# Patient Record
Sex: MALE | Race: WHITE | NOT HISPANIC OR LATINO | Employment: OTHER | RURAL
[De-identification: names, ages, dates, MRNs, and addresses within clinical notes are randomized per-mention and may not be internally consistent; named-entity substitution may affect disease eponyms.]

---

## 2019-02-22 ENCOUNTER — HISTORICAL (OUTPATIENT)
Dept: ADMINISTRATIVE | Facility: HOSPITAL | Age: 79
End: 2019-02-22

## 2019-02-25 LAB
LAB AP CLINICAL INFORMATION: NORMAL
LAB AP DIAGNOSIS - HISTORICAL: NORMAL
LAB AP GROSS PATHOLOGY - HISTORICAL: NORMAL
LAB AP SPECIMEN SUBMITTED - HISTORICAL: NORMAL

## 2019-04-23 ENCOUNTER — HISTORICAL (OUTPATIENT)
Dept: ADMINISTRATIVE | Facility: HOSPITAL | Age: 79
End: 2019-04-23

## 2019-05-03 ENCOUNTER — HISTORICAL (OUTPATIENT)
Dept: ADMINISTRATIVE | Facility: HOSPITAL | Age: 79
End: 2019-05-03

## 2019-05-13 LAB — CRC RECOMMENDATION EXT: NORMAL

## 2020-06-11 ENCOUNTER — HISTORICAL (OUTPATIENT)
Dept: ADMINISTRATIVE | Facility: HOSPITAL | Age: 80
End: 2020-06-11

## 2021-04-13 ENCOUNTER — OFFICE VISIT (OUTPATIENT)
Dept: UROLOGY | Facility: CLINIC | Age: 81
End: 2021-04-13
Payer: MEDICARE

## 2021-04-13 VITALS
WEIGHT: 180 LBS | SYSTOLIC BLOOD PRESSURE: 149 MMHG | BODY MASS INDEX: 28.93 KG/M2 | DIASTOLIC BLOOD PRESSURE: 77 MMHG | TEMPERATURE: 98 F | HEART RATE: 71 BPM | HEIGHT: 66 IN

## 2021-04-13 DIAGNOSIS — N41.1 CHRONIC PROSTATITIS: ICD-10-CM

## 2021-04-13 DIAGNOSIS — N40.1 BENIGN PROSTATIC HYPERPLASIA WITH NOCTURIA: ICD-10-CM

## 2021-04-13 DIAGNOSIS — R35.1 BENIGN PROSTATIC HYPERPLASIA WITH NOCTURIA: ICD-10-CM

## 2021-04-13 DIAGNOSIS — N48.29 INFLAMMATION OF PENIS: Primary | ICD-10-CM

## 2021-04-13 PROCEDURE — 99214 OFFICE O/P EST MOD 30 MIN: CPT | Mod: PBBFAC | Performed by: UROLOGY

## 2021-04-13 PROCEDURE — 99999 PR PBB SHADOW E&M-EST. PATIENT-LVL IV: CPT | Mod: PBBFAC,,, | Performed by: UROLOGY

## 2021-04-13 PROCEDURE — 99213 OFFICE O/P EST LOW 20 MIN: CPT | Mod: S$PBB,,, | Performed by: UROLOGY

## 2021-04-13 PROCEDURE — 99213 PR OFFICE/OUTPT VISIT, EST, LEVL III, 20-29 MIN: ICD-10-PCS | Mod: S$PBB,,, | Performed by: UROLOGY

## 2021-04-13 PROCEDURE — 99999 PR PBB SHADOW E&M-EST. PATIENT-LVL IV: ICD-10-PCS | Mod: PBBFAC,,, | Performed by: UROLOGY

## 2021-04-13 RX ORDER — ASPIRIN 81 MG/1
81 TABLET ORAL DAILY
Status: ON HOLD | COMMUNITY
End: 2021-12-31 | Stop reason: HOSPADM

## 2021-04-13 RX ORDER — ALPRAZOLAM 0.5 MG/1
0.5 TABLET ORAL DAILY PRN
COMMUNITY
Start: 2021-02-24 | End: 2021-07-11

## 2021-04-13 RX ORDER — IMIPRAMINE HYDROCHLORIDE 25 MG/1
25 TABLET, FILM COATED ORAL DAILY
COMMUNITY
Start: 2021-01-27 | End: 2021-07-11

## 2021-04-13 RX ORDER — CARVEDILOL 3.12 MG/1
3.12 TABLET ORAL 2 TIMES DAILY
Status: ON HOLD | COMMUNITY
Start: 2021-01-27 | End: 2022-01-07 | Stop reason: HOSPADM

## 2021-04-13 RX ORDER — NITROGLYCERIN 0.4 MG/1
TABLET SUBLINGUAL
Status: ON HOLD | COMMUNITY
Start: 2021-02-09 | End: 2022-01-13 | Stop reason: HOSPADM

## 2021-04-13 RX ORDER — CLOPIDOGREL BISULFATE 75 MG/1
75 TABLET ORAL DAILY
Status: ON HOLD | COMMUNITY
Start: 2021-01-17 | End: 2021-12-31 | Stop reason: HOSPADM

## 2021-04-13 RX ORDER — AMLODIPINE BESYLATE 5 MG/1
2.5 TABLET ORAL DAILY
Status: ON HOLD | COMMUNITY
Start: 2021-01-27 | End: 2022-01-07 | Stop reason: HOSPADM

## 2021-04-13 RX ORDER — TERAZOSIN 5 MG/1
5 CAPSULE ORAL DAILY
Status: ON HOLD | COMMUNITY
Start: 2021-01-17 | End: 2022-01-13 | Stop reason: HOSPADM

## 2021-04-13 RX ORDER — CLOTRIMAZOLE AND BETAMETHASONE DIPROPIONATE 10; .64 MG/G; MG/G
CREAM TOPICAL
Qty: 45 G | Refills: 5 | OUTPATIENT
Start: 2021-04-13 | End: 2021-07-11

## 2021-04-13 RX ORDER — ROSUVASTATIN CALCIUM 20 MG/1
20 TABLET, COATED ORAL DAILY
COMMUNITY
Start: 2021-01-17 | End: 2022-10-19 | Stop reason: SDUPTHER

## 2021-04-13 RX ORDER — PANTOPRAZOLE SODIUM 40 MG/1
40 TABLET, DELAYED RELEASE ORAL DAILY
COMMUNITY
Start: 2021-03-06 | End: 2022-04-19 | Stop reason: SDUPTHER

## 2021-04-17 PROBLEM — N41.1 CHRONIC PROSTATITIS: Status: ACTIVE | Noted: 2021-04-17

## 2021-04-17 PROBLEM — N48.29: Status: ACTIVE | Noted: 2021-04-17

## 2021-04-17 PROBLEM — N40.1 BENIGN PROSTATIC HYPERPLASIA WITH NOCTURIA: Status: ACTIVE | Noted: 2021-04-17

## 2021-04-17 PROBLEM — R35.1 BENIGN PROSTATIC HYPERPLASIA WITH NOCTURIA: Status: ACTIVE | Noted: 2021-04-17

## 2021-05-17 ENCOUNTER — TELEPHONE (OUTPATIENT)
Dept: FAMILY MEDICINE | Facility: CLINIC | Age: 81
End: 2021-05-17

## 2021-05-17 ENCOUNTER — OFFICE VISIT (OUTPATIENT)
Dept: FAMILY MEDICINE | Facility: CLINIC | Age: 81
End: 2021-05-17
Payer: MEDICARE

## 2021-05-17 VITALS
SYSTOLIC BLOOD PRESSURE: 118 MMHG | WEIGHT: 179 LBS | BODY MASS INDEX: 28.77 KG/M2 | OXYGEN SATURATION: 98 % | RESPIRATION RATE: 16 BRPM | HEIGHT: 66 IN | HEART RATE: 65 BPM | DIASTOLIC BLOOD PRESSURE: 56 MMHG | TEMPERATURE: 98 F

## 2021-05-17 DIAGNOSIS — B86 SCABIES: Primary | ICD-10-CM

## 2021-05-17 DIAGNOSIS — L08.9 STAPHYLOCOCCAL INFECTION OF SKIN: ICD-10-CM

## 2021-05-17 DIAGNOSIS — B95.8 STAPHYLOCOCCAL INFECTION OF SKIN: ICD-10-CM

## 2021-05-17 DIAGNOSIS — T78.40XA ALLERGIC REACTION, INITIAL ENCOUNTER: ICD-10-CM

## 2021-05-17 PROCEDURE — 96372 PR INJECTION,THERAP/PROPH/DIAG2ST, IM OR SUBCUT: ICD-10-PCS | Mod: ,,, | Performed by: FAMILY MEDICINE

## 2021-05-17 PROCEDURE — 99214 PR OFFICE/OUTPT VISIT, EST, LEVL IV, 30-39 MIN: ICD-10-PCS | Mod: 25,,, | Performed by: FAMILY MEDICINE

## 2021-05-17 PROCEDURE — 99214 OFFICE O/P EST MOD 30 MIN: CPT | Mod: 25,,, | Performed by: FAMILY MEDICINE

## 2021-05-17 PROCEDURE — 96372 THER/PROPH/DIAG INJ SC/IM: CPT | Mod: ,,, | Performed by: FAMILY MEDICINE

## 2021-05-17 RX ORDER — DEXAMETHASONE SODIUM PHOSPHATE 4 MG/ML
4 INJECTION, SOLUTION INTRA-ARTICULAR; INTRALESIONAL; INTRAMUSCULAR; INTRAVENOUS; SOFT TISSUE
Status: COMPLETED | OUTPATIENT
Start: 2021-05-17 | End: 2021-05-17

## 2021-05-17 RX ORDER — METHYLPREDNISOLONE ACETATE 40 MG/ML
40 INJECTION, SUSPENSION INTRA-ARTICULAR; INTRALESIONAL; INTRAMUSCULAR; SOFT TISSUE
Status: COMPLETED | OUTPATIENT
Start: 2021-05-17 | End: 2021-05-17

## 2021-05-17 RX ORDER — HYDROXYZINE HYDROCHLORIDE 25 MG/1
25 TABLET, FILM COATED ORAL 3 TIMES DAILY PRN
Qty: 35 TABLET | Refills: 0 | Status: SHIPPED | OUTPATIENT
Start: 2021-05-17 | End: 2021-05-17

## 2021-05-17 RX ORDER — SULFAMETHOXAZOLE AND TRIMETHOPRIM 800; 160 MG/1; MG/1
1 TABLET ORAL 2 TIMES DAILY
Qty: 20 TABLET | Refills: 0 | Status: SHIPPED | OUTPATIENT
Start: 2021-05-17 | End: 2021-05-17

## 2021-05-17 RX ORDER — HYDROXYZINE HYDROCHLORIDE 25 MG/1
25 TABLET, FILM COATED ORAL 3 TIMES DAILY PRN
Qty: 35 TABLET | Refills: 0 | Status: SHIPPED | OUTPATIENT
Start: 2021-05-17 | End: 2021-06-14 | Stop reason: SDUPTHER

## 2021-05-17 RX ORDER — SULFAMETHOXAZOLE AND TRIMETHOPRIM 800; 160 MG/1; MG/1
1 TABLET ORAL 2 TIMES DAILY
Qty: 20 TABLET | Refills: 0 | Status: SHIPPED | OUTPATIENT
Start: 2021-05-17 | End: 2021-05-27

## 2021-05-17 RX ADMIN — METHYLPREDNISOLONE ACETATE 40 MG: 40 INJECTION, SUSPENSION INTRA-ARTICULAR; INTRALESIONAL; INTRAMUSCULAR; SOFT TISSUE at 02:05

## 2021-05-17 RX ADMIN — DEXAMETHASONE SODIUM PHOSPHATE 4 MG: 4 INJECTION, SOLUTION INTRA-ARTICULAR; INTRALESIONAL; INTRAMUSCULAR; INTRAVENOUS; SOFT TISSUE at 02:05

## 2021-06-02 PROBLEM — M17.11 ARTHRITIS OF RIGHT KNEE: Status: ACTIVE | Noted: 2021-06-02

## 2021-06-14 ENCOUNTER — OFFICE VISIT (OUTPATIENT)
Dept: FAMILY MEDICINE | Facility: CLINIC | Age: 81
End: 2021-06-14
Payer: MEDICARE

## 2021-06-14 ENCOUNTER — OFFICE VISIT (OUTPATIENT)
Dept: UROLOGY | Facility: CLINIC | Age: 81
End: 2021-06-14
Payer: MEDICARE

## 2021-06-14 ENCOUNTER — OFFICE VISIT (OUTPATIENT)
Dept: DERMATOLOGY | Facility: CLINIC | Age: 81
End: 2021-06-14
Payer: MEDICARE

## 2021-06-14 ENCOUNTER — HOSPITAL ENCOUNTER (OUTPATIENT)
Dept: RADIOLOGY | Facility: HOSPITAL | Age: 81
Discharge: HOME | End: 2021-06-14
Attending: UROLOGY
Payer: MEDICARE

## 2021-06-14 VITALS
WEIGHT: 174 LBS | DIASTOLIC BLOOD PRESSURE: 76 MMHG | HEIGHT: 66 IN | BODY MASS INDEX: 27.97 KG/M2 | SYSTOLIC BLOOD PRESSURE: 136 MMHG | HEART RATE: 77 BPM

## 2021-06-14 VITALS — RESPIRATION RATE: 16 BRPM | HEIGHT: 66 IN | WEIGHT: 175 LBS | BODY MASS INDEX: 28.12 KG/M2

## 2021-06-14 VITALS
HEART RATE: 92 BPM | WEIGHT: 175.19 LBS | BODY MASS INDEX: 28.15 KG/M2 | OXYGEN SATURATION: 98 % | TEMPERATURE: 99 F | DIASTOLIC BLOOD PRESSURE: 78 MMHG | HEIGHT: 66 IN | RESPIRATION RATE: 20 BRPM | SYSTOLIC BLOOD PRESSURE: 136 MMHG

## 2021-06-14 DIAGNOSIS — N47.6 BALANOPOSTHITIS: ICD-10-CM

## 2021-06-14 DIAGNOSIS — L73.9 FOLLICULITIS: Primary | ICD-10-CM

## 2021-06-14 DIAGNOSIS — N20.0 KIDNEY STONE: ICD-10-CM

## 2021-06-14 DIAGNOSIS — B86 SCABIES: Primary | ICD-10-CM

## 2021-06-14 DIAGNOSIS — N20.0 KIDNEY STONE: Primary | ICD-10-CM

## 2021-06-14 DIAGNOSIS — T78.40XA ALLERGIC REACTION, INITIAL ENCOUNTER: ICD-10-CM

## 2021-06-14 DIAGNOSIS — B86 SCABIES: ICD-10-CM

## 2021-06-14 PROCEDURE — 87077 CULTURE, WOUND: ICD-10-PCS | Mod: ,,, | Performed by: CLINICAL MEDICAL LABORATORY

## 2021-06-14 PROCEDURE — 99213 PR OFFICE/OUTPT VISIT, EST, LEVL III, 20-29 MIN: ICD-10-PCS | Mod: S$PBB,,, | Performed by: UROLOGY

## 2021-06-14 PROCEDURE — 99213 OFFICE O/P EST LOW 20 MIN: CPT | Mod: S$PBB,,, | Performed by: UROLOGY

## 2021-06-14 PROCEDURE — 99214 OFFICE O/P EST MOD 30 MIN: CPT | Mod: ,,, | Performed by: FAMILY MEDICINE

## 2021-06-14 PROCEDURE — 87077 CULTURE AEROBIC IDENTIFY: CPT | Mod: ,,, | Performed by: CLINICAL MEDICAL LABORATORY

## 2021-06-14 PROCEDURE — 87070 CULTURE, WOUND: ICD-10-PCS | Mod: ,,, | Performed by: CLINICAL MEDICAL LABORATORY

## 2021-06-14 PROCEDURE — 99203 OFFICE O/P NEW LOW 30 MIN: CPT | Mod: ,,, | Performed by: DERMATOLOGY

## 2021-06-14 PROCEDURE — 74018 RADEX ABDOMEN 1 VIEW: CPT | Mod: 26,,, | Performed by: RADIOLOGY

## 2021-06-14 PROCEDURE — 99203 PR OFFICE/OUTPT VISIT, NEW, LEVL III, 30-44 MIN: ICD-10-PCS | Mod: ,,, | Performed by: DERMATOLOGY

## 2021-06-14 PROCEDURE — 74018 XR KUB: ICD-10-PCS | Mod: 26,,, | Performed by: RADIOLOGY

## 2021-06-14 PROCEDURE — 87070 CULTURE OTHR SPECIMN AEROBIC: CPT | Mod: ,,, | Performed by: CLINICAL MEDICAL LABORATORY

## 2021-06-14 PROCEDURE — 87186 CULTURE, WOUND: ICD-10-PCS | Mod: ,,, | Performed by: CLINICAL MEDICAL LABORATORY

## 2021-06-14 PROCEDURE — 87186 SC STD MICRODIL/AGAR DIL: CPT | Mod: ,,, | Performed by: CLINICAL MEDICAL LABORATORY

## 2021-06-14 PROCEDURE — 99214 OFFICE O/P EST MOD 30 MIN: CPT | Mod: PBBFAC,25 | Performed by: UROLOGY

## 2021-06-14 PROCEDURE — 99214 PR OFFICE/OUTPT VISIT, EST, LEVL IV, 30-39 MIN: ICD-10-PCS | Mod: ,,, | Performed by: FAMILY MEDICINE

## 2021-06-14 PROCEDURE — 74018 RADEX ABDOMEN 1 VIEW: CPT | Mod: TC

## 2021-06-14 RX ORDER — GABAPENTIN 100 MG/1
CAPSULE ORAL
COMMUNITY
Start: 2021-06-09 | End: 2021-07-11

## 2021-06-14 RX ORDER — PERMETHRIN 50 MG/G
CREAM TOPICAL
Status: ON HOLD | COMMUNITY
Start: 2021-05-17 | End: 2022-01-13 | Stop reason: HOSPADM

## 2021-06-15 RX ORDER — DOXYCYCLINE 100 MG/1
CAPSULE ORAL
Qty: 60 CAPSULE | Refills: 0 | OUTPATIENT
Start: 2021-06-15 | End: 2021-07-11

## 2021-06-15 RX ORDER — CLINDAMYCIN PHOSPHATE 10 UG/ML
LOTION TOPICAL
Qty: 60 ML | Refills: 2 | OUTPATIENT
Start: 2021-06-15 | End: 2021-07-11

## 2021-06-15 RX ORDER — HYDROXYZINE HYDROCHLORIDE 25 MG/1
25 TABLET, FILM COATED ORAL NIGHTLY PRN
Qty: 30 TABLET | Refills: 0 | OUTPATIENT
Start: 2021-06-15 | End: 2021-07-11

## 2021-06-16 LAB — MICROORGANISM SPEC CULT: ABNORMAL

## 2021-06-25 ENCOUNTER — OFFICE VISIT (OUTPATIENT)
Dept: FAMILY MEDICINE | Facility: CLINIC | Age: 81
End: 2021-06-25
Payer: MEDICARE

## 2021-06-25 VITALS
HEART RATE: 68 BPM | BODY MASS INDEX: 28.26 KG/M2 | WEIGHT: 175.81 LBS | RESPIRATION RATE: 20 BRPM | HEIGHT: 66 IN | DIASTOLIC BLOOD PRESSURE: 68 MMHG | SYSTOLIC BLOOD PRESSURE: 120 MMHG | TEMPERATURE: 98 F | OXYGEN SATURATION: 99 %

## 2021-06-25 DIAGNOSIS — Z23 ENCOUNTER FOR IMMUNIZATION: ICD-10-CM

## 2021-06-25 DIAGNOSIS — Z12.5 ENCOUNTER FOR SCREENING FOR MALIGNANT NEOPLASM OF PROSTATE: ICD-10-CM

## 2021-06-25 DIAGNOSIS — Z00.00 ENCOUNTER FOR SUBSEQUENT ANNUAL WELLNESS VISIT (AWV) IN MEDICARE PATIENT: Primary | ICD-10-CM

## 2021-06-25 PROCEDURE — G0444 DEPRESSION SCREEN ANNUAL: HCPCS | Mod: ,,, | Performed by: NURSE PRACTITIONER

## 2021-06-25 PROCEDURE — 90471 PR IMMUNIZ ADMIN,1 SINGLE/COMB VAC/TOXOID: ICD-10-PCS | Mod: ,,, | Performed by: NURSE PRACTITIONER

## 2021-06-25 PROCEDURE — 1036F TOBACCO NON-USER: CPT | Mod: ,,, | Performed by: NURSE PRACTITIONER

## 2021-06-25 PROCEDURE — 1036F PR CURRENT TOBACCO NON-USER: ICD-10-PCS | Mod: ,,, | Performed by: NURSE PRACTITIONER

## 2021-06-25 PROCEDURE — 3048F LDL-C <100 MG/DL: CPT | Mod: ,,, | Performed by: NURSE PRACTITIONER

## 2021-06-25 PROCEDURE — 90715 TDAP VACCINE 7 YRS/> IM: CPT | Mod: ,,, | Performed by: NURSE PRACTITIONER

## 2021-06-25 PROCEDURE — G0439 PR MEDICARE ANNUAL WELLNESS SUBSEQUENT VISIT: ICD-10-PCS | Mod: ,,, | Performed by: NURSE PRACTITIONER

## 2021-06-25 PROCEDURE — 3048F PR MOST RECENT LDL-C < 100 MG/DL: ICD-10-PCS | Mod: ,,, | Performed by: NURSE PRACTITIONER

## 2021-06-25 PROCEDURE — 90715 PR TDAP VACCINE >7 YO, IM: ICD-10-PCS | Mod: ,,, | Performed by: NURSE PRACTITIONER

## 2021-06-25 PROCEDURE — G0439 PPPS, SUBSEQ VISIT: HCPCS | Mod: ,,, | Performed by: NURSE PRACTITIONER

## 2021-06-25 PROCEDURE — G0444 PR DEPRESSION SCREENING: ICD-10-PCS | Mod: ,,, | Performed by: NURSE PRACTITIONER

## 2021-06-25 PROCEDURE — 90471 IMMUNIZATION ADMIN: CPT | Mod: ,,, | Performed by: NURSE PRACTITIONER

## 2021-07-11 ENCOUNTER — HOSPITAL ENCOUNTER (EMERGENCY)
Facility: HOSPITAL | Age: 81
Discharge: HOME | End: 2021-07-11
Payer: MEDICARE

## 2021-07-11 VITALS
BODY MASS INDEX: 27.32 KG/M2 | WEIGHT: 170 LBS | RESPIRATION RATE: 20 BRPM | TEMPERATURE: 98 F | HEIGHT: 66 IN | OXYGEN SATURATION: 99 % | HEART RATE: 56 BPM | SYSTOLIC BLOOD PRESSURE: 109 MMHG | DIASTOLIC BLOOD PRESSURE: 73 MMHG

## 2021-07-11 DIAGNOSIS — M25.521 RIGHT ELBOW PAIN: Primary | ICD-10-CM

## 2021-07-11 PROCEDURE — 99283 EMERGENCY DEPT VISIT LOW MDM: CPT | Mod: GF | Performed by: NURSE PRACTITIONER

## 2021-07-11 PROCEDURE — 63600175 PHARM REV CODE 636 W HCPCS: Performed by: NURSE PRACTITIONER

## 2021-07-11 PROCEDURE — 96372 THER/PROPH/DIAG INJ SC/IM: CPT

## 2021-07-11 PROCEDURE — 99284 EMERGENCY DEPT VISIT MOD MDM: CPT

## 2021-07-11 RX ORDER — KETOROLAC TROMETHAMINE 30 MG/ML
30 INJECTION, SOLUTION INTRAMUSCULAR; INTRAVENOUS
Status: COMPLETED | OUTPATIENT
Start: 2021-07-11 | End: 2021-07-11

## 2021-07-11 RX ORDER — HYDROCODONE BITARTRATE AND ACETAMINOPHEN 5; 325 MG/1; MG/1
1 TABLET ORAL EVERY 6 HOURS PRN
Qty: 12 TABLET | Refills: 0 | Status: SHIPPED | OUTPATIENT
Start: 2021-07-11 | End: 2021-07-14

## 2021-07-11 RX ADMIN — KETOROLAC TROMETHAMINE 30 MG: 30 INJECTION, SOLUTION INTRAMUSCULAR at 01:07

## 2021-07-12 ENCOUNTER — TELEPHONE (OUTPATIENT)
Dept: EMERGENCY MEDICINE | Facility: HOSPITAL | Age: 81
End: 2021-07-12

## 2021-07-14 ENCOUNTER — OFFICE VISIT (OUTPATIENT)
Dept: DERMATOLOGY | Facility: CLINIC | Age: 81
End: 2021-07-14
Payer: MEDICARE

## 2021-07-14 VITALS — WEIGHT: 170 LBS | RESPIRATION RATE: 18 BRPM | HEIGHT: 66 IN | BODY MASS INDEX: 27.32 KG/M2

## 2021-07-14 DIAGNOSIS — L73.9 FOLLICULITIS: Primary | ICD-10-CM

## 2021-07-14 PROBLEM — S52.124A CLOSED NONDISPLACED FRACTURE OF HEAD OF RIGHT RADIUS: Status: ACTIVE | Noted: 2021-07-14

## 2021-07-14 PROCEDURE — 99213 OFFICE O/P EST LOW 20 MIN: CPT | Mod: ,,, | Performed by: DERMATOLOGY

## 2021-07-14 PROCEDURE — 99213 PR OFFICE/OUTPT VISIT, EST, LEVL III, 20-29 MIN: ICD-10-PCS | Mod: ,,, | Performed by: DERMATOLOGY

## 2021-07-23 DIAGNOSIS — L73.9 FOLLICULITIS: Primary | ICD-10-CM

## 2021-07-25 RX ORDER — DOXYCYCLINE 100 MG/1
CAPSULE ORAL
Qty: 60 CAPSULE | Refills: 0 | Status: ON HOLD | OUTPATIENT
Start: 2021-07-25 | End: 2021-12-31 | Stop reason: HOSPADM

## 2021-07-28 ENCOUNTER — HOSPITAL ENCOUNTER (OUTPATIENT)
Dept: RADIOLOGY | Facility: HOSPITAL | Age: 81
Discharge: HOME | End: 2021-07-28
Attending: ORTHOPAEDIC SURGERY
Payer: MEDICARE

## 2021-07-28 DIAGNOSIS — S52.124D CLOSED NONDISPLACED FRACTURE OF HEAD OF RIGHT RADIUS WITH ROUTINE HEALING, SUBSEQUENT ENCOUNTER: ICD-10-CM

## 2021-07-28 PROCEDURE — 73080 X-RAY EXAM OF ELBOW: CPT | Mod: TC,RT

## 2021-08-11 ENCOUNTER — OFFICE VISIT (OUTPATIENT)
Dept: FAMILY MEDICINE | Facility: CLINIC | Age: 81
End: 2021-08-11
Payer: MEDICARE

## 2021-08-11 VITALS
WEIGHT: 181.19 LBS | RESPIRATION RATE: 20 BRPM | TEMPERATURE: 98 F | DIASTOLIC BLOOD PRESSURE: 68 MMHG | HEART RATE: 69 BPM | HEIGHT: 64 IN | OXYGEN SATURATION: 97 % | SYSTOLIC BLOOD PRESSURE: 120 MMHG | BODY MASS INDEX: 30.93 KG/M2

## 2021-08-11 DIAGNOSIS — L25.9 CONTACT DERMATITIS, UNSPECIFIED CONTACT DERMATITIS TYPE, UNSPECIFIED TRIGGER: Primary | ICD-10-CM

## 2021-08-11 PROCEDURE — 96372 THER/PROPH/DIAG INJ SC/IM: CPT | Mod: ,,, | Performed by: NURSE PRACTITIONER

## 2021-08-11 PROCEDURE — 99214 PR OFFICE/OUTPT VISIT, EST, LEVL IV, 30-39 MIN: ICD-10-PCS | Mod: 25,,, | Performed by: NURSE PRACTITIONER

## 2021-08-11 PROCEDURE — 99214 OFFICE O/P EST MOD 30 MIN: CPT | Mod: 25,,, | Performed by: NURSE PRACTITIONER

## 2021-08-11 PROCEDURE — 96372 PR INJECTION,THERAP/PROPH/DIAG2ST, IM OR SUBCUT: ICD-10-PCS | Mod: ,,, | Performed by: NURSE PRACTITIONER

## 2021-08-11 RX ORDER — PREDNISONE 10 MG/1
10 TABLET ORAL DAILY
Qty: 10 TABLET | Refills: 0 | Status: SHIPPED | OUTPATIENT
Start: 2021-08-11 | End: 2021-08-11 | Stop reason: SDUPTHER

## 2021-08-11 RX ORDER — CETIRIZINE HYDROCHLORIDE 10 MG/1
10 TABLET ORAL DAILY
Qty: 30 TABLET | Refills: 5 | Status: ON HOLD | OUTPATIENT
Start: 2021-08-11 | End: 2022-01-13 | Stop reason: HOSPADM

## 2021-08-11 RX ORDER — PREDNISONE 10 MG/1
10 TABLET ORAL DAILY
Qty: 10 TABLET | Refills: 0 | Status: ON HOLD | OUTPATIENT
Start: 2021-08-11 | End: 2022-01-07 | Stop reason: HOSPADM

## 2021-08-11 RX ORDER — CETIRIZINE HYDROCHLORIDE 10 MG/1
10 TABLET ORAL DAILY
Qty: 30 TABLET | Refills: 5 | Status: SHIPPED | OUTPATIENT
Start: 2021-08-11 | End: 2021-08-11 | Stop reason: SDUPTHER

## 2021-08-11 RX ORDER — GABAPENTIN 100 MG/1
100 CAPSULE ORAL DAILY
Status: ON HOLD | COMMUNITY
Start: 2021-07-17 | End: 2022-01-13 | Stop reason: HOSPADM

## 2021-08-11 RX ORDER — TRIAMCINOLONE ACETONIDE 1 MG/G
OINTMENT TOPICAL 2 TIMES DAILY
Qty: 454 G | Refills: 0 | Status: ON HOLD | OUTPATIENT
Start: 2021-08-11 | End: 2022-01-13 | Stop reason: HOSPADM

## 2021-08-11 RX ORDER — TRIAMCINOLONE ACETONIDE 1 MG/G
OINTMENT TOPICAL 2 TIMES DAILY
Qty: 454 G | Refills: 0 | Status: SHIPPED | OUTPATIENT
Start: 2021-08-11 | End: 2021-08-11 | Stop reason: SDUPTHER

## 2021-08-11 RX ORDER — DEXAMETHASONE SODIUM PHOSPHATE 4 MG/ML
6 INJECTION, SOLUTION INTRA-ARTICULAR; INTRALESIONAL; INTRAMUSCULAR; INTRAVENOUS; SOFT TISSUE ONCE
Status: COMPLETED | OUTPATIENT
Start: 2021-08-11 | End: 2021-08-11

## 2021-08-11 RX ADMIN — DEXAMETHASONE SODIUM PHOSPHATE 6 MG: 4 INJECTION, SOLUTION INTRA-ARTICULAR; INTRALESIONAL; INTRAMUSCULAR; INTRAVENOUS; SOFT TISSUE at 10:08

## 2021-08-12 PROCEDURE — 36415 COLL VENOUS BLD VENIPUNCTURE: CPT | Mod: ,,, | Performed by: CLINICAL MEDICAL LABORATORY

## 2021-08-12 PROCEDURE — 86003 ALLG SPEC IGE CRUDE XTRC EA: CPT | Mod: 90,GZ,, | Performed by: CLINICAL MEDICAL LABORATORY

## 2021-08-12 PROCEDURE — 86003 RESP PROFILE, REG SOUTH CENTRAL: ICD-10-PCS | Mod: 90,GZ,, | Performed by: CLINICAL MEDICAL LABORATORY

## 2021-08-12 PROCEDURE — 82785 ASSAY OF IGE: CPT | Mod: 90,GZ,, | Performed by: CLINICAL MEDICAL LABORATORY

## 2021-08-12 PROCEDURE — 36415 PR COLLECTION VENOUS BLOOD,VENIPUNCTURE: ICD-10-PCS | Mod: ,,, | Performed by: CLINICAL MEDICAL LABORATORY

## 2021-08-12 PROCEDURE — 82785 RESP PROFILE, REG SOUTH CENTRAL: ICD-10-PCS | Mod: 90,GZ,, | Performed by: CLINICAL MEDICAL LABORATORY

## 2021-08-13 LAB
A ALTERNATA IGE QN: <0.35 KU/L
A FUMIGATUS IGE QN: <0.35 KU/L
BERMUDA GRASS IGE QN: <0.35 KU/L
BOXELDER IGE QN: <0.35 KU/L
CALIF WALNUT IGE QN: <0.35 KU/L
CAT DANDER IGE QN: <0.35 KU/L
CLADOSPORIUM IGE QN: <0.35 KU/L
COMMON RAGWEED IGE QN: <0.35 KU/L
D FARINAE IGE QN: <0.35 KU/L
D PTERONYSS IGE QN: <0.35 KU/L
DEPRECATED IGE QN: <0.35 KU/L
DOG DANDER IGE QN: <0.35 KU/L
IGE SERPL-ACNC: 122 KU/L
MARSH ELDER IGE QN: <0.35 KU/L
MT JUNIPER IGE QN: <0.35 KU/L
P NOTATUM IGE QN: <0.35 KU/L
PECAN/HICK NUT IGE QN: <0.35 KU/L
ROACH IGE QN: <0.35 KU/L
SILVER BIRCH IGE QN: <0.35 KU/L
TIMOTHY IGE QN: <0.35 KU/L
WHITE ELM IGE QN: <0.35 KU/L
WHITE MULBERRY IGE QN: <0.35 KU/L
WHITE OAK IGE QN: <0.35 KU/L

## 2021-09-08 ENCOUNTER — HOSPITAL ENCOUNTER (OUTPATIENT)
Dept: RADIOLOGY | Facility: HOSPITAL | Age: 81
Discharge: HOME | End: 2021-09-08
Attending: ORTHOPAEDIC SURGERY
Payer: MEDICARE

## 2021-09-08 DIAGNOSIS — M25.561 ACUTE PAIN OF RIGHT KNEE: ICD-10-CM

## 2021-09-08 DIAGNOSIS — S52.124D CLOSED NONDISPLACED FRACTURE OF HEAD OF RIGHT RADIUS WITH ROUTINE HEALING, SUBSEQUENT ENCOUNTER: ICD-10-CM

## 2021-09-08 PROCEDURE — 73564 X-RAY EXAM KNEE 4 OR MORE: CPT | Mod: TC,RT

## 2021-09-08 PROCEDURE — 73080 X-RAY EXAM OF ELBOW: CPT | Mod: TC,RT

## 2021-10-18 ENCOUNTER — OFFICE VISIT (OUTPATIENT)
Dept: FAMILY MEDICINE | Facility: CLINIC | Age: 81
End: 2021-10-18
Payer: MEDICARE

## 2021-10-18 VITALS
HEART RATE: 58 BPM | DIASTOLIC BLOOD PRESSURE: 78 MMHG | TEMPERATURE: 98 F | SYSTOLIC BLOOD PRESSURE: 124 MMHG | WEIGHT: 181 LBS | BODY MASS INDEX: 31.07 KG/M2 | RESPIRATION RATE: 18 BRPM | OXYGEN SATURATION: 98 %

## 2021-10-18 DIAGNOSIS — I10 PRIMARY HYPERTENSION: Primary | ICD-10-CM

## 2021-10-18 DIAGNOSIS — L25.9 CONTACT DERMATITIS, UNSPECIFIED CONTACT DERMATITIS TYPE, UNSPECIFIED TRIGGER: ICD-10-CM

## 2021-10-18 DIAGNOSIS — Z23 IMMUNIZATION DUE: ICD-10-CM

## 2021-10-18 DIAGNOSIS — R97.20 ELEVATED PSA: ICD-10-CM

## 2021-10-18 DIAGNOSIS — N41.1 CHRONIC PROSTATITIS: ICD-10-CM

## 2021-10-18 PROCEDURE — G0008 FLU VACCINE - QUADRIVALENT - HIGH DOSE (65+) PRESERVATIVE FREE IM: ICD-10-PCS | Mod: ,,, | Performed by: NURSE PRACTITIONER

## 2021-10-18 PROCEDURE — 99214 OFFICE O/P EST MOD 30 MIN: CPT | Mod: ,,, | Performed by: NURSE PRACTITIONER

## 2021-10-18 PROCEDURE — 99214 PR OFFICE/OUTPT VISIT, EST, LEVL IV, 30-39 MIN: ICD-10-PCS | Mod: ,,, | Performed by: NURSE PRACTITIONER

## 2021-10-18 PROCEDURE — 90662 FLU VACCINE - QUADRIVALENT - HIGH DOSE (65+) PRESERVATIVE FREE IM: ICD-10-PCS | Mod: ,,, | Performed by: NURSE PRACTITIONER

## 2021-10-18 PROCEDURE — 90662 IIV NO PRSV INCREASED AG IM: CPT | Mod: ,,, | Performed by: NURSE PRACTITIONER

## 2021-10-18 PROCEDURE — G0008 ADMIN INFLUENZA VIRUS VAC: HCPCS | Mod: ,,, | Performed by: NURSE PRACTITIONER

## 2021-10-18 RX ORDER — MELOXICAM 7.5 MG/1
7.5 TABLET ORAL DAILY PRN
Qty: 90 TABLET | Refills: 1 | Status: ON HOLD | OUTPATIENT
Start: 2021-10-18 | End: 2022-01-07 | Stop reason: HOSPADM

## 2021-10-18 RX ORDER — MELOXICAM 7.5 MG/1
7.5 TABLET ORAL DAILY PRN
COMMUNITY
End: 2021-10-18 | Stop reason: SDUPTHER

## 2021-12-10 DIAGNOSIS — L25.9 CONTACT DERMATITIS, UNSPECIFIED CONTACT DERMATITIS TYPE, UNSPECIFIED TRIGGER: Primary | ICD-10-CM

## 2021-12-17 ENCOUNTER — HOSPITAL ENCOUNTER (OUTPATIENT)
Dept: RADIOLOGY | Facility: HOSPITAL | Age: 81
Discharge: HOME | End: 2021-12-17
Attending: ORTHOPAEDIC SURGERY
Payer: MEDICARE

## 2021-12-17 DIAGNOSIS — Z01.811 PRE-OPERATIVE RESPIRATORY EXAMINATION: ICD-10-CM

## 2021-12-17 PROCEDURE — 71046 X-RAY EXAM CHEST 2 VIEWS: CPT | Mod: 26,,,

## 2021-12-17 PROCEDURE — 71046 XR CHEST PA AND LATERAL: ICD-10-PCS | Mod: 26,,,

## 2021-12-17 PROCEDURE — 71046 X-RAY EXAM CHEST 2 VIEWS: CPT | Mod: TC

## 2021-12-20 ENCOUNTER — OFFICE VISIT (OUTPATIENT)
Dept: SLEEP MEDICINE | Facility: CLINIC | Age: 81
End: 2021-12-20
Attending: FAMILY MEDICINE
Payer: MEDICARE

## 2021-12-20 VITALS
HEART RATE: 63 BPM | SYSTOLIC BLOOD PRESSURE: 145 MMHG | DIASTOLIC BLOOD PRESSURE: 69 MMHG | HEIGHT: 66 IN | BODY MASS INDEX: 29.25 KG/M2 | WEIGHT: 182 LBS | OXYGEN SATURATION: 99 %

## 2021-12-20 DIAGNOSIS — G47.33 OSA ON CPAP: Primary | ICD-10-CM

## 2021-12-20 PROCEDURE — 99999 PR STA SHADOW: ICD-10-PCS | Mod: PBBFAC,,, | Performed by: FAMILY MEDICINE

## 2021-12-20 PROCEDURE — 99214 OFFICE O/P EST MOD 30 MIN: CPT | Mod: PBBFAC | Performed by: FAMILY MEDICINE

## 2021-12-20 PROCEDURE — 99213 OFFICE O/P EST LOW 20 MIN: CPT | Mod: S$PBB | Performed by: FAMILY MEDICINE

## 2021-12-20 PROCEDURE — 99999 PR STA SHADOW: CPT | Mod: PBBFAC,,, | Performed by: FAMILY MEDICINE

## 2021-12-28 ENCOUNTER — ANESTHESIA EVENT (OUTPATIENT)
Dept: SURGERY | Facility: HOSPITAL | Age: 81
DRG: 377 | End: 2021-12-28
Payer: MEDICARE

## 2021-12-28 ENCOUNTER — ANESTHESIA (OUTPATIENT)
Dept: SURGERY | Facility: HOSPITAL | Age: 81
DRG: 377 | End: 2021-12-28
Payer: MEDICARE

## 2021-12-28 PROBLEM — E78.5 DYSLIPIDEMIA: Status: ACTIVE | Noted: 2021-12-28

## 2021-12-28 PROBLEM — Z95.820 S/P ANGIOPLASTY WITH STENT: Status: RESOLVED | Noted: 2021-12-28 | Resolved: 2021-12-28

## 2021-12-28 PROBLEM — K21.9 GERD (GASTROESOPHAGEAL REFLUX DISEASE): Status: ACTIVE | Noted: 2021-12-28

## 2021-12-28 PROBLEM — Z95.5 H/O HEART ARTERY STENT: Status: ACTIVE | Noted: 2021-12-28

## 2021-12-28 PROBLEM — Z95.820 S/P ANGIOPLASTY WITH STENT: Status: ACTIVE | Noted: 2021-12-28

## 2021-12-28 PROCEDURE — D9220A PRA ANESTHESIA: Mod: ANES,ICN,, | Performed by: ANESTHESIOLOGY

## 2021-12-28 PROCEDURE — 64447 PERIPHERAL BLOCK: ICD-10-PCS | Mod: XU,RT,, | Performed by: ANESTHESIOLOGY

## 2021-12-28 PROCEDURE — 27201960 HC SPINAL TRAY: Performed by: ANESTHESIOLOGY

## 2021-12-28 PROCEDURE — 27000260 *HC AIRWAY ORAL: Performed by: ANESTHESIOLOGY

## 2021-12-28 PROCEDURE — 27200750 HC INSULATED NEEDLE/ STIMUPLEX: Performed by: ANESTHESIOLOGY

## 2021-12-28 PROCEDURE — 63600175 PHARM REV CODE 636 W HCPCS: Performed by: ANESTHESIOLOGY

## 2021-12-28 PROCEDURE — 63600175 PHARM REV CODE 636 W HCPCS: Performed by: NURSE ANESTHETIST, CERTIFIED REGISTERED

## 2021-12-28 PROCEDURE — 25000003 PHARM REV CODE 250: Performed by: ORTHOPAEDIC SURGERY

## 2021-12-28 PROCEDURE — D9220A PRA ANESTHESIA: ICD-10-PCS | Mod: CRNA,,, | Performed by: NURSE ANESTHETIST, CERTIFIED REGISTERED

## 2021-12-28 PROCEDURE — D9220A PRA ANESTHESIA: ICD-10-PCS | Mod: ANES,ICN,, | Performed by: ANESTHESIOLOGY

## 2021-12-28 PROCEDURE — 63600175 PHARM REV CODE 636 W HCPCS: Performed by: ORTHOPAEDIC SURGERY

## 2021-12-28 PROCEDURE — 27000716 HC OXISENSOR PROBE, ANY SIZE: Performed by: ANESTHESIOLOGY

## 2021-12-28 PROCEDURE — 27000510 HC BLANKET BAIR HUGGER ANY SIZE: Performed by: ANESTHESIOLOGY

## 2021-12-28 PROCEDURE — 25000003 PHARM REV CODE 250: Performed by: NURSE ANESTHETIST, CERTIFIED REGISTERED

## 2021-12-28 PROCEDURE — 27000177 HC AIRWAY, LARYNGEAL MASK: Performed by: ANESTHESIOLOGY

## 2021-12-28 PROCEDURE — 27000655: Performed by: ANESTHESIOLOGY

## 2021-12-28 PROCEDURE — 27000284 HC CANNULA NASAL: Performed by: ANESTHESIOLOGY

## 2021-12-28 PROCEDURE — 64447 NJX AA&/STRD FEMORAL NRV IMG: CPT | Mod: XU,RT,, | Performed by: ANESTHESIOLOGY

## 2021-12-28 PROCEDURE — D9220A PRA ANESTHESIA: Mod: CRNA,,, | Performed by: NURSE ANESTHETIST, CERTIFIED REGISTERED

## 2021-12-28 RX ORDER — EPHEDRINE SULFATE 50 MG/ML
INJECTION, SOLUTION INTRAVENOUS
Status: DISCONTINUED | OUTPATIENT
Start: 2021-12-28 | End: 2021-12-28

## 2021-12-28 RX ORDER — LIDOCAINE HYDROCHLORIDE 20 MG/ML
INJECTION, SOLUTION EPIDURAL; INFILTRATION; INTRACAUDAL; PERINEURAL
Status: DISCONTINUED | OUTPATIENT
Start: 2021-12-28 | End: 2021-12-28

## 2021-12-28 RX ORDER — GLYCOPYRROLATE 0.2 MG/ML
INJECTION INTRAMUSCULAR; INTRAVENOUS
Status: DISCONTINUED | OUTPATIENT
Start: 2021-12-28 | End: 2021-12-28

## 2021-12-28 RX ORDER — ONDANSETRON 2 MG/ML
INJECTION INTRAMUSCULAR; INTRAVENOUS
Status: DISCONTINUED | OUTPATIENT
Start: 2021-12-28 | End: 2021-12-28

## 2021-12-28 RX ORDER — CEFAZOLIN SODIUM 1 G/3ML
INJECTION, POWDER, FOR SOLUTION INTRAMUSCULAR; INTRAVENOUS
Status: DISCONTINUED | OUTPATIENT
Start: 2021-12-28 | End: 2021-12-28

## 2021-12-28 RX ORDER — MIDAZOLAM HYDROCHLORIDE 5 MG/ML
INJECTION INTRAMUSCULAR; INTRAVENOUS
Status: DISCONTINUED | OUTPATIENT
Start: 2021-12-28 | End: 2021-12-28

## 2021-12-28 RX ORDER — PROPOFOL 10 MG/ML
VIAL (ML) INTRAVENOUS
Status: DISCONTINUED | OUTPATIENT
Start: 2021-12-28 | End: 2021-12-28

## 2021-12-28 RX ADMIN — EPHEDRINE SULFATE 10 MG: 50 INJECTION INTRAVENOUS at 01:12

## 2021-12-28 RX ADMIN — MIDAZOLAM HYDROCHLORIDE 2 MG: 5 INJECTION, SOLUTION INTRAMUSCULAR; INTRAVENOUS at 01:12

## 2021-12-28 RX ADMIN — CEFAZOLIN 2000 MG: 1 INJECTION, POWDER, FOR SOLUTION INTRAMUSCULAR; INTRAVENOUS; PARENTERAL at 01:12

## 2021-12-28 RX ADMIN — GLYCOPYRROLATE 0.2 MG: 0.2 INJECTION INTRAMUSCULAR; INTRAVENOUS at 02:12

## 2021-12-28 RX ADMIN — PROPOFOL 50 MG: 10 INJECTION, EMULSION INTRAVENOUS at 01:12

## 2021-12-28 RX ADMIN — ONDANSETRON 8 MG: 2 INJECTION INTRAMUSCULAR; INTRAVENOUS at 01:12

## 2021-12-28 RX ADMIN — LIDOCAINE HYDROCHLORIDE 60 MG: 20 INJECTION, SOLUTION EPIDURAL; INFILTRATION; INTRACAUDAL; PERINEURAL at 01:12

## 2021-12-28 RX ADMIN — ROPIVACAINE HYDROCHLORIDE 30 ML: 7.5 INJECTION, SOLUTION EPIDURAL; PERINEURAL at 01:12

## 2021-12-28 RX ADMIN — VANCOMYCIN HYDROCHLORIDE 1000 MG: 1 INJECTION, POWDER, LYOPHILIZED, FOR SOLUTION INTRAVENOUS at 01:12

## 2021-12-29 ENCOUNTER — HOSPITAL ENCOUNTER (INPATIENT)
Facility: HOSPITAL | Age: 81
LOS: 2 days | Discharge: ANOTHER HOSPITAL | DRG: 556 | End: 2021-12-31
Attending: FAMILY MEDICINE | Admitting: FAMILY MEDICINE
Payer: MEDICARE

## 2021-12-29 DIAGNOSIS — M62.81 MUSCLE WEAKNESS (GENERALIZED): ICD-10-CM

## 2021-12-29 DIAGNOSIS — K92.1 GASTROINTESTINAL HEMORRHAGE WITH MELENA: Primary | ICD-10-CM

## 2021-12-29 DIAGNOSIS — R53.1 ASTHENIA: ICD-10-CM

## 2021-12-29 LAB
BILIRUB UR QL STRIP: NEGATIVE
CLARITY UR: CLEAR
COLOR UR: YELLOW
GLUCOSE UR STRIP-MCNC: NEGATIVE MG/DL
KETONES UR STRIP-SCNC: NEGATIVE MG/DL
LEUKOCYTE ESTERASE UR QL STRIP: NEGATIVE
NITRITE UR QL STRIP: NEGATIVE
PH UR STRIP: 6 PH UNITS
PROT UR QL STRIP: NEGATIVE
RBC # UR STRIP: NEGATIVE /UL
SP GR UR STRIP: 1.01
UROBILINOGEN UR STRIP-ACNC: 0.2 MG/DL

## 2021-12-29 PROCEDURE — 11000004 HC SNF PRIVATE

## 2021-12-29 PROCEDURE — 25000003 PHARM REV CODE 250: Performed by: NURSE PRACTITIONER

## 2021-12-29 PROCEDURE — 81003 URINALYSIS AUTO W/O SCOPE: CPT

## 2021-12-29 RX ORDER — HYDROCODONE BITARTRATE AND ACETAMINOPHEN 10; 325 MG/1; MG/1
1 TABLET ORAL EVERY 4 HOURS PRN
Status: DISCONTINUED | OUTPATIENT
Start: 2021-12-29 | End: 2021-12-31 | Stop reason: HOSPADM

## 2021-12-29 RX ORDER — TALC
6 POWDER (GRAM) TOPICAL NIGHTLY PRN
Status: DISCONTINUED | OUTPATIENT
Start: 2021-12-29 | End: 2021-12-31 | Stop reason: HOSPADM

## 2021-12-29 RX ORDER — GABAPENTIN 300 MG/1
300 CAPSULE ORAL DAILY
Status: DISCONTINUED | OUTPATIENT
Start: 2021-12-30 | End: 2021-12-31 | Stop reason: HOSPADM

## 2021-12-29 RX ORDER — CARVEDILOL 3.12 MG/1
3.12 TABLET ORAL 2 TIMES DAILY
Status: DISCONTINUED | OUTPATIENT
Start: 2021-12-29 | End: 2021-12-31 | Stop reason: HOSPADM

## 2021-12-29 RX ORDER — AMLODIPINE BESYLATE 2.5 MG/1
2.5 TABLET ORAL DAILY
Status: DISCONTINUED | OUTPATIENT
Start: 2021-12-30 | End: 2021-12-31 | Stop reason: HOSPADM

## 2021-12-29 RX ORDER — ATORVASTATIN CALCIUM 10 MG/1
10 TABLET, FILM COATED ORAL NIGHTLY
Status: DISCONTINUED | OUTPATIENT
Start: 2021-12-29 | End: 2021-12-31 | Stop reason: HOSPADM

## 2021-12-29 RX ORDER — CLOPIDOGREL BISULFATE 75 MG/1
75 TABLET ORAL DAILY
Status: DISCONTINUED | OUTPATIENT
Start: 2021-12-30 | End: 2021-12-31

## 2021-12-29 RX ORDER — PANTOPRAZOLE SODIUM 40 MG/1
40 TABLET, DELAYED RELEASE ORAL DAILY
Status: DISCONTINUED | OUTPATIENT
Start: 2021-12-30 | End: 2021-12-31

## 2021-12-29 RX ORDER — TAMSULOSIN HYDROCHLORIDE 0.4 MG/1
0.4 CAPSULE ORAL DAILY
Status: DISCONTINUED | OUTPATIENT
Start: 2021-12-30 | End: 2021-12-31 | Stop reason: HOSPADM

## 2021-12-29 RX ORDER — ACETAMINOPHEN 325 MG/1
650 TABLET ORAL EVERY 6 HOURS PRN
Status: DISCONTINUED | OUTPATIENT
Start: 2021-12-29 | End: 2021-12-31 | Stop reason: HOSPADM

## 2021-12-29 RX ORDER — ASPIRIN 81 MG/1
81 TABLET ORAL DAILY
Status: DISCONTINUED | OUTPATIENT
Start: 2021-12-30 | End: 2021-12-31

## 2021-12-29 RX ORDER — ROPIVACAINE HYDROCHLORIDE 7.5 MG/ML
INJECTION, SOLUTION EPIDURAL; PERINEURAL
Status: DISCONTINUED | OUTPATIENT
Start: 2021-12-28 | End: 2021-12-29

## 2021-12-29 RX ADMIN — HYDROCODONE BITARTRATE AND ACETAMINOPHEN 1 TABLET: 10; 325 TABLET ORAL at 05:12

## 2021-12-29 RX ADMIN — APIXABAN 2.5 MG: 2.5 TABLET, FILM COATED ORAL at 08:12

## 2021-12-29 RX ADMIN — ATORVASTATIN CALCIUM 10 MG: 10 TABLET, FILM COATED ORAL at 08:12

## 2021-12-29 RX ADMIN — CARVEDILOL 3.12 MG: 3.12 TABLET, FILM COATED ORAL at 08:12

## 2021-12-29 RX ADMIN — HYDROCODONE BITARTRATE AND ACETAMINOPHEN 1 TABLET: 10; 325 TABLET ORAL at 09:12

## 2021-12-29 NOTE — PLAN OF CARE
Care plan reviewed  Problem: Adult Inpatient Plan of Care  Goal: Plan of Care Review  Outcome: Ongoing, Progressing     Problem: Adult Inpatient Plan of Care  Goal: Patient-Specific Goal (Individualized)  Outcome: Ongoing, Progressing     Problem: Adult Inpatient Plan of Care  Goal: Absence of Hospital-Acquired Illness or Injury  Outcome: Ongoing, Progressing     Problem: Adult Inpatient Plan of Care  Goal: Optimal Comfort and Wellbeing  Outcome: Ongoing, Progressing

## 2021-12-29 NOTE — NURSING
Patient received via wheelchair to room 203. Dressing noted to right knee. Dressing removed and pictures taken of surgical incisions. Sherwood noted intact with small amount of bloody drainage to old drain area. New dressing applied. Patient and family oriented to room. Instructed to call with needs. Verbalized understanding. Safety measures in place. Will continue to monitor.

## 2021-12-30 PROBLEM — M62.81 MUSCLE WEAKNESS (GENERALIZED): Status: ACTIVE | Noted: 2021-12-30

## 2021-12-30 PROBLEM — T14.8XXA SURGICAL WOUND PRESENT: Status: ACTIVE | Noted: 2021-12-30

## 2021-12-30 PROBLEM — Z91.89 AT RISK FOR VENOUS THROMBOEMBOLISM (VTE): Status: ACTIVE | Noted: 2021-12-30

## 2021-12-30 PROBLEM — M25.561 ACUTE PAIN OF RIGHT KNEE: Status: ACTIVE | Noted: 2021-12-30

## 2021-12-30 LAB
ANION GAP SERPL CALCULATED.3IONS-SCNC: 12 MMOL/L (ref 7–16)
BUN SERPL-MCNC: 16 MG/DL (ref 7–18)
BUN/CREAT SERPL: 17 (ref 6–20)
CALCIUM SERPL-MCNC: 7.7 MG/DL (ref 8.5–10.1)
CHLORIDE SERPL-SCNC: 99 MMOL/L (ref 98–107)
CO2 SERPL-SCNC: 26 MMOL/L (ref 21–32)
CREAT SERPL-MCNC: 0.95 MG/DL (ref 0.7–1.3)
DIFFERENTIAL METHOD BLD: ABNORMAL
EOSINOPHIL # BLD AUTO: 0.01 K/UL (ref 0–0.5)
EOSINOPHIL NFR BLD AUTO: 0 % (ref 1–4)
ERYTHROCYTE [DISTWIDTH] IN BLOOD BY AUTOMATED COUNT: 16 % (ref 11.5–14.5)
GLUCOSE SERPL-MCNC: 114 MG/DL (ref 74–106)
HCT VFR BLD AUTO: 23.2 % (ref 40–54)
HGB BLD-MCNC: 7.8 G/DL (ref 13.5–18)
LYMPHOCYTES # BLD AUTO: 2.33 K/UL (ref 1–4.8)
LYMPHOCYTES NFR BLD AUTO: 11.4 % (ref 27–41)
LYMPHOCYTES NFR BLD MANUAL: 10 % (ref 27–41)
MCH RBC QN AUTO: 29.1 PG (ref 27–31)
MCHC RBC AUTO-ENTMCNC: 33.6 G/DL (ref 32–36)
MCV RBC AUTO: 86.6 FL (ref 80–96)
MONOCYTES # BLD AUTO: 10.32 K/UL (ref 0–0.8)
MONOCYTES NFR BLD AUTO: 50.7 % (ref 2–6)
MONOCYTES NFR BLD MANUAL: 36 % (ref 2–6)
MPC BLD CALC-MCNC: 11.5 FL (ref 9.4–12.4)
NEUTS SEG NFR BLD MANUAL: 54 % (ref 50–62)
NRBC BLD MANUAL-RTO: ABNORMAL %
PLATELET # BLD AUTO: 82 K/UL (ref 150–400)
PLATELET MORPHOLOGY: ABNORMAL
POTASSIUM SERPL-SCNC: 3.9 MMOL/L (ref 3.5–5.1)
RBC # BLD AUTO: 2.68 M/UL (ref 4.6–6.2)
SODIUM SERPL-SCNC: 133 MMOL/L (ref 136–145)
WBC # BLD AUTO: 20.37 K/UL (ref 4.5–11)

## 2021-12-30 PROCEDURE — 80048 BASIC METABOLIC PNL TOTAL CA: CPT | Performed by: FAMILY MEDICINE

## 2021-12-30 PROCEDURE — 11000004 HC SNF PRIVATE

## 2021-12-30 PROCEDURE — 36415 COLL VENOUS BLD VENIPUNCTURE: CPT | Performed by: FAMILY MEDICINE

## 2021-12-30 PROCEDURE — 97162 PT EVAL MOD COMPLEX 30 MIN: CPT

## 2021-12-30 PROCEDURE — 25000003 PHARM REV CODE 250: Performed by: NURSE PRACTITIONER

## 2021-12-30 PROCEDURE — 85025 COMPLETE CBC W/AUTO DIFF WBC: CPT

## 2021-12-30 PROCEDURE — 36415 COLL VENOUS BLD VENIPUNCTURE: CPT

## 2021-12-30 PROCEDURE — 25000003 PHARM REV CODE 250: Performed by: FAMILY MEDICINE

## 2021-12-30 PROCEDURE — 97165 OT EVAL LOW COMPLEX 30 MIN: CPT

## 2021-12-30 PROCEDURE — 94761 N-INVAS EAR/PLS OXIMETRY MLT: CPT

## 2021-12-30 PROCEDURE — 87040 BLOOD CULTURE FOR BACTERIA: CPT | Performed by: FAMILY MEDICINE

## 2021-12-30 RX ORDER — SYRING-NEEDL,DISP,INSUL,0.3 ML 29 G X1/2"
296 SYRINGE, EMPTY DISPOSABLE MISCELLANEOUS ONCE
Status: COMPLETED | OUTPATIENT
Start: 2021-12-30 | End: 2021-12-30

## 2021-12-30 RX ORDER — ONDANSETRON 4 MG/1
4 TABLET, FILM COATED ORAL EVERY 6 HOURS PRN
Status: DISCONTINUED | OUTPATIENT
Start: 2021-12-30 | End: 2021-12-31 | Stop reason: HOSPADM

## 2021-12-30 RX ORDER — LACTULOSE 10 G/15ML
10 SOLUTION ORAL 2 TIMES DAILY
Status: DISCONTINUED | OUTPATIENT
Start: 2021-12-30 | End: 2021-12-31 | Stop reason: HOSPADM

## 2021-12-30 RX ADMIN — HYDROCODONE BITARTRATE AND ACETAMINOPHEN 1 TABLET: 10; 325 TABLET ORAL at 06:12

## 2021-12-30 RX ADMIN — HYDROCODONE BITARTRATE AND ACETAMINOPHEN 1 TABLET: 10; 325 TABLET ORAL at 01:12

## 2021-12-30 RX ADMIN — HYDROCODONE BITARTRATE AND ACETAMINOPHEN 1 TABLET: 10; 325 TABLET ORAL at 03:12

## 2021-12-30 RX ADMIN — HYDROCODONE BITARTRATE AND ACETAMINOPHEN 1 TABLET: 10; 325 TABLET ORAL at 10:12

## 2021-12-30 RX ADMIN — TAMSULOSIN HYDROCHLORIDE 0.4 MG: 0.4 CAPSULE ORAL at 08:12

## 2021-12-30 RX ADMIN — ONDANSETRON HYDROCHLORIDE 4 MG: 4 TABLET, FILM COATED ORAL at 02:12

## 2021-12-30 RX ADMIN — Medication 296 ML: at 07:12

## 2021-12-30 RX ADMIN — LACTULOSE 10 G: 10 SOLUTION ORAL at 08:12

## 2021-12-30 RX ADMIN — GABAPENTIN 300 MG: 300 CAPSULE ORAL at 08:12

## 2021-12-30 RX ADMIN — CARVEDILOL 3.12 MG: 3.12 TABLET, FILM COATED ORAL at 08:12

## 2021-12-30 RX ADMIN — ATORVASTATIN CALCIUM 10 MG: 10 TABLET, FILM COATED ORAL at 08:12

## 2021-12-30 RX ADMIN — CLOPIDOGREL 75 MG: 75 TABLET, FILM COATED ORAL at 08:12

## 2021-12-30 RX ADMIN — AMLODIPINE BESYLATE 2.5 MG: 2.5 TABLET ORAL at 08:12

## 2021-12-30 RX ADMIN — ASPIRIN 81 MG: 81 TABLET, COATED ORAL at 08:12

## 2021-12-30 RX ADMIN — PANTOPRAZOLE SODIUM 40 MG: 40 TABLET, DELAYED RELEASE ORAL at 08:12

## 2021-12-30 RX ADMIN — APIXABAN 2.5 MG: 2.5 TABLET, FILM COATED ORAL at 08:12

## 2021-12-30 NOTE — PLAN OF CARE
Plan of care reviewed  Problem: Pain Acute  Goal: Acceptable Pain Control and Functional Ability  Outcome: Ongoing, Progressing     Problem: Adjustment to Surgery (Knee Arthroplasty)  Goal: Optimal Coping  Outcome: Ongoing, Progressing     Problem: Fall Injury Risk  Goal: Absence of Fall and Fall-Related Injury  Outcome: Ongoing, Progressing

## 2021-12-30 NOTE — SUBJECTIVE & OBJECTIVE
Past Medical History:   Diagnosis Date    Hyperlipidemia     Hypertension     Kidney stone     MI (myocardial infarction)     Sleep apnea     Urinary tract infection        Past Surgical History:   Procedure Laterality Date    CORONARY ANGIOPLASTY WITH STENT PLACEMENT      HERNIA REPAIR      JOINT REPLACEMENT      difficult to wake after surgery        Review of patient's allergies indicates:  No Known Allergies    Current Facility-Administered Medications on File Prior to Encounter   Medication    [DISCONTINUED] 0.9%  NaCl infusion    [DISCONTINUED] amLODIPine tablet 2.5 mg    [DISCONTINUED] apixaban tablet 2.5 mg    [DISCONTINUED] aspirin EC tablet 81 mg    [DISCONTINUED] atorvastatin tablet 10 mg    [DISCONTINUED] bisacodyL suppository 10 mg    [DISCONTINUED] carvediloL tablet 3.125 mg    [DISCONTINUED] clopidogreL tablet 75 mg    [DISCONTINUED] docusate sodium capsule 100 mg    [DISCONTINUED] gabapentin capsule 300 mg    [DISCONTINUED] HYDROcodone-acetaminophen 5-325 mg per tablet 1 tablet    [DISCONTINUED] morphine injection 4 mg    [DISCONTINUED] nitroGLYCERIN SL tablet 0.4 mg    [DISCONTINUED] ondansetron injection 4 mg    [DISCONTINUED] pantoprazole EC tablet 40 mg    [COMPLETED] ROPIvacaine (PF) 7.5 mg/mL (0.75 %) injection    [DISCONTINUED] tamsulosin 24 hr capsule 0.4 mg     Current Outpatient Medications on File Prior to Encounter   Medication Sig    amLODIPine (NORVASC) 5 MG tablet Take 2.5 mg by mouth once daily.    apixaban (ELIQUIS) 2.5 mg Tab Take 1 tablet (2.5 mg total) by mouth 2 (two) times daily. for 12 days    aspirin (ECOTRIN) 81 MG EC tablet Take 81 mg by mouth once daily.    carvediloL (COREG) 3.125 MG tablet Take 3.125 mg by mouth 2 (two) times a day.    cetirizine (ZYRTEC) 10 MG tablet Take 1 tablet (10 mg total) by mouth once daily.    clopidogreL (PLAVIX) 75 mg tablet Take 75 mg by mouth once daily.    cyclobenzaprine (FLEXERIL) 10 MG tablet TAKE 1 TABLET  BY MOUTH 3 TIMES DAILY AS NEEDED FOR MUSCLE SPASMS ..MAY CAUSE DROWSINESS ..NO ALCOHOL WHILE TAKING    gabapentin (NEURONTIN) 100 MG capsule 100 mg once daily. Prescribe 3x per day takes once daily    HYDROcodone-acetaminophen (NORCO)  mg per tablet Take 1 tablet by mouth every 4 (four) hours as needed for Pain.    imipramine (TOFRANIL) 25 MG tablet     meloxicam (MOBIC) 7.5 MG tablet Take 1 tablet (7.5 mg total) by mouth daily as needed for Pain.    nitroGLYCERIN (NITROSTAT) 0.4 MG SL tablet     pantoprazole (PROTONIX) 40 MG tablet Take 40 mg by mouth once daily.    permethrin (ELIMITE) 5 % cream     predniSONE (DELTASONE) 10 MG tablet Take 1 tablet (10 mg total) by mouth once daily.    rosuvastatin (CRESTOR) 20 MG tablet Take 20 mg by mouth once daily.    terazosin (HYTRIN) 5 MG capsule Take 5 mg by mouth once daily.    triamcinolone acetonide 0.1% (KENALOG) 0.1 % ointment Apply topically 2 (two) times daily.    amoxicillin-clavulanate 500-125mg (AUGMENTIN) 500-125 mg Tab TAKE 1 TABLET BY MOUTH 3 TIMES DAILY WITH FOOD UNTIL GONE (DRINK PLENTY OF WATER)    doxycycline (VIBRA-TABS) 100 MG tablet     doxycycline (VIBRAMYCIN) 100 MG Cap Take one 100mg capsule PO BID. DO NOT TAKE WITH DAIRY PRODUCTS    [DISCONTINUED] ALPRAZolam (XANAX) 0.5 MG tablet Take 0.5 mg by mouth daily as needed.    [DISCONTINUED] clindamycin (CLEOCIN T) 1 % lotion Apply to back, chest, and neck twice a day (Patient not taking: Reported on 6/25/2021)     Family History     Problem Relation (Age of Onset)    Cancer Mother        Tobacco Use    Smoking status: Former Smoker    Smokeless tobacco: Never Used    Tobacco comment: QUIT 50 +YEARS AGO   Substance and Sexual Activity    Alcohol use: Not Currently     Comment: QUIT 50 +YEARS AGO     Drug use: Never    Sexual activity: Not Currently     Review of Systems   Constitutional: Negative for chills and fever.   HENT: Negative for congestion and sore throat.     Respiratory: Negative for cough, chest tightness, shortness of breath and wheezing.    Cardiovascular: Negative for chest pain and palpitations.   Gastrointestinal: Negative for abdominal pain, constipation, diarrhea, nausea and vomiting.   Genitourinary: Negative for dysuria.   Musculoskeletal: Positive for gait problem.   Skin: Positive for wound. Negative for rash.   Neurological: Positive for weakness (generalized). Negative for headaches.   Psychiatric/Behavioral: Negative for confusion.   All other systems reviewed and are negative.    Objective:     Vital Signs (Most Recent):  Temp: 98.9 °F (37.2 °C) (12/29/21 2000)  Pulse: 99 (12/29/21 2000)  Resp: 20 (12/30/21 0607)  BP: (!) 141/68 (12/29/21 2000)  SpO2: 96 % (12/29/21 2000) Vital Signs (24h Range):  Temp:  [97.8 °F (36.6 °C)-98.9 °F (37.2 °C)] 98.9 °F (37.2 °C)  Pulse:  [66-99] 99  Resp:  [16-28] 20  SpO2:  [96 %-97 %] 96 %  BP: (130-148)/(59-68) 141/68     Weight: 85.5 kg (188 lb 9.6 oz)  Body mass index is 30.44 kg/m².    Physical Exam  Vitals reviewed.   Constitutional:       General: He is not in acute distress.     Appearance: He is not ill-appearing, toxic-appearing or diaphoretic.   HENT:      Head: Normocephalic and atraumatic.      Right Ear: External ear normal.      Left Ear: External ear normal.      Nose: Nose normal.   Eyes:      General: No scleral icterus.        Right eye: No discharge.         Left eye: No discharge.      Extraocular Movements: Extraocular movements intact.      Conjunctiva/sclera: Conjunctivae normal.      Pupils:      Right eye: Pupil is reactive.      Left eye: Pupil is reactive.   Cardiovascular:      Rate and Rhythm: Normal rate and regular rhythm.      Pulses: Normal pulses.      Heart sounds: Normal heart sounds. No murmur heard.  No friction rub. No gallop.    Pulmonary:      Effort: Pulmonary effort is normal. No respiratory distress.      Breath sounds: No stridor. No wheezing, rhonchi or rales.   Chest:       Chest wall: No tenderness.   Abdominal:      Palpations: Abdomen is soft.      Tenderness: There is no abdominal tenderness. There is no guarding.   Musculoskeletal:      Right knee: No deformity. No tenderness.      Right lower leg: No edema.      Left lower leg: No edema.        Legs:    Skin:     General: Skin is warm and dry.   Neurological:      General: No focal deficit present.      Mental Status: He is alert and oriented to person, place, and time.      Sensory: No sensory deficit.      Motor: Weakness (generalized) present.   Psychiatric:         Mood and Affect: Mood normal.             Significant Labs:   All pertinent labs within the past 24 hours have been reviewed.  BMP:   Recent Labs   Lab 12/30/21  0603   *   *   K 3.9   CL 99   CO2 26   BUN 16   CREATININE 0.95   CALCIUM 7.7*     CBC:   Recent Labs   Lab 12/28/21  1637 12/28/21  1637 12/28/21  2249 12/29/21  0330 12/30/21  0445   WBC 3.08*  --   --  7.63 20.37*   HGB 11.8*   < > 10.6* 10.0* 7.8*   HCT 35.2*   < > 33.3* 29.5* 23.2*   PLT 89*  --   --  88* 82*    < > = values in this interval not displayed.     Urine Studies:   Recent Labs   Lab 12/29/21 2013   COLORU Yellow   APPEARANCEUA Clear   PHUR 6.0   SPECGRAV 1.010   PROTEINUA Negative   GLUCUA Negative   KETONESU Negative   BILIRUBINUA Negative   OCCULTUA Negative   NITRITE Negative   UROBILINOGEN 0.2   LEUKOCYTESUR Negative       Significant Imaging:

## 2021-12-30 NOTE — ASSESSMENT & PLAN NOTE
- Dressing to be changed on post op day #3  - Apply Aquacel AG dressing. Repeat dressing change in 7 days. Notify physician of any wound drainage. D/c staples in 2 weeks from surgery and steri strip incicision

## 2021-12-30 NOTE — HPI
Mr. Peters is an 81 yo WM s/p RTKR by Dr. Epps on 12/28/2021 who is admitted to [facility] on 12/29/21 for generalized muscle weakness. Patient's PMH includes HTN, HLD, cardiac stent, and sleep apnea. According to records he tolerated the procedure w/o complications. He was evaluated by PT/OT at [facility] and is to be weight bearing as tolerated to [facility]. Patient has hx of LTKR and suffered abdominal pain and constipation following procedure. He denies any abdominal pain at this time. Last BM was 12/27/21. H&H on admit to [facility] 7.8/23.2. Patient with H&H 10/29.5 on 12/29 prior to d/c from [facility]. He reports pain to E. States he took one Norco on the morning of 12/29 prior to transport to [facility] this evening. Pain was well controlled following procedure with current regimen. He denies any chest pain or shortness of breath. His wife is at bedside. They have met with [facility] and goal is to be discharged back home with wife and HH. He is to remain on Eliquis for DVT ppx.     Full Code  VTE - Eliquis/PATRIC/early ambulation

## 2021-12-30 NOTE — H&P
[facility] Surgical [facility] Medicine  History & Physical    Patient Name: Elliott Peters  MRN: [id number]  Patient Class: - Swing  Admission Date: 12/29/2021  Attending Physician: Juan Kirkpatrick DO   Primary Care Provider: Izzy Olivier NP         Patient information was obtained from patient, spouse/SO and past medical records.     Subjective:     Principal Problem:Muscle weakness (generalized)    Chief Complaint: No chief complaint on file.       HPI: Mr. Peters is an 83 yo WM s/p RTKR by Dr. Epps on 12/28/2021 who is admitted to [facility] Bed on 12/29/21 for generalized muscle weakness. Patient's PMH includes HTN, HLD, cardiac stent, and sleep apnea. According to records he tolerated the procedure w/o complications. He was evaluated by PT/OT at [facility] and is to be weight bearing as tolerated to [facility]. Patient has hx of LTKR and suffered abdominal pain and constipation following procedure. He denies any abdominal pain at this time. Last BM was 12/27/21. H&H on admit to [facility] 7.8/23.2. Patient with H&H 10/29.5 on 12/29 prior to d/c from [facility]. He reports pain to [facility]. States he took one Norco on the morning of 12/29 prior to transport to [facility] this evening. Pain was well controlled following procedure with current regimen. He denies any chest pain or shortness of breath. His wife is at bedside. They have met with [facility] and goal is to be discharged back home with wife and HH. He is to remain on Eliquis for DVT ppx.     Full Code  VTE - Eliquis/PATRIC/early ambulation      Past Medical History:   Diagnosis Date    Hyperlipidemia     Hypertension     Kidney stone     MI (myocardial infarction)     Sleep apnea     Urinary tract infection        Past Surgical History:   Procedure Laterality Date    CORONARY ANGIOPLASTY WITH STENT PLACEMENT      HERNIA REPAIR      JOINT REPLACEMENT      difficult to wake after surgery        Review of patient's allergies indicates:  No Known Allergies    Current  Facility-Administered Medications on File Prior to Encounter   Medication    [DISCONTINUED] 0.9%  NaCl infusion    [DISCONTINUED] amLODIPine tablet 2.5 mg    [DISCONTINUED] apixaban tablet 2.5 mg    [DISCONTINUED] aspirin EC tablet 81 mg    [DISCONTINUED] atorvastatin tablet 10 mg    [DISCONTINUED] bisacodyL suppository 10 mg    [DISCONTINUED] carvediloL tablet 3.125 mg    [DISCONTINUED] clopidogreL tablet 75 mg    [DISCONTINUED] docusate sodium capsule 100 mg    [DISCONTINUED] gabapentin capsule 300 mg    [DISCONTINUED] HYDROcodone-acetaminophen 5-325 mg per tablet 1 tablet    [DISCONTINUED] morphine injection 4 mg    [DISCONTINUED] nitroGLYCERIN SL tablet 0.4 mg    [DISCONTINUED] ondansetron injection 4 mg    [DISCONTINUED] pantoprazole EC tablet 40 mg    [COMPLETED] ROPIvacaine (PF) 7.5 mg/mL (0.75 %) injection    [DISCONTINUED] tamsulosin 24 hr capsule 0.4 mg     Current Outpatient Medications on File Prior to Encounter   Medication Sig    amLODIPine (NORVASC) 5 MG tablet Take 2.5 mg by mouth once daily.    apixaban (ELIQUIS) 2.5 mg Tab Take 1 tablet (2.5 mg total) by mouth 2 (two) times daily. for 12 days    aspirin (ECOTRIN) 81 MG EC tablet Take 81 mg by mouth once daily.    carvediloL (COREG) 3.125 MG tablet Take 3.125 mg by mouth 2 (two) times a day.    cetirizine (ZYRTEC) 10 MG tablet Take 1 tablet (10 mg total) by mouth once daily.    clopidogreL (PLAVIX) 75 mg tablet Take 75 mg by mouth once daily.    cyclobenzaprine (FLEXERIL) 10 MG tablet TAKE 1 TABLET BY MOUTH 3 TIMES DAILY AS NEEDED FOR MUSCLE SPASMS ..MAY CAUSE DROWSINESS ..NO ALCOHOL WHILE TAKING    gabapentin (NEURONTIN) 100 MG capsule 100 mg once daily. Prescribe 3x per day takes once daily    HYDROcodone-acetaminophen (NORCO)  mg per tablet Take 1 tablet by mouth every 4 (four) hours as needed for Pain.    imipramine (TOFRANIL) 25 MG tablet     meloxicam (MOBIC) 7.5 MG tablet Take 1 tablet (7.5 mg total) by mouth daily as needed for Pain.     nitroGLYCERIN (NITROSTAT) 0.4 MG SL tablet     pantoprazole (PROTONIX) 40 MG tablet Take 40 mg by mouth once daily.    permethrin (ELIMITE) 5 % cream     predniSONE (DELTASONE) 10 MG tablet Take 1 tablet (10 mg total) by mouth once daily.    rosuvastatin (CRESTOR) 20 MG tablet Take 20 mg by mouth once daily.    terazosin (HYTRIN) 5 MG capsule Take 5 mg by mouth once daily.    triamcinolone acetonide 0.1% (KENALOG) 0.1 % ointment Apply topically 2 (two) times daily.    amoxicillin-clavulanate 500-125mg (AUGMENTIN) 500-125 mg Tab TAKE 1 TABLET BY MOUTH 3 TIMES DAILY WITH FOOD UNTIL GONE (DRINK PLENTY OF WATER)    doxycycline (VIBRA-TABS) 100 MG tablet     doxycycline (VIBRAMYCIN) 100 MG Cap Take one 100mg capsule PO BID. DO NOT TAKE WITH DAIRY PRODUCTS    [DISCONTINUED] ALPRAZolam (XANAX) 0.5 MG tablet Take 0.5 mg by mouth daily as needed.    [DISCONTINUED] clindamycin (CLEOCIN T) 1 % lotion Apply to back, chest, and neck twice a day (Patient not taking: Reported on 6/25/2021)     Family History       Problem Relation (Age of Onset)    Cancer Mother          Tobacco Use    Smoking status: Former Smoker    Smokeless tobacco: Never Used    Tobacco comment: QUIT 50 +YEARS AGO   Substance and Sexual Activity    Alcohol use: Not Currently     Comment: QUIT 50 +YEARS AGO     Drug use: Never    Sexual activity: Not Currently     Review of Systems   Constitutional: Negative for chills and fever.   HENT: Negative for congestion and sore throat.    Respiratory: Negative for cough, chest tightness, shortness of breath and wheezing.    Cardiovascular: Negative for chest pain and palpitations.   Gastrointestinal: Negative for abdominal pain, constipation, diarrhea, nausea and vomiting.   Genitourinary: Negative for dysuria.   Musculoskeletal: Positive for gait problem.   Skin: Positive for wound. Negative for rash.   Neurological: Positive for weakness (generalized). Negative for headaches.   Psychiatric/Behavioral: Negative  for confusion.   All other systems reviewed and are negative.    Objective:     Vital Signs (Most Recent):  Temp: 98.9 °F (37.2 °C) (12/29/21 2000)  Pulse: 99 (12/29/21 2000)  Resp: 20 (12/30/21 0607)  BP: (!) 141/68 (12/29/21 2000)  SpO2: 96 % (12/29/21 2000) Vital Signs (24h Range):  Temp:  [97.8 °F (36.6 °C)-98.9 °F (37.2 °C)] 98.9 °F (37.2 °C)  Pulse:  [66-99] 99  Resp:  [16-28] 20  SpO2:  [96 %-97 %] 96 %  BP: (130-148)/(59-68) 141/68     Weight: 85.5 kg (188 lb 9.6 oz)  Body mass index is 30.44 kg/m².    Physical Exam  Vitals reviewed.   Constitutional:       General: He is not in acute distress.     Appearance: He is not ill-appearing, toxic-appearing or diaphoretic.   HENT:      Head: Normocephalic and atraumatic.      Right Ear: External ear normal.      Left Ear: External ear normal.      Nose: Nose normal.   Eyes:      General: No scleral icterus.        Right eye: No discharge.         Left eye: No discharge.      Extraocular Movements: Extraocular movements intact.      Conjunctiva/sclera: Conjunctivae normal.      Pupils:      Right eye: Pupil is reactive.      Left eye: Pupil is reactive.   Cardiovascular:      Rate and Rhythm: Normal rate and regular rhythm.      Pulses: Normal pulses.      Heart sounds: Normal heart sounds. No murmur heard.  No friction rub. No gallop.    Pulmonary:      Effort: Pulmonary effort is normal. No respiratory distress.      Breath sounds: No stridor. No wheezing, rhonchi or rales.   Chest:      Chest wall: No tenderness.   Abdominal:      Palpations: Abdomen is soft.      Tenderness: There is no abdominal tenderness. There is no guarding.   Musculoskeletal:      Right knee: No deformity. No tenderness.      Right lower leg: No edema.      Left lower leg: No edema.        Legs:    Skin:     General: Skin is warm and dry.   Neurological:      General: No focal deficit present.      Mental Status: He is alert and oriented to person, place, and time.      Sensory: No sensory  deficit.      Motor: Weakness (generalized) present.   Psychiatric:         Mood and Affect: Mood normal.             Significant Labs:   All pertinent labs within the past 24 hours have been reviewed.  BMP:   Recent Labs   Lab 12/30/21  0603   *   *   K 3.9   CL 99   CO2 26   BUN 16   CREATININE 0.95   CALCIUM 7.7*     CBC:   Recent Labs   Lab 12/28/21  1637 12/28/21  1637 12/28/21  2249 12/29/21  0330 12/30/21  0445   WBC 3.08*  --   --  7.63 20.37*   HGB 11.8*   < > 10.6* 10.0* 7.8*   HCT 35.2*   < > 33.3* 29.5* 23.2*   PLT 89*  --   --  88* 82*    < > = values in this interval not displayed.     Urine Studies:   Recent Labs   Lab 12/29/21 2013   COLORU Yellow   APPEARANCEUA Clear   PHUR 6.0   SPECGRAV 1.010   PROTEINUA Negative   GLUCUA Negative   KETONESU Negative   BILIRUBINUA Negative   OCCULTUA Negative   NITRITE Negative   UROBILINOGEN 0.2   LEUKOCYTESUR Negative       Significant Imaging:     Assessment/Plan:     * Muscle weakness (generalized)  - s/p RTKR  - PT/OT eval and treat  - weight bearing RLE as tolerated      Surgical wound present  - Dressing to be changed on post op day #3  - Apply Aquacel AG dressing. Repeat dressing change in 7 days. Notify physician of any wound drainage. D/c staples in 2 weeks from surgery and steri strip incicision      Hypertension  - Amlodipine 2.5 mg daily   - Carvedilol 3.125 mg BID  - 141/68      At risk for venous thromboembolism (VTE)  - Eliquis 2.5 mg BID  - PATRIC  - Early ambulation        Acute pain of right knee  - Norco  mg q4h PRN      Hyperlipidemia  - Atorvastatin 10 mg nightly      VTE Risk Mitigation (From admission, onward)           Ordered     apixaban tablet 2.5 mg  2 times daily         12/29/21 1710                  I discussed this patient with the nurse practitioner.  I have personally evaluated the patient and performed a physical exam.  I have reviewed the nurse practitioner's documentation.  I agree with nurse practitioner's  assessment and plan.        NICOLÁS Lei  Department of Hospital Medicine   [facility] - Medical Surgical Unit

## 2021-12-30 NOTE — PLAN OF CARE
Problem: Occupational Therapy Goal  Goal: Occupational Therapy Goal  Description: Goals to be met by: Discharge     Patient will increase functional independence with ADLs by performing:    LE Dressing with Stand-by Assistance.  Toileting from toilet with Stand-by Assistance for hygiene and clothing management.   Bathing from  edge of bed with Stand-by Assistance.  Toilet transfer to toilet with Stand-by Assistance.  Increased functional strength to 4/5 for increased safety and independence with self care and mobility tasks.    Outcome: Ongoing, Progressing

## 2021-12-30 NOTE — PT/OT/SLP EVAL
Physical Therapy Evaluation    Patient Name:  Elliott Peters   MRN:  [id number]    Recommendations:     Discharge Recommendations:  home with home health,home health PT   Discharge Equipment Recommendations: bedside commode,walker, rolling   Barriers to discharge: None    Assessment:     Elliott Peters is a 81 y.o. male admitted with a medical diagnosis of Muscle weakness (generalized).  He presents with the following impairments/functional limitations:  weakness,gait instability,impaired endurance,impaired self care skills,impaired functional mobilty,impaired balance,pain,decreased lower extremity function .    Rehab Prognosis: Good; patient would benefit from acute skilled PT services to address these deficits and reach maximum level of function.    Recent Surgery: * No surgery found *      Plan:     During this hospitalization, patient to be seen BID to address the identified rehab impairments via gait training,therapeutic activities,therapeutic exercises,neuromuscular re-education and progress toward the following goals:    · Plan of Care Expires:       Subjective     Chief Complaint: Patient without complaints upon therapist arrival and is agreeable to treatment today.   Patient/Family Comments/goals:   Pain/Comfort:  · Pain Rating 1: 4/10  · Location - Side 1: Right  · Location 1: knee  · Pain Addressed 1: Distraction  · Pain Rating Post-Intervention 1: 4/10    Patients cultural, spiritual, Yarsanism conflicts given the current situation: no    Living Environment:  Patient lives with his wife in a single story home. No steps required for entry  Prior to admission, patients level of function was independent.  Equipment used at home: bedside commode,cane, straight.  DME owned (not currently used): none.  Upon discharge, patient will have assistance from his wife.    Objective:     Communicated with nursing staff prior to session.  Patient found supine with SCD  upon PT entry to room.    General Precautions:  Standard, fall   Orthopedic Precautions:RLE weight bearing as tolerated   Braces: N/A  Respiratory Status: Room air    Exams:  · Cognitive Exam:  Patient is oriented to Person, Place, Time and Situation  · RLE ROM: Deficits: PROM knee flexion 4-98  · RLE Strength: Deficits: 3-/5 gross  · LLE ROM: WFL  · LLE Strength: Deficits: 4/5 gross    Functional Mobility:  · Bed Mobility:     · Rolling Left:  minimum assistance  · Rolling Right: minimum assistance  · Supine to Sit: minimum assistance  · Transfers:     · Sit to Stand:  contact guard assistance with rolling walker  · Bed to Chair: minimum assistance with  rolling walker  using  Step Transfer  · Gait: 95 feet with use of front wheeled walker        AM-PAC 6 CLICK MOBILITY  Total Score:17     Patient left supine with call button in reach.    GOALS:   Multidisciplinary Problems     Physical Therapy Goals        Problem: Physical Therapy Goal    Goal Priority Disciplines Outcome Goal Variances Interventions   Physical Therapy Goal     PT, PT/OT Ongoing, Progressing     Description: Goals to be met by: Discharge     Patient will increase functional independence with mobility by performin. Supine to sit with [place]  2. Sit to stand transfer with [place]  3. Bed to chair transfer with [place] using Rolling Walker  4. Gait  x 300 feet with Modified [place] using Rolling Walker.                      History:     Past Medical History:   Diagnosis Date    Hyperlipidemia     Hypertension     Kidney stone     MI (myocardial infarction)     Sleep apnea     Urinary tract infection        Past Surgical History:   Procedure Laterality Date    CORONARY ANGIOPLASTY WITH STENT PLACEMENT      HERNIA REPAIR      JOINT REPLACEMENT      difficult to wake after surgery        Time Tracking:     PT Received On: 21  PT Start Time: 1000     PT Stop Time: 1030  PT Total Time (min): 30 min     Billable Minutes: Evaluation 30      2021

## 2021-12-30 NOTE — PLAN OF CARE
[facility] - Medical Surgical Unit  Initial Discharge Assessment       Primary Care Provider: Izzy Olivier NP    Admission Diagnosis: Asthenia [R53.1]    Admission Date: 12/29/2021  Expected Discharge Date:     Discharge Barriers Identified: None    Payor: MEDICARE / Plan: MEDICARE PART A & B / Product Type: Government /     Extended Emergency Contact Information  Primary Emergency Contact: Teresa Peters  Mobile Phone: [phone]  Relation: Spouse    Discharge Plan A: Home Health  Discharge Plan B: Home,Home Health      [facility] - [place], [place] - [address] AT [facility]  [address]  [facility] [address]  Phone: [phone] Fax: [phone]    [facility] - [place], [place] - [address]  [address]  [place] [address]  Phone: [phone] Fax: [phone]    [facility] - [place], [place] - [address]  [address]  [place] [address]  Phone: [phone] Fax: [phone]    [facility] [place] [place] - [address] [place] [address]  [address] [place] [address] [place] [address]  Phone: [phone] Fax: [phone]      Initial Assessment (most recent)     Adult Discharge Assessment - 12/30/21 1006        Discharge Assessment    Assessment Type Discharge Planning Assessment     Source of Information patient     Lives With spouse     Do you expect to return to your current living situation? Yes     Do you have help at home or someone to help you manage your care at home? Yes     Who are your caregiver(s) and their phone number(s)? teresa peters spouse [phone]     Equipment Currently Used at Home CPAP;bedside commode;cane, straight     Do you currently have service(s) that help you manage your care at home? No     Are you on dialysis? No     Discharge Plan A Home Health     Discharge Plan B Home;Home Health     DME Needed Upon Discharge  none     Discharge Plan discussed with: Patient     Discharge Barriers Identified None                Admitted to [facility] on 12/29/21 for PT/OT. Lives at home with wife, melissa, who assist with care. Pt is not current with hh. [facility] obtained for [facility]. Dme includes cane, bsc, and cpap. Pt will need a rw , choice obtained for the medical store. PCP LELA Olivier and Pharmacy [facility]. SS explained [facility]. Plan dc home with hh. SS following for dc needs.          [facility] - Medical Surgical Unit  Discharge Final Note    Primary Care Provider: Izzy Olivier NP    Expected Discharge Date:     Final Discharge Note (most recent)     Final Note - 12/30/21 1006        Final Note    Assessment Type Discharge Planning Assessment                 Important Message from Medicare

## 2021-12-30 NOTE — PLAN OF CARE
Problem: Physical Therapy Goal  Goal: Physical Therapy Goal  Description: Goals to be met by: Discharge     Patient will increase functional independence with mobility by performin. Supine to sit with [place]  2. Sit to stand transfer with [place]  3. Bed to chair transfer with [place] using Rolling Walker  4. Gait  x 300 feet with Modified [place] using Rolling Walker.     Outcome: Ongoing, Progressing

## 2021-12-30 NOTE — CONSULTS
"  [facility] - Medical Surgical Unit  Adult Nutrition  Consult Note    SUMMARY     Recommendations    Recommendation/Intervention: 1. Ensure Clear po BID.  RDN honored prefs.  Goals: Improve po intake  Nutrition Goal Status: new    Assessment and Plan    No new Assessment & Plan notes have been filed under this hospital service since the last note was generated.  Service: Nutrition       Malnutrition Assessment  Malnutrition Type:  (This patient is not malnourished,  suspect Alb 3.1 is inaccurate)                                    Reason for Assessment    Reason For Assessment: consult (swing bed patient)  Diagnosis:  (s/p R TKR, constipation post op)  Relevant Medical History: ROSI, HTN, HLD, UTI, CAD with stents  Interdisciplinary Rounds: did not attend  General Information Comments: RDN vsited pt today andpt has a hx of ? ileus post op with another surgery.  He feels full and bloated and has a poor appetite. He is taking Lactulouse and Mg Citrate.  RDN took prefs and communicated thetm to dietary dept.    Nutrition Risk Screen    Nutrition Risk Screen: no indicators present    Nutrition/Diet History    Patient Reported Diet/Restrictions/Preferences: general  Typical Food/Fluid Intake: ~25% since admit  Food Preferences: Communicated to dietary dept  Spiritual, Cultural Beliefs, Church Practices, Values that Affect Care: no  Food Allergies: NKFA  Factors Affecting Nutritional Intake: altered gastrointestinal function,abdominal distension,constipation,decreased appetite    Anthropometrics    Temp: 99.8 °F (37.7 °C)  Height Method: Stated  Height: 5' 6" (167.6 cm)  Height (inches): 66 in  Weight Method: Standard Scale  Weight: 86 kg (189 lb 9.5 oz)  Weight (lb): 189.6 lb  Ideal Body Weight (IBW), Male: 142 lb  % Ideal Body Weight, Male (lb): 133.52 %  BMI (Calculated): 30.6  BMI Grade: 30 - 34.9- obesity - grade I       Lab/Procedures/Meds    Pertinent Labs Reviewed: reviewed  Pertinent Labs Comments: B-178, Hgb " 7.8, Hct 23.2. Alb 3.1, Na 133, elevated WBC  Pertinent Medications Reviewed: reviewed  Pertinent Medications Comments: Lipitor, Lactulose, Mg Citrate, Protonix    Physical Findings/Assessment         Estimated/Assessed Needs    Weight Used For Calorie Calculations: 86 kg (189 lb 9.5 oz)  Energy Calorie Requirements (kcal): 2150  Energy Need Method: Kcal/kg  Protein Requirements: 86-95  Weight Used For Protein Calculations: 86 kg (189 lb 9.5 oz)     Estimated Fluid Requirement Method: RDA Method  RDA Method (mL): 2150         Nutrition Prescription Ordered    Current Diet Order: [facility] soft with chopped meats and gravy    Evaluation of Received Nutrient/Fluid Intake    Energy Calories Required: not meeting needs  Protein Required: not meeting needs  Fluid Required: not meeting needs  Tolerance:  (Intake is poor)  % Intake of Estimated Energy Needs: 25 - 50 %  % Meal Intake: 25 - 50 %    Nutrition Risk            Monitor and Evaluation    Food and Nutrient Intake: energy intake  Anthropometric Measurements: weight  Biochemical Data, Medical Tests and Procedures: electrolyte and renal panel,glucose/endocrine profile  Nutrition-Focused Physical Findings: overall appearance       Nutrition Follow-Up  ~ one week or as needed.

## 2021-12-30 NOTE — PT/OT/SLP EVAL
Occupational Therapy   Evaluation    Name: Elliott Peters  MRN: [id number]  Admitting Diagnosis:  Muscle weakness (generalized)  Recent Surgery: * No surgery found *      Recommendations:     Discharge Recommendations: home health OT,home with home health  Discharge Equipment Recommendations:  bedside commode,walker, rolling  Barriers to discharge:  None    Assessment:     Elliott Peters is a 81 y.o. male with a medical diagnosis of Muscle weakness (generalized).  He presents with generalized muscle weakness and a decline in functional abilities. Performance deficits affecting function: weakness,impaired endurance,impaired functional mobilty,impaired self care skills.      Rehab Prognosis: Good; patient would benefit from acute skilled OT services to address these deficits and reach maximum level of function.       Plan:     Patient to be seen 5 x/week to address the above listed problems via self-care/home management,therapeutic activities,therapeutic exercises,neuromuscular re-education  · Plan of Care Expires: 01/27/22  · Plan of Care Reviewed with: patient    Subjective     Chief Complaint: decreased functional independence   Patient/Family Comments/goals: return to PLOF    Occupational Profile:  Living Environment: lives at home with spouse  Previous level of function: Independent/Modified Independent   Roles and Routines: Retired  Equipment Used at Home:  bedside commode,cane, straight  Assistance upon Discharge: family will assist as needed upon discharge    Pain/Comfort:  · Pain Rating 1: 4/10  · Location - Side 1: Right  · Location 1: knee  · Pain Addressed 1: Nurse notified,Distraction,Cessation of Activity  · Pain Rating Post-Intervention 1: 4/10    Patients cultural, spiritual, Pentecostal conflicts given the current situation: no    Objective:     Communicated with: nurse prior to session.  Patient found supine with cryotherapy,SCD upon OT entry to room.    General Precautions: Standard, fall    Orthopedic Precautions:RLE weight bearing as tolerated   Braces: N/A  Respiratory Status: Room air    Occupational Performance:    Bed Mobility:    · Patient completed Rolling/Turning to Right with stand by assistance  · Patient completed Scooting/Bridging with stand by assistance and contact guard assistance  · Patient completed Supine to Sit with contact guard assistance and minimum assistance  · Patient completed Sit to Supine with minimum assistance    Functional Mobility/Transfers:  · Patient completed Sit <> Stand Transfer with contact guard assistance  with  rolling walker   · Patient completed Bed <> Chair Transfer using Step Transfer technique with contact guard assistance with rolling walker  · Patient completed Toilet Transfer Step Transfer technique with contact guard assistance with  rolling walker  · Functional Mobility: Patient performed functional mobility transfers in room; Patient ambulated in hallway.    Activities of Daily Living:  · Feeding:  independence and modified independence .  · Grooming: modified independence and stand by assistance .  · Bathing: contact guard assistance intermittent steadying assistance needed while standing to complete LB bathing tasks  · Upper Body Dressing: supervision /setup- Patient needs assistance obtaining items  · Lower Body Dressing: minimum assistance Patient requires assistance to thread pant legs, to omero/doff socks, and intermittent steadying assistance needed while donning/doffing garments over hips.   · Toileting: contact guard assistance .    Cognitive/Visual Perceptual:  Cognitive/Psychosocial Skills:     -       Oriented to: Person, Place, Time and Situation   -       Follows Commands/attention:Follows multistep  commands  -       Communication: clear/fluent  -       Memory: No Deficits noted  -       Safety awareness/insight to disability: intact   -       Mood/Affect/Coping skills/emotional control: Appropriate to situation    Physical  Exam:  Edema:  None noted  Sensation:    -       Intact  Dominant hand: -       right  Upper Extremity Range of Motion:  -       Right Upper Extremity: WFL  -       Left Upper Extremity: WFL  Upper Extremity Strength: -       Right Upper Extremity: +3/5  -       Left Upper Extremity: +3/5   Strength: -       Right Upper Extremity: WFL  -       Left Upper Extremity: WFL  Fine Motor Coordination: -       Intact  Gross motor coordination:   WFL    AMPAC 6 Click ADL:  AMPAC Total Score: 20    Treatment & Education: Supervisory visit conducted with Debbie ANDERSON re:POC and goals.   · Pt educated on OT role/POC.   · Importance of OOB activity with staff assistance.  · Importance of sitting up in the chair throughout the day as tolerated, especially for meals   · Safety during functional t/f and mobility with use of RW  · Importance of assisting with self-care activities   · All questions/concerns answered within OT scope of practice  Education:    Patient left HOB elevated with all lines intact and call button in reach    GOALS:   Multidisciplinary Problems     Occupational Therapy Goals        Problem: Occupational Therapy Goal    Goal Priority Disciplines Outcome Interventions   Occupational Therapy Goal     OT, PT/OT Ongoing, Progressing    Description: Goals to be met by: Discharge     Patient will increase functional independence with ADLs by performing:    LE Dressing with Stand-by Assistance.  Toileting from toilet with Stand-by Assistance for hygiene and clothing management.   Bathing from  edge of bed with Stand-by Assistance.  Toilet transfer to toilet with Stand-by Assistance.  Increased functional strength to 4/5 for increased safety and independence with self care and mobility tasks.                     History:     Past Medical History:   Diagnosis Date    Hyperlipidemia     Hypertension     Kidney stone     MI (myocardial infarction)     Sleep apnea     Urinary tract infection        Past  Surgical History:   Procedure Laterality Date    CORONARY ANGIOPLASTY WITH STENT PLACEMENT      HERNIA REPAIR      JOINT REPLACEMENT      difficult to wake after surgery        Time Tracking:     OT Date of Treatment: 12/30/21     Billable Minutes:Evaluation low complexity    12/30/2021

## 2021-12-31 ENCOUNTER — HOSPITAL ENCOUNTER (INPATIENT)
Facility: HOSPITAL | Age: 81
LOS: 7 days | Discharge: NURSING, REHAB OR LONG-TERM CARE | DRG: 377 | End: 2022-01-07
Attending: HOSPITALIST | Admitting: HOSPITALIST
Payer: MEDICARE

## 2021-12-31 VITALS
HEART RATE: 82 BPM | OXYGEN SATURATION: 90 % | TEMPERATURE: 99 F | SYSTOLIC BLOOD PRESSURE: 113 MMHG | WEIGHT: 189.63 LBS | DIASTOLIC BLOOD PRESSURE: 56 MMHG | RESPIRATION RATE: 18 BRPM | BODY MASS INDEX: 30.47 KG/M2 | HEIGHT: 66 IN

## 2021-12-31 DIAGNOSIS — T14.8XXA SURGICAL WOUND PRESENT: ICD-10-CM

## 2021-12-31 DIAGNOSIS — Z96.651 STATUS POST TOTAL RIGHT KNEE REPLACEMENT: ICD-10-CM

## 2021-12-31 DIAGNOSIS — E78.5 HYPERLIPIDEMIA, UNSPECIFIED HYPERLIPIDEMIA TYPE: ICD-10-CM

## 2021-12-31 DIAGNOSIS — B86 SCABIES: ICD-10-CM

## 2021-12-31 DIAGNOSIS — R53.1 ASTHENIA: ICD-10-CM

## 2021-12-31 DIAGNOSIS — B95.8 STAPHYLOCOCCAL INFECTION OF SKIN: ICD-10-CM

## 2021-12-31 DIAGNOSIS — K92.2 UGIB (UPPER GASTROINTESTINAL BLEED): ICD-10-CM

## 2021-12-31 DIAGNOSIS — K21.9 GASTROESOPHAGEAL REFLUX DISEASE, UNSPECIFIED WHETHER ESOPHAGITIS PRESENT: ICD-10-CM

## 2021-12-31 DIAGNOSIS — K92.1 GASTROINTESTINAL HEMORRHAGE WITH MELENA: ICD-10-CM

## 2021-12-31 DIAGNOSIS — L25.9 CONTACT DERMATITIS, UNSPECIFIED CONTACT DERMATITIS TYPE, UNSPECIFIED TRIGGER: ICD-10-CM

## 2021-12-31 DIAGNOSIS — K92.2 ACUTE GI BLEEDING: ICD-10-CM

## 2021-12-31 DIAGNOSIS — M25.561 ACUTE PAIN OF RIGHT KNEE: ICD-10-CM

## 2021-12-31 DIAGNOSIS — L08.9 STAPHYLOCOCCAL INFECTION OF SKIN: ICD-10-CM

## 2021-12-31 DIAGNOSIS — J18.9 PNEUMONIA OF LOWER LOBE DUE TO INFECTIOUS ORGANISM, UNSPECIFIED LATERALITY: Primary | ICD-10-CM

## 2021-12-31 DIAGNOSIS — M62.81 MUSCLE WEAKNESS (GENERALIZED): ICD-10-CM

## 2021-12-31 DIAGNOSIS — R97.20 ELEVATED PSA: ICD-10-CM

## 2021-12-31 DIAGNOSIS — T78.40XA ALLERGIC REACTION, INITIAL ENCOUNTER: ICD-10-CM

## 2021-12-31 DIAGNOSIS — Z91.89 AT RISK FOR VENOUS THROMBOEMBOLISM (VTE): ICD-10-CM

## 2021-12-31 DIAGNOSIS — R35.1 BENIGN PROSTATIC HYPERPLASIA WITH NOCTURIA: ICD-10-CM

## 2021-12-31 DIAGNOSIS — G47.33 OSA ON CPAP: ICD-10-CM

## 2021-12-31 DIAGNOSIS — N40.1 BENIGN PROSTATIC HYPERPLASIA WITH NOCTURIA: ICD-10-CM

## 2021-12-31 DIAGNOSIS — Z95.5 H/O HEART ARTERY STENT: ICD-10-CM

## 2021-12-31 DIAGNOSIS — D69.6 THROMBOCYTOPENIA: ICD-10-CM

## 2021-12-31 DIAGNOSIS — R14.0 ABDOMINAL DISTENSION (GASEOUS): ICD-10-CM

## 2021-12-31 DIAGNOSIS — M17.11 ARTHRITIS OF RIGHT KNEE: ICD-10-CM

## 2021-12-31 DIAGNOSIS — S52.124A CLOSED NONDISPLACED FRACTURE OF HEAD OF RIGHT RADIUS, INITIAL ENCOUNTER: ICD-10-CM

## 2021-12-31 DIAGNOSIS — N48.29 INFLAMMATION OF PENIS: ICD-10-CM

## 2021-12-31 DIAGNOSIS — I10 HYPERTENSION, UNSPECIFIED TYPE: ICD-10-CM

## 2021-12-31 DIAGNOSIS — D62 ACUTE BLOOD LOSS ANEMIA: ICD-10-CM

## 2021-12-31 DIAGNOSIS — N41.1 CHRONIC PROSTATITIS: ICD-10-CM

## 2021-12-31 DIAGNOSIS — D72.829 LEUKOCYTOSIS, UNSPECIFIED TYPE: ICD-10-CM

## 2021-12-31 DIAGNOSIS — R07.9 CHEST PAIN: ICD-10-CM

## 2021-12-31 LAB
ALBUMIN SERPL BCP-MCNC: 2.6 G/DL (ref 3.5–5)
ALBUMIN/GLOB SERPL: 0.9 {RATIO}
ALP SERPL-CCNC: 60 U/L (ref 45–115)
ALT SERPL W P-5'-P-CCNC: 16 U/L (ref 16–61)
ANION GAP SERPL CALCULATED.3IONS-SCNC: 9 MMOL/L (ref 7–16)
AST SERPL W P-5'-P-CCNC: 23 U/L (ref 15–37)
BILIRUB SERPL-MCNC: 1.2 MG/DL (ref 0–1.2)
BUN SERPL-MCNC: 21 MG/DL (ref 7–18)
BUN/CREAT SERPL: 22 (ref 6–20)
CALCIUM SERPL-MCNC: 8.1 MG/DL (ref 8.5–10.1)
CHLORIDE SERPL-SCNC: 102 MMOL/L (ref 98–107)
CO2 SERPL-SCNC: 26 MMOL/L (ref 21–32)
CREAT SERPL-MCNC: 0.97 MG/DL (ref 0.7–1.3)
DIFFERENTIAL METHOD BLD: ABNORMAL
EOSINOPHIL # BLD AUTO: 0.06 K/UL (ref 0–0.5)
EOSINOPHIL NFR BLD AUTO: 0.4 % (ref 1–4)
EOSINOPHIL NFR BLD MANUAL: 1 % (ref 1–4)
ERYTHROCYTE [DISTWIDTH] IN BLOOD BY AUTOMATED COUNT: 16.2 % (ref 11.5–14.5)
GLOBULIN SER-MCNC: 3 G/DL (ref 2–4)
GLUCOSE SERPL-MCNC: 97 MG/DL (ref 74–106)
GROUP & RH: NORMAL
HCT VFR BLD AUTO: 19.3 % (ref 40–54)
HGB BLD-MCNC: 6.4 G/DL (ref 13.5–18)
INDIRECT COOMBS GEL: NEGATIVE
LYMPHOCYTES # BLD AUTO: 3.23 K/UL (ref 1–4.8)
LYMPHOCYTES NFR BLD AUTO: 19.5 % (ref 27–41)
LYMPHOCYTES NFR BLD MANUAL: 16 % (ref 27–41)
MCH RBC QN AUTO: 29.1 PG (ref 27–31)
MCHC RBC AUTO-ENTMCNC: 33.2 G/DL (ref 32–36)
MCV RBC AUTO: 87.7 FL (ref 80–96)
MONOCYTES # BLD AUTO: 6.58 K/UL (ref 0–0.8)
MONOCYTES NFR BLD AUTO: 39.7 % (ref 2–6)
MONOCYTES NFR BLD MANUAL: 36 % (ref 2–6)
MPC BLD CALC-MCNC: 10.7 FL (ref 9.4–12.4)
NEUTS BAND NFR BLD MANUAL: 3 % (ref 1–5)
NEUTS SEG NFR BLD MANUAL: 44 % (ref 50–62)
NRBC BLD MANUAL-RTO: ABNORMAL %
OCCULT BLOOD: POSITIVE
PLATELET # BLD AUTO: 86 K/UL (ref 150–400)
POTASSIUM SERPL-SCNC: 4.2 MMOL/L (ref 3.5–5.1)
PROT SERPL-MCNC: 5.6 G/DL (ref 6.4–8.2)
RBC # BLD AUTO: 2.2 M/UL (ref 4.6–6.2)
SODIUM SERPL-SCNC: 133 MMOL/L (ref 136–145)
WBC # BLD AUTO: 16.59 K/UL (ref 4.5–11)

## 2021-12-31 PROCEDURE — 85610 PROTHROMBIN TIME: CPT | Performed by: HOSPITALIST

## 2021-12-31 PROCEDURE — 97110 THERAPEUTIC EXERCISES: CPT | Mod: CQ

## 2021-12-31 PROCEDURE — 25000003 PHARM REV CODE 250: Performed by: NURSE PRACTITIONER

## 2021-12-31 PROCEDURE — 85730 THROMBOPLASTIN TIME PARTIAL: CPT | Performed by: HOSPITALIST

## 2021-12-31 PROCEDURE — 11000001 HC ACUTE MED/SURG PRIVATE ROOM

## 2021-12-31 PROCEDURE — 36415 COLL VENOUS BLD VENIPUNCTURE: CPT | Performed by: NURSE PRACTITIONER

## 2021-12-31 PROCEDURE — 86901 BLOOD TYPING SEROLOGIC RH(D): CPT | Performed by: NURSE PRACTITIONER

## 2021-12-31 PROCEDURE — 25000003 PHARM REV CODE 250

## 2021-12-31 PROCEDURE — 85025 COMPLETE CBC W/AUTO DIFF WBC: CPT | Performed by: NURSE PRACTITIONER

## 2021-12-31 PROCEDURE — 97530 THERAPEUTIC ACTIVITIES: CPT | Mod: CQ

## 2021-12-31 PROCEDURE — 85025 COMPLETE CBC W/AUTO DIFF WBC: CPT | Performed by: HOSPITALIST

## 2021-12-31 PROCEDURE — 86900 BLOOD TYPING SEROLOGIC ABO: CPT | Performed by: NURSE PRACTITIONER

## 2021-12-31 PROCEDURE — P9016 RBC LEUKOCYTES REDUCED: HCPCS | Performed by: NURSE PRACTITIONER

## 2021-12-31 PROCEDURE — 80053 COMPREHEN METABOLIC PANEL: CPT | Performed by: HOSPITALIST

## 2021-12-31 PROCEDURE — 27000221 HC OXYGEN, UP TO 24 HOURS

## 2021-12-31 PROCEDURE — C9113 INJ PANTOPRAZOLE SODIUM, VIA: HCPCS | Performed by: HOSPITALIST

## 2021-12-31 PROCEDURE — 82271 OCCULT BLOOD OTHER SOURCES: CPT | Performed by: NURSE PRACTITIONER

## 2021-12-31 PROCEDURE — 63600175 PHARM REV CODE 636 W HCPCS: Performed by: HOSPITALIST

## 2021-12-31 PROCEDURE — 99223 PR INITIAL HOSPITAL CARE,LEVL III: ICD-10-PCS | Mod: ,,, | Performed by: HOSPITALIST

## 2021-12-31 PROCEDURE — 25000003 PHARM REV CODE 250: Performed by: HOSPITALIST

## 2021-12-31 PROCEDURE — 99316 NF DSCHRG MGMT 30 MIN+: CPT | Mod: ,,, | Performed by: FAMILY MEDICINE

## 2021-12-31 PROCEDURE — 99316 PR NURSING FAC DISCHRGE DAY,MORE 30 MIN: ICD-10-PCS | Mod: ,,, | Performed by: FAMILY MEDICINE

## 2021-12-31 PROCEDURE — 86850 RBC ANTIBODY SCREEN: CPT | Performed by: NURSE PRACTITIONER

## 2021-12-31 PROCEDURE — 94761 N-INVAS EAR/PLS OXIMETRY MLT: CPT

## 2021-12-31 PROCEDURE — 36430 TRANSFUSION BLD/BLD COMPNT: CPT

## 2021-12-31 PROCEDURE — 86920 COMPATIBILITY TEST SPIN: CPT | Performed by: NURSE PRACTITIONER

## 2021-12-31 PROCEDURE — 99223 1ST HOSP IP/OBS HIGH 75: CPT | Mod: ,,, | Performed by: HOSPITALIST

## 2021-12-31 RX ORDER — TALC
6 POWDER (GRAM) TOPICAL NIGHTLY PRN
Status: DISCONTINUED | OUTPATIENT
Start: 2021-12-31 | End: 2022-01-07 | Stop reason: HOSPADM

## 2021-12-31 RX ORDER — TAMSULOSIN HYDROCHLORIDE 0.4 MG/1
0.4 CAPSULE ORAL DAILY
Status: DISCONTINUED | OUTPATIENT
Start: 2022-01-01 | End: 2022-01-07 | Stop reason: HOSPADM

## 2021-12-31 RX ORDER — SIMETHICONE 80 MG
1 TABLET,CHEWABLE ORAL 3 TIMES DAILY PRN
Status: DISCONTINUED | OUTPATIENT
Start: 2021-12-31 | End: 2022-01-07 | Stop reason: HOSPADM

## 2021-12-31 RX ORDER — SODIUM CHLORIDE 9 MG/ML
INJECTION, SOLUTION INTRAVENOUS CONTINUOUS
Status: DISCONTINUED | OUTPATIENT
Start: 2022-01-01 | End: 2022-01-01

## 2021-12-31 RX ORDER — TRAZODONE HYDROCHLORIDE 50 MG/1
50 TABLET ORAL NIGHTLY PRN
Status: DISCONTINUED | OUTPATIENT
Start: 2021-12-31 | End: 2022-01-07 | Stop reason: HOSPADM

## 2021-12-31 RX ORDER — GUAIFENESIN/DEXTROMETHORPHAN 100-10MG/5
10 SYRUP ORAL EVERY 6 HOURS PRN
Status: DISCONTINUED | OUTPATIENT
Start: 2021-12-31 | End: 2022-01-07 | Stop reason: HOSPADM

## 2021-12-31 RX ORDER — PANTOPRAZOLE SODIUM 40 MG/10ML
40 INJECTION, POWDER, LYOPHILIZED, FOR SOLUTION INTRAVENOUS 2 TIMES DAILY
Status: DISCONTINUED | OUTPATIENT
Start: 2021-12-31 | End: 2022-01-01

## 2021-12-31 RX ORDER — SODIUM CHLORIDE 0.9 % (FLUSH) 0.9 %
10 SYRINGE (ML) INJECTION EVERY 12 HOURS PRN
Status: DISCONTINUED | OUTPATIENT
Start: 2021-12-31 | End: 2022-01-07 | Stop reason: HOSPADM

## 2021-12-31 RX ORDER — ONDANSETRON 2 MG/ML
8 INJECTION INTRAMUSCULAR; INTRAVENOUS EVERY 6 HOURS PRN
Status: DISCONTINUED | OUTPATIENT
Start: 2021-12-31 | End: 2022-01-07 | Stop reason: HOSPADM

## 2021-12-31 RX ORDER — HYDROCODONE BITARTRATE AND ACETAMINOPHEN 500; 5 MG/1; MG/1
TABLET ORAL
Status: DISCONTINUED | OUTPATIENT
Start: 2021-12-31 | End: 2021-12-31 | Stop reason: HOSPADM

## 2021-12-31 RX ORDER — NALOXONE HCL 0.4 MG/ML
0.02 VIAL (ML) INJECTION
Status: DISCONTINUED | OUTPATIENT
Start: 2021-12-31 | End: 2022-01-07 | Stop reason: HOSPADM

## 2021-12-31 RX ORDER — AMLODIPINE BESYLATE 2.5 MG/1
2.5 TABLET ORAL DAILY
Status: DISCONTINUED | OUTPATIENT
Start: 2022-01-01 | End: 2022-01-06

## 2021-12-31 RX ORDER — ATORVASTATIN CALCIUM 80 MG/1
80 TABLET, FILM COATED ORAL NIGHTLY
Status: DISCONTINUED | OUTPATIENT
Start: 2021-12-31 | End: 2022-01-07 | Stop reason: HOSPADM

## 2021-12-31 RX ORDER — GLUCAGON 1 MG
1 KIT INJECTION
Status: DISCONTINUED | OUTPATIENT
Start: 2021-12-31 | End: 2022-01-07 | Stop reason: HOSPADM

## 2021-12-31 RX ORDER — CARVEDILOL 3.12 MG/1
3.12 TABLET ORAL 2 TIMES DAILY
Status: DISCONTINUED | OUTPATIENT
Start: 2021-12-31 | End: 2022-01-06

## 2021-12-31 RX ORDER — BISACODYL 5 MG
10 TABLET, DELAYED RELEASE (ENTERIC COATED) ORAL DAILY PRN
Status: DISCONTINUED | OUTPATIENT
Start: 2021-12-31 | End: 2022-01-07 | Stop reason: HOSPADM

## 2021-12-31 RX ORDER — PANTOPRAZOLE SODIUM 40 MG/10ML
40 INJECTION, POWDER, LYOPHILIZED, FOR SOLUTION INTRAVENOUS 2 TIMES DAILY
Status: DISCONTINUED | OUTPATIENT
Start: 2021-12-31 | End: 2021-12-31 | Stop reason: HOSPADM

## 2021-12-31 RX ORDER — ACETAMINOPHEN 500 MG
1000 TABLET ORAL EVERY 6 HOURS PRN
Status: DISCONTINUED | OUTPATIENT
Start: 2021-12-31 | End: 2022-01-07 | Stop reason: HOSPADM

## 2021-12-31 RX ORDER — OXYCODONE HYDROCHLORIDE 5 MG/1
5 TABLET ORAL EVERY 4 HOURS PRN
Status: DISCONTINUED | OUTPATIENT
Start: 2021-12-31 | End: 2022-01-07 | Stop reason: HOSPADM

## 2021-12-31 RX ORDER — CETIRIZINE HYDROCHLORIDE 10 MG/1
10 TABLET ORAL DAILY
Status: DISCONTINUED | OUTPATIENT
Start: 2022-01-01 | End: 2022-01-01

## 2021-12-31 RX ADMIN — GABAPENTIN 300 MG: 300 CAPSULE ORAL at 09:12

## 2021-12-31 RX ADMIN — CARVEDILOL 3.12 MG: 3.12 TABLET, FILM COATED ORAL at 09:12

## 2021-12-31 RX ADMIN — CARVEDILOL 3.12 MG: 3.12 TABLET, FILM COATED ORAL at 11:12

## 2021-12-31 RX ADMIN — OXYCODONE HYDROCHLORIDE 5 MG: 5 TABLET ORAL at 09:12

## 2021-12-31 RX ADMIN — TRAZODONE HYDROCHLORIDE 50 MG: 50 TABLET ORAL at 09:12

## 2021-12-31 RX ADMIN — PANTOPRAZOLE SODIUM 40 MG: 40 TABLET, DELAYED RELEASE ORAL at 09:12

## 2021-12-31 RX ADMIN — SODIUM CHLORIDE: 9 INJECTION, SOLUTION INTRAVENOUS at 10:12

## 2021-12-31 RX ADMIN — TAMSULOSIN HYDROCHLORIDE 0.4 MG: 0.4 CAPSULE ORAL at 09:12

## 2021-12-31 RX ADMIN — ACETAMINOPHEN 1000 MG: 500 TABLET ORAL at 10:12

## 2021-12-31 RX ADMIN — AMLODIPINE BESYLATE 2.5 MG: 2.5 TABLET ORAL at 09:12

## 2021-12-31 RX ADMIN — PANTOPRAZOLE SODIUM 40 MG: 40 INJECTION, POWDER, FOR SOLUTION INTRAVENOUS at 11:12

## 2021-12-31 RX ADMIN — ATORVASTATIN CALCIUM 80 MG: 80 TABLET, FILM COATED ORAL at 11:12

## 2021-12-31 RX ADMIN — ACETAMINOPHEN 650 MG: 325 TABLET, FILM COATED ORAL at 10:12

## 2021-12-31 NOTE — HOSPITAL COURSE
12/31/21 Patient is swingbed day #3 for PT/OT rehabiliation s/p right TKR by Dr. Epps on 12/28/21. Patient is ambulating 95 feet with front wheeled walker. Right knee surgical incision with staples intact without any redness or drainage, but does have some swelling present. Pain is currently controlled with Norco 10/325 mg. H&H 6.4/19.3 with plt 82, hemoccult positive. Wife reports that patient had 2 episodes of vomiting yesterday with coffee ground vomitus, but none today. Also having dark stools. Denies any bright blood with bm. Patient denies any abdominal pain, nausea.or dizziness. Patient has cardiac stents and is anticoagulated with Eliquis, Plavix and ASA.  Discussed patient's case with Dr. Tanner and orders received.     Patient received 2 units of packed red blood cells without incident.  Upon further review of his chart patient requires Eliquis due to his cardiac history.  He is also on Plavix and aspirin.  In addition to that his platelet count is less than 100. We do not have Gastroenterology coverage at this facility and do not have immediate access to platelets should they become necessary.  I believe that this patient would benefit from evaluation and management by a gastroenterologist.  I called the [facility] Admission line and discussed this with them.  They in turn discussed the case with Dr. Viramontes who agreed to accept the patient.  Patient will be transferred to [facility] by EMS in stable condition.  I evaluated the patient and confirmed that he is stable for transfer.  I also discussed this plan with the patient and his wife.  All questions were answered all issues were addressed.  The patient and his wife verbalized understanding of and agreement with the plan.

## 2021-12-31 NOTE — SUBJECTIVE & OBJECTIVE
Interval History: H&H down to 6.4/19.3, receiving 2 units of PRBCs    Review of Systems   Constitutional: Positive for fever. Negative for activity change, appetite change and chills.   HENT: Negative for congestion, ear pain, sinus pain, sore throat and trouble swallowing.    Eyes: Negative.  Negative for pain and visual disturbance.   Respiratory: Negative.  Negative for cough, chest tightness, shortness of breath and wheezing.    Cardiovascular: Negative.  Negative for chest pain and palpitations.   Gastrointestinal: Positive for vomiting. Negative for abdominal pain, constipation, diarrhea and nausea.        Vomited x 2 yesterday  Dark stool yesterday   Genitourinary: Negative for dysuria, frequency and hematuria.   Musculoskeletal: Positive for arthralgias and gait problem.        Unsteady gait due to right TKR   Skin: Positive for wound. Negative for rash.   Neurological: Positive for weakness (generalized). Negative for dizziness, light-headedness, numbness and headaches.   Hematological: Negative.    Psychiatric/Behavioral: Negative.  Negative for confusion.   All other systems reviewed and are negative.    Objective:     Vital Signs (Most Recent):  Temp: 99.5 °F (37.5 °C) (12/31/21 1302)  Pulse: 83 (12/31/21 1302)  Resp: 16 (12/31/21 1302)  BP: (!) 110/54 (12/31/21 1302)  SpO2: (!) 90 % (12/31/21 0800) Vital Signs (24h Range):  Temp:  [99 °F (37.2 °C)-101.1 °F (38.4 °C)] 99.5 °F (37.5 °C)  Pulse:  [] 83  Resp:  [16-20] 16  SpO2:  [90 %-92 %] 90 %  BP: (106-133)/(51-62) 110/54     Weight: 86 kg (189 lb 9.5 oz)  Body mass index is 30.6 kg/m².    Intake/Output Summary (Last 24 hours) at 12/31/2021 1446  Last data filed at 12/31/2021 1215  Gross per 24 hour   Intake 786.5 ml   Output --   Net 786.5 ml      Physical Exam  Vitals reviewed.   Constitutional:       General: He is awake. He is not in acute distress.     Appearance: Normal appearance. He is well-developed and well-groomed. He is not  ill-appearing, toxic-appearing or diaphoretic.   HENT:      Head: Normocephalic and atraumatic.      Right Ear: External ear normal.      Left Ear: External ear normal.      Nose: Nose normal.      Mouth/Throat:      Mouth: Mucous membranes are dry.   Eyes:      General: Lids are normal. No scleral icterus.        Right eye: No discharge.         Left eye: No discharge.      Extraocular Movements: Extraocular movements intact.      Comments: Left  pupil is slightly enlarged likely 2/2 to retinal detachment surgery.    Cardiovascular:      Rate and Rhythm: Normal rate and regular rhythm.      Pulses: Normal pulses.           Radial pulses are 2+ on the right side and 2+ on the left side.        Posterior tibial pulses are 2+ on the right side and 2+ on the left side.      Heart sounds: Normal heart sounds. No murmur heard.  No friction rub. No gallop.    Pulmonary:      Effort: Pulmonary effort is normal. No respiratory distress.      Breath sounds: No stridor. No wheezing, rhonchi or rales.   Chest:      Chest wall: No tenderness.   Abdominal:      General: Bowel sounds are normal.      Palpations: Abdomen is soft.      Tenderness: There is no abdominal tenderness. There is no right CVA tenderness, left CVA tenderness, guarding or rebound.   Musculoskeletal:         General: No swelling.      Right lower le+ Edema present.      Left lower leg: No edema.      Comments: Right knee surgical incision with staples intact without any redness or drainage noted. Trace to 1+ pitting in RLE.  Cryo-Cuff apparatus seen placed over the knee.    Lymphadenopathy:      Cervical: No cervical adenopathy.   Skin:     General: Skin is warm and dry.      Capillary Refill: Capillary refill takes 2 to 3 seconds.      Findings: No bruising or ecchymosis.   Neurological:      Mental Status: He is alert and oriented to person, place, and time.      GCS: GCS eye subscore is 4. GCS verbal subscore is 5. GCS motor subscore is 6.       Cranial Nerves: Cranial nerves are intact.      Sensory: Sensation is intact.      Motor: Motor function is intact.   Psychiatric:         Attention and Perception: Attention normal.         Mood and Affect: Mood normal.         Speech: Speech normal.         Behavior: Behavior is cooperative.         Thought Content: Thought content normal.         Significant Labs:   All pertinent labs within the past 24 hours have been reviewed.  BMP:   Recent Labs   Lab 12/30/21  0603   *   *   K 3.9   CL 99   CO2 26   BUN 16   CREATININE 0.95   CALCIUM 7.7*     CBC:   Recent Labs   Lab 12/30/21  0445 12/31/21  0601   WBC 20.37* 16.59*   HGB 7.8* 6.4*   HCT 23.2* 19.3*   PLT 82* 86*       Significant Imaging: No new imaging

## 2021-12-31 NOTE — ASSESSMENT & PLAN NOTE
12/31/21: Oxygen level will drop to 89-90% on room air while sleeping. Patient declined to wear CPAP.  -O2 at2L/NC while sleeping and prn to keep O2 levels 90% or greater

## 2021-12-31 NOTE — ASSESSMENT & PLAN NOTE
12/31/21: H&H down to 6.4/19.3 with plt 82 from 11.8/35.2 with plt 86 on 12/28. Hemoccult positive.  -Transfuse 2 units of PRBCs  -Start Protonix 40 mg IV BID  -D/C Eliquis  -D/C Plavix  -D/C ASA  -Repeat CBC daily x 3

## 2021-12-31 NOTE — PLAN OF CARE
Plan or care reviewed.  Problem: Pain Acute  Goal: Acceptable Pain Control and Functional Ability  Outcome: Ongoing, Progressing     Problem: Bowel Motility Impaired (Knee Arthroplasty)  Goal: Effective Bowel Elimination  Outcome: Ongoing, Progressing

## 2021-12-31 NOTE — PROGRESS NOTES
[facility] Medicine  Progress Note    Patient Name: Elliott Peters  MRN: [id number]  Patient Class: IP- Swing   Admission Date: 12/29/2021  Length of Stay: 2 days  Attending Physician: Juan Kirkpatrick DO  Primary Care Provider: Izzy Olivier NP        Subjective:     Principal Problem:Muscle weakness (generalized)        HPI:  Mr. Peters is an 81 yo WM s/p RTKR by Dr. Epps on 12/28/2021 who is admitted to [facility] on 12/29/21 for generalized muscle weakness. Patient's PMH includes HTN, HLD, cardiac stent, and sleep apnea. According to records he tolerated the procedure w/o complications. He was evaluated by PT/OT at [facility] and is to be weight bearing as tolerated to [facility]. Patient has hx of LTKR and suffered abdominal pain and constipation following procedure. He denies any abdominal pain at this time. Last BM was 12/27/21. H&H on admit to [facility] 7.8/23.2. Patient with H&H 10/29.5 on 12/29 prior to d/c from [facility]. He reports pain to RLE. States he took one Norco on the morning of 12/29 prior to transport to [facility] this evening. Pain was well controlled following procedure with current regimen. He denies any chest pain or shortness of breath. His wife is at bedside. They have met with [facility] and goal is to be discharged back home with wife and HH. He is to remain on Eliquis for DVT ppx.     Full Code  VTE - Eliquis/PATRIC/early ambulation      Overview/Hospital Course:  12/31/21 Patient is swingbed day #3 for PT/OT rehabiliation s/p right TKR by Dr. Epps on 12/28/21. Patient is ambulating 95 feet with front wheeled walker. Right knee surgical incision with staples intact without any redness or drainage, but does have some swelling present. Pain is currently controlled with Norco 10/325 mg. H&H 6.4/19.3 with plt 82, hemoccult positive. Wife reports that patient had 2 episodes of vomiting yesterday with coffee ground vomitus, but none today. Also having dark stools. Denies any bright  blood with bm. Patient denies any abdominal pain, nausea.or dizziness. Patient has cardiac stents and is anticoagulated with Eliquis, Plavix and ASA.  Discussed patient's case with Dr. Tanner and orders received.       Interval History: H&H down to 6.4/19.3, receiving 2 units of PRBCs    Review of Systems   Constitutional: Positive for fever. Negative for activity change, appetite change and chills.   HENT: Negative for congestion, ear pain, sinus pain, sore throat and trouble swallowing.    Eyes: Negative.  Negative for pain and visual disturbance.   Respiratory: Negative.  Negative for cough, chest tightness, shortness of breath and wheezing.    Cardiovascular: Negative.  Negative for chest pain and palpitations.   Gastrointestinal: Positive for vomiting. Negative for abdominal pain, constipation, diarrhea and nausea.        Vomited x 2 yesterday  Dark stool yesterday   Genitourinary: Negative for dysuria, frequency and hematuria.   Musculoskeletal: Positive for arthralgias and gait problem.        Unsteady gait due to right TKR   Skin: Positive for wound. Negative for rash.   Neurological: Positive for weakness (generalized). Negative for dizziness, light-headedness, numbness and headaches.   Hematological: Negative.    Psychiatric/Behavioral: Negative.  Negative for confusion.   All other systems reviewed and are negative.    Objective:     Vital Signs (Most Recent):  Temp: 99.5 °F (37.5 °C) (12/31/21 1302)  Pulse: 83 (12/31/21 1302)  Resp: 16 (12/31/21 1302)  BP: (!) 110/54 (12/31/21 1302)  SpO2: (!) 90 % (12/31/21 0800) Vital Signs (24h Range):  Temp:  [99 °F (37.2 °C)-101.1 °F (38.4 °C)] 99.5 °F (37.5 °C)  Pulse:  [] 83  Resp:  [16-20] 16  SpO2:  [90 %-92 %] 90 %  BP: (106-133)/(51-62) 110/54     Weight: 86 kg (189 lb 9.5 oz)  Body mass index is 30.6 kg/m².    Intake/Output Summary (Last 24 hours) at 12/31/2021 1446  Last data filed at 12/31/2021 1215  Gross per 24 hour   Intake 786.5 ml   Output --    Net 786.5 ml      Physical Exam  Vitals reviewed.   Constitutional:       General: He is awake. He is not in acute distress.     Appearance: Normal appearance. He is well-developed and well-groomed. He is not ill-appearing, toxic-appearing or diaphoretic.   HENT:      Head: Normocephalic and atraumatic.      Right Ear: External ear normal.      Left Ear: External ear normal.      Nose: Nose normal.      Mouth/Throat:      Mouth: Mucous membranes are dry.   Eyes:      General: Lids are normal. No scleral icterus.        Right eye: No discharge.         Left eye: No discharge.      Extraocular Movements: Extraocular movements intact.      Comments: Left  pupil is slightly enlarged likely 2/2 to retinal detachment surgery.    Cardiovascular:      Rate and Rhythm: Normal rate and regular rhythm.      Pulses: Normal pulses.           Radial pulses are 2+ on the right side and 2+ on the left side.        Posterior tibial pulses are 2+ on the right side and 2+ on the left side.      Heart sounds: Normal heart sounds. No murmur heard.  No friction rub. No gallop.    Pulmonary:      Effort: Pulmonary effort is normal. No respiratory distress.      Breath sounds: No stridor. No wheezing, rhonchi or rales.   Chest:      Chest wall: No tenderness.   Abdominal:      General: Bowel sounds are normal.      Palpations: Abdomen is soft.      Tenderness: There is no abdominal tenderness. There is no right CVA tenderness, left CVA tenderness, guarding or rebound.   Musculoskeletal:         General: No swelling.      Right lower le+ Edema present.      Left lower leg: No edema.      Comments: Right knee surgical incision with staples intact without any redness or drainage noted. Trace to 1+ pitting in RLE.  Cryo-Cuff apparatus seen placed over the knee.    Lymphadenopathy:      Cervical: No cervical adenopathy.   Skin:     General: Skin is warm and dry.      Capillary Refill: Capillary refill takes 2 to 3 seconds.      Findings:  No bruising or ecchymosis.   Neurological:      Mental Status: He is alert and oriented to person, place, and time.      GCS: GCS eye subscore is 4. GCS verbal subscore is 5. GCS motor subscore is 6.      Cranial Nerves: Cranial nerves are intact.      Sensory: Sensation is intact.      Motor: Motor function is intact.   Psychiatric:         Attention and Perception: Attention normal.         Mood and Affect: Mood normal.         Speech: Speech normal.         Behavior: Behavior is cooperative.         Thought Content: Thought content normal.         Significant Labs:   All pertinent labs within the past 24 hours have been reviewed.  BMP:   Recent Labs   Lab 12/30/21  0603   *   *   K 3.9   CL 99   CO2 26   BUN 16   CREATININE 0.95   CALCIUM 7.7*     CBC:   Recent Labs   Lab 12/30/21  0445 12/31/21  0601   WBC 20.37* 16.59*   HGB 7.8* 6.4*   HCT 23.2* 19.3*   PLT 82* 86*       Significant Imaging: No new imaging      Assessment/Plan:      * Muscle weakness (generalized)  - s/p RTKR  - PT/OT eval and treat  - weight bearing RLE as tolerated    12/31/21:  -Continue PT/OT     Gastrointestinal hemorrhage with melena  12/31/21: H&H down to 6.4/19.3 with plt 82 from 11.8/35.2 with plt 86 on 12/28. Hemoccult positive.  -Transfuse 2 units of PRBCs  -Start Protonix 40 mg IV BID  -D/C Eliquis  -D/C Plavix  -D/C ASA  -Repeat CBC daily x 3      At risk for venous thromboembolism (VTE)  12/31/21: Currently holding Eliquis, Plavix and ASA  -PATRIC  -Early ambulation        Acute pain of right knee  - Norco  mg q4h PRN      Surgical wound present  - Dressing to be changed on post op day #3  - Apply Aquacel AG dressing. Repeat dressing change in 7 days. Notify physician of any wound drainage. D/c staples in 2 weeks from surgery and steri strip incicision      Hyperlipidemia  - Atorvastatin 10 mg nightly    ROSI on CPAP  12/31/21: Oxygen level will drop to 89-90% on room air while sleeping. Patient declined to wear  CPAP.  -O2 at2L/NC while sleeping and prn to keep O2 levels 90% or greater      Hypertension  - Amlodipine 2.5 mg daily   - Carvedilol 3.125 mg BID  - 141/68    12/31/21: /54 with HR 83    VTE Risk Mitigation (From admission, onward)    None          Discharge Planning   BRAULIO:      Code Status: Full Code   Is the patient medically ready for discharge?:     Reason for patient still in hospital (select all that apply): Patient new problem and PT / OT recommendations  Discharge Plan A: Home Health            Discussed patient's case, labs and medications with Dr. Tanner. She agreed to the above POC.      Deborah Patel, NICOLÁS  Department of Hospital Medicine   [facility] - Medical Surgical Unit

## 2021-12-31 NOTE — PT/OT/SLP PROGRESS
Occupational Therapy      Patient Name:  Elliott Peters   MRN:  [id number]    Patient not seen today secondary to Other (Comment) (Patient is receiving PRBC due to decreased H&H). Will follow-up 01/03/2022.    12/31/2021

## 2021-12-31 NOTE — PLAN OF CARE
Patient is awake and alert. His o2 sat. Is 90% on room air.  His wife states that he is not wearing his cpap and that Dr. Smart said he didn't have to wear it. Tech encouraged him to wear it.  Will check with Dr. Malhotra to see if he wants patient on o2.

## 2021-12-31 NOTE — PT/OT/SLP PROGRESS
Physical Therapy Treatment    Patient Name:  Elliott Peters   MRN:  [id number]    Recommendations:     Discharge Recommendations:  home with home health,home health PT   Discharge Equipment Recommendations: bedside commode,walker, rolling   Barriers to discharge: Decreased caregiver support    Assessment:     Elliott Peters is a 81 y.o. male admitted with a medical diagnosis of Muscle weakness (generalized).  He presents with the following impairments/functional limitations:  weakness,impaired endurance,gait instability,decreased lower extremity function,decreased ROM,pain .    Pt was supine, pt c/o pain 4/10 in R knee, and complaints of digestive issues,  but agreed to attempt Tx.  After EOB sitting x 10 min with exercises, pt stated increased pain and added weakness, and asked to conclude Tx.  Pt was returned supine, call bell in hand, PTA notified nurse of pt complaints.     Rehab Prognosis: Good; patient would benefit from acute skilled PT services to address these deficits and reach maximum level of function.    Recent Surgery: * No surgery found *      Plan:     During this hospitalization, patient to be seen BID to address the identified rehab impairments via therapeutic activities,therapeutic exercises and progress toward the following goals:    · Plan of Care Expires:       Subjective     Chief Complaint: pain 4/10 in R knee  Patient/Family Comments/goals: increased R knee ROM and strengthening  Pain/Comfort:  · Pain Rating 1: 4/10  · Location - Side 1: Right  · Location 1: knee      Objective:     Communicated with pt prior to session.  Patient found supine with   upon PT entry to room.     General Precautions: Standard, fall   Orthopedic Precautions:RLE weight bearing as tolerated   Braces:    Respiratory Status: Room air     Functional Mobility:  · Bed Mobility:     · Rolling Right: stand by assistance  · Scooting: stand by assistance  · Supine to Sit: minimum assistance  · Sit to Supine: minimum  assistance      AM-PAC 6 CLICK MOBILITY          Therapeutic Activities and Exercises:   Pt performed supine rolling R SBA, supine/sit Nitesh, sit/supine Nitesh, supine scooting SBA, EOB sitting unsupported x 10 min with exercises.    Pt performed the following 2x20 with RLE:  -quad sets  -HS curls with RTB  -towel slides    Patient left supine with call button in reach, nurse notified and pt spouse present..    GOALS:   Multidisciplinary Problems     Physical Therapy Goals        Problem: Physical Therapy Goal    Goal Priority Disciplines Outcome Goal Variances Interventions   Physical Therapy Goal     PT, PT/OT Ongoing, Progressing     Description: Goals to be met by: Discharge     Patient will increase functional independence with mobility by performin. Supine to sit with [place]  2. Sit to stand transfer with [place]  3. Bed to chair transfer with [place] using Rolling Walker  4. Gait  x 300 feet with Modified [place] using Rolling Walker.                      Time Tracking:     PT Received On: 21  PT Start Time: 0850     PT Stop Time: 0930  PT Total Time (min): 40 min     Billable Minutes: Therapeutic Activity 15 and Therapeutic Exercise 25    Treatment Type: Treatment  PT/PTA: PTA     PTA Visit Number: 1     2021

## 2021-12-31 NOTE — ASSESSMENT & PLAN NOTE
- s/p RTKR  - PT/OT eval and treat  - weight bearing RLE as tolerated    12/31/21:  -Continue PT/OT

## 2021-12-31 NOTE — PT/OT/SLP PROGRESS
Physical Therapy      Patient Name:  Elliott Peters   MRN:  [id number]    Patient not seen for p.m. Tx today due to pt receiving blood and presents elevated temperature.   Will follow-up Monday, 1/03/2022.

## 2022-01-01 ENCOUNTER — ANESTHESIA EVENT (OUTPATIENT)
Dept: GASTROENTEROLOGY | Facility: HOSPITAL | Age: 82
DRG: 377 | End: 2022-01-01
Payer: MEDICARE

## 2022-01-01 ENCOUNTER — ANESTHESIA (OUTPATIENT)
Dept: GASTROENTEROLOGY | Facility: HOSPITAL | Age: 82
DRG: 377 | End: 2022-01-01
Payer: MEDICARE

## 2022-01-01 PROBLEM — R14.0 ABDOMINAL DISTENSION (GASEOUS): Status: ACTIVE | Noted: 2022-01-01

## 2022-01-01 LAB
ANISOCYTOSIS BLD QL SMEAR: ABNORMAL
APTT PPP: 39.5 SECONDS (ref 25.2–37.3)
BASOPHILS # BLD AUTO: 0.03 K/UL (ref 0–0.2)
BASOPHILS NFR BLD AUTO: 0.2 % (ref 0–1)
DIFFERENTIAL METHOD BLD: ABNORMAL
EOSINOPHIL # BLD AUTO: 0.09 K/UL (ref 0–0.5)
EOSINOPHIL NFR BLD AUTO: 0.6 % (ref 1–4)
ERYTHROCYTE [DISTWIDTH] IN BLOOD BY AUTOMATED COUNT: 15.6 % (ref 11.5–14.5)
HCT VFR BLD AUTO: 22.4 % (ref 40–54)
HCT VFR BLD AUTO: 23.9 % (ref 40–54)
HCT VFR BLD AUTO: 24.7 % (ref 40–54)
HCT VFR BLD AUTO: 25 % (ref 40–54)
HGB BLD-MCNC: 7.7 G/DL (ref 13.5–18)
HGB BLD-MCNC: 8.1 G/DL (ref 13.5–18)
HGB BLD-MCNC: 8.4 G/DL (ref 13.5–18)
HGB BLD-MCNC: 8.4 G/DL (ref 13.5–18)
IMM GRANULOCYTES # BLD AUTO: 0.99 K/UL (ref 0–0.04)
IMM GRANULOCYTES NFR BLD: 6.5 % (ref 0–0.4)
INR BLD: 1.14 (ref 0.9–1.1)
LYMPHOCYTES # BLD AUTO: 2.05 K/UL (ref 1–4.8)
LYMPHOCYTES NFR BLD AUTO: 13.4 % (ref 27–41)
LYMPHOCYTES NFR BLD MANUAL: 17 % (ref 27–41)
MCH RBC QN AUTO: 29.2 PG (ref 27–31)
MCHC RBC AUTO-ENTMCNC: 35.1 G/DL (ref 32–36)
MCV RBC AUTO: 83 FL (ref 80–96)
MONOCYTES # BLD AUTO: 4.36 K/UL (ref 0–0.8)
MONOCYTES NFR BLD AUTO: 28.4 % (ref 2–6)
MONOCYTES NFR BLD MANUAL: 21 % (ref 2–6)
MPC BLD CALC-MCNC: 11.1 FL (ref 9.4–12.4)
NEUTROPHILS # BLD AUTO: 7.81 K/UL (ref 1.8–7.7)
NEUTROPHILS NFR BLD AUTO: 50.9 % (ref 53–65)
NEUTS SEG NFR BLD MANUAL: 62 % (ref 50–62)
NRBC # BLD AUTO: 0.14 X10E3/UL
NRBC, AUTO (.00): 0.9 %
PLATELET # BLD AUTO: 77 K/UL (ref 150–400)
PLATELET MORPHOLOGY: ABNORMAL
POLYCHROMASIA BLD QL SMEAR: ABNORMAL
PROTHROMBIN TIME: 14.6 SECONDS (ref 11.7–14.7)
RBC # BLD AUTO: 2.88 M/UL (ref 4.6–6.2)
WBC # BLD AUTO: 15.33 K/UL (ref 4.5–11)

## 2022-01-01 PROCEDURE — 63600175 PHARM REV CODE 636 W HCPCS: Performed by: NURSE ANESTHETIST, CERTIFIED REGISTERED

## 2022-01-01 PROCEDURE — 88305 TISSUE EXAM BY PATHOLOGIST: CPT | Mod: SUR | Performed by: STUDENT IN AN ORGANIZED HEALTH CARE EDUCATION/TRAINING PROGRAM

## 2022-01-01 PROCEDURE — 99233 SBSQ HOSP IP/OBS HIGH 50: CPT | Mod: ,,, | Performed by: HOSPITALIST

## 2022-01-01 PROCEDURE — 99223 1ST HOSP IP/OBS HIGH 75: CPT | Mod: 25,,, | Performed by: STUDENT IN AN ORGANIZED HEALTH CARE EDUCATION/TRAINING PROGRAM

## 2022-01-01 PROCEDURE — 11000001 HC ACUTE MED/SURG PRIVATE ROOM

## 2022-01-01 PROCEDURE — 88342 SURGICAL PATHOLOGY: ICD-10-PCS | Mod: 26,,, | Performed by: PATHOLOGY

## 2022-01-01 PROCEDURE — 88305 SURGICAL PATHOLOGY: ICD-10-PCS | Mod: 26,,, | Performed by: PATHOLOGY

## 2022-01-01 PROCEDURE — 25000003 PHARM REV CODE 250: Performed by: HOSPITALIST

## 2022-01-01 PROCEDURE — 99233 PR SUBSEQUENT HOSPITAL CARE,LEVL III: ICD-10-PCS | Mod: ,,, | Performed by: HOSPITALIST

## 2022-01-01 PROCEDURE — C9113 INJ PANTOPRAZOLE SODIUM, VIA: HCPCS | Performed by: HOSPITALIST

## 2022-01-01 PROCEDURE — 85014 HEMATOCRIT: CPT | Performed by: HOSPITALIST

## 2022-01-01 PROCEDURE — 25000003 PHARM REV CODE 250: Performed by: NURSE ANESTHETIST, CERTIFIED REGISTERED

## 2022-01-01 PROCEDURE — 43239 PR EGD, FLEX, W/BIOPSY, SGL/MULTI: ICD-10-PCS | Mod: ,,, | Performed by: STUDENT IN AN ORGANIZED HEALTH CARE EDUCATION/TRAINING PROGRAM

## 2022-01-01 PROCEDURE — 88305 TISSUE EXAM BY PATHOLOGIST: CPT | Mod: 26,,, | Performed by: PATHOLOGY

## 2022-01-01 PROCEDURE — D9220A PRA ANESTHESIA: ICD-10-PCS | Mod: CRNA,,, | Performed by: NURSE ANESTHETIST, CERTIFIED REGISTERED

## 2022-01-01 PROCEDURE — 88342 IMHCHEM/IMCYTCHM 1ST ANTB: CPT | Mod: 26,,, | Performed by: PATHOLOGY

## 2022-01-01 PROCEDURE — D9220A PRA ANESTHESIA: Mod: ANES,ICN,, | Performed by: ANESTHESIOLOGY

## 2022-01-01 PROCEDURE — 36415 COLL VENOUS BLD VENIPUNCTURE: CPT | Performed by: HOSPITALIST

## 2022-01-01 PROCEDURE — D9220A PRA ANESTHESIA: ICD-10-PCS | Mod: ANES,ICN,, | Performed by: ANESTHESIOLOGY

## 2022-01-01 PROCEDURE — 43239 EGD BIOPSY SINGLE/MULTIPLE: CPT

## 2022-01-01 PROCEDURE — 99223 PR INITIAL HOSPITAL CARE,LEVL III: ICD-10-PCS | Mod: 25,,, | Performed by: STUDENT IN AN ORGANIZED HEALTH CARE EDUCATION/TRAINING PROGRAM

## 2022-01-01 PROCEDURE — 63600175 PHARM REV CODE 636 W HCPCS: Performed by: HOSPITALIST

## 2022-01-01 PROCEDURE — 25000003 PHARM REV CODE 250

## 2022-01-01 PROCEDURE — 43239 EGD BIOPSY SINGLE/MULTIPLE: CPT | Mod: ,,, | Performed by: STUDENT IN AN ORGANIZED HEALTH CARE EDUCATION/TRAINING PROGRAM

## 2022-01-01 PROCEDURE — 27000284 HC CANNULA NASAL: Performed by: ANESTHESIOLOGY

## 2022-01-01 PROCEDURE — D9220A PRA ANESTHESIA: Mod: CRNA,,, | Performed by: NURSE ANESTHETIST, CERTIFIED REGISTERED

## 2022-01-01 RX ORDER — PANTOPRAZOLE SODIUM 40 MG/1
40 TABLET, DELAYED RELEASE ORAL
Status: DISCONTINUED | OUTPATIENT
Start: 2022-01-02 | End: 2022-01-07 | Stop reason: HOSPADM

## 2022-01-01 RX ORDER — PANTOPRAZOLE SODIUM 40 MG/1
40 TABLET, DELAYED RELEASE ORAL DAILY
Status: DISCONTINUED | OUTPATIENT
Start: 2022-01-01 | End: 2022-01-01

## 2022-01-01 RX ORDER — CETIRIZINE HYDROCHLORIDE 10 MG/1
10 TABLET ORAL DAILY PRN
Status: DISCONTINUED | OUTPATIENT
Start: 2022-01-01 | End: 2022-01-07 | Stop reason: HOSPADM

## 2022-01-01 RX ORDER — HYDRALAZINE HYDROCHLORIDE 20 MG/ML
5 INJECTION INTRAMUSCULAR; INTRAVENOUS EVERY 6 HOURS PRN
Status: DISCONTINUED | OUTPATIENT
Start: 2022-01-01 | End: 2022-01-07 | Stop reason: HOSPADM

## 2022-01-01 RX ORDER — SODIUM CHLORIDE 9 MG/ML
INJECTION, SOLUTION INTRAVENOUS
Status: COMPLETED
Start: 2022-01-01 | End: 2022-01-01

## 2022-01-01 RX ORDER — SODIUM CHLORIDE 9 MG/ML
INJECTION, SOLUTION INTRAVENOUS CONTINUOUS PRN
Status: DISCONTINUED | OUTPATIENT
Start: 2022-01-01 | End: 2022-01-01

## 2022-01-01 RX ORDER — LIDOCAINE HYDROCHLORIDE 20 MG/ML
INJECTION, SOLUTION EPIDURAL; INFILTRATION; INTRACAUDAL; PERINEURAL
Status: DISCONTINUED | OUTPATIENT
Start: 2022-01-01 | End: 2022-01-01

## 2022-01-01 RX ORDER — PROPOFOL 10 MG/ML
VIAL (ML) INTRAVENOUS
Status: DISCONTINUED | OUTPATIENT
Start: 2022-01-01 | End: 2022-01-01

## 2022-01-01 RX ORDER — POLYETHYLENE GLYCOL 3350 17 G/17G
17 POWDER, FOR SOLUTION ORAL DAILY
Status: DISCONTINUED | OUTPATIENT
Start: 2022-01-01 | End: 2022-01-07 | Stop reason: HOSPADM

## 2022-01-01 RX ORDER — ETOMIDATE 2 MG/ML
INJECTION INTRAVENOUS
Status: DISCONTINUED | OUTPATIENT
Start: 2022-01-01 | End: 2022-01-01

## 2022-01-01 RX ADMIN — SODIUM CHLORIDE: 9 INJECTION, SOLUTION INTRAVENOUS at 12:01

## 2022-01-01 RX ADMIN — PROPOFOL 20 MG: 10 INJECTION, EMULSION INTRAVENOUS at 11:01

## 2022-01-01 RX ADMIN — POLYETHYLENE GLYCOL 3350 17 G: 17 POWDER, FOR SOLUTION ORAL at 06:01

## 2022-01-01 RX ADMIN — TAMSULOSIN HYDROCHLORIDE 0.4 MG: 0.4 CAPSULE ORAL at 08:01

## 2022-01-01 RX ADMIN — CETIRIZINE HYDROCHLORIDE 10 MG: 10 TABLET, FILM COATED ORAL at 08:01

## 2022-01-01 RX ADMIN — MELATONIN 6 MG: at 08:01

## 2022-01-01 RX ADMIN — PANTOPRAZOLE SODIUM 40 MG: 40 TABLET, DELAYED RELEASE ORAL at 02:01

## 2022-01-01 RX ADMIN — SODIUM CHLORIDE: 9 INJECTION, SOLUTION INTRAVENOUS at 08:01

## 2022-01-01 RX ADMIN — AMLODIPINE BESYLATE 2.5 MG: 2.5 TABLET ORAL at 08:01

## 2022-01-01 RX ADMIN — ETOMIDATE 4 MG: 20 INJECTION, SOLUTION INTRAVENOUS at 11:01

## 2022-01-01 RX ADMIN — SODIUM CHLORIDE: 9 INJECTION, SOLUTION INTRAVENOUS at 11:01

## 2022-01-01 RX ADMIN — OXYCODONE HYDROCHLORIDE 5 MG: 5 TABLET ORAL at 08:01

## 2022-01-01 RX ADMIN — ETOMIDATE 2 MG: 20 INJECTION, SOLUTION INTRAVENOUS at 11:01

## 2022-01-01 RX ADMIN — LIDOCAINE HYDROCHLORIDE 50 MG: 20 INJECTION, SOLUTION EPIDURAL; INFILTRATION; INTRACAUDAL; PERINEURAL at 11:01

## 2022-01-01 RX ADMIN — ATORVASTATIN CALCIUM 80 MG: 80 TABLET, FILM COATED ORAL at 08:01

## 2022-01-01 RX ADMIN — PANTOPRAZOLE SODIUM 8 MG/HR: 40 INJECTION, POWDER, FOR SOLUTION INTRAVENOUS at 05:01

## 2022-01-01 RX ADMIN — TRAZODONE HYDROCHLORIDE 50 MG: 50 TABLET ORAL at 08:01

## 2022-01-01 RX ADMIN — CARVEDILOL 3.12 MG: 3.12 TABLET, FILM COATED ORAL at 08:01

## 2022-01-01 NOTE — SUBJECTIVE & OBJECTIVE
Interval History:     Review of Systems   Constitutional: Negative for appetite change, fatigue and fever.   HENT: Negative for congestion, hearing loss and trouble swallowing.    Respiratory: Negative for chest tightness, shortness of breath and wheezing.    Cardiovascular: Positive for leg swelling. Negative for chest pain and palpitations.   Gastrointestinal: Negative for abdominal pain, constipation and nausea.   Genitourinary: Negative for difficulty urinating and dysuria.   Musculoskeletal: Positive for gait problem. Negative for back pain and neck stiffness.   Skin: Positive for wound. Negative for pallor and rash.   Neurological: Negative for dizziness, speech difficulty and headaches.   Psychiatric/Behavioral: Negative for confusion and suicidal ideas.     Objective:     Vital Signs (Most Recent):  Temp: 98 °F (36.7 °C) (01/01/22 1520)  Pulse: 84 (01/01/22 1520)  Resp: 17 (01/01/22 1520)  BP: (!) 122/55 (01/01/22 1520)  SpO2: (!) 94 % (01/01/22 1520) Vital Signs (24h Range):  Temp:  [97.7 °F (36.5 °C)-99.9 °F (37.7 °C)] 98 °F (36.7 °C)  Pulse:  [72-88] 84  Resp:  [12-24] 17  SpO2:  [94 %-100 %] 94 %  BP: (100-161)/(53-93) 122/55     Weight: 86 kg (189 lb 9.5 oz)  Body mass index is 30.6 kg/m².  No intake or output data in the 24 hours ending 01/01/22 1715   Physical Exam  Vitals reviewed.   Constitutional:       General: He is not in acute distress.  Eyes:      Pupils: Pupils are equal, round, and reactive to light.   Cardiovascular:      Rate and Rhythm: Normal rate and regular rhythm.      Pulses: Normal pulses.   Pulmonary:      Effort: Pulmonary effort is normal. No respiratory distress.      Breath sounds: Normal breath sounds. No wheezing.   Abdominal:      General: Bowel sounds are normal. There is no distension.      Tenderness: There is no abdominal tenderness.   Skin:     General: Skin is warm.   Neurological:      General: No focal deficit present.      Mental Status: He is alert, oriented to  person, place, and time and easily aroused. Mental status is at baseline.   Psychiatric:         Mood and Affect: Mood normal.         Behavior: Behavior normal.         Significant Labs:   All pertinent labs within the past 24 hours have been reviewed.  BMP:   Recent Labs   Lab 12/31/21 2307   GLU 97   *   K 4.2      CO2 26   BUN 21*   CREATININE 0.97   CALCIUM 8.1*     CBC:   Recent Labs   Lab 12/31/21  0601 12/31/21  0601 12/31/21  2307 01/01/22  0554 01/01/22  1422   WBC 16.59*  --  15.33*  --   --    HGB 6.4*   < > 8.4* 8.4* 8.1*   HCT 19.3*   < > 23.9* 24.7* 25.0*   PLT 86*  --  77*  --   --     < > = values in this interval not displayed.     CMP:   Recent Labs   Lab 12/31/21 2307   *   K 4.2      CO2 26   GLU 97   BUN 21*   CREATININE 0.97   CALCIUM 8.1*   PROT 5.6*   ALBUMIN 2.6*   BILITOT 1.2   ALKPHOS 60   AST 23   ALT 16   ANIONGAP 9   EGFRNONAA 79

## 2022-01-01 NOTE — ASSESSMENT & PLAN NOTE
Currently hemodynamically stable.   No H/H since patient received 2 U blood at [place]. Will repeat CBC and transfuse if indicated.   Trend H/H q6h.  NPO except for sips of water and oral medications. Will give IVFs while NPO.   Protonix 40 IV bid.  Consulting GI in AM.  Holding ASA, Eliquis, and Plavix (on due to patient's cardiac history).

## 2022-01-01 NOTE — PROGRESS NOTES
[address] Medicine  Progress Note    Patient Name: Elliott Peters  MRN: [id number]  Patient Class: IP- Inpatient   Admission Date: 12/31/2021  Length of Stay: 1 days  Attending Physician: Christiano Santacruz MD  Primary Care Provider: Izzy Olivier NP        Subjective:     Principal Problem:UGIB (upper gastrointestinal bleed)        HPI:  82 yo M present to [facility] from [place] for acute GI bleed. History obtained from wife and patient. Patient recently had total right knee replacement on 12/28/21 by Dr. Epps and transferred to [facility] for physical rehabilitation. On Wednesday, patient complained of abdominal bloating and constipation. He was given an oral laxative Thursday which made him nauseous and subsequently vomited twice. Vomit was characterized as coffee ground and bloody. He still did not have a bowel movement and was given enema twice around lunch time. Thursday morning he had a large bowel movement; black in color. No bright red blood was noted in stool. This was associated with malaise and pallor. Wife reports he did have have minimal black, tarry, pellet-like, coffee ground colored, stool this afternoon; no episodes of vomiting since Thursday morning. Currently reports no vomiting, lower GI bleed, abdominal pain, or malaise which resolved after he received blood. CBC was ordered with H/H of 6.4/19.3 (was 7.8/23.3 on admission to [facility] facility); FOBT was positive. ASA, Plavix, Eliquis was discontinued. He was transfused with 2 units of blood at [facility] and transferred to [facility] for further evaluation by GI specialist.     Of note, wife reports patient had similar GI symptoms when he had left total knee replacement about 4 years ago. He had an EGD and colonoscopy which showed several polyps. Wife reports patient had no colonoscopy since. Patient has past medical hx of HTN, HLD, renal stones, osteoarthritis, CAD with 3 stents placed, ROSI on CPAP. His cardiologist is   Tani at [facility]. He is followed by Dr. Allen for his nephrolithiasis.         Overview/Hospital Course:  Records reviewed.  Patient admitted after having vomiting and passage per rectum of dark liquid felt to possibly be blood.  No red blood was seen.  Patient had just been discharged from here to swing bed at [facility] after right total knee replacement 12/28 by Dr. Epps.  Patient has been on Eliquis 2.5 mg twice daily.  Also been on Plavix 75 mg daily after having cardiac stents placed in May 2021 by Dr. Freire.  Had preop evaluation by Dr. Freire including stress study.  Do not see any iron therapy patient has been receiving.  Mr. Peters similar problem with abdominal distention and vomiting after previous orthopedic surgeon.  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  Has had previous colonoscopy with polyp removed.  KUB done prior day did show some gaseous distension nonspecific    Past Medical History:   Diagnosis Date    Hyperlipidemia      Hypertension      Kidney stone      MI (myocardial infarction)      Sleep apnea      Urinary tract infection            Interval History:     Review of Systems   Constitutional: Negative for appetite change, fatigue and fever.   HENT: Negative for congestion, hearing loss and trouble swallowing.    Respiratory: Negative for chest tightness, shortness of breath and wheezing.    Cardiovascular: Positive for leg swelling. Negative for chest pain and palpitations.   Gastrointestinal: Negative for abdominal pain, constipation and nausea.   Genitourinary: Negative for difficulty urinating and dysuria.   Musculoskeletal: Positive for gait problem. Negative for back pain and neck stiffness.   Skin: Positive for wound. Negative for pallor and rash.   Neurological: Negative for dizziness, speech difficulty and headaches.   Psychiatric/Behavioral: Negative for confusion and suicidal ideas.     Objective:     Vital Signs (Most  Recent):  Temp: 98 °F (36.7 °C) (01/01/22 1520)  Pulse: 84 (01/01/22 1520)  Resp: 17 (01/01/22 1520)  BP: (!) 122/55 (01/01/22 1520)  SpO2: (!) 94 % (01/01/22 1520) Vital Signs (24h Range):  Temp:  [97.7 °F (36.5 °C)-99.9 °F (37.7 °C)] 98 °F (36.7 °C)  Pulse:  [72-88] 84  Resp:  [12-24] 17  SpO2:  [94 %-100 %] 94 %  BP: (100-161)/(53-93) 122/55     Weight: 86 kg (189 lb 9.5 oz)  Body mass index is 30.6 kg/m².  No intake or output data in the 24 hours ending 01/01/22 1715   Physical Exam  Vitals reviewed.   Constitutional:       General: He is not in acute distress.  Eyes:      Pupils: Pupils are equal, round, and reactive to light.   Cardiovascular:      Rate and Rhythm: Normal rate and regular rhythm.      Pulses: Normal pulses.   Pulmonary:      Effort: Pulmonary effort is normal. No respiratory distress.      Breath sounds: Normal breath sounds. No wheezing.   Abdominal:      General: Bowel sounds are normal. There is no distension.      Tenderness: There is no abdominal tenderness.   Skin:     General: Skin is warm.   Neurological:      General: No focal deficit present.      Mental Status: He is alert, oriented to person, place, and time and easily aroused. Mental status is at baseline.   Psychiatric:         Mood and Affect: Mood normal.         Behavior: Behavior normal.         Significant Labs:   All pertinent labs within the past 24 hours have been reviewed.  BMP:   Recent Labs   Lab 12/31/21 2307   GLU 97   *   K 4.2      CO2 26   BUN 21*   CREATININE 0.97   CALCIUM 8.1*     CBC:   Recent Labs   Lab 12/31/21  0601 12/31/21  0601 12/31/21  2307 01/01/22  0554 01/01/22  1422   WBC 16.59*  --  15.33*  --   --    HGB 6.4*   < > 8.4* 8.4* 8.1*   HCT 19.3*   < > 23.9* 24.7* 25.0*   PLT 86*  --  77*  --   --     < > = values in this interval not displayed.     CMP:   Recent Labs   Lab 12/31/21  2307   *   K 4.2      CO2 26   GLU 97   BUN 21*   CREATININE 0.97   CALCIUM 8.1*   PROT 5.6*    ALBUMIN 2.6*   BILITOT 1.2   ALKPHOS 60   AST 23   ALT 16   ANIONGAP 9   EGFRNONAA 79                     Assessment/Plan:      * UGIB (upper gastrointestinal bleed)   hemodynamically stable.    received 2 U blood at [place] her prior note  Trend H/H q6h.  Protonix   Consulted GI with EGD this a.m..  Holding ASA, Eliquis, and Plavix (Patient was taking due to cardiac history).    Leukocytosis  No other signs/symptoms of acute infection. Likely normal stress reaction to acute GI bleed. Will continue to monitor with CBC.   Blood cultures ordered at [place]. Will f/u with final results.    Status post total right knee replacement  Holding anticoagulation therapy due to acute GI bleed. On SCDs.   Currently on oxycodone 5 q4h prn and Tylenol 1,000 q6h prn for pain control.  Notify Dr. Epps of admission    Hyperlipidemia  On Crestor 20 at home; substituting with Lipitor 80 while in hospital.     ROSI on CPAP  Wife brought home CPAP. Patient reports he has mild ROSI and declines use tonight. States he will consider using it tomorrow night.    Hypertension  On home amlodipine 2.5 mg qd, Coreg 3.125 mg bid    Benign prostatic hyperplasia with nocturia  On terazosin at home; substituting with tamsulosin while in hospital.       VTE Risk Mitigation (From admission, onward)         Ordered     Place PATRIC hose  Until discontinued         12/31/21 2254     Place sequential compression device  Until discontinued         12/31/21 2254     IP VTE HIGH RISK PATIENT  Once         12/31/21 2254                Discharge Planning   BRAULIO:      Code Status: Full Code   Is the patient medically ready for discharge?:     Reason for patient still in hospital (select all that apply): Laboratory test, Treatment, Imaging and Consult recommendations                     Christiano Santacruz MD  Department of Hospital Medicine   [address]

## 2022-01-01 NOTE — ANESTHESIA PREPROCEDURE EVALUATION
01/01/2022  Elliott Peters is a 81 y.o., male.    Anesthesia Evaluation    I have reviewed the Patient Summary Reports.   I have reviewed the NPO Status.   I have reviewed the Medications.     Review of Systems         Anesthesia Plan  Type of Anesthesia, risks & benefits discussed:  Anesthesia Type:  general    Patient's Preference:   Plan Factors:          Intra-op Monitoring Plan: standard ASA monitors  Intra-op Monitoring Plan Comments:   Post Op Pain Control Plan:   Post Op Pain Control Plan Comments:     Induction:   IV  Beta Blocker:         Informed Consent: Patient understands risks and agrees with Anesthesia plan.  Questions answered. Anesthesia consent signed with patient.  ASA Score: 3     Day of Surgery Review of History & Physical:            Ready For Surgery From Anesthesia Perspective.     NPO greater than 8 hours  NAC  NKDA    Hct 25  Platelets 77  Ca 8.1    Kidney stone Urinary tract infection   Hypertension Hyperlipidemia   Sleep apnea Coronary artery disease   GERD  CAD (stents; most recent stents placed May,2021)  H/o MI (7 or more years ago)  Suspected GI bleed  Had a TKA 4 days ago . . . Similar episode occurred 4 years ago after TKA as well   Anemia  Hypocalcemia  Thrombocytopenia  Cardiac clearance for the Knee replacement (from Dr. Freire) is on the chart    Airway exam deferred (COVID precautions); adequate ROM at neck.

## 2022-01-01 NOTE — ASSESSMENT & PLAN NOTE
Holding anticoagulation therapy due to acute GI bleed. On SCDs.   Currently on oxycodone 5 q4h prn and Tylenol 1,000 q6h prn for pain control.

## 2022-01-01 NOTE — ASSESSMENT & PLAN NOTE
No other signs/symptoms of acute infection. Likely normal stress reaction to acute GI bleed. Will continue to monitor with CBC.   Blood cultures ordered at [facility]. Will f/u with final results.

## 2022-01-01 NOTE — HPI
82 yo M present to [facility] from [place] for acute GI bleed. History obtained from wife and patient. Patient recently had total right knee replacement on 12/28/21 by Dr. Epps and transferred to [facility] for physical rehabilitation. On Wednesday, patient complained of abdominal bloating and constipation. He was given an oral laxative Thursday which made him nauseous and subsequently vomited twice. Vomit was characterized as coffee ground and bloody. He still did not have a bowel movement and was given enema twice around lunch time. Thursday morning he had a large bowel movement; black in color. No bright red blood was noted in stool. This was associated with malaise and pallor. Wife reports he did have have minimal black, tarry, pellet-like, coffee ground colored, stool this afternoon; no episodes of vomiting since Thursday morning. Currently reports no vomiting, lower GI bleed, abdominal pain, or malaise which resolved after he received blood. CBC was ordered with H/H of 6.4/19.3 (was 7.8/23.3 on admission to [facility] facility); FOBT was positive. ASA, Plavix, Eliquis was discontinued. He was transfused with 2 units of blood at [place] and transferred to [facility] for further evaluation by GI specialist.     Of note, wife reports patient had similar GI symptoms when he had left total knee replacement about 4 years ago. He had an EGD and colonoscopy which showed several polyps. Wife reports patient had no colonoscopy since. Patient has past medical hx of HTN, HLD, renal stones, osteoarthritis, CAD with 3 stents placed, ROSI on CPAP. His cardiologist is Dr. Freire at [facility]. He is followed by Dr. Allen for his nephrolithiasis.

## 2022-01-01 NOTE — ASSESSMENT & PLAN NOTE
Holding anticoagulation therapy due to acute GI bleed. On SCDs.   Currently on oxycodone 5 q4h prn and Tylenol 1,000 q6h prn for pain control.  Notify Dr. Epps of admission

## 2022-01-01 NOTE — H&P
[address] Medicine  History & Physical    Patient Name: Elliott Peters  MRN: [id number]  Patient Class: IP- Inpatient  Admission Date: 12/31/2021  Attending Physician: Christiano Santacruz MD   Primary Care Provider: Izzy Olivier NP         Patient information was obtained from patient, spouse/SO and ER records.     Subjective:     Principal Problem:Acute GI bleeding    Chief Complaint:   Chief Complaint   Patient presents with    Acute GI bleed        HPI: 82 yo M present to [facility] from [place] for acute GI bleed. History obtained from wife and patient. Patient recently had total right knee replacement on 12/28/21 by Dr. Epps and transferred to [facility] for physical rehabilitation. On Wednesday, patient complained of abdominal bloating and constipation. He was given an oral laxative Thursday which made him nauseous and subsequently vomited twice. Vomit was characterized as coffee ground and bloody. He still did not have a bowel movement and was given enema twice around lunch time. Thursday morning he had a large bowel movement; black in color. No bright red blood was noted in stool. This was associated with malaise and pallor. Wife reports he did have have minimal black, tarry, pellet-like, coffee ground colored, stool this afternoon; no episodes of vomiting since Thursday morning. Currently reports no vomiting, lower GI bleed, abdominal pain, or malaise which resolved after he received blood. CBC was ordered with H/H of 6.4/19.3 (was 7.8/23.3 on admission to [facility] facility); FOBT was positive. ASA, Plavix, Eliquis was discontinued. He was transfused with 2 units of blood at [place] and transferred to [facility] for further evaluation by GI specialist.     Of note, wife reports patient had similar GI symptoms when he had left total knee replacement about 4 years ago. He had an EGD and colonoscopy which showed several polyps. Wife reports patient had no colonoscopy since. Patient has  past medical hx of HTN, HLD, renal stones, osteoarthritis, CAD with 3 stents placed, ROSI on CPAP. His cardiologist is Dr. Freire at [facility]. He is followed by Dr. Allen for his nephrolithiasis.         Past Medical History:   Diagnosis Date    Hyperlipidemia     Hypertension     Kidney stone     MI (myocardial infarction)     Sleep apnea     Urinary tract infection        Past Surgical History:   Procedure Laterality Date    CORONARY ANGIOPLASTY WITH STENT PLACEMENT      HERNIA REPAIR      JOINT REPLACEMENT      difficult to wake after surgery        Review of patient's allergies indicates:  No Known Allergies    Current Facility-Administered Medications on File Prior to Encounter   Medication    [DISCONTINUED] 0.9%  NaCl infusion (for blood administration)    [DISCONTINUED] acetaminophen tablet 650 mg    [DISCONTINUED] amLODIPine tablet 2.5 mg    [DISCONTINUED] apixaban tablet 2.5 mg    [DISCONTINUED] aspirin EC tablet 81 mg    [DISCONTINUED] atorvastatin tablet 10 mg    [DISCONTINUED] carvediloL tablet 3.125 mg    [DISCONTINUED] clopidogreL tablet 75 mg    [DISCONTINUED] gabapentin capsule 300 mg    [DISCONTINUED] HYDROcodone-acetaminophen  mg per tablet 1 tablet    [DISCONTINUED] lactulose 20 gram/30 mL solution Soln 10 g    [DISCONTINUED] melatonin tablet 6 mg    [DISCONTINUED] ondansetron tablet 4 mg    [DISCONTINUED] pantoprazole EC tablet 40 mg    [DISCONTINUED] pantoprazole injection 40 mg    [DISCONTINUED] tamsulosin 24 hr capsule 0.4 mg     Current Outpatient Medications on File Prior to Encounter   Medication Sig    amLODIPine (NORVASC) 5 MG tablet Take 2.5 mg by mouth once daily.    carvediloL (COREG) 3.125 MG tablet Take 3.125 mg by mouth 2 (two) times a day.    cetirizine (ZYRTEC) 10 MG tablet Take 1 tablet (10 mg total) by mouth once daily.    cyclobenzaprine (FLEXERIL) 10 MG tablet TAKE 1 TABLET BY MOUTH 3 TIMES DAILY AS NEEDED FOR MUSCLE SPASMS ..MAY CAUSE  DROWSINESS ..NO ALCOHOL WHILE TAKING    gabapentin (NEURONTIN) 100 MG capsule 100 mg once daily. Prescribe 3x per day takes once daily    HYDROcodone-acetaminophen (NORCO)  mg per tablet Take 1 tablet by mouth every 4 (four) hours as needed for Pain.    imipramine (TOFRANIL) 25 MG tablet     meloxicam (MOBIC) 7.5 MG tablet Take 1 tablet (7.5 mg total) by mouth daily as needed for Pain.    nitroGLYCERIN (NITROSTAT) 0.4 MG SL tablet     pantoprazole (PROTONIX) 40 MG tablet Take 40 mg by mouth once daily.    permethrin (ELIMITE) 5 % cream     predniSONE (DELTASONE) 10 MG tablet Take 1 tablet (10 mg total) by mouth once daily.    rosuvastatin (CRESTOR) 20 MG tablet Take 20 mg by mouth once daily.    terazosin (HYTRIN) 5 MG capsule Take 5 mg by mouth once daily.    triamcinolone acetonide 0.1% (KENALOG) 0.1 % ointment Apply topically 2 (two) times daily.    [DISCONTINUED] ALPRAZolam (XANAX) 0.5 MG tablet Take 0.5 mg by mouth daily as needed.    [DISCONTINUED] amoxicillin-clavulanate 500-125mg (AUGMENTIN) 500-125 mg Tab TAKE 1 TABLET BY MOUTH 3 TIMES DAILY WITH FOOD UNTIL GONE (DRINK PLENTY OF WATER)    [DISCONTINUED] apixaban (ELIQUIS) 2.5 mg Tab Take 1 tablet (2.5 mg total) by mouth 2 (two) times daily. for 12 days    [DISCONTINUED] aspirin (ECOTRIN) 81 MG EC tablet Take 81 mg by mouth once daily.    [DISCONTINUED] clindamycin (CLEOCIN T) 1 % lotion Apply to back, chest, and neck twice a day (Patient not taking: Reported on 6/25/2021)    [DISCONTINUED] clopidogreL (PLAVIX) 75 mg tablet Take 75 mg by mouth once daily.    [DISCONTINUED] doxycycline (VIBRA-TABS) 100 MG tablet     [DISCONTINUED] doxycycline (VIBRAMYCIN) 100 MG Cap Take one 100mg capsule PO BID. DO NOT TAKE WITH DAIRY PRODUCTS     Family History     Problem Relation (Age of Onset)    Cancer Mother        Tobacco Use    Smoking status: Former Smoker    Smokeless tobacco: Never Used    Tobacco comment: QUIT 50 +YEARS AGO   Substance  and Sexual Activity    Alcohol use: Not Currently     Comment: QUIT 50 +YEARS AGO     Drug use: Never    Sexual activity: Not Currently     Review of Systems   Constitutional: Negative for chills, diaphoresis, fatigue and fever.   HENT: Negative for mouth sores, nosebleeds and trouble swallowing.    Eyes: Negative.    Respiratory: Negative for cough, chest tightness, shortness of breath and wheezing.    Cardiovascular: Positive for leg swelling. Negative for chest pain.        Right lower leg swelling since right total knee replacement.    Gastrointestinal: Positive for anal bleeding, blood in stool, nausea and vomiting. Negative for abdominal pain.   Genitourinary: Negative for dysuria, frequency and hematuria.   Musculoskeletal: Positive for arthralgias.        Right knee pain since surgery.    Skin: Positive for pallor. Negative for rash.   Neurological: Negative for light-headedness.   Psychiatric/Behavioral: Negative.  Negative for agitation and hallucinations. The patient is not nervous/anxious.    All other systems reviewed and are negative.    Objective:     Vital Signs (Most Recent):  Temp: 99.9 °F (37.7 °C) (12/31/21 2048)  Pulse: 77 (12/31/21 2048)  Resp: 20 (12/31/21 2130)  BP: (!) 161/93 (12/31/21 2048)  SpO2: 96 % (12/31/21 2048) Vital Signs (24h Range):  Temp:  [98.9 °F (37.2 °C)-101.1 °F (38.4 °C)] 99.9 °F (37.7 °C)  Pulse:  [] 77  Resp:  [16-20] 20  SpO2:  [90 %-96 %] 96 %  BP: (106-161)/(51-93) 161/93     Weight: 86 kg (189 lb 9.5 oz)  Body mass index is 30.6 kg/m².    Physical Exam  Vitals reviewed.   Constitutional:       General: He is not in acute distress.     Appearance: Normal appearance. He is obese. He is not ill-appearing or diaphoretic.   HENT:      Head: Normocephalic and atraumatic.      Right Ear: External ear normal.      Left Ear: External ear normal.      Nose: Nose normal.      Mouth/Throat:      Mouth: Mucous membranes are moist.      Pharynx: No oropharyngeal exudate or  posterior oropharyngeal erythema.      Comments: Upper and lower dentures in place.   Eyes:      Extraocular Movements: Extraocular movements intact.      Conjunctiva/sclera: Conjunctivae normal.   Cardiovascular:      Rate and Rhythm: Normal rate and regular rhythm.      Pulses: Normal pulses.      Heart sounds: Murmur heard.       Pulmonary:      Effort: Pulmonary effort is normal. No respiratory distress.      Breath sounds: Normal breath sounds. No wheezing or rales.   Abdominal:      General: Bowel sounds are normal.      Palpations: Abdomen is soft.      Tenderness: There is no abdominal tenderness. There is no guarding or rebound.   Musculoskeletal:         General: No tenderness.      Cervical back: Normal range of motion.      Right lower leg: No edema.      Left lower leg: Edema present.      Comments: Left lower extremity edema since left total knee replacement. Dressing around left knee.     Skin:     General: Skin is warm and dry.      Capillary Refill: Capillary refill takes 2 to 3 seconds.      Coloration: Skin is pale.   Neurological:      General: No focal deficit present.      Mental Status: He is alert and oriented to person, place, and time.   Psychiatric:         Mood and Affect: Mood normal.         Behavior: Behavior normal.             Significant Labs:   All pertinent labs within the past 24 hours have been reviewed.  Recent Lab Results       12/31/21  0635   12/31/21  0601   12/31/21  0440        Bands   3         Differential Type   Manual         Eos #   0.06         Eosinophil %   0.4            1         Group & Rh     O Positive       Hematocrit   19.3         Hemoglobin   6.4         Indirect Kathleen GEL     Negative       Lymph #   3.23         Lymph %   19.5            16         MCH   29.1         MCHC   33.2         MCV   87.7         Mono #   6.58         Mono %   39.7            36         MPV   10.7         nRBC           Occult Blood Positive           Platelets   86          RBC   2.20         RDW   16.2         Segmented Neutrophils, Man %   44         WBC   16.59               Significant Imaging: I have reviewed all pertinent imaging results/findings within the past 24 hours.    Assessment/Plan:     * Acute GI bleeding  Currently hemodynamically stable.   No H/H since patient received 2 U blood at [place]. Will repeat CBC and transfuse if indicated.   Trend H/H q6h.  NPO except for sips of water and oral medications. Will give IVFs while NPO.   Protonix infusion.  Consulting GI in AM.  Holding ASA, Eliquis, and Plavix (Patient was taking due to cardiac history).    Status post total right knee replacement  Holding anticoagulation therapy due to acute GI bleed. On SCDs.   Currently on oxycodone 5 q4h prn and Tylenol 1,000 q6h prn for pain control.    Leukocytosis  No other signs/symptoms of acute infection. Likely normal stress reaction to acute GI bleed. Will continue to monitor with CBC.   Blood cultures ordered at [place]. Will f/u with final results.    Hyperlipidemia  On Crestor 20 at home; substituting with Lipitor 80 while in hospital.     ROSI on CPAP  Wife brought home CPAP. Patient reports he has mild ROSI and declines use tonight. States he will consider using it tomorrow night.    Hypertension  On home amlodipine 2.5 mg qd, Coreg 3.125 mg bid    Benign prostatic hyperplasia with nocturia  On terazosin at home; substituting with tamsulosin while in hospital.     VTE Risk Mitigation (From admission, onward)         Ordered     Place PATRIC hose  Until discontinued         12/31/21 2254     Place sequential compression device  Until discontinued         12/31/21 2254     IP VTE HIGH RISK PATIENT  Once         12/31/21 2254                   Bryce Rea MD  Department of Hospital Medicine   [facility] - [address] [place] [address]

## 2022-01-01 NOTE — ASSESSMENT & PLAN NOTE
Wife brought home CPAP. Patient reports he has mild ROSI and declines use tonight. States he will consider using it tomorrow night.

## 2022-01-01 NOTE — PLAN OF CARE
Problem: Adult Inpatient Plan of Care  Goal: Plan of Care Review  Outcome: Ongoing, Progressing  Flowsheets (Taken 1/1/2022 0602)  Plan of Care Reviewed With:   patient   spouse  Goal: Patient-Specific Goal (Individualized)  Outcome: Ongoing, Progressing  Flowsheets (Taken 1/1/2022 0602)  Anxieties, Fears or Concerns: reason for bleeding  Individualized Care Needs: ADLS  Patient-Specific Goals (Include Timeframe): Pt will stay hemodynamically stable  Goal: Absence of Hospital-Acquired Illness or Injury  Outcome: Ongoing, Progressing  Intervention: Prevent Skin Injury  Flowsheets (Taken 1/1/2022 0602)  Body Position: position changed independently  Skin Protection: incontinence pads utilized  Intervention: Prevent Infection  Flowsheets (Taken 1/1/2022 0602)  Infection Prevention:   equipment surfaces disinfected   hand hygiene promoted   rest/sleep promoted   single patient room provided  Goal: Optimal Comfort and Wellbeing  Outcome: Ongoing, Progressing  Intervention: Monitor Pain and Promote Comfort  Flowsheets (Taken 1/1/2022 0602)  Pain Management Interventions:   medication offered   pain management plan reviewed with patient/caregiver   position adjusted   quiet environment facilitated  Intervention: Provide Person-Centered Care  Flowsheets (Taken 1/1/2022 0602)  Trust Relationship/Rapport:   care explained   choices provided   emotional support provided   empathic listening provided   questions answered   questions encouraged   reassurance provided   thoughts/feelings acknowledged  Goal: Readiness for Transition of Care  Outcome: Ongoing, Progressing

## 2022-01-01 NOTE — NURSING
Dr. Kirkpatrick in to see patient. Patient accepted by Dr. Beckham to [facility] Room 652. Report called to Debbie Negron RN.

## 2022-01-01 NOTE — HOSPITAL COURSE
01/01 -Records reviewed.  Patient admitted after having vomiting and passage per rectum of dark liquid felt to possibly be blood.  No red blood was seen.  Patient had just been discharged from here to swing bed at [facility] after right total knee replacement 12/28 by Dr. Epps.  Patient has been on Eliquis 2.5 mg twice daily.  Also been on Plavix 75 mg daily after having cardiac stents placed in May 2021 by Dr. Freire.  Had preop evaluation by Dr. Freire including stress study.  Do not see any iron therapy patient has been receiving.  Mr. Peters similar problem with abdominal distention and vomiting after previous orthopedic surgeon.  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  Has had previous colonoscopy with polyp removed.  KUB done prior day did show some gaseous distension nonspecific    Past Medical History:   Diagnosis Date    Hyperlipidemia      Hypertension      Kidney stone      MI (myocardial infarction)      Sleep apnea      Urinary tract infection        01/02 - patient without new issues.  No evidence of bleeding.  H&H similar to prior day; any lower will consider transfusion.  Therapy team to see, at discharge look at going back to [place] swing bed.  Dr. Epps to recheck.  Still with some abdominal distention but less; talk with patient about trying to avoid narcotics as much as possible and to increase activity during this acute abdominal problem.  As prior stated had summer GI complaints after prior orthopedic surgery.  No DVT noted on recent ultrasound; right leg is swollen associated with recent surgery.  Dr. Crawford to assume care am.  Recheck CBC to follow H&H but also platelet count which is been low not clear etiology    1/3 - EGD performed on 01/01/2022.  Multiple small, superficial ulcers in the cardia; performed cold forceps biopsy  3 cm hiatal hernia  Monitoring hemoglobin and will plan to return to [place] when stable.  Will plan to restart Eliquis 2.5 mg  for prophylaxis and hold Plavix while on Eliquis and then restart Plavix after 2 weeks post right knee repair which would be approximately 1/10/22.  May check with Dr. Freire if there is any uncertainty.      1/4 - will recheck cbc today.  Eliquis restarted as DVT ppx.  Holding plavix and will resume on Melecio 10.  May discuss plan with Dr. Freire, patient's cardiologist.      1/5 - fever to 101 overnight and severe nausea with gastric distention.  Initiate antibiotics.      1/6 - fever again last night.  CT chest unrevealing.  Possibly post-op pneumonia contributing to fever.  No fever today.  Will monitor tomorrow.  If no fever and inflammatory markers are fine, will discharge.      1/7--Pt afebrile, however, inflammatory markers are elevated.  O2 sats are in the high 90s on room air; Pt has received IV and Oral ABT during this hospitalization but will D/C on oral Levaquin.  Pt to restart Plavix and ASA on 1/10/2022, Eliquis can be stopped at that time, will need to monitor CBC and monitor for bleeding as pt has had recent UGIB.  Pt to follow up with GI-Dr. Aguirre, Dr. Epps, and cardiology in addition to his pcp.  Has met maximum benefit from this hospitalization, however, will benefit from continued therapy while inpatient at [facility]; is stale for discharge.

## 2022-01-01 NOTE — TRANSFER OF CARE
"Anesthesia Transfer of Care Note    Patient: Elliott Peters    Procedure(s) Performed: * No procedures listed *    Patient location: GI    Anesthesia Type: MAC    Transport from OR: Transported from OR on room air with adequate spontaneous ventilation. Continuous ECG monitoring in transport. Continuous SpO2 monitoring in transport    Post pain: adequate analgesia    Post assessment: no apparent anesthetic complications    Post vital signs: stable    Level of consciousness: responds to stimulation    Nausea/Vomiting: no nausea/vomiting    Complications: none    Transfer of care protocol was followed      Last vitals:   Visit Vitals  BP (!) 100/53 (BP Location: Right arm, Patient Position: Lying)   Pulse 83   Temp 36.7 °C (98 °F)   Resp 16   Ht 5' 6" (1.676 m)   Wt 86 kg (189 lb 9.5 oz)   SpO2 98%   BMI 30.60 kg/m²     "

## 2022-01-01 NOTE — CONSULTS
Digestive Disease Consult Note    Chief Complaint   Patient presents with    Acute GI bleed       History of Present Illness:  Elliott Peters is a 81 y.o. male that  has a past medical history of Hyperlipidemia, Hypertension, Kidney stone, MI (myocardial infarction), Sleep apnea, and Urinary tract infection. .    Patient admitted today after recent right TKA 12/28 after having some coffee ground emesis and black stools.   Labs showing on arrival from [place], H/H was 6.4/19.    Of note, patient is on ASA, Plavix for history of CAD. Last stent placed in May 2021. States prior to TKA he did have a stress test that was normal.    Similar episode occurred 4 years ago after TKA as well and EGD and colonoscopy done.    Denies any NSAID use. States he takes protonix as outpatient.    Review of Systems:  12 point review of systems otherwise negative except as stated in HPI.    Objective:  Vitals:    01/01/22 0807   BP:    Pulse:    Resp: 18   Temp:      Physical Exam  Constitutional:       Appearance: Normal appearance.   HENT:      Head: Normocephalic and atraumatic.      Nose: No congestion or rhinorrhea.   Eyes:      General: No scleral icterus.  Cardiovascular:      Rate and Rhythm: Normal rate.   Pulmonary:      Effort: Pulmonary effort is normal.   Abdominal:      General: Abdomen is flat.      Palpations: Abdomen is soft.   Musculoskeletal:         General: No swelling or tenderness.      Cervical back: Neck supple. No rigidity.   Skin:     General: Skin is warm and dry.   Neurological:      Mental Status: He is alert.   Psychiatric:         Mood and Affect: Mood normal.         Assessment and Plan:  Suspected Upper GI bleed  - coffee ground emesis and melena  - transfuse for goal Hgb 10 ideally given history of CAD  - plan on EGD for evaluation    Thank you for including us in the care of this patient. Please call with any questions.     Juan Jose Aguirre MD  Gastroenterology

## 2022-01-01 NOTE — ANESTHESIA POSTPROCEDURE EVALUATION
Anesthesia Post Evaluation    Patient: Elliott Peters    Procedure(s) Performed: * No procedures listed *    Final Anesthesia Type: general      Patient location during evaluation: PACU  Post-procedure vital signs: reviewed and stable  Pain management: adequate  Airway patency: patent  ROSI mitigation strategies: Extubation while patient is awake and Intraoperative administration of CPAP, nasopharyngeal airway, or oral appliance during sedation  PONV status at discharge: No PONV  Anesthetic complications: no      Cardiovascular status: hemodynamically stable  Respiratory status: unassisted  Hydration status: euvolemic  Follow-up not needed.          Vitals Value Taken Time   /58 01/01/22 1141   Temp 97.8 01/01/22 1143   Pulse 79 01/01/22 1143   Resp 21 01/01/22 1143   SpO2 98 % 01/01/22 1143   Vitals shown include unvalidated device data.      No case tracking events are documented in the log.      Pain/Lai Score: Pain Rating Prior to Med Admin: 4 (1/1/2022  8:07 AM)  Pain Rating Post Med Admin: 0 (1/1/2022  9:07 AM)  Lai Score: 8 (1/1/2022 11:28 AM)

## 2022-01-01 NOTE — ASSESSMENT & PLAN NOTE
hemodynamically stable.    received 2 U blood at [facility] her prior note  Trend H/H q6h.  Protonix   Consulted GI with EGD this a.m..  Holding ASA, Eliquis, and Plavix (Patient was taking due to cardiac history).

## 2022-01-01 NOTE — SUBJECTIVE & OBJECTIVE
Past Medical History:   Diagnosis Date    Hyperlipidemia     Hypertension     Kidney stone     MI (myocardial infarction)     Sleep apnea     Urinary tract infection        Past Surgical History:   Procedure Laterality Date    CORONARY ANGIOPLASTY WITH STENT PLACEMENT      HERNIA REPAIR      JOINT REPLACEMENT      difficult to wake after surgery        Review of patient's allergies indicates:  No Known Allergies    Current Facility-Administered Medications on File Prior to Encounter   Medication    [DISCONTINUED] 0.9%  NaCl infusion (for blood administration)    [DISCONTINUED] acetaminophen tablet 650 mg    [DISCONTINUED] amLODIPine tablet 2.5 mg    [DISCONTINUED] apixaban tablet 2.5 mg    [DISCONTINUED] aspirin EC tablet 81 mg    [DISCONTINUED] atorvastatin tablet 10 mg    [DISCONTINUED] carvediloL tablet 3.125 mg    [DISCONTINUED] clopidogreL tablet 75 mg    [DISCONTINUED] gabapentin capsule 300 mg    [DISCONTINUED] HYDROcodone-acetaminophen  mg per tablet 1 tablet    [DISCONTINUED] lactulose 20 gram/30 mL solution Soln 10 g    [DISCONTINUED] melatonin tablet 6 mg    [DISCONTINUED] ondansetron tablet 4 mg    [DISCONTINUED] pantoprazole EC tablet 40 mg    [DISCONTINUED] pantoprazole injection 40 mg    [DISCONTINUED] tamsulosin 24 hr capsule 0.4 mg     Current Outpatient Medications on File Prior to Encounter   Medication Sig    amLODIPine (NORVASC) 5 MG tablet Take 2.5 mg by mouth once daily.    carvediloL (COREG) 3.125 MG tablet Take 3.125 mg by mouth 2 (two) times a day.    cetirizine (ZYRTEC) 10 MG tablet Take 1 tablet (10 mg total) by mouth once daily.    cyclobenzaprine (FLEXERIL) 10 MG tablet TAKE 1 TABLET BY MOUTH 3 TIMES DAILY AS NEEDED FOR MUSCLE SPASMS ..MAY CAUSE DROWSINESS ..NO ALCOHOL WHILE TAKING    gabapentin (NEURONTIN) 100 MG capsule 100 mg once daily. Prescribe 3x per day takes once daily    HYDROcodone-acetaminophen (NORCO)  mg per tablet Take 1 tablet  by mouth every 4 (four) hours as needed for Pain.    imipramine (TOFRANIL) 25 MG tablet     meloxicam (MOBIC) 7.5 MG tablet Take 1 tablet (7.5 mg total) by mouth daily as needed for Pain.    nitroGLYCERIN (NITROSTAT) 0.4 MG SL tablet     pantoprazole (PROTONIX) 40 MG tablet Take 40 mg by mouth once daily.    permethrin (ELIMITE) 5 % cream     predniSONE (DELTASONE) 10 MG tablet Take 1 tablet (10 mg total) by mouth once daily.    rosuvastatin (CRESTOR) 20 MG tablet Take 20 mg by mouth once daily.    terazosin (HYTRIN) 5 MG capsule Take 5 mg by mouth once daily.    triamcinolone acetonide 0.1% (KENALOG) 0.1 % ointment Apply topically 2 (two) times daily.    [DISCONTINUED] ALPRAZolam (XANAX) 0.5 MG tablet Take 0.5 mg by mouth daily as needed.    [DISCONTINUED] amoxicillin-clavulanate 500-125mg (AUGMENTIN) 500-125 mg Tab TAKE 1 TABLET BY MOUTH 3 TIMES DAILY WITH FOOD UNTIL GONE (DRINK PLENTY OF WATER)    [DISCONTINUED] apixaban (ELIQUIS) 2.5 mg Tab Take 1 tablet (2.5 mg total) by mouth 2 (two) times daily. for 12 days    [DISCONTINUED] aspirin (ECOTRIN) 81 MG EC tablet Take 81 mg by mouth once daily.    [DISCONTINUED] clindamycin (CLEOCIN T) 1 % lotion Apply to back, chest, and neck twice a day (Patient not taking: Reported on 6/25/2021)    [DISCONTINUED] clopidogreL (PLAVIX) 75 mg tablet Take 75 mg by mouth once daily.    [DISCONTINUED] doxycycline (VIBRA-TABS) 100 MG tablet     [DISCONTINUED] doxycycline (VIBRAMYCIN) 100 MG Cap Take one 100mg capsule PO BID. DO NOT TAKE WITH DAIRY PRODUCTS     Family History     Problem Relation (Age of Onset)    Cancer Mother        Tobacco Use    Smoking status: Former Smoker    Smokeless tobacco: Never Used    Tobacco comment: QUIT 50 +YEARS AGO   Substance and Sexual Activity    Alcohol use: Not Currently     Comment: QUIT 50 +YEARS AGO     Drug use: Never    Sexual activity: Not Currently     Review of Systems   Constitutional: Negative for chills,  diaphoresis, fatigue and fever.   HENT: Negative for mouth sores, nosebleeds and trouble swallowing.    Eyes: Negative.    Respiratory: Negative for cough, chest tightness, shortness of breath and wheezing.    Cardiovascular: Positive for leg swelling. Negative for chest pain.        Right lower leg swelling since right total knee replacement.    Gastrointestinal: Positive for anal bleeding, blood in stool, nausea and vomiting. Negative for abdominal pain.   Genitourinary: Negative for dysuria, frequency and hematuria.   Musculoskeletal: Positive for arthralgias.        Right knee pain since surgery.    Skin: Positive for pallor. Negative for rash.   Neurological: Negative for light-headedness.   Psychiatric/Behavioral: Negative.  Negative for agitation and hallucinations. The patient is not nervous/anxious.    All other systems reviewed and are negative.    Objective:     Vital Signs (Most Recent):  Temp: 99.9 °F (37.7 °C) (12/31/21 2048)  Pulse: 77 (12/31/21 2048)  Resp: 20 (12/31/21 2130)  BP: (!) 161/93 (12/31/21 2048)  SpO2: 96 % (12/31/21 2048) Vital Signs (24h Range):  Temp:  [98.9 °F (37.2 °C)-101.1 °F (38.4 °C)] 99.9 °F (37.7 °C)  Pulse:  [] 77  Resp:  [16-20] 20  SpO2:  [90 %-96 %] 96 %  BP: (106-161)/(51-93) 161/93     Weight: 86 kg (189 lb 9.5 oz)  Body mass index is 30.6 kg/m².    Physical Exam  Vitals reviewed.   Constitutional:       General: He is not in acute distress.     Appearance: Normal appearance. He is obese. He is not ill-appearing or diaphoretic.   HENT:      Head: Normocephalic and atraumatic.      Right Ear: External ear normal.      Left Ear: External ear normal.      Nose: Nose normal.      Mouth/Throat:      Mouth: Mucous membranes are moist.      Pharynx: No oropharyngeal exudate or posterior oropharyngeal erythema.      Comments: Upper and lower dentures in place.   Eyes:      Extraocular Movements: Extraocular movements intact.      Conjunctiva/sclera: Conjunctivae normal.    Cardiovascular:      Rate and Rhythm: Normal rate and regular rhythm.      Pulses: Normal pulses.      Heart sounds: Murmur heard.       Pulmonary:      Effort: Pulmonary effort is normal. No respiratory distress.      Breath sounds: Normal breath sounds. No wheezing or rales.   Abdominal:      General: Bowel sounds are normal.      Palpations: Abdomen is soft.      Tenderness: There is no abdominal tenderness. There is no guarding or rebound.   Musculoskeletal:         General: No tenderness.      Cervical back: Normal range of motion.      Right lower leg: No edema.      Left lower leg: Edema present.      Comments: Left lower extremity edema since left total knee replacement.    Skin:     General: Skin is warm and dry.      Capillary Refill: Capillary refill takes 2 to 3 seconds.      Coloration: Skin is pale.   Neurological:      General: No focal deficit present.      Mental Status: He is alert and oriented to person, place, and time.   Psychiatric:         Mood and Affect: Mood normal.         Behavior: Behavior normal.             Significant Labs:   All pertinent labs within the past 24 hours have been reviewed.  Recent Lab Results       12/31/21  0635   12/31/21  0601   12/31/21  0440        Bands   3         Differential Type   Manual         Eos #   0.06         Eosinophil %   0.4            1         Group & Rh     O Positive       Hematocrit   19.3         Hemoglobin   6.4         Indirect Kathleen GEL     Negative       Lymph #   3.23         Lymph %   19.5            16         MCH   29.1         MCHC   33.2         MCV   87.7         Mono #   6.58         Mono %   39.7            36         MPV   10.7         nRBC           Occult Blood Positive           Platelets   86         RBC   2.20         RDW   16.2         Segmented Neutrophils, Man %   44         WBC   16.59               Significant Imaging: I have reviewed all pertinent imaging results/findings within the past 24 hours.

## 2022-01-01 NOTE — DISCHARGE SUMMARY
[facility] Surgical Unit  Hospital Medicine  Discharge Summary      Patient Name: Elliott Peters  MRN: [id number]  Patient Class: IP- Swing  Admission Date: 12/29/2021  Hospital Length of Stay: 2 days  Discharge Date and Time:  12/31/2021 6:53 PM  Attending Physician: Juan Kirkpatrick DO   Discharging Provider: Juan Kirkpatrick DO  Primary Care Provider: Izzy Olivier NP      HPI:   Mr. Peters is an 81 yo WM s/p RTKR by Dr. Epps on 12/28/2021 who is admitted to [facility] on 12/29/21 for generalized muscle weakness. Patient's PMH includes HTN, HLD, cardiac stent, and sleep apnea. According to records he tolerated the procedure w/o complications. He was evaluated by PT/OT at [facility] and is to be weight bearing as tolerated to [facility] Patient has hx of LTKR and suffered abdominal pain and constipation following procedure. He denies any abdominal pain at this time. Last BM was 12/27/21. H&H on admit to [facility] 7.8/23.2. Patient with H&H 10/29.5 on 12/29 prior to d/c from [facility]. He reports pain to E. States he took one Norco on the morning of 12/29 prior to transport to [facility] this evening. Pain was well controlled following procedure with current regimen. He denies any chest pain or shortness of breath. His wife is at bedside. They have met with [facility] and goal is to be discharged back home with wife and HH. He is to remain on Eliquis for DVT ppx.     Full Code  VTE - Eliquis/PATRIC/early ambulation      * No surgery found *      Hospital Course:   12/31/21 Patient is swingbed day #3 for PT/OT rehabiliation s/p right TKR by Dr. Epps on 12/28/21. Patient is ambulating 95 feet with front wheeled walker. Right knee surgical incision with staples intact without any redness or drainage, but does have some swelling present. Pain is currently controlled with Norco 10/325 mg. H&H 6.4/19.3 with plt 82, hemoccult positive. Wife reports that patient had 2 episodes of vomiting yesterday with coffee ground vomitus, but none  today. Also having dark stools. Denies any bright blood with bm. Patient denies any abdominal pain, nausea.or dizziness. Patient has cardiac stents and is anticoagulated with Eliquis, Plavix and ASA.  Discussed patient's case with Dr. Tanner and orders received.     Patient received 2 units of packed red blood cells without incident.  Upon further review of his chart patient requires Eliquis due to his cardiac history.  He is also on Plavix and aspirin.  In addition to that his platelet count is less than 100. We do not have Gastroenterology coverage at this facility and do not have immediate access to platelets should they become necessary.  I believe that this patient would benefit from evaluation and management by a gastroenterologist.  I called the [facility]mission line and discussed this with them.  They in turn discussed the case with Dr. Viramontes who agreed to accept the patient.  Patient will be transferred to [facility] by EMS in stable condition.  I evaluated the patient and confirmed that he is stable for transfer.  I also discussed this plan with the patient and his wife.  All questions were answered all issues were addressed.  The patient and his wife verbalized understanding of and agreement with the plan.       Goals of Care Treatment Preferences:  Code Status: Full Code      Consults:   Consults (From admission, onward)        Status Ordering Provider     Inpatient consult to Registered Dietitian/Nutritionist  Once        Provider:  (Not yet assigned)    Completed KRISS MOTA          No new Assessment & Plan notes have been filed under this hospital service since the last note was generated.  Service: Hospital Medicine    Final Active Diagnoses:    Diagnosis Date Noted POA    PRINCIPAL PROBLEM:  Muscle weakness (generalized) [M62.81] 12/30/2021 Yes    Gastrointestinal hemorrhage with melena [K92.1] 12/31/2021 No    Asthenia [R53.1] 12/31/2021 Yes    Surgical wound present [T14.8XXA]  12/30/2021 Yes    Acute pain of right knee [M25.561] 12/30/2021 Yes    At risk for venous thromboembolism (VTE) [Z91.89] 12/30/2021 Yes    Hyperlipidemia [E78.5] 12/28/2021 Yes    ROSI on CPAP [G47.33, Z99.89] 12/20/2021 Not Applicable    Hypertension [I10] 10/18/2021 Yes      Problems Resolved During this Admission:       Discharged Condition: stable    Disposition: Another Health Care Inst*    Follow Up:    Patient Instructions:      Transfer patient       Significant Diagnostic Studies: Labs:   BMP:   Recent Labs   Lab 12/30/21  0603   *   *   K 3.9   CL 99   CO2 26   BUN 16   CREATININE 0.95   CALCIUM 7.7*    and CBC   Recent Labs   Lab 12/30/21 0445 12/30/21 0445 12/31/21  0601   WBC 20.37*  --  16.59*   HGB 7.8*  --  6.4*   HCT 23.2*   < > 19.3*   PLT 82*  --  86*    < > = values in this interval not displayed.       Pending Diagnostic Studies:     None         Medications:  Reconciled Home Medications:      Medication List      CONTINUE taking these medications    amLODIPine 5 MG tablet  Commonly known as: NORVASC  Take 2.5 mg by mouth once daily.     carvediloL 3.125 MG tablet  Commonly known as: COREG  Take 3.125 mg by mouth 2 (two) times a day.     cetirizine 10 MG tablet  Commonly known as: ZYRTEC  Take 1 tablet (10 mg total) by mouth once daily.     cyclobenzaprine 10 MG tablet  Commonly known as: FLEXERIL  TAKE 1 TABLET BY MOUTH 3 TIMES DAILY AS NEEDED FOR MUSCLE SPASMS ..MAY CAUSE DROWSINESS ..NO ALCOHOL WHILE TAKING     gabapentin 100 MG capsule  Commonly known as: NEURONTIN  100 mg once daily. Prescribe 3x per day takes once daily     HYDROcodone-acetaminophen  mg per tablet  Commonly known as: NORCO  Take 1 tablet by mouth every 4 (four) hours as needed for Pain.     imipramine 25 MG tablet  Commonly known as: TOFRANIL     meloxicam 7.5 MG tablet  Commonly known as: MOBIC  Take 1 tablet (7.5 mg total) by mouth daily as needed for Pain.     nitroGLYCERIN 0.4 MG SL  tablet  Commonly known as: NITROSTAT     pantoprazole 40 MG tablet  Commonly known as: PROTONIX  Take 40 mg by mouth once daily.     permethrin 5 % cream  Commonly known as: ELIMITE     predniSONE 10 MG tablet  Commonly known as: DELTASONE  Take 1 tablet (10 mg total) by mouth once daily.     rosuvastatin 20 MG tablet  Commonly known as: CRESTOR  Take 20 mg by mouth once daily.     terazosin 5 MG capsule  Commonly known as: HYTRIN  Take 5 mg by mouth once daily.     triamcinolone acetonide 0.1% 0.1 % ointment  Commonly known as: KENALOG  Apply topically 2 (two) times daily.        STOP taking these medications    amoxicillin-clavulanate 500-125mg 500-125 mg Tab  Commonly known as: AUGMENTIN     apixaban 2.5 mg Tab  Commonly known as: ELIQUIS     aspirin 81 MG EC tablet  Commonly known as: ECOTRIN     clopidogreL 75 mg tablet  Commonly known as: PLAVIX     doxycycline 100 MG Cap  Commonly known as: VIBRAMYCIN     doxycycline 100 MG tablet  Commonly known as: VIBRA-TABS        ASK your doctor about these medications    ALPRAZolam 0.5 MG tablet  Commonly known as: XANAX  Take 0.5 mg by mouth daily as needed.     clindamycin 1 % lotion  Commonly known as: CLEOCIN T  Apply to back, chest, and neck twice a day            Indwelling Lines/Drains at time of discharge:   Lines/Drains/Airways     None                 Time spent on the discharge of patient: 35 minutes         Juan Kirkpatrick DO  Department of Hospital Medicine  [place] - Medical Surgical Unit

## 2022-01-01 NOTE — NURSING
Pt arrival via [facility]. Pt on 3LNC, wife at beside. Dr. Viramontes notified of arrival. Safety measures ongoing.

## 2022-01-02 PROBLEM — D69.6 THROMBOCYTOPENIA: Status: ACTIVE | Noted: 2022-01-02

## 2022-01-02 LAB
ANION GAP SERPL CALCULATED.3IONS-SCNC: 12 MMOL/L (ref 7–16)
BUN SERPL-MCNC: 16 MG/DL (ref 7–18)
BUN/CREAT SERPL: 17 (ref 6–20)
CALCIUM SERPL-MCNC: 8 MG/DL (ref 8.5–10.1)
CHLORIDE SERPL-SCNC: 105 MMOL/L (ref 98–107)
CO2 SERPL-SCNC: 25 MMOL/L (ref 21–32)
CREAT SERPL-MCNC: 0.96 MG/DL (ref 0.7–1.3)
GLUCOSE SERPL-MCNC: 80 MG/DL (ref 74–106)
HCT VFR BLD AUTO: 23.5 % (ref 40–54)
HCT VFR BLD AUTO: 23.6 % (ref 40–54)
HGB BLD-MCNC: 7.9 G/DL (ref 13.5–18)
HGB BLD-MCNC: 7.9 G/DL (ref 13.5–18)
MAGNESIUM SERPL-MCNC: 2.2 MG/DL (ref 1.7–2.3)
POTASSIUM SERPL-SCNC: 4.3 MMOL/L (ref 3.5–5.1)
SODIUM SERPL-SCNC: 138 MMOL/L (ref 136–145)
T4 SERPL-MCNC: 3.3 ΜG/DL (ref 4.5–12.1)
TSH SERPL DL<=0.005 MIU/L-ACNC: 0.65 UIU/ML (ref 0.36–3.74)

## 2022-01-02 PROCEDURE — 25000003 PHARM REV CODE 250: Performed by: HOSPITALIST

## 2022-01-02 PROCEDURE — 99232 SBSQ HOSP IP/OBS MODERATE 35: CPT | Mod: ,,, | Performed by: HOSPITALIST

## 2022-01-02 PROCEDURE — 84443 ASSAY THYROID STIM HORMONE: CPT | Performed by: HOSPITALIST

## 2022-01-02 PROCEDURE — 85014 HEMATOCRIT: CPT | Performed by: HOSPITALIST

## 2022-01-02 PROCEDURE — 83735 ASSAY OF MAGNESIUM: CPT | Performed by: HOSPITALIST

## 2022-01-02 PROCEDURE — 99900035 HC TECH TIME PER 15 MIN (STAT)

## 2022-01-02 PROCEDURE — 80048 BASIC METABOLIC PNL TOTAL CA: CPT | Performed by: HOSPITALIST

## 2022-01-02 PROCEDURE — 97165 OT EVAL LOW COMPLEX 30 MIN: CPT

## 2022-01-02 PROCEDURE — 94761 N-INVAS EAR/PLS OXIMETRY MLT: CPT

## 2022-01-02 PROCEDURE — 11000001 HC ACUTE MED/SURG PRIVATE ROOM

## 2022-01-02 PROCEDURE — 84436 ASSAY OF TOTAL THYROXINE: CPT | Performed by: HOSPITALIST

## 2022-01-02 PROCEDURE — 36415 COLL VENOUS BLD VENIPUNCTURE: CPT | Performed by: HOSPITALIST

## 2022-01-02 PROCEDURE — 27000221 HC OXYGEN, UP TO 24 HOURS

## 2022-01-02 PROCEDURE — 99232 PR SUBSEQUENT HOSPITAL CARE,LEVL II: ICD-10-PCS | Mod: ,,, | Performed by: HOSPITALIST

## 2022-01-02 PROCEDURE — 97161 PT EVAL LOW COMPLEX 20 MIN: CPT

## 2022-01-02 RX ORDER — DEXTROSE MONOHYDRATE AND SODIUM CHLORIDE 5; .225 G/100ML; G/100ML
100 INJECTION, SOLUTION INTRAVENOUS DAILY
Status: COMPLETED | OUTPATIENT
Start: 2022-01-02 | End: 2022-01-03

## 2022-01-02 RX ADMIN — CARVEDILOL 3.12 MG: 3.12 TABLET, FILM COATED ORAL at 08:01

## 2022-01-02 RX ADMIN — OXYCODONE HYDROCHLORIDE 5 MG: 5 TABLET ORAL at 05:01

## 2022-01-02 RX ADMIN — POLYETHYLENE GLYCOL 3350 17 G: 17 POWDER, FOR SOLUTION ORAL at 08:01

## 2022-01-02 RX ADMIN — DEXTROSE AND SODIUM CHLORIDE 100 ML/HR: 5; 200 INJECTION, SOLUTION INTRAVENOUS at 01:01

## 2022-01-02 RX ADMIN — PANTOPRAZOLE SODIUM 40 MG: 40 TABLET, DELAYED RELEASE ORAL at 04:01

## 2022-01-02 RX ADMIN — ATORVASTATIN CALCIUM 80 MG: 80 TABLET, FILM COATED ORAL at 08:01

## 2022-01-02 RX ADMIN — TAMSULOSIN HYDROCHLORIDE 0.4 MG: 0.4 CAPSULE ORAL at 08:01

## 2022-01-02 RX ADMIN — MELATONIN 6 MG: at 08:01

## 2022-01-02 RX ADMIN — AMLODIPINE BESYLATE 2.5 MG: 2.5 TABLET ORAL at 08:01

## 2022-01-02 RX ADMIN — PANTOPRAZOLE SODIUM 40 MG: 40 TABLET, DELAYED RELEASE ORAL at 05:01

## 2022-01-02 RX ADMIN — TRAZODONE HYDROCHLORIDE 50 MG: 50 TABLET ORAL at 08:01

## 2022-01-02 NOTE — PLAN OF CARE
[facility] - [facility] Telemetry  Initial Discharge Assessment       Primary Care Provider: Izzy Olivier NP    Admission Diagnosis: UGIB (upper gastrointestinal bleed) [K92.2]    Admission Date: 12/31/2021  Expected Discharge Date:     Discharge Barriers Identified: None    Payor: MEDICARE / Plan: MEDICARE PART A & B / Product Type: Government /     Extended Emergency Contact Information  Primary Emergency Contact: Teresa Peters  Mobile Phone: [phone]  Relation: Spouse    Discharge Plan A: Other (Swingbed at [facility])  Discharge Plan B: Home Health ([facility])      [facility] [place] [place] - [address] AT Portal to Registered [facility] Sites  [address]  [facility] [address]  Phone: [phone] Fax: [phone]    [facility] - [place], [place] - [address]  [address]  [place] [address]  Phone: [phone] Fax: [phone]    [facility] - [place], [place] - [address]  [address]  [place] [address]  Phone: [phone] Fax: [phone]    [facility] [place], [place] - [address] [place] [address]  [address] [place] [address]  Phone: [phone] Fax: [phone]      Initial Assessment (most recent)     Adult Discharge Assessment - 01/02/22 0822        Discharge Assessment    Assessment Type Discharge Planning Assessment     Source of Information patient     Lives With spouse     Do you expect to return to your current living situation? Yes     Do you have help at home or someone to help you manage your care at home? Yes     Who are your caregiver(s) and their phone number(s)? Teresa Peters- Spouse [phone]     Equipment Currently Used at Home bedside commode;cane, straight;CPAP     Do you currently have service(s) that help you manage your care at home? No     Discharge Plan A Other   Swingbed at [facility]    Discharge Plan B Home Health   [facility]    DME Needed Upon Discharge  none     Discharge Plan  discussed with: Spouse/sig other     Name(s) and Number(s) Teresa Peters- Spouse [phone]     Discharge Barriers Identified None               SW consulted that pt will return back to [facility] [place]. SW spoke with pts spouse, Teresa. Pt lives at home with spouse, not current with  and has a bsc, can and cpap. Pt was at [facility] pta and the plan is for pt to return and once ready for dc from [facility], the plan is for pt to dc home with [facility], choice obtained. SW to call referral to [facility] in a.m. I.M. obtained. SW will cont to follow for dc needs.

## 2022-01-02 NOTE — PLAN OF CARE
Problem: Physical Therapy Goal  Goal: Physical Therapy Goal  Description: Short Term Goals to be met by: 2022    Patient will increase functional independence with mobility by performin. Supine to sit with Modified [place]  2. Sit to stand transfer with Modified [place]  3. Bed to chair transfer with Modified [place] using Rolling Walker, WBAT right LE  4. Gait  x 100 feet with Modified [place] using Rolling Walker, WBAT right LE.   5. Lower extremity exercise program x30 reps per handout, with assistance as needed  6. Knee ROM 0-100    Long Term Goals to be met by: 3/2/2022    Pt will regain full independent functional mobility to return to prior activities of daily living.   Outcome: Ongoing, Progressing       Patient participated well with PT evaluation and will benefit from return to swing bed once medically stable to complete TKR rehab.

## 2022-01-02 NOTE — PT/OT/SLP EVAL
"Physical Therapy Evaluation    Patient Name:  Elliott Peters   MRN:  [id number]    Recommendations:     Discharge Recommendations:  rehabilitation facility,nursing facility, skilled   Discharge Equipment Recommendations: walker, rolling   Barriers to discharge: ongoing medical care    Assessment:     Elliott ePters is a 81 y.o. male admitted with a medical diagnosis of UGIB (upper gastrointestinal bleed); Sammie's syndrome  He presents with the following impairments/functional limitations:  weakness,impaired endurance,impaired self care skills,impaired functional mobilty,gait instability,impaired balance,decreased lower extremity function,pain,decreased ROM,impaired cardiopulmonary response to activity,orthopedic precautions .    Patient participated well with PT evaluation and will benefit from return to swing bed once medically stable to complete TKR rehab.     Rehab Prognosis: Good; patient would benefit from acute skilled PT services to address these deficits and reach maximum level of function.    Recent Surgery: * No surgery found *      Plan:     During this hospitalization, patient to be seen 5 x/week to address the identified rehab impairments via gait training,therapeutic activities,therapeutic exercises and progress toward the following goals:    · Plan of Care Expires:  02/02/22    Subjective     Chief Complaint: UGIB, impaired bowel function; recent right TKR  Patient/Family Comments/goals: "I thought I was not going to have this problem after this TKR but it was just like my last time."  Pain/Comfort:  · Pain Rating 1: 5/10  · Location - Side 1: Right  · Location 1: knee  · Pain Addressed 1: Pre-medicate for activity,Reposition,Distraction,Cessation of Activity  · Pain Rating Post-Intervention 1: 5/10    Patients cultural, spiritual, Mandaeism conflicts given the current situation: no    Living Environment:  Pt lives with spouse in a single level home with one shallow step to enter  Prior to " admission, patients level of function was independent.  Equipment used at home: walker, standard,cane, straight,bedside commode,CPAP.  DME owned (not currently used): none.  Upon discharge, patient will have assistance from swing bed staff.    Objective:     Communicated with BERTHA Negron prior to session.  Patient found supine with peripheral IV,oxygen,SCD  upon PT entry to room.    General Precautions: Standard, fall   Orthopedic Precautions:RLE weight bearing as tolerated   Braces: N/A  Respiratory Status: Nasal cannula, flow 4 L/min    Exams:  · Cognitive Exam:  Patient is oriented to Person, Place, Time and Situation  · Sensation:    · -       Intact  · RLE ROM: Deficits: hip WFL; knee 0-85; ankle WFL  · RLE Strength: Deficits: hip 5/5; knee 4-/5; ankle 5/5  · LLE ROM: WFL  · LLE Strength: 5/5    Functional Mobility:  · Bed Mobility:     · Supine to Sit: minimum assistance and increased time/effort  · Transfers:     · Sit to Stand:  minimum assistance with rolling walker  · Bed to Chair: contact guard assistance with  rolling walker  using  Step Transfer  · Gait: 35' with RW, CGA, step to pattern, slow trent, no LOB/lightheadedness but pt c/o fatigue    Therapeutic Activities and Exercises:  ·  Pt educated on PT role/POC.   · Importance of OOB activity with staff assistance.  · Importance of sitting up in the chair throughout the day as tolerated, especially for meals   · Safety during functional t/f and mobility with use of RW  · White board updated   · Multiple self-care tasks/functional mobility completed- assistance level noted above   · All questions/concerns answered within PT scope of practice      AM-PAC 6 CLICK MOBILITY  Total Score:16     Patient left up in chair with all lines intact, call button in reach, BERTHA Negron notified and spouse present.    GOALS:   Multidisciplinary Problems     Physical Therapy Goals        Problem: Physical Therapy Goal    Goal Priority Disciplines Outcome Goal  Variances Interventions   Physical Therapy Goal     PT, PT/OT Ongoing, Progressing     Description: Short Term Goals to be met by: 2022    Patient will increase functional independence with mobility by performin. Supine to sit with Modified [place]  2. Sit to stand transfer with Modified [place]  3. Bed to chair transfer with Modified [place] using Rolling Walker, WBAT right LE  4. Gait  x 100 feet with Modified [place] using Rolling Walker, WBAT right LE.   5. Lower extremity exercise program x30 reps per handout, with assistance as needed  6. Knee ROM 0-100    Long Term Goals to be met by: 3/2/2022    Pt will regain full independent functional mobility to return to prior activities of daily living.                    History:     Past Medical History:   Diagnosis Date    Coronary artery disease     cardiac stents    Hyperlipidemia     Hypertension     Kidney stone     MI (myocardial infarction)     Sleep apnea     Urinary tract infection        Past Surgical History:   Procedure Laterality Date    CORONARY ANGIOPLASTY WITH STENT PLACEMENT      HERNIA REPAIR      JOINT REPLACEMENT      difficult to wake after surgery     PROSTATECTOMY         Time Tracking:     PT Received On: 22  PT Start Time: 1024     PT Stop Time: 1046  PT Total Time (min): 22 min     Billable Minutes: Evaluation Low complexity      2022

## 2022-01-02 NOTE — ASSESSMENT & PLAN NOTE
Prior history of [place] syndrome after previous surgery  Avoid narcotics as able  Increase activity

## 2022-01-02 NOTE — NURSING
0800: care recd    0845: am meds given. Pt in bed awake and alert. Wife at bedside. No pain reported at this time. Pt states his main issue is not being able to go to the bathroom and decreased goldie. BS noted in all 4 quads. ACE wrap to right knee. PATRIC to left. No further needs voiced.     1030: pt awake and alert working with therapy    1200: pt in bed awake and alert. Son at bedside. No needs voiced at this time     1330: pt assisted back to bed.     1609: pt in bed awake and alert. Nadn. resp even and unlabored. Son at bedside.     1824: pt in bed awake. Wife at bedside. Nadn.

## 2022-01-02 NOTE — PLAN OF CARE
Problem: Occupational Therapy Goal  Goal: Occupational Therapy Goal  Description: ST.Pt will perform bathing with setup and min(A)  2.Pt will perform UE dressing with setup  3.Pt will perform LE dressing with CGA using AD  4.Pt will transfer bed/chair/bsc with RW and SBA  5.Pt will perform standing task x 3 min with RW and SBA  6.Tolerate 20 min of tx without fatigue.      LTG:   Restore to max I with selfcare and mobility.     Outcome: Ongoing, Progressing   OT low complexity evaluation performed. Pt is expected to return to [facility] when ready for D/C. OT will treat pt daily 5x/wk while hospitalized.

## 2022-01-02 NOTE — SUBJECTIVE & OBJECTIVE
Interval History:     Review of Systems   Constitutional: Negative for appetite change, fatigue and fever.   HENT: Negative for congestion, hearing loss and trouble swallowing.    Respiratory: Negative for chest tightness, shortness of breath and wheezing.    Cardiovascular: Positive for leg swelling. Negative for chest pain and palpitations.   Gastrointestinal: Negative for abdominal pain, constipation and nausea.   Genitourinary: Negative for difficulty urinating and dysuria.   Musculoskeletal: Positive for gait problem. Negative for back pain and neck stiffness.   Skin: Positive for wound. Negative for pallor and rash.   Neurological: Negative for dizziness, speech difficulty and headaches.   Psychiatric/Behavioral: Negative for confusion and suicidal ideas.     Objective:     Vital Signs (Most Recent):  Temp: 98.1 °F (36.7 °C) (01/02/22 0701)  Pulse: 82 (01/02/22 0701)  Resp: 18 (01/02/22 0701)  BP: (!) 126/54 (01/02/22 0701)  SpO2: 98 % (01/02/22 0701) Vital Signs (24h Range):  Temp:  [98 °F (36.7 °C)-99.5 °F (37.5 °C)] 98.1 °F (36.7 °C)  Pulse:  [77-84] 82  Resp:  [16-24] 18  SpO2:  [92 %-100 %] 98 %  BP: (100-146)/(53-68) 126/54     Weight: 84.2 kg (185 lb 10 oz)  Body mass index is 29.96 kg/m².  No intake or output data in the 24 hours ending 01/02/22 1023   Physical Exam  Vitals reviewed.   Constitutional:       General: He is not in acute distress.  Eyes:      Pupils: Pupils are equal, round, and reactive to light.   Cardiovascular:      Rate and Rhythm: Normal rate and regular rhythm.      Pulses: Normal pulses.   Pulmonary:      Effort: Pulmonary effort is normal. No respiratory distress.      Breath sounds: Normal breath sounds. No wheezing.   Abdominal:      General: Bowel sounds are normal. There is no distension.      Tenderness: There is no abdominal tenderness.   Skin:     General: Skin is warm.   Neurological:      General: No focal deficit present.      Mental Status: He is alert, oriented to  person, place, and time and easily aroused. Mental status is at baseline.   Psychiatric:         Mood and Affect: Mood normal.         Behavior: Behavior normal.         Significant Labs:   All pertinent labs within the past 24 hours have been reviewed.  BMP:   Recent Labs   Lab 01/02/22  0505   GLU 80      K 4.3      CO2 25   BUN 16   CREATININE 0.96   CALCIUM 8.0*   MG 2.2     CBC:   Recent Labs   Lab 12/31/21  2307 01/01/22  0554 01/01/22  1806 01/01/22  2319 01/02/22  0505   WBC 15.33*  --   --   --   --    HGB 8.4*   < > 7.7* 7.9* 7.9*   HCT 23.9*   < > 22.4* 23.6* 23.5*   PLT 77*  --   --   --   --     < > = values in this interval not displayed.     CMP:   Recent Labs   Lab 12/31/21 2307 01/02/22  0505   * 138   K 4.2 4.3    105   CO2 26 25   GLU 97 80   BUN 21* 16   CREATININE 0.97 0.96   CALCIUM 8.1* 8.0*   PROT 5.6*  --    ALBUMIN 2.6*  --    BILITOT 1.2  --    ALKPHOS 60  --    AST 23  --    ALT 16  --    ANIONGAP 9 12   EGFRNONAA 79 80

## 2022-01-02 NOTE — PROGRESS NOTES
[address] Medicine  Progress Note    Patient Name: Elliott Peters  MRN: [id number]  Patient Class: IP- Inpatient   Admission Date: 12/31/2021  Length of Stay: 2 days  Attending Physician: Christiano Santacruz MD  Primary Care Provider: Izzy Olivier NP        Subjective:     Principal Problem:UGIB (upper gastrointestinal bleed)        HPI:  82 yo M present to [facility] from [facility] for acute GI bleed. History obtained from wife and patient. Patient recently had total right knee replacement on 12/28/21 by Dr. Epps and transferred to [facility] for physical rehabilitation. On Wednesday, patient complained of abdominal bloating and constipation. He was given an oral laxative Thursday which made him nauseous and subsequently vomited twice. Vomit was characterized as coffee ground and bloody. He still did not have a bowel movement and was given enema twice around lunch time. Thursday morning he had a large bowel movement; black in color. No bright red blood was noted in stool. This was associated with malaise and pallor. Wife reports he did have have minimal black, tarry, pellet-like, coffee ground colored, stool this afternoon; no episodes of vomiting since Thursday morning. Currently reports no vomiting, lower GI bleed, abdominal pain, or malaise which resolved after he received blood. CBC was ordered with H/H of 6.4/19.3 (was 7.8/23.3 on admission to [facility] facility); FOBT was positive. ASA, Plavix, Eliquis was discontinued. He was transfused with 2 units of blood at [facility] and transferred to [facility] for further evaluation by GI specialist.     Of note, wife reports patient had similar GI symptoms when he had left total knee replacement about 4 years ago. He had an EGD and colonoscopy which showed several polyps. Wife reports patient had no colonoscopy since. Patient has past medical hx of HTN, HLD, renal stones, osteoarthritis, CAD with 3 stents placed, ROSI on CPAP. His cardiologist is   Tani at [facility]. He is followed by Dr. Allen for his nephrolithiasis.         Overview/Hospital Course:  01/01 -Records reviewed.  Patient admitted after having vomiting and passage per rectum of dark liquid felt to possibly be blood.  No red blood was seen.  Patient had just been discharged from here to swing bed at [facility] after right total knee replacement 12/28 by Dr. Epps.  Patient has been on Eliquis 2.5 mg twice daily.  Also been on Plavix 75 mg daily after having cardiac stents placed in May 2021 by Dr. Freire.  Had preop evaluation by Dr. Freire including stress study.  Do not see any iron therapy patient has been receiving.  Mr. Peters similar problem with abdominal distention and vomiting after previous orthopedic surgeon.  Dr. Epps states he was diagnosed with [place] syndrome then.  Has had previous colonoscopy with polyp removed.  KUB done prior day did show some gaseous distension nonspecific    Past Medical History:   Diagnosis Date    Hyperlipidemia      Hypertension      Kidney stone      MI (myocardial infarction)      Sleep apnea      Urinary tract infection        01/02 - patient without new issues.  No evidence of bleeding.  H&H similar to prior day; any lower will consider transfusion.  Therapy team to see, at discharge look at going back to [facility] bed.  Dr. Epps to recheck.  Still with some abdominal distention but less; talk with patient about trying to avoid narcotics as much as possible and to increase activity during this acute abdominal problem.  As prior stated had summer GI complaints after prior orthopedic surgery.  No DVT noted on recent ultrasound; right leg is swollen associated with recent surgery.  Dr. Crawford to assume care am.    Recheck CBC to follow H&H but also platelet count which is been low not clear etiology    Interval History:     Review of Systems   Constitutional: Negative for appetite change, fatigue and fever.   HENT: Negative for  congestion, hearing loss and trouble swallowing.    Respiratory: Negative for chest tightness, shortness of breath and wheezing.    Cardiovascular: Positive for leg swelling. Negative for chest pain and palpitations.   Gastrointestinal: Negative for abdominal pain, constipation and nausea.   Genitourinary: Negative for difficulty urinating and dysuria.   Musculoskeletal: Positive for gait problem. Negative for back pain and neck stiffness.   Skin: Positive for wound. Negative for pallor and rash.   Neurological: Negative for dizziness, speech difficulty and headaches.   Psychiatric/Behavioral: Negative for confusion and suicidal ideas.     Objective:     Vital Signs (Most Recent):  Temp: 98.1 °F (36.7 °C) (01/02/22 0701)  Pulse: 82 (01/02/22 0701)  Resp: 18 (01/02/22 0701)  BP: (!) 126/54 (01/02/22 0701)  SpO2: 98 % (01/02/22 0701) Vital Signs (24h Range):  Temp:  [98 °F (36.7 °C)-99.5 °F (37.5 °C)] 98.1 °F (36.7 °C)  Pulse:  [77-84] 82  Resp:  [16-24] 18  SpO2:  [92 %-100 %] 98 %  BP: (100-146)/(53-68) 126/54     Weight: 84.2 kg (185 lb 10 oz)  Body mass index is 29.96 kg/m².  No intake or output data in the 24 hours ending 01/02/22 1023   Physical Exam  Vitals reviewed.   Constitutional:       General: He is not in acute distress.  Eyes:      Pupils: Pupils are equal, round, and reactive to light.   Cardiovascular:      Rate and Rhythm: Normal rate and regular rhythm.      Pulses: Normal pulses.   Pulmonary:      Effort: Pulmonary effort is normal. No respiratory distress.      Breath sounds: Normal breath sounds. No wheezing.   Abdominal:      General: Bowel sounds are normal. There is no distension.      Tenderness: There is no abdominal tenderness.   Skin:     General: Skin is warm.   Neurological:      General: No focal deficit present.      Mental Status: He is alert, oriented to person, place, and time and easily aroused. Mental status is at baseline.   Psychiatric:         Mood and Affect: Mood normal.          Behavior: Behavior normal.         Significant Labs:   All pertinent labs within the past 24 hours have been reviewed.  BMP:   Recent Labs   Lab 01/02/22  0505   GLU 80      K 4.3      CO2 25   BUN 16   CREATININE 0.96   CALCIUM 8.0*   MG 2.2     CBC:   Recent Labs   Lab 12/31/21  2307 01/01/22  0554 01/01/22  1806 01/01/22  2319 01/02/22  0505   WBC 15.33*  --   --   --   --    HGB 8.4*   < > 7.7* 7.9* 7.9*   HCT 23.9*   < > 22.4* 23.6* 23.5*   PLT 77*  --   --   --   --     < > = values in this interval not displayed.     CMP:   Recent Labs   Lab 12/31/21  2307 01/02/22  0505   * 138   K 4.2 4.3    105   CO2 26 25   GLU 97 80   BUN 21* 16   CREATININE 0.97 0.96   CALCIUM 8.1* 8.0*   PROT 5.6*  --    ALBUMIN 2.6*  --    BILITOT 1.2  --    ALKPHOS 60  --    AST 23  --    ALT 16  --    ANIONGAP 9 12   EGFRNONAA 79 80               Assessment/Plan:      * UGIB (upper gastrointestinal bleed)   hemodynamically stable.    received 2 U blood at [place] her prior note  Trend H/H q6h.  Protonix   Consulted GI with EGD this a.m..  Holding ASA, Eliquis, and Plavix (Patient was taking due to cardiac history).    Abdominal distension (gaseous)  Prior history of [place] syndrome after previous surgery  Avoid narcotics as able  Increase activity      Acute blood loss anemia  EGD done this admission showing hiatal hernia and superficial nonbleeding gastric ulcers      Leukocytosis  No other signs/symptoms of acute infection. Likely normal stress reaction to acute GI bleed. Will continue to monitor with CBC.   Blood cultures ordered at [place]. Will f/u with final results.    Status post total right knee replacement  Holding anticoagulation therapy due to acute GI bleed. On SCDs.   Currently on oxycodone 5 q4h prn and Tylenol 1,000 q6h prn for pain control.  Notify Dr. Epps of admission    Hyperlipidemia  On Crestor 20 at home; substituting with Lipitor 80 while in hospital.     ROSI on CPAP  Wife brought  home CPAP. Patient reports he has mild ROSI and declines use tonight. States he will consider using it tomorrow night.    Hypertension  On home amlodipine 2.5 mg qd, Coreg 3.125 mg bid    Benign prostatic hyperplasia with nocturia  On terazosin at home; substituting with tamsulosin while in hospital.       VTE Risk Mitigation (From admission, onward)         Ordered     Place PATRIC hose  Until discontinued         12/31/21 2254     Place sequential compression device  Until discontinued         12/31/21 2254     IP VTE HIGH RISK PATIENT  Once         12/31/21 2254                Discharge Planning   BRAULIO:      Code Status: Full Code   Is the patient medically ready for discharge?:     Reason for patient still in hospital (select all that apply): Laboratory test, Treatment, Imaging and Consult recommendations  Discharge Plan A: Other (Swingbed at [facility])                  Christiano Santacruz MD  Department of Hospital Medicine   [address]

## 2022-01-02 NOTE — PT/OT/SLP EVAL
Occupational Therapy   Evaluation    Name: Elliott Peters  MRN: [id number]  Admitting Diagnosis:  UGIB (upper gastrointestinal bleed)  Recent Surgery: * No surgery found *      Recommendations:     Discharge Recommendations: rehabilitation facility,nursing facility, skilled  Discharge Equipment Recommendations:     Barriers to discharge:  None    Assessment:     Elliott Peters is a 81 y.o. male with a medical diagnosis of UGIB (upper gastrointestinal bleed).  He presents with s/p (R) TKR 12/30/2021, with increased pain as he is avoiding most pain medication due to his upper GI bleed. Performance deficits affecting function: weakness,impaired endurance,impaired self care skills,impaired functional mobilty,gait instability,pain,decreased safety awareness,orthopedic precautions.      Rehab Prognosis: Good; patient would benefit from acute skilled OT services to address these deficits and reach maximum level of function.       Plan:     Patient to be seen 5 x/week to address the above listed problems via self-care/home management,therapeutic activities,therapeutic exercises  · Plan of Care Expires: 01/16/22  · Plan of Care Reviewed with: patient,spouse    Subjective     Chief Complaint: (R) knee pain  Patient/Family Comments/goals: return to [facility] to continue to rehab his knee    Occupational Profile:  Living Environment: Pt lives at home with spouse, no steps to enter, single level home  Previous level of function: Pt was independent with most ADLs. Pt's spouse assisted with LE dressing as needed due to back and knee pain  Roles and Routines: Pt is retired, , performs most of his self-care  Equipment Used at Home:  bedside commode,cane, straight,CPAP  Assistance upon Discharge: Swingbed staff    Pain/Comfort:  · Pain Rating 1: 5/10  · Location - Side 1: Right  · Location 1: knee  · Pain Addressed 1: Reposition,Distraction,Cessation of Activity  · Pain Rating Post-Intervention 1: 5/10    Patients cultural,  spiritual, Zoroastrian conflicts given the current situation:      Objective:     Communicated with: BERTHA Negron prior to session.  Patient found HOB elevated with SCD,oxygen,peripheral IV upon OT entry to room.    General Precautions: Standard, fall   Orthopedic Precautions:RLE weight bearing as tolerated   Braces: N/A  Respiratory Status: Nasal cannula, flow 3 L/min    Occupational Performance:    Bed Mobility:    · Patient completed Supine to Sit with minimum assistance and increased time and effort    Functional Mobility/Transfers:  · Patient completed Sit <> Stand Transfer with contact guard assistance  with  rolling walker   · Patient completed Bed <> Chair Transfer using Step Transfer technique with contact guard assistance with rolling walker  · Functional Mobility: Pt ambulated around the room with CGA with RW with O2    Activities of Daily Living:  · Upper Body Dressing: moderate assistance gown as robe  · Lower Body Dressing: total assistance with socks    Cognitive/Visual Perceptual:  Cognitive/Psychosocial Skills:     -       Follows Commands/attention:Follows one-step commands  -       Communication: clear/fluent  -       Safety awareness/insight to disability: intact   -       Mood/Affect/Coping skills/emotional control: Appropriate to situation, Cooperative and Pleasant    Physical Exam:  Edema:  None noted  Sensation:    -       Intact  Dominant hand:    -       right  Upper Extremity Range of Motion:     -       Right Upper Extremity: WFL  -       Left Upper Extremity: WFL  Upper Extremity Strength:    -       Right Upper Extremity: WFL  -       Left Upper Extremity: WFL   Strength:    -       Right Upper Extremity: WFL  -       Left Upper Extremity: WFL  Fine Motor Coordination:    -       Intact  Gross motor coordination:   WFL    AMPAC 6 Click ADL:  AMPAC Total Score: 19    Treatment & Education:  · Pt educated on OT role/POC.   · Importance of OOB activity with staff  assistance.  · Importance of sitting up in the chair throughout the day as tolerated, especially for meals   · Safety during functional t/f and mobility with use of RW  · Importance of assisting with self-care activities   · All questions/concerns answered within OT scope of practice    Education:    Patient left HOB elevated with all lines intact, call button in reach, RN Eli notified and spouse present    GOALS:   Multidisciplinary Problems     Occupational Therapy Goals        Problem: Occupational Therapy Goal    Goal Priority Disciplines Outcome Interventions   Occupational Therapy Goal     OT, PT/OT Ongoing, Progressing    Description: ST.Pt will perform bathing with setup and min(A)  2.Pt will perform UE dressing with setup  3.Pt will perform LE dressing with CGA using AD  4.Pt will transfer bed/chair/bsc with RW and SBA  5.Pt will perform standing task x 3 min with RW and SBA  6.Tolerate 20 min of tx without fatigue.      LTG:   Restore to max I with selfcare and mobility.                      History:     Past Medical History:   Diagnosis Date    Coronary artery disease     cardiac stents    Hyperlipidemia     Hypertension     Kidney stone     MI (myocardial infarction)     Sleep apnea     Urinary tract infection        Past Surgical History:   Procedure Laterality Date    CORONARY ANGIOPLASTY WITH STENT PLACEMENT      HERNIA REPAIR      JOINT REPLACEMENT      difficult to wake after surgery     PROSTATECTOMY         Time Tracking:     OT Date of Treatment: 22  OT Start Time: 1025  OT Stop Time: 1047  OT Total Time (min): 22 min    Billable Minutes:Evaluation low complexity    2022

## 2022-01-03 LAB
ALBUMIN PE, BLOOD: 2.76 G/DL (ref 3.5–5.2)
ALPHA1 GLOB SERPL ELPH-MCNC: 0.3 G/DL (ref 0.1–0.4)
ALPHA2 GLOB SERPL ELPH-MCNC: 0.8 G/DL (ref 0.4–1.3)
ANION GAP SERPL CALCULATED.3IONS-SCNC: 8 MMOL/L (ref 7–16)
ANISOCYTOSIS BLD QL SMEAR: ABNORMAL
B-GLOBULIN SERPL ELPH-MCNC: 0.5 G/DL (ref 0.5–1.5)
BASOPHILS # BLD AUTO: 0.03 K/UL (ref 0–0.2)
BASOPHILS NFR BLD AUTO: 0.4 % (ref 0–1)
BUN SERPL-MCNC: 11 MG/DL (ref 7–18)
BUN/CREAT SERPL: 16 (ref 6–20)
CALCIUM SERPL-MCNC: 7.9 MG/DL (ref 8.5–10.1)
CHLORIDE SERPL-SCNC: 105 MMOL/L (ref 98–107)
CO2 SERPL-SCNC: 23 MMOL/L (ref 21–32)
CREAT SERPL-MCNC: 0.69 MG/DL (ref 0.7–1.3)
DIFFERENTIAL METHOD BLD: ABNORMAL
EOSINOPHIL # BLD AUTO: 0.05 K/UL (ref 0–0.5)
EOSINOPHIL NFR BLD AUTO: 0.7 % (ref 1–4)
EOSINOPHIL NFR BLD MANUAL: 1 % (ref 1–4)
ERYTHROCYTE [DISTWIDTH] IN BLOOD BY AUTOMATED COUNT: 15.2 % (ref 11.5–14.5)
GAMMA GLOB SERPL ELPH-MCNC: 0.4 G/DL (ref 0.5–1.8)
GLUCOSE SERPL-MCNC: 95 MG/DL (ref 74–106)
HAV IGM SER QL: NORMAL
HBV CORE IGM SER QL: NORMAL
HBV SURFACE AG SERPL QL IA: NORMAL
HCT VFR BLD AUTO: 23.2 % (ref 40–54)
HCV AB SER QL: NORMAL
HGB BLD-MCNC: 8.1 G/DL (ref 13.5–18)
IMM GRANULOCYTES # BLD AUTO: 0.29 K/UL (ref 0–0.04)
IMM GRANULOCYTES NFR BLD: 3.8 % (ref 0–0.4)
LYMPHOCYTES # BLD AUTO: 1.48 K/UL (ref 1–4.8)
LYMPHOCYTES NFR BLD AUTO: 19.5 % (ref 27–41)
LYMPHOCYTES NFR BLD MANUAL: 35 % (ref 27–41)
MCH RBC QN AUTO: 29 PG (ref 27–31)
MCHC RBC AUTO-ENTMCNC: 34.9 G/DL (ref 32–36)
MCV RBC AUTO: 83.2 FL (ref 80–96)
MONOCYTES # BLD AUTO: 3.54 K/UL (ref 0–0.8)
MONOCYTES NFR BLD AUTO: 46.7 % (ref 2–6)
MONOCYTES NFR BLD MANUAL: 38 % (ref 2–6)
MPC BLD CALC-MCNC: 11.1 FL (ref 9.4–12.4)
NEUTROPHILS # BLD AUTO: 2.19 K/UL (ref 1.8–7.7)
NEUTROPHILS NFR BLD AUTO: 28.9 % (ref 53–65)
NEUTS SEG NFR BLD MANUAL: 26 % (ref 50–62)
NRBC # BLD AUTO: 0.02 X10E3/UL
NRBC, AUTO (.00): 0.3 %
PATH INTERP BLD-IMP: ABNORMAL
PLATELET # BLD AUTO: 128 K/UL (ref 150–400)
PLATELET MORPHOLOGY: ABNORMAL
POLYCHROMASIA BLD QL SMEAR: ABNORMAL
POTASSIUM SERPL-SCNC: 4.1 MMOL/L (ref 3.5–5.1)
PROT SERPL-MCNC: 4.8 G/DL (ref 6.4–8.2)
RBC # BLD AUTO: 2.79 M/UL (ref 4.6–6.2)
SODIUM SERPL-SCNC: 132 MMOL/L (ref 136–145)
WBC # BLD AUTO: 7.58 K/UL (ref 4.5–11)

## 2022-01-03 PROCEDURE — 80074 ACUTE HEPATITIS PANEL: CPT | Performed by: HOSPITALIST

## 2022-01-03 PROCEDURE — 97110 THERAPEUTIC EXERCISES: CPT | Mod: CQ

## 2022-01-03 PROCEDURE — 84165 PROTEIN E-PHORESIS SERUM: CPT | Performed by: HOSPITALIST

## 2022-01-03 PROCEDURE — 11000001 HC ACUTE MED/SURG PRIVATE ROOM

## 2022-01-03 PROCEDURE — 25000003 PHARM REV CODE 250: Performed by: HOSPITALIST

## 2022-01-03 PROCEDURE — 94761 N-INVAS EAR/PLS OXIMETRY MLT: CPT

## 2022-01-03 PROCEDURE — 97116 GAIT TRAINING THERAPY: CPT | Mod: CQ

## 2022-01-03 PROCEDURE — 85025 COMPLETE CBC W/AUTO DIFF WBC: CPT | Performed by: HOSPITALIST

## 2022-01-03 PROCEDURE — 80048 BASIC METABOLIC PNL TOTAL CA: CPT | Performed by: HOSPITALIST

## 2022-01-03 PROCEDURE — 84165 PROTEIN E-PHORESIS SERUM: CPT | Mod: 26,,, | Performed by: PATHOLOGY

## 2022-01-03 PROCEDURE — 27000221 HC OXYGEN, UP TO 24 HOURS

## 2022-01-03 PROCEDURE — 84165 PROTEIN ELECTROPHORESIS, SERUM WITH REFLEX IFE: ICD-10-PCS | Mod: 26,,, | Performed by: PATHOLOGY

## 2022-01-03 PROCEDURE — 99233 PR SUBSEQUENT HOSPITAL CARE,LEVL III: ICD-10-PCS | Mod: ,,, | Performed by: HOSPITALIST

## 2022-01-03 PROCEDURE — 36415 COLL VENOUS BLD VENIPUNCTURE: CPT | Performed by: HOSPITALIST

## 2022-01-03 PROCEDURE — 99233 SBSQ HOSP IP/OBS HIGH 50: CPT | Mod: ,,, | Performed by: HOSPITALIST

## 2022-01-03 PROCEDURE — 97535 SELF CARE MNGMENT TRAINING: CPT

## 2022-01-03 RX ADMIN — PANTOPRAZOLE SODIUM 40 MG: 40 TABLET, DELAYED RELEASE ORAL at 06:01

## 2022-01-03 RX ADMIN — DEXTROSE AND SODIUM CHLORIDE 100 ML/HR: 5; 200 INJECTION, SOLUTION INTRAVENOUS at 10:01

## 2022-01-03 RX ADMIN — AMLODIPINE BESYLATE 2.5 MG: 2.5 TABLET ORAL at 12:01

## 2022-01-03 RX ADMIN — APIXABAN 2.5 MG: 2.5 TABLET, FILM COATED ORAL at 12:01

## 2022-01-03 RX ADMIN — PANTOPRAZOLE SODIUM 40 MG: 40 TABLET, DELAYED RELEASE ORAL at 04:01

## 2022-01-03 RX ADMIN — POLYETHYLENE GLYCOL 3350 17 G: 17 POWDER, FOR SOLUTION ORAL at 10:01

## 2022-01-03 RX ADMIN — ATORVASTATIN CALCIUM 80 MG: 80 TABLET, FILM COATED ORAL at 08:01

## 2022-01-03 RX ADMIN — CARVEDILOL 3.12 MG: 3.12 TABLET, FILM COATED ORAL at 10:01

## 2022-01-03 RX ADMIN — TAMSULOSIN HYDROCHLORIDE 0.4 MG: 0.4 CAPSULE ORAL at 10:01

## 2022-01-03 RX ADMIN — APIXABAN 2.5 MG: 2.5 TABLET, FILM COATED ORAL at 08:01

## 2022-01-03 NOTE — ASSESSMENT & PLAN NOTE
EGD done this admission showing hiatal hernia and superficial nonbleeding gastric ulcers    Restart Eliquis 2.5 mg on 01/02/2022.

## 2022-01-03 NOTE — SUBJECTIVE & OBJECTIVE
Review of Systems   Constitutional: Negative for appetite change, fatigue and fever.   HENT: Negative for congestion, hearing loss and trouble swallowing.    Respiratory: Negative for chest tightness, shortness of breath and wheezing.    Cardiovascular: Positive for leg swelling. Negative for chest pain and palpitations.   Gastrointestinal: Negative for abdominal pain, constipation and nausea.   Genitourinary: Negative for difficulty urinating and dysuria.   Musculoskeletal: Positive for gait problem. Negative for back pain and neck stiffness.   Skin: Positive for wound. Negative for pallor and rash.   Neurological: Negative for dizziness, speech difficulty and headaches.   Psychiatric/Behavioral: Negative for confusion and suicidal ideas.     Objective:     Vital Signs (Most Recent):  Temp: 98.1 °F (36.7 °C) (01/03/22 0720)  Pulse: 77 (01/03/22 0720)  Resp: 18 (01/03/22 0720)  BP: 137/64 (01/03/22 0720)  SpO2: 96 % (01/03/22 0853) Vital Signs (24h Range):  Temp:  [98 °F (36.7 °C)-98.9 °F (37.2 °C)] 98.1 °F (36.7 °C)  Pulse:  [67-77] 77  Resp:  [16-18] 18  SpO2:  [95 %-98 %] 96 %  BP: (130-138)/(58-67) 137/64     Weight: 84.5 kg (186 lb 4.6 oz)  Body mass index is 30.07 kg/m².  No intake or output data in the 24 hours ending 01/03/22 1131   Physical Exam  Vitals reviewed.   Constitutional:       General: He is not in acute distress.  Eyes:      Pupils: Pupils are equal, round, and reactive to light.   Cardiovascular:      Rate and Rhythm: Normal rate and regular rhythm.      Pulses: Normal pulses.   Pulmonary:      Effort: Pulmonary effort is normal. No respiratory distress.      Breath sounds: Normal breath sounds. No wheezing.   Abdominal:      General: Bowel sounds are normal. There is no distension.      Tenderness: There is no abdominal tenderness.   Skin:     General: Skin is warm.   Neurological:      General: No focal deficit present.      Mental Status: He is alert, oriented to person, place, and time and  easily aroused. Mental status is at baseline.   Psychiatric:         Mood and Affect: Mood normal.         Behavior: Behavior normal.         Significant Labs: All pertinent labs within the past 24 hours have been reviewed.    Significant Imaging: I have reviewed all pertinent imaging results/findings within the past 24 hours.

## 2022-01-03 NOTE — PROGRESS NOTES
[address] Medicine  Progress Note    Patient Name: Elliott Peters  MRN: [id number]  Patient Class: IP- Inpatient   Admission Date: 12/31/2021  Length of Stay: 3 days  Attending Physician: Jhoan Crawford MD  Primary Care Provider: Izzy Olivier NP        Subjective:     Principal Problem:UGIB (upper gastrointestinal bleed)    HPI:  82 yo M present to [facility] from [place] for acute GI bleed. History obtained from wife and patient. Patient recently had total right knee replacement on 12/28/21 by Dr. Epps and transferred to [facility] for physical rehabilitation. On Wednesday, patient complained of abdominal bloating and constipation. He was given an oral laxative Thursday which made him nauseous and subsequently vomited twice. Vomit was characterized as coffee ground and bloody. He still did not have a bowel movement and was given enema twice around lunch time. Thursday morning he had a large bowel movement; black in color. No bright red blood was noted in stool. This was associated with malaise and pallor. Wife reports he did have have minimal black, tarry, pellet-like, coffee ground colored, stool this afternoon; no episodes of vomiting since Thursday morning. Currently reports no vomiting, lower GI bleed, abdominal pain, or malaise which resolved after he received blood. CBC was ordered with H/H of 6.4/19.3 (was 7.8/23.3 on admission to [facility] facility); FOBT was positive. ASA, Plavix, Eliquis was discontinued. He was transfused with 2 units of blood at [place] and transferred to [facility] for further evaluation by GI specialist.     Of note, wife reports patient had similar GI symptoms when he had left total knee replacement about 4 years ago. He had an EGD and colonoscopy which showed several polyps. Wife reports patient had no colonoscopy since. Patient has past medical hx of HTN, HLD, renal stones, osteoarthritis, CAD with 3 stents placed, ROSI on CPAP. His cardiologist is  Dr. Freire at [facility]. He is followed by Dr. Allen for his nephrolithiasis.         Overview/Hospital Course:  01/01 -Records reviewed.  Patient admitted after having vomiting and passage per rectum of dark liquid felt to possibly be blood.  No red blood was seen.  Patient had just been discharged from here to swing bed at [facility] after right total knee replacement 12/28 by Dr. Epps.  Patient has been on Eliquis 2.5 mg twice daily.  Also been on Plavix 75 mg daily after having cardiac stents placed in May 2021 by Dr. Freire.  Had preop evaluation by Dr. Freire including stress study.  Do not see any iron therapy patient has been receiving.  Mr. Peters similar problem with abdominal distention and vomiting after previous orthopedic surgeon.  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  Has had previous colonoscopy with polyp removed.  KUB done prior day did show some gaseous distension nonspecific    Past Medical History:   Diagnosis Date    Hyperlipidemia      Hypertension      Kidney stone      MI (myocardial infarction)      Sleep apnea      Urinary tract infection        01/02 - patient without new issues.  No evidence of bleeding.  H&H similar to prior day; any lower will consider transfusion.  Therapy team to see, at discharge look at going back to [facility] bed.  Dr. Epps to recheck.  Still with some abdominal distention but less; talk with patient about trying to avoid narcotics as much as possible and to increase activity during this acute abdominal problem.  As prior stated had summer GI complaints after prior orthopedic surgery.  No DVT noted on recent ultrasound; right leg is swollen associated with recent surgery.  Dr. Crawford to assume care am.  Recheck CBC to follow H&H but also platelet count which is been low not clear etiology    1/3 - EGD performed on 01/01/2022.  · Multiple small, superficial ulcers in the cardia; performed cold forceps biopsy  · 3 cm  hiatal hernia  Monitoring hemoglobin and will plan to return to [facility] when stable.  Will plan to restart Eliquis 2.5 mg for prophylaxis and hold Plavix while on Eliquis and then restart Plavix after 2 weeks post right knee repair which would be approximately 1/10/22.  May check with Dr. Freire if there is any uncertainty.          Review of Systems   Constitutional: Negative for appetite change, fatigue and fever.   HENT: Negative for congestion, hearing loss and trouble swallowing.    Respiratory: Negative for chest tightness, shortness of breath and wheezing.    Cardiovascular: Positive for leg swelling. Negative for chest pain and palpitations.   Gastrointestinal: Negative for abdominal pain, constipation and nausea.   Genitourinary: Negative for difficulty urinating and dysuria.   Musculoskeletal: Positive for gait problem. Negative for back pain and neck stiffness.   Skin: Positive for wound. Negative for pallor and rash.   Neurological: Negative for dizziness, speech difficulty and headaches.   Psychiatric/Behavioral: Negative for confusion and suicidal ideas.     Objective:     Vital Signs (Most Recent):  Temp: 98.1 °F (36.7 °C) (01/03/22 0720)  Pulse: 77 (01/03/22 0720)  Resp: 18 (01/03/22 0720)  BP: 137/64 (01/03/22 0720)  SpO2: 96 % (01/03/22 0853) Vital Signs (24h Range):  Temp:  [98 °F (36.7 °C)-98.9 °F (37.2 °C)] 98.1 °F (36.7 °C)  Pulse:  [67-77] 77  Resp:  [16-18] 18  SpO2:  [95 %-98 %] 96 %  BP: (130-138)/(58-67) 137/64     Weight: 84.5 kg (186 lb 4.6 oz)  Body mass index is 30.07 kg/m².  No intake or output data in the 24 hours ending 01/03/22 1131   Physical Exam  Vitals reviewed.   Constitutional:       General: He is not in acute distress.  Eyes:      Pupils: Pupils are equal, round, and reactive to light.   Cardiovascular:      Rate and Rhythm: Normal rate and regular rhythm.      Pulses: Normal pulses.   Pulmonary:      Effort: Pulmonary effort is normal. No respiratory distress.       Breath sounds: Normal breath sounds. No wheezing.   Abdominal:      General: Bowel sounds are normal. There is no distension.      Tenderness: There is no abdominal tenderness.   Skin:     General: Skin is warm.   Neurological:      General: No focal deficit present.      Mental Status: He is alert, oriented to person, place, and time and easily aroused. Mental status is at baseline.   Psychiatric:         Mood and Affect: Mood normal.         Behavior: Behavior normal.         Significant Labs: All pertinent labs within the past 24 hours have been reviewed.    Significant Imaging: I have reviewed all pertinent imaging results/findings within the past 24 hours.      Assessment/Plan:      * UGIB (upper gastrointestinal bleed)   hemodynamically stable.    received 2 U blood at [facility] per prior note  Trend H/H q6h.  Protonix   Consulted GI with EGD this a.m..  Holding ASA, Eliquis, and Plavix (Patient was taking due to cardiac history).    Patient was on triple therapy and is therefore at high risk for bleeding event.  If hemoglobin remains stable will restart Eliquis in hold aspirin Plavix.  Can further discuss with Dr. Freire.    Thrombocytopenia  Thrombocytopenia with platelets in the 70s and 80s.      Abdominal distension (gaseous)  Prior history of [place] syndrome after previous surgery  Avoid narcotics as able  Increase activity      Acute blood loss anemia  EGD done this admission showing hiatal hernia and superficial nonbleeding gastric ulcers    Restart Eliquis 2.5 mg on 01/02/2022.      Leukocytosis  No other signs/symptoms of acute infection. Likely normal stress reaction to acute GI bleed. Will continue to monitor with CBC.   Blood cultures ordered at [facility]. Will f/u with final results.    Status post total right knee replacement  On SCDs.   Currently on oxycodone 5 q4h prn and Tylenol 1,000 q6h prn for pain control.  Notify Dr. Epps of admission  1/3/22:  Restarted Eliquis on  01/03/2022.    Hyperlipidemia  On Crestor 20 at home; substituting with Lipitor 80 while in hospital.     ROSI on CPAP  Wife brought home CPAP. Patient reports he has mild ROSI and declines use tonight. States he will consider using it tomorrow night.    Hypertension  On home amlodipine 2.5 mg qd, Coreg 3.125 mg bid    Benign prostatic hyperplasia with nocturia  On terazosin at home; substituting with tamsulosin while in hospital.       VTE Risk Mitigation (From admission, onward)         Ordered     apixaban tablet 2.5 mg  2 times daily         01/03/22 1141     Place PATRIC hose  Until discontinued         12/31/21 2254     Place sequential compression device  Until discontinued         12/31/21 2254     IP VTE HIGH RISK PATIENT  Once         12/31/21 2254                Discharge Planning   BRAULIO:      Code Status: Full Code   Is the patient medically ready for discharge?:     Reason for patient still in hospital (select all that apply): Treatment  Discharge Plan A: Other (Swingbed at [facility])                  Jhoan Crawford MD  Department of Hospital Medicine   [address]

## 2022-01-03 NOTE — CARE UPDATE
Patient awake alert still without bowel function yet.  Is not throwing up.  Does not have an NG tube.  Dressing right knee is intact.  He does have some drainage on the dressing dried blood.  Will change dressing.  Continue to ambulate try to increase his activities.  Hopefully ileus was also seen.

## 2022-01-03 NOTE — PT/OT/SLP PROGRESS
Occupational Therapy   Treatment    Name: Elliott Peters  MRN: [id number]  Admitting Diagnosis:  UGIB (upper gastrointestinal bleed)       Recommendations:     Discharge Recommendations: nursing facility, skilled,rehabilitation facility  Discharge Equipment Recommendations:  walker, rolling  Barriers to discharge:  None    Assessment:     Elliott Peters is a 81 y.o. male with a medical diagnosis of UGIB (upper gastrointestinal bleed).  He presents with c/o weakness and quick fatigue with no improvement from his bowel function. Performance deficits affecting function are weakness,impaired endurance,impaired functional mobilty,impaired self care skills,impaired balance,decreased ROM,orthopedic precautions,pain.     Rehab Prognosis:  Good; patient would benefit from acute skilled OT services to address these deficits and reach maximum level of function.       Plan:     Patient to be seen 5 x/week to address the above listed problems via self-care/home management,therapeutic activities,therapeutic exercises  · Plan of Care Expires: 01/16/22  · Plan of Care Reviewed with: patient    Subjective     Pain/Comfort:  · Pain Rating 1: 2/10  · Location - Side 1: Right  · Location 1: knee  · Pain Rating Post-Intervention 1: 3/10    Objective:     Communicated with: BERTHA Torre prior to session.  Patient found up in chair with peripheral IV,SCD upon OT entry to room.    General Precautions: Standard, fall   Orthopedic Precautions:RLE weight bearing as tolerated   Braces: N/A  Respiratory Status: Room air     Occupational Performance:     Bed Mobility:    · Patient completed Sit to Supine with minimum assistance     Functional Mobility/Transfers:  · Patient completed Sit <> Stand Transfer with contact guard assistance  with  rolling walker   · Patient completed Bed <> Chair Transfer using Step Transfer technique with stand by assistance with rolling walker  · Functional Mobility: Pt ambulated into bathroom and sat on BSC at  the sink to wash and to shave.    Activities of Daily Living:  · Grooming: independence with shaving, sitting on BSC at the sink with his shave kit in reach  · Bathing: Pt bathed sitting on BSC at the sink with setup. Pt washed upper body anterior and stood to wash perineum and buttocks with CGA. therapist washed his back for him, but pt dried his back (I)'ly.    · Upper Body Dressing: [facility]: 20    Treatment & Education:  · Pt educated on OT role/POC.   · Importance of OOB activity with staff assistance.  · Importance of sitting up in the chair throughout the day as tolerated, especially for meals   · Safety during functional t/f and mobility with use of RW  · Importance of assisting with self-care activities   · All questions/concerns answered within OT scope of practice      Patient left HOB elevated with call button in reach, RN Tanisha Torre  notified and spouse presentEducation:      GOALS:   Multidisciplinary Problems     Occupational Therapy Goals        Problem: Occupational Therapy Goal    Goal Priority Disciplines Outcome Interventions   Occupational Therapy Goal     OT, PT/OT Ongoing, Progressing    Description: ST.Pt will perform bathing with setup and min(A)  2.Pt will perform UE dressing with setup  3.Pt will perform LE dressing with CGA using AD  4.Pt will transfer bed/chair/bsc with RW and SBA  5.Pt will perform standing task x 3 min with RW and SBA  6.Tolerate 20 min of tx without fatigue.      LTG:   Restore to max I with selfcare and mobility.                      Time Tracking:     OT Date of Treatment: 22  OT Start Time: 1540  OT Stop Time: 1624  OT Total Time (min): 44 min    Billable Minutes:Self Care/Home Management 44 min    OT/HARRIETT: OT          1/3/2022

## 2022-01-03 NOTE — ASSESSMENT & PLAN NOTE
On SCDs.   Currently on oxycodone 5 q4h prn and Tylenol 1,000 q6h prn for pain control.  Notify Dr. Epps of admission  1/3/22:  Restarted Eliquis on 01/03/2022.

## 2022-01-03 NOTE — PT/OT/SLP PROGRESS
"Physical Therapy Treatment    Patient Name:  Elliott Peters   MRN:  [id number]    Recommendations:     Discharge Recommendations:  rehabilitation facility,nursing facility, skilled   Discharge Equipment Recommendations: walker, rolling   Barriers to discharge: ongoing medical care    Assessment:     Elliott Peters is a 81 y.o. male admitted with a medical diagnosis of UGIB (upper gastrointestinal bleed).  He presents with the following impairments/functional limitations:  weakness,impaired endurance,impaired self care skills,impaired functional mobilty,gait instability,impaired balance,decreased lower extremity function,pain,decreased ROM,impaired cardiopulmonary response to activity,orthopedic precautions Pt agreeable to tx upon entry to room. Pt is cont to improve with functional mob and strength in BLE's. Pt has been accepted back to [facility] at d/c when medically ready. Pt will benefit from cont to improve endurance with ambulation and t/f activities to further progress towards d/c.  Rehab Prognosis: Good; patient would benefit from acute skilled PT services to address these deficits and reach maximum level of function.    Recent Surgery: * No surgery found *      Plan:     During this hospitalization, patient to be seen 5 x/week to address the identified rehab impairments via gait training,therapeutic activities,therapeutic exercises and progress toward the following goals:    · Plan of Care Expires:  02/02/22    Subjective     Chief Complaint: UGIB, impaired bowel function, and recent R TKR   Patient/Family Comments/goals: "I went through this with my last knee replacement"  Pain/Comfort:  · Pain Rating 1: 0/10      Objective:     Communicated with Tanisha Torre RN prior to session.  Patient found supine with peripheral IV,oxygen,SCD upon PT entry to room.     General Precautions: Standard, fall   Orthopedic Precautions:RLE weight bearing as tolerated   Braces: N/A  Respiratory Status: Room air     Functional " Mobility:  · Bed Mobility:     · Rolling Left:  minimum assistance  · Rolling Right: minimum assistance  · Scooting: minimum assistance and VC/TC to scoot to EOB from supine and maintain RLE straight to rest heel on floor in p! free position  · Supine to Sit: minimum assistance  · Transfers:     · Sit to Stand:  minimum assistance with rolling walker  · Bed to Chair: contact guard assistance with  rolling walker  using  Step Transfer  · Gait: 35ft in room with RW, CGA, step to gait pattern with slow trent, no LOB or diziness noted during trial but put expressed slight fatigue.        AM-PAC 6 CLICK MOBILITY  Turning over in bed (including adjusting bedclothes, sheets and blankets)?: 3  Sitting down on and standing up from a chair with arms (e.g., wheelchair, bedside commode, etc.): 3  Moving from lying on back to sitting on the side of the bed?: 3  Moving to and from a bed to a chair (including a wheelchair)?: 3  Need to walk in hospital room?: 3  Climbing 3-5 steps with a railing?: 1  Basic Mobility Total Score: 16       Therapeutic Activities and Exercises:   supine RLE hs AAROM 20x   Supine QS BLE 20x   EOB seated BLE heel raises 20x  Standing in RW heel raises with CGA 2x10    5 sit<>stand from chair using CGA with RW and heavy VC/TC to maximize safety during trial and further improve BLE strength and mobility       Patient left up in chair with all lines intact, call button in reach, Tanisha Torre RN notified and wife present..    GOALS:   Multidisciplinary Problems     Physical Therapy Goals        Problem: Physical Therapy Goal    Goal Priority Disciplines Outcome Goal Variances Interventions   Physical Therapy Goal     PT, PT/OT Ongoing, Progressing     Description: Short Term Goals to be met by: 2022    Patient will increase functional independence with mobility by performin. Supine to sit with Modified [place]  2. Sit to stand transfer with Modified [place]  3. Bed to chair  transfer with Modified [place] using Rolling Walker, WBAT right LE  4. Gait  x 100 feet with Modified [place] using Rolling Walker, WBAT right LE.   5. Lower extremity exercise program x30 reps per handout, with assistance as needed  6. Knee ROM 0-100    Long Term Goals to be met by: 3/2/2022    Pt will regain full independent functional mobility to return to prior activities of daily living.                    Time Tracking:     PT Received On: 01/03/22  PT Start Time: 0913     PT Stop Time: 0945  PT Total Time (min): 32 min     Billable Minutes: Gait Training 10 and Therapeutic Exercise 15    Treatment Type: Treatment  PT/PTA: PTA     PTA Visit Number: 1     01/03/2022

## 2022-01-03 NOTE — PLAN OF CARE
Problem: Adult Inpatient Plan of Care  Goal: Plan of Care Review  Outcome: Ongoing, Progressing  Goal: Patient-Specific Goal (Individualized)  Outcome: Ongoing, Progressing  Goal: Absence of Hospital-Acquired Illness or Injury  Outcome: Ongoing, Progressing  Goal: Optimal Comfort and Wellbeing  Outcome: Ongoing, Progressing  Goal: Readiness for Transition of Care  Outcome: Ongoing, Progressing     Problem: Gas Exchange Impaired  Goal: Optimal Gas Exchange  Outcome: Ongoing, Progressing

## 2022-01-03 NOTE — PLAN OF CARE
Spoke with Claudia at [facility], states pt is accepted back to [facility] whenever d/c ready. SS following.

## 2022-01-03 NOTE — ASSESSMENT & PLAN NOTE
hemodynamically stable.    received 2 U blood at [place] per prior note  Trend H/H q6h.  Protonix   Consulted GI with EGD this a.m..  Holding ASA, Eliquis, and Plavix (Patient was taking due to cardiac history).    Patient was on triple therapy and is therefore at high risk for bleeding event.  If hemoglobin remains stable will restart Eliquis in hold aspirin Plavix.  Can further discuss with Dr. Freire.

## 2022-01-04 LAB
ANION GAP SERPL CALCULATED.3IONS-SCNC: 15 MMOL/L (ref 7–16)
ANISOCYTOSIS BLD QL SMEAR: ABNORMAL
BASOPHILS # BLD AUTO: 0.01 K/UL (ref 0–0.2)
BASOPHILS NFR BLD AUTO: 0.1 % (ref 0–1)
BILIRUB UR QL STRIP: NEGATIVE
BUN SERPL-MCNC: 11 MG/DL (ref 7–18)
BUN/CREAT SERPL: 13 (ref 6–20)
CALCIUM SERPL-MCNC: 8.2 MG/DL (ref 8.5–10.1)
CHLORIDE SERPL-SCNC: 103 MMOL/L (ref 98–107)
CLARITY UR: CLEAR
CO2 SERPL-SCNC: 23 MMOL/L (ref 21–32)
COLOR UR: NORMAL
CREAT SERPL-MCNC: 0.84 MG/DL (ref 0.7–1.3)
DIFFERENTIAL METHOD BLD: ABNORMAL
EOSINOPHIL # BLD AUTO: 0.09 K/UL (ref 0–0.5)
EOSINOPHIL NFR BLD AUTO: 1.3 % (ref 1–4)
EOSINOPHIL NFR BLD MANUAL: 3 % (ref 1–4)
ERYTHROCYTE [DISTWIDTH] IN BLOOD BY AUTOMATED COUNT: 15.4 % (ref 11.5–14.5)
ESTROGEN SERPL-MCNC: NORMAL PG/ML
FLUAV AG UPPER RESP QL IA.RAPID: NEGATIVE
FLUBV AG UPPER RESP QL IA.RAPID: NEGATIVE
GLUCOSE SERPL-MCNC: 107 MG/DL (ref 74–106)
GLUCOSE UR STRIP-MCNC: NEGATIVE MG/DL
HCT VFR BLD AUTO: 25.7 % (ref 40–54)
HGB BLD-MCNC: 8.8 G/DL (ref 13.5–18)
IMM GRANULOCYTES # BLD AUTO: 0.19 K/UL (ref 0–0.04)
IMM GRANULOCYTES NFR BLD: 2.8 % (ref 0–0.4)
KETONES UR STRIP-SCNC: NEGATIVE MG/DL
LAB AP GROSS DESCRIPTION: NORMAL
LAB AP LABORATORY NOTES: NORMAL
LEUKOCYTE ESTERASE UR QL STRIP: NEGATIVE
LYMPHOCYTES # BLD AUTO: 1.25 K/UL (ref 1–4.8)
LYMPHOCYTES NFR BLD AUTO: 18.1 % (ref 27–41)
LYMPHOCYTES NFR BLD MANUAL: 28 % (ref 27–41)
MCH RBC QN AUTO: 28.2 PG (ref 27–31)
MCHC RBC AUTO-ENTMCNC: 34.2 G/DL (ref 32–36)
MCV RBC AUTO: 82.4 FL (ref 80–96)
MONOCYTES # BLD AUTO: 3.74 K/UL (ref 0–0.8)
MONOCYTES NFR BLD AUTO: 54.2 % (ref 2–6)
MONOCYTES NFR BLD MANUAL: 31 % (ref 2–6)
MPC BLD CALC-MCNC: 11.1 FL (ref 9.4–12.4)
NEUTROPHILS # BLD AUTO: 1.62 K/UL (ref 1.8–7.7)
NEUTROPHILS NFR BLD AUTO: 23.5 % (ref 53–65)
NEUTS BAND NFR BLD MANUAL: 2 % (ref 1–5)
NEUTS SEG NFR BLD MANUAL: 36 % (ref 50–62)
NITRITE UR QL STRIP: NEGATIVE
NRBC # BLD AUTO: 0 X10E3/UL
NRBC, AUTO (.00): 0 %
PH UR STRIP: 7 PH UNITS
PLATELET # BLD AUTO: 195 K/UL (ref 150–400)
PLATELET MORPHOLOGY: ABNORMAL
POLYCHROMASIA BLD QL SMEAR: ABNORMAL
POTASSIUM SERPL-SCNC: 4.5 MMOL/L (ref 3.5–5.1)
PROT UR QL STRIP: NEGATIVE
RBC # BLD AUTO: 3.12 M/UL (ref 4.6–6.2)
RBC # UR STRIP: NEGATIVE /UL
SARS-COV+SARS-COV-2 AG RESP QL IA.RAPID: NEGATIVE
SODIUM SERPL-SCNC: 136 MMOL/L (ref 136–145)
SP GR UR STRIP: 1.01
T3RU NFR SERPL: NORMAL %
UROBILINOGEN UR STRIP-ACNC: 0.2 MG/DL
WBC # BLD AUTO: 6.9 K/UL (ref 4.5–11)

## 2022-01-04 PROCEDURE — 27201920 HC DRESSING, AQUACEL AG

## 2022-01-04 PROCEDURE — 36415 COLL VENOUS BLD VENIPUNCTURE: CPT | Performed by: HOSPITALIST

## 2022-01-04 PROCEDURE — 99233 PR SUBSEQUENT HOSPITAL CARE,LEVL III: ICD-10-PCS | Mod: ,,, | Performed by: HOSPITALIST

## 2022-01-04 PROCEDURE — 25000003 PHARM REV CODE 250: Performed by: HOSPITALIST

## 2022-01-04 PROCEDURE — 97110 THERAPEUTIC EXERCISES: CPT | Mod: CQ

## 2022-01-04 PROCEDURE — A4495 THIGH LENGTH SURG STOCKING: HCPCS

## 2022-01-04 PROCEDURE — 99900035 HC TECH TIME PER 15 MIN (STAT)

## 2022-01-04 PROCEDURE — 63600175 PHARM REV CODE 636 W HCPCS: Performed by: HOSPITALIST

## 2022-01-04 PROCEDURE — 97116 GAIT TRAINING THERAPY: CPT | Mod: CQ

## 2022-01-04 PROCEDURE — 85025 COMPLETE CBC W/AUTO DIFF WBC: CPT | Performed by: HOSPITALIST

## 2022-01-04 PROCEDURE — 87428 SARSCOV & INF VIR A&B AG IA: CPT | Performed by: HOSPITALIST

## 2022-01-04 PROCEDURE — 11000001 HC ACUTE MED/SURG PRIVATE ROOM

## 2022-01-04 PROCEDURE — 80048 BASIC METABOLIC PNL TOTAL CA: CPT | Performed by: HOSPITALIST

## 2022-01-04 PROCEDURE — 94761 N-INVAS EAR/PLS OXIMETRY MLT: CPT

## 2022-01-04 PROCEDURE — 81003 URINALYSIS AUTO W/O SCOPE: CPT | Performed by: HOSPITALIST

## 2022-01-04 PROCEDURE — 99233 SBSQ HOSP IP/OBS HIGH 50: CPT | Mod: ,,, | Performed by: HOSPITALIST

## 2022-01-04 RX ADMIN — APIXABAN 2.5 MG: 2.5 TABLET, FILM COATED ORAL at 08:01

## 2022-01-04 RX ADMIN — ACETAMINOPHEN 1000 MG: 500 TABLET ORAL at 03:01

## 2022-01-04 RX ADMIN — BISACODYL 10 MG: 5 TABLET, COATED ORAL at 09:01

## 2022-01-04 RX ADMIN — ATORVASTATIN CALCIUM 80 MG: 80 TABLET, FILM COATED ORAL at 08:01

## 2022-01-04 RX ADMIN — PANTOPRAZOLE SODIUM 40 MG: 40 TABLET, DELAYED RELEASE ORAL at 03:01

## 2022-01-04 RX ADMIN — AMLODIPINE BESYLATE 2.5 MG: 2.5 TABLET ORAL at 09:01

## 2022-01-04 RX ADMIN — TAMSULOSIN HYDROCHLORIDE 0.4 MG: 0.4 CAPSULE ORAL at 09:01

## 2022-01-04 RX ADMIN — OXYCODONE HYDROCHLORIDE 5 MG: 5 TABLET ORAL at 07:01

## 2022-01-04 RX ADMIN — PANTOPRAZOLE SODIUM 40 MG: 40 TABLET, DELAYED RELEASE ORAL at 06:01

## 2022-01-04 RX ADMIN — POLYETHYLENE GLYCOL 3350 17 G: 17 POWDER, FOR SOLUTION ORAL at 09:01

## 2022-01-04 RX ADMIN — ONDANSETRON 8 MG: 2 INJECTION INTRAMUSCULAR; INTRAVENOUS at 12:01

## 2022-01-04 RX ADMIN — APIXABAN 2.5 MG: 2.5 TABLET, FILM COATED ORAL at 09:01

## 2022-01-04 RX ADMIN — CARVEDILOL 3.12 MG: 3.12 TABLET, FILM COATED ORAL at 09:01

## 2022-01-04 NOTE — SUBJECTIVE & OBJECTIVE
Review of Systems   Constitutional: Negative for appetite change, fatigue and fever.   HENT: Negative for congestion, hearing loss and trouble swallowing.    Respiratory: Negative for chest tightness, shortness of breath and wheezing.    Cardiovascular: Positive for leg swelling. Negative for chest pain and palpitations.   Gastrointestinal: Negative for abdominal pain, constipation and nausea.   Genitourinary: Negative for difficulty urinating and dysuria.   Musculoskeletal: Positive for gait problem. Negative for back pain and neck stiffness.   Skin: Positive for wound. Negative for pallor and rash.   Neurological: Negative for dizziness, speech difficulty and headaches.   Psychiatric/Behavioral: Negative for confusion and suicidal ideas.     Objective:     Vital Signs (Most Recent):  Temp: 98.6 °F (37 °C) (01/04/22 0710)  Pulse: (!) 52 (01/04/22 0743)  Resp: 18 (01/04/22 0743)  BP: (!) 142/69 (01/04/22 0710)  SpO2: (!) 93 % (01/04/22 0743) Vital Signs (24h Range):  Temp:  [98.5 °F (36.9 °C)-99.5 °F (37.5 °C)] 98.6 °F (37 °C)  Pulse:  [52-84] 52  Resp:  [18-20] 18  SpO2:  [92 %-100 %] 93 %  BP: (106-142)/(48-69) 142/69     Weight: 82.6 kg (182 lb 1.6 oz)  Body mass index is 29.39 kg/m².    Intake/Output Summary (Last 24 hours) at 1/4/2022 1021  Last data filed at 1/4/2022 0600  Gross per 24 hour   Intake --   Output 1520 ml   Net -1520 ml      Physical Exam  Vitals reviewed.   Constitutional:       General: He is not in acute distress.  Eyes:      Pupils: Pupils are equal, round, and reactive to light.   Cardiovascular:      Rate and Rhythm: Normal rate and regular rhythm.      Pulses: Normal pulses.   Pulmonary:      Effort: Pulmonary effort is normal. No respiratory distress.      Breath sounds: Normal breath sounds. No wheezing.   Abdominal:      General: Bowel sounds are normal. There is no distension.      Tenderness: There is no abdominal tenderness.   Skin:     General: Skin is warm.   Neurological:       General: No focal deficit present.      Mental Status: He is alert, oriented to person, place, and time and easily aroused. Mental status is at baseline.   Psychiatric:         Mood and Affect: Mood normal.         Behavior: Behavior normal.         Significant Labs: All pertinent labs within the past 24 hours have been reviewed.    Significant Imaging: I have reviewed all pertinent imaging results/findings within the past 24 hours.

## 2022-01-04 NOTE — PLAN OF CARE
Problem: Adult Inpatient Plan of Care  Goal: Plan of Care Review  Outcome: Ongoing, Progressing  Flowsheets (Taken 1/4/2022 0248)  Plan of Care Reviewed With: patient  Goal: Patient-Specific Goal (Individualized)  Outcome: Ongoing, Progressing  Flowsheets (Taken 1/4/2022 0248)  Anxieties, Fears or Concerns: Lack of normal BM, source of bleeding  Individualized Care Needs: ADL's  Patient-Specific Goals (Include Timeframe): Pt will remain hemodynamically stable  Goal: Absence of Hospital-Acquired Illness or Injury  Outcome: Ongoing, Progressing  Goal: Optimal Comfort and Wellbeing  Outcome: Ongoing, Progressing     Problem: Gas Exchange Impaired  Goal: Optimal Gas Exchange  Outcome: Ongoing, Progressing     Problem: Fall Injury Risk  Goal: Absence of Fall and Fall-Related Injury  Outcome: Ongoing, Progressing   Pt cooperative with POC et expresses eagerness to improve current state of health.

## 2022-01-04 NOTE — PLAN OF CARE
Problem: Adult Inpatient Plan of Care  Goal: Plan of Care Review  Outcome: Ongoing, Progressing  Goal: Patient-Specific Goal (Individualized)  Outcome: Ongoing, Progressing  Goal: Absence of Hospital-Acquired Illness or Injury  Outcome: Ongoing, Progressing  Goal: Optimal Comfort and Wellbeing  Outcome: Ongoing, Progressing  Goal: Readiness for Transition of Care  Outcome: Ongoing, Progressing     Problem: Gas Exchange Impaired  Goal: Optimal Gas Exchange  Outcome: Ongoing, Progressing   Plan o fcare continues after review for this shift

## 2022-01-04 NOTE — PT/OT/SLP PROGRESS
"Physical Therapy Treatment    Patient Name:  Elliott Peters   MRN:  [id number]    Recommendations:     Discharge Recommendations:  nursing facility, skilled,rehabilitation facility   Discharge Equipment Recommendations: walker, rolling   Barriers to discharge: ongoing medical care    Assessment:     Elliott Peters is a 81 y.o. male admitted with a medical diagnosis of UGIB (upper gastrointestinal bleed).  He presents with the following impairments/functional limitations:  weakness,impaired endurance,impaired functional mobilty,impaired self care skills,impaired balance,decreased ROM,orthopedic precautions,pain   Pt agreeable to tx upon entry to room. Pt improved knee flexion PROM to 90 degrees and maintained extension at 0. Pt progressing well with functional mob and performed fwd/bckwd steps with instruction to focus on heel strike/toe off with RLE to further increase knee mobility.    Rehab Prognosis: Good; patient would benefit from acute skilled PT services to address these deficits and reach maximum level of function.    Recent Surgery: * No surgery found *      Plan:     During this hospitalization, patient to be seen 5 x/week to address the identified rehab impairments via gait training,therapeutic activities,therapeutic exercises and progress toward the following goals:    · Plan of Care Expires:  02/02/22    Subjective     Chief Complaint: UGIB, recent R TKR  Patient/Family Comments/goals: "I dont have any pain today"  Pain/Comfort:  · Pain Rating 1: 0/10  · Location 1: knee  · Pain Addressed 1: Pre-medicate for activity,Reposition,Distraction,Cessation of Activity      Objective:     Communicated with Tanisha Torre RN prior to session.  Patient found supine with peripheral IV,SCD upon PT entry to room.     General Precautions: Standard, fall   Orthopedic Precautions:RLE weight bearing as tolerated   Braces: N/A  Respiratory Status: Room air     Functional Mobility:  · Bed Mobility:     · Rolling Left:  " minimum assistance  · Rolling Right: minimum assistance  · Scooting: contact guard assistance  · Supine to Sit: minimum assistance  · Transfers:     · Sit to Stand:  contact guard assistance with rolling walker  · Bed to Chair: contact guard assistance with  rolling walker  using  Step Transfer  · Gait: 50ft in room with 3 fwd/retro steps taken after each sit<>stand using RW and dec trent, increased trunk flexion  · Balance: good      AM-PAC 6 CLICK MOBILITY  Turning over in bed (including adjusting bedclothes, sheets and blankets)?: 3  Sitting down on and standing up from a chair with arms (e.g., wheelchair, bedside commode, etc.): 3  Moving from lying on back to sitting on the side of the bed?: 3  Moving to and from a bed to a chair (including a wheelchair)?: 3  Need to walk in hospital room?: 3  Climbing 3-5 steps with a railing?: 1  Basic Mobility Total Score: 16       Therapeutic Activities and Exercises:    supine RLE hs AAROM 20x   Supine QS BLE 20x   EOB seated BLE heel raises 20x  5 sit<>stand from chair using CGA with RW  Taking 3 fwd and 3 retro steps back to chair for heel strike, toe off and heavy VC/TC to maximize safety during trial and further improve BLE strength and mobility   Patient left up in chair with all lines intact, call button in reach, Tanisha Torre RN notified and wife present..    GOALS:   Multidisciplinary Problems     Physical Therapy Goals        Problem: Physical Therapy Goal    Goal Priority Disciplines Outcome Goal Variances Interventions   Physical Therapy Goal     PT, PT/OT Ongoing, Progressing     Description: Short Term Goals to be met by: 2022    Patient will increase functional independence with mobility by performin. Supine to sit with Modified [place]  2. Sit to stand transfer with Modified [place]  3. Bed to chair transfer with Modified [place] using Rolling Walker, WBAT right LE  4. Gait  x 100 feet with Modified [place] using Rolling  Walker, WBAT right LE.   5. Lower extremity exercise program x30 reps per handout, with assistance as needed  6. Knee ROM 0-100    Long Term Goals to be met by: 3/2/2022    Pt will regain full independent functional mobility to return to prior activities of daily living.                    Time Tracking:     PT Received On: 01/04/22  PT Start Time: 0855     PT Stop Time: 0935  PT Total Time (min): 40 min     Billable Minutes: Gait Training 20 and Therapeutic Exercise 12    Treatment Type: Treatment  PT/PTA: PTA     PTA Visit Number: 2     01/04/2022

## 2022-01-04 NOTE — PROGRESS NOTES
[address] Medicine  Progress Note    Patient Name: Elliott Peters  MRN: [id number]  Patient Class: IP- Inpatient   Admission Date: 12/31/2021  Length of Stay: 4 days  Attending Physician: Jhoan Crafword MD  Primary Care Provider: Izzy Olivier NP        Subjective:     Principal Problem:UGIB (upper gastrointestinal bleed)        HPI:  82 yo M present to [facility] from [place] for acute GI bleed. History obtained from wife and patient. Patient recently had total right knee replacement on 12/28/21 by Dr. Epps and transferred to [facility] for physical rehabilitation. On Wednesday, patient complained of abdominal bloating and constipation. He was given an oral laxative Thursday which made him nauseous and subsequently vomited twice. Vomit was characterized as coffee ground and bloody. He still did not have a bowel movement and was given enema twice around lunch time. Thursday morning he had a large bowel movement; black in color. No bright red blood was noted in stool. This was associated with malaise and pallor. Wife reports he did have have minimal black, tarry, pellet-like, coffee ground colored, stool this afternoon; no episodes of vomiting since Thursday morning. Currently reports no vomiting, lower GI bleed, abdominal pain, or malaise which resolved after he received blood. CBC was ordered with H/H of 6.4/19.3 (was 7.8/23.3 on admission to [facility] [place] facility); FOBT was positive. ASA, Plavix, Eliquis was discontinued. He was transfused with 2 units of blood at [place] and transferred to [facility] for further evaluation by GI specialist.     Of note, wife reports patient had similar GI symptoms when he had left total knee replacement about 4 years ago. He had an EGD and colonoscopy which showed several polyps. Wife reports patient had no colonoscopy since. Patient has past medical hx of HTN, HLD, renal stones, osteoarthritis, CAD with 3 stents placed, ROSI on CPAP. His cardiologist  is Dr. Freire at [facility]. He is followed by Dr. Allen for his nephrolithiasis.         Overview/Hospital Course:  01/01 -Records reviewed.  Patient admitted after having vomiting and passage per rectum of dark liquid felt to possibly be blood.  No red blood was seen.  Patient had just been discharged from here to swing bed at [facility] after right total knee replacement 12/28 by Dr. Epps.  Patient has been on Eliquis 2.5 mg twice daily.  Also been on Plavix 75 mg daily after having cardiac stents placed in May 2021 by Dr. Freire.  Had preop evaluation by Dr. Freire including stress study.  Do not see any iron therapy patient has been receiving.  Mr. Peters similar problem with abdominal distention and vomiting after previous orthopedic surgeon.  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  Has had previous colonoscopy with polyp removed.  KUB done prior day did show some gaseous distension nonspecific    Past Medical History:   Diagnosis Date    Hyperlipidemia      Hypertension      Kidney stone      MI (myocardial infarction)      Sleep apnea      Urinary tract infection        01/02 - patient without new issues.  No evidence of bleeding.  H&H similar to prior day; any lower will consider transfusion.  Therapy team to see, at discharge look at going back to [facility] bed.  Dr. Epps to recheck.  Still with some abdominal distention but less; talk with patient about trying to avoid narcotics as much as possible and to increase activity during this acute abdominal problem.  As prior stated had summer GI complaints after prior orthopedic surgery.  No DVT noted on recent ultrasound; right leg is swollen associated with recent surgery.  Dr. Crawford to assume care am.  Recheck CBC to follow H&H but also platelet count which is been low not clear etiology    1/3 - EGD performed on 01/01/2022.  · Multiple small, superficial ulcers in the cardia; performed cold forceps biopsy  · 3  cm hiatal hernia  Monitoring hemoglobin and will plan to return to [place] when stable.  Will plan to restart Eliquis 2.5 mg for prophylaxis and hold Plavix while on Eliquis and then restart Plavix after 2 weeks post right knee repair which would be approximately 1/10/22.  May check with Dr. Freire if there is any uncertainty.      1/4 - will recheck cbc today.  Eliquis restarted as DVT ppx.  Holding plavix and will resume on Melecio 10.  May discuss plan with Dr. Freire, patient's cardiologist.          Review of Systems   Constitutional: Negative for appetite change, fatigue and fever.   HENT: Negative for congestion, hearing loss and trouble swallowing.    Respiratory: Negative for chest tightness, shortness of breath and wheezing.    Cardiovascular: Positive for leg swelling. Negative for chest pain and palpitations.   Gastrointestinal: Negative for abdominal pain, constipation and nausea.   Genitourinary: Negative for difficulty urinating and dysuria.   Musculoskeletal: Positive for gait problem. Negative for back pain and neck stiffness.   Skin: Positive for wound. Negative for pallor and rash.   Neurological: Negative for dizziness, speech difficulty and headaches.   Psychiatric/Behavioral: Negative for confusion and suicidal ideas.     Objective:     Vital Signs (Most Recent):  Temp: 98.6 °F (37 °C) (01/04/22 0710)  Pulse: (!) 52 (01/04/22 0743)  Resp: 18 (01/04/22 0743)  BP: (!) 142/69 (01/04/22 0710)  SpO2: (!) 93 % (01/04/22 0743) Vital Signs (24h Range):  Temp:  [98.5 °F (36.9 °C)-99.5 °F (37.5 °C)] 98.6 °F (37 °C)  Pulse:  [52-84] 52  Resp:  [18-20] 18  SpO2:  [92 %-100 %] 93 %  BP: (106-142)/(48-69) 142/69     Weight: 82.6 kg (182 lb 1.6 oz)  Body mass index is 29.39 kg/m².    Intake/Output Summary (Last 24 hours) at 1/4/2022 1021  Last data filed at 1/4/2022 0600  Gross per 24 hour   Intake --   Output 1520 ml   Net -1520 ml      Physical Exam  Vitals reviewed.   Constitutional:       General: He is  not in acute distress.  Eyes:      Pupils: Pupils are equal, round, and reactive to light.   Cardiovascular:      Rate and Rhythm: Normal rate and regular rhythm.      Pulses: Normal pulses.   Pulmonary:      Effort: Pulmonary effort is normal. No respiratory distress.      Breath sounds: Normal breath sounds. No wheezing.   Abdominal:      General: Bowel sounds are normal. There is no distension.      Tenderness: There is no abdominal tenderness.   Skin:     General: Skin is warm.   Neurological:      General: No focal deficit present.      Mental Status: He is alert, oriented to person, place, and time and easily aroused. Mental status is at baseline.   Psychiatric:         Mood and Affect: Mood normal.         Behavior: Behavior normal.         Significant Labs: All pertinent labs within the past 24 hours have been reviewed.    Significant Imaging: I have reviewed all pertinent imaging results/findings within the past 24 hours.      Assessment/Plan:      * UGIB (upper gastrointestinal bleed)   hemodynamically stable.    received 2 U blood at [place] per prior note  Trend H/H q6h.  Protonix   Consulted GI with EGD this a.m..  Holding ASA, Eliquis, and Plavix (Patient was taking due to cardiac history).    Patient was on triple therapy and is therefore at high risk for bleeding event.  If hemoglobin remains stable will restart Eliquis in hold aspirin Plavix.  Can further discuss with Dr. Freire.    Thrombocytopenia  Thrombocytopenia with platelets in the 70s and 80s.      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      Leukocytosis  No other signs/symptoms of acute infection. Likely normal stress reaction to acute GI bleed. Will continue to monitor with CBC.   Blood  cultures ordered at [place]. Will f/u with final results.    Status post total right knee replacement  On SCDs.   Currently on oxycodone 5 q4h prn and Tylenol 1,000 q6h prn for pain control.  Notify Dr. Epps of admission  1/3/22:  Restarted Eliquis on 01/03/2022.    Hyperlipidemia  On Crestor 20 at home; substituting with Lipitor 80 while in hospital.     ROSI on CPAP  Wife brought home CPAP. Patient reports he has mild ROSI and declines use tonight. States he will consider using it tomorrow night.    Hypertension  On home amlodipine 2.5 mg qd, Coreg 3.125 mg bid    Benign prostatic hyperplasia with nocturia  On terazosin at home; substituting with tamsulosin while in hospital.       VTE Risk Mitigation (From admission, onward)         Ordered     apixaban tablet 2.5 mg  2 times daily         01/03/22 1141     Place PATRIC hose  Until discontinued         12/31/21 2254     Place sequential compression device  Until discontinued         12/31/21 2254     IP VTE HIGH RISK PATIENT  Once         12/31/21 2254                Discharge Planning   BRAULIO:      Code Status: Full Code   Is the patient medically ready for discharge?:     Reason for patient still in hospital (select all that apply): Treatment  Discharge Plan A: Other (Swingbed at [facility])          Jhoan Crawford MD  Department of Hospital Medicine   [address]

## 2022-01-05 PROBLEM — J18.9 PNEUMONIA: Status: ACTIVE | Noted: 2022-01-05

## 2022-01-05 LAB
ALBUMIN SERPL BCP-MCNC: 2.6 G/DL (ref 3.5–5)
ALBUMIN/GLOB SERPL: 0.7 {RATIO}
ALP SERPL-CCNC: 104 U/L (ref 45–115)
ALT SERPL W P-5'-P-CCNC: 118 U/L (ref 16–61)
ANION GAP SERPL CALCULATED.3IONS-SCNC: 13 MMOL/L (ref 7–16)
ANISOCYTOSIS BLD QL SMEAR: ABNORMAL
AST SERPL W P-5'-P-CCNC: 113 U/L (ref 15–37)
BACTERIA BLD CULT: NORMAL
BACTERIA BLD CULT: NORMAL
BASOPHILS # BLD AUTO: 0.02 K/UL (ref 0–0.2)
BASOPHILS NFR BLD AUTO: 0.3 % (ref 0–1)
BASOPHILS NFR BLD MANUAL: 2 % (ref 0–1)
BILIRUB SERPL-MCNC: 1.3 MG/DL (ref 0–1.2)
BUN SERPL-MCNC: 12 MG/DL (ref 7–18)
BUN/CREAT SERPL: 14 (ref 6–20)
CALCIUM SERPL-MCNC: 8.3 MG/DL (ref 8.5–10.1)
CHLORIDE SERPL-SCNC: 102 MMOL/L (ref 98–107)
CO2 SERPL-SCNC: 22 MMOL/L (ref 21–32)
CREAT SERPL-MCNC: 0.88 MG/DL (ref 0.7–1.3)
D DIMER PPP FEU-MCNC: 3.92 ΜG/ML (ref 0–0.47)
DIFFERENTIAL METHOD BLD: ABNORMAL
EOSINOPHIL # BLD AUTO: 0.11 K/UL (ref 0–0.5)
EOSINOPHIL NFR BLD AUTO: 1.6 % (ref 1–4)
EOSINOPHIL NFR BLD MANUAL: 1 % (ref 1–4)
ERYTHROCYTE [DISTWIDTH] IN BLOOD BY AUTOMATED COUNT: 15.6 % (ref 11.5–14.5)
GLOBULIN SER-MCNC: 3.9 G/DL (ref 2–4)
GLUCOSE SERPL-MCNC: 130 MG/DL (ref 74–106)
HCT VFR BLD AUTO: 27.6 % (ref 40–54)
HGB BLD-MCNC: 9.2 G/DL (ref 13.5–18)
IMM GRANULOCYTES # BLD AUTO: 0.12 K/UL (ref 0–0.04)
IMM GRANULOCYTES NFR BLD: 1.7 % (ref 0–0.4)
LYMPHOCYTES # BLD AUTO: 1.67 K/UL (ref 1–4.8)
LYMPHOCYTES NFR BLD AUTO: 23.7 % (ref 27–41)
LYMPHOCYTES NFR BLD MANUAL: 26 % (ref 27–41)
MCH RBC QN AUTO: 27.6 PG (ref 27–31)
MCHC RBC AUTO-ENTMCNC: 33.3 G/DL (ref 32–36)
MCV RBC AUTO: 82.9 FL (ref 80–96)
MONOCYTES # BLD AUTO: 2.82 K/UL (ref 0–0.8)
MONOCYTES NFR BLD AUTO: 39.9 % (ref 2–6)
MONOCYTES NFR BLD MANUAL: 31 % (ref 2–6)
MPC BLD CALC-MCNC: 11.1 FL (ref 9.4–12.4)
NEUTROPHILS # BLD AUTO: 2.32 K/UL (ref 1.8–7.7)
NEUTROPHILS NFR BLD AUTO: 32.8 % (ref 53–65)
NEUTS BAND NFR BLD MANUAL: 1 % (ref 1–5)
NEUTS SEG NFR BLD MANUAL: 39 % (ref 50–62)
NRBC # BLD AUTO: 0.02 X10E3/UL
NRBC BLD MANUAL-RTO: 1 /100 WBC
NRBC, AUTO (.00): 0.3 %
NT-PROBNP SERPL-MCNC: 292 PG/ML (ref 1–450)
PLATELET # BLD AUTO: 243 K/UL (ref 150–400)
PLATELET MORPHOLOGY: ABNORMAL
POLYCHROMASIA BLD QL SMEAR: ABNORMAL
POTASSIUM SERPL-SCNC: 4.3 MMOL/L (ref 3.5–5.1)
PROT SERPL-MCNC: 6.5 G/DL (ref 6.4–8.2)
RBC # BLD AUTO: 3.33 M/UL (ref 4.6–6.2)
SODIUM SERPL-SCNC: 133 MMOL/L (ref 136–145)
WBC # BLD AUTO: 7.06 K/UL (ref 4.5–11)

## 2022-01-05 PROCEDURE — 63600175 PHARM REV CODE 636 W HCPCS: Performed by: HOSPITALIST

## 2022-01-05 PROCEDURE — 83880 ASSAY OF NATRIURETIC PEPTIDE: CPT | Performed by: HOSPITALIST

## 2022-01-05 PROCEDURE — 36415 COLL VENOUS BLD VENIPUNCTURE: CPT | Performed by: HOSPITALIST

## 2022-01-05 PROCEDURE — 80053 COMPREHEN METABOLIC PANEL: CPT | Performed by: HOSPITALIST

## 2022-01-05 PROCEDURE — 25000003 PHARM REV CODE 250: Performed by: HOSPITALIST

## 2022-01-05 PROCEDURE — 99233 PR SUBSEQUENT HOSPITAL CARE,LEVL III: ICD-10-PCS | Mod: ,,, | Performed by: HOSPITALIST

## 2022-01-05 PROCEDURE — 97110 THERAPEUTIC EXERCISES: CPT

## 2022-01-05 PROCEDURE — 85378 FIBRIN DEGRADE SEMIQUANT: CPT | Performed by: HOSPITALIST

## 2022-01-05 PROCEDURE — 85025 COMPLETE CBC W/AUTO DIFF WBC: CPT | Performed by: HOSPITALIST

## 2022-01-05 PROCEDURE — 63700000 PHARM REV CODE 250 ALT 637 W/O HCPCS: Performed by: HOSPITALIST

## 2022-01-05 PROCEDURE — 97116 GAIT TRAINING THERAPY: CPT

## 2022-01-05 PROCEDURE — 94761 N-INVAS EAR/PLS OXIMETRY MLT: CPT

## 2022-01-05 PROCEDURE — 11000001 HC ACUTE MED/SURG PRIVATE ROOM

## 2022-01-05 PROCEDURE — 99233 SBSQ HOSP IP/OBS HIGH 50: CPT | Mod: ,,, | Performed by: HOSPITALIST

## 2022-01-05 RX ORDER — AZITHROMYCIN 250 MG/1
250 TABLET, FILM COATED ORAL DAILY
Status: DISCONTINUED | OUTPATIENT
Start: 2022-01-05 | End: 2022-01-07 | Stop reason: HOSPADM

## 2022-01-05 RX ADMIN — CEFEPIME HYDROCHLORIDE 1 G: 1 INJECTION, POWDER, FOR SOLUTION INTRAMUSCULAR; INTRAVENOUS at 05:01

## 2022-01-05 RX ADMIN — CEFEPIME HYDROCHLORIDE 1 G: 1 INJECTION, POWDER, FOR SOLUTION INTRAMUSCULAR; INTRAVENOUS at 11:01

## 2022-01-05 RX ADMIN — APIXABAN 2.5 MG: 2.5 TABLET, FILM COATED ORAL at 09:01

## 2022-01-05 RX ADMIN — ACETAMINOPHEN 1000 MG: 500 TABLET ORAL at 05:01

## 2022-01-05 RX ADMIN — AZITHROMYCIN MONOHYDRATE 250 MG: 250 TABLET ORAL at 12:01

## 2022-01-05 RX ADMIN — CARVEDILOL 3.12 MG: 3.12 TABLET, FILM COATED ORAL at 09:01

## 2022-01-05 RX ADMIN — CEFEPIME HYDROCHLORIDE 1 G: 1 INJECTION, POWDER, FOR SOLUTION INTRAMUSCULAR; INTRAVENOUS at 12:01

## 2022-01-05 RX ADMIN — ATORVASTATIN CALCIUM 80 MG: 80 TABLET, FILM COATED ORAL at 09:01

## 2022-01-05 RX ADMIN — POLYETHYLENE GLYCOL 3350 17 G: 17 POWDER, FOR SOLUTION ORAL at 09:01

## 2022-01-05 RX ADMIN — OXYCODONE HYDROCHLORIDE 5 MG: 5 TABLET ORAL at 12:01

## 2022-01-05 RX ADMIN — LINACLOTIDE 145 MCG: 145 CAPSULE, GELATIN COATED ORAL at 07:01

## 2022-01-05 RX ADMIN — ONDANSETRON 8 MG: 2 INJECTION INTRAMUSCULAR; INTRAVENOUS at 12:01

## 2022-01-05 RX ADMIN — VANCOMYCIN HYDROCHLORIDE 1500 MG: 5 INJECTION, POWDER, LYOPHILIZED, FOR SOLUTION INTRAVENOUS at 12:01

## 2022-01-05 RX ADMIN — AMLODIPINE BESYLATE 2.5 MG: 2.5 TABLET ORAL at 09:01

## 2022-01-05 RX ADMIN — PANTOPRAZOLE SODIUM 40 MG: 40 TABLET, DELAYED RELEASE ORAL at 05:01

## 2022-01-05 RX ADMIN — TAMSULOSIN HYDROCHLORIDE 0.4 MG: 0.4 CAPSULE ORAL at 09:01

## 2022-01-05 NOTE — PROGRESS NOTES
Pharmacy consulted to dose vancomycin for pneumonia. Based on patient's weight and kidney function, start vanc 1500 mg IV Q18H. Trough level ordered for 1/7/22 at 1730. Pharmacy will continue to follow and dose.

## 2022-01-05 NOTE — PT/OT/SLP PROGRESS
Occupational Therapy      Patient Name:  Elliott Peters   MRN:  [id number]    Patient not seen today secondary to Patient ill. Pt states he had a good morning, but after lunch became very nauseated and painful. Pt has had medication, but it is not helping yet. Pt declined treatment. Will follow-up 1/06/2022.    1/5/2022

## 2022-01-05 NOTE — PT/OT/SLP PROGRESS
"Physical Therapy Treatment    Patient Name:  Elliott Peters   MRN:  [id number]    Recommendations:     Discharge Recommendations:  nursing facility, skilled,rehabilitation facility   Discharge Equipment Recommendations: walker, rolling   Barriers to discharge: awaiting medical clearance prior to returning to swing bed    Assessment:     Elliott Peters is a 81 y.o. male admitted with a medical diagnosis of UGIB (upper gastrointestinal bleed).  He presents with the following impairments/functional limitations:  weakness,impaired endurance,impaired self care skills,impaired functional mobilty,decreased lower extremity function,pain,edema .    Patient making good progress with PT interventions and remains highly motivated. Patient demonstrated safe mobility with RW to ambulate in room with spouse SBA. PT will continue to work on gait quality/sequencing. Patient with new diagnosis of pneumonia, to start abx today.     Rehab Prognosis: Good; patient would benefit from acute skilled PT services to address these deficits and reach maximum level of function.    Recent Surgery: * No surgery found *      Plan:     During this hospitalization, patient to be seen 5 x/week to address the identified rehab impairments via gait training,therapeutic activities,therapeutic exercises and progress toward the following goals:    · Plan of Care Expires:  02/02/22    Subjective     Chief Complaint: UGIG, PNA, recent right TKR  Patient/Family Comments/goals: "Can I get up and walk on my own?"  Pain/Comfort:  · Pain Rating 1: 0/10 (up to 4/10 during knee flexion stretch)  · Location - Side 1: Right  · Location 1: knee  · Pain Addressed 1: Pre-medicate for activity,Reposition,Distraction,Cessation of Activity  · Pain Rating Post-Intervention 1: 0/10      Objective:     Communicated with BERTHA Mary prior to session.  Patient found sitting on the side of the bed stretching his right knee with peripheral IV,cryotherapy upon PT entry to room. "     General Precautions: Standard, fall   Orthopedic Precautions:RLE weight bearing as tolerated   Braces: N/A  Respiratory Status: Room air     Functional Mobility:  · Transfers:     · Sit to Stand:  stand by assistance with rolling walker  · Bed to Chair: stand by assistance with  rolling walker  using  Step Transfer  · Gait: 40', 20' with RW, SBA, no LOB, emerging reciprocal pattern, patient fatigued easily but no LOB. Patient advised that if he wanted to walk in the room he needed to have with spouse or nursing SBA      AM-PAC 6 CLICK MOBILITY  Turning over in bed (including adjusting bedclothes, sheets and blankets)?: 4  Sitting down on and standing up from a chair with arms (e.g., wheelchair, bedside commode, etc.): 4  Moving from lying on back to sitting on the side of the bed?: 4  Moving to and from a bed to a chair (including a wheelchair)?: 3  Need to walk in hospital room?: 3  Climbing 3-5 steps with a railing?: 1  Basic Mobility Total Score: 19       Therapeutic Activities and Exercises:   Right lower extremity exercise x 30 reps: ankle pumps, Quad sets, glut sets, long arc quads, heel slides, hip abduction/adduction, and straight leg raises with active assist ROM, verbal cues, and tactile cues     Seated knee flexion stretch with prolonged hold    Right knee ROM: 0-93    Gait per above    Standing at toilet to void- distant supervision, UE support on walker; no LOB    Patient left up in chair with all lines intact, call button in reach and RN Jena Mary notified, spouse present    GOALS:   Multidisciplinary Problems     Physical Therapy Goals        Problem: Physical Therapy Goal    Goal Priority Disciplines Outcome Goal Variances Interventions   Physical Therapy Goal     PT, PT/OT Ongoing, Progressing     Description: Short Term Goals to be met by: 2022    Patient will increase functional independence with mobility by performin. Supine to sit with Modified [place]  2. Sit to stand  transfer with Modified [place]  3. Bed to chair transfer with Modified [place] using Rolling Walker, WBAT right LE  4. Gait  x 100 feet with Modified [place] using Rolling Walker, WBAT right LE.   5. Lower extremity exercise program x30 reps per handout, with assistance as needed  6. Knee ROM 0-100    Long Term Goals to be met by: 3/2/2022    Pt will regain full independent functional mobility to return to prior activities of daily living.                    Time Tracking:     PT Received On: 01/05/22  PT Start Time: 1005     PT Stop Time: 1033  PT Total Time (min): 28 min     Billable Minutes: Gait Training 10 minutes and Therapeutic Exercise 15 minutes    Treatment Type: Treatment  PT/PTA: PT     PTA Visit Number: 0     01/05/2022

## 2022-01-05 NOTE — PROGRESS NOTES
[address] Medicine  Progress Note    Patient Name: Elliott Peters  MRN: [id number]  Patient Class: IP- Inpatient   Admission Date: 12/31/2021  Length of Stay: 5 days  Attending Physician: Jhoan Crawford MD  Primary Care Provider: Izzy Olivier NP        Subjective:     Principal Problem:UGIB (upper gastrointestinal bleed)        HPI:  80 yo M present to [facility] from [place] for acute GI bleed. History obtained from wife and patient. Patient recently had total right knee replacement on 12/28/21 by Dr. Epps and transferred to [facility] for physical rehabilitation. On Wednesday, patient complained of abdominal bloating and constipation. He was given an oral laxative Thursday which made him nauseous and subsequently vomited twice. Vomit was characterized as coffee ground and bloody. He still did not have a bowel movement and was given enema twice around lunch time. Thursday morning he had a large bowel movement; black in color. No bright red blood was noted in stool. This was associated with malaise and pallor. Wife reports he did have have minimal black, tarry, pellet-like, coffee ground colored, stool this afternoon; no episodes of vomiting since Thursday morning. Currently reports no vomiting, lower GI bleed, abdominal pain, or malaise which resolved after he received blood. CBC was ordered with H/H of 6.4/19.3 (was 7.8/23.3 on admission to [facility] facility); FOBT was positive. ASA, Plavix, Eliquis was discontinued. He was transfused with 2 units of blood at [place] and transferred to [facility] for further evaluation by GI specialist.     Of note, wife reports patient had similar GI symptoms when he had left total knee replacement about 4 years ago. He had an EGD and colonoscopy which showed several polyps. Wife reports patient had no colonoscopy since. Patient has past medical hx of HTN, HLD, renal stones, osteoarthritis, CAD with 3 stents placed, ROSI on CPAP. His cardiologist  is Dr. Freire at [facility]. He is followed by Dr. Allen for his nephrolithiasis.         Overview/Hospital Course:  01/01 -Records reviewed.  Patient admitted after having vomiting and passage per rectum of dark liquid felt to possibly be blood.  No red blood was seen.  Patient had just been discharged from here to swing bed at [facility] after right total knee replacement 12/28 by Dr. Epps.  Patient has been on Eliquis 2.5 mg twice daily.  Also been on Plavix 75 mg daily after having cardiac stents placed in May 2021 by Dr. Freire.  Had preop evaluation by Dr. Freire including stress study.  Do not see any iron therapy patient has been receiving.  Mr. Peters similar problem with abdominal distention and vomiting after previous orthopedic surgeon.  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  Has had previous colonoscopy with polyp removed.  KUB done prior day did show some gaseous distension nonspecific    Past Medical History:   Diagnosis Date    Hyperlipidemia      Hypertension      Kidney stone      MI (myocardial infarction)      Sleep apnea      Urinary tract infection        01/02 - patient without new issues.  No evidence of bleeding.  H&H similar to prior day; any lower will consider transfusion.  Therapy team to see, at discharge look at going back to [facility] bed.  Dr. Epps to recheck.  Still with some abdominal distention but less; talk with patient about trying to avoid narcotics as much as possible and to increase activity during this acute abdominal problem.  As prior stated had summer GI complaints after prior orthopedic surgery.  No DVT noted on recent ultrasound; right leg is swollen associated with recent surgery.  Dr. Crawford to assume care am.  Recheck CBC to follow H&H but also platelet count which is been low not clear etiology    1/3 - EGD performed on 01/01/2022.  · Multiple small, superficial ulcers in the cardia; performed cold forceps biopsy  · 3  cm hiatal hernia  Monitoring hemoglobin and will plan to return to [facility] when stable.  Will plan to restart Eliquis 2.5 mg for prophylaxis and hold Plavix while on Eliquis and then restart Plavix after 2 weeks post right knee repair which would be approximately 1/10/22.  May check with Dr. Freire if there is any uncertainty.      1/4 - will recheck cbc today.  Eliquis restarted as DVT ppx.  Holding plavix and will resume on Melecio 10.  May discuss plan with Dr. Freire, patient's cardiologist.      1/5 - fever to 101 overnight and severe nausea with gastric distention.  Initiate antibiotics.        No new subjective & objective note has been filed under this hospital service since the last note was generated.      Assessment/Plan:      * UGIB (upper gastrointestinal bleed)   hemodynamically stable.    received 2 U blood at [facility] per prior note  Trend H/H q6h.  Protonix   Consulted GI with EGD this a.m..  Holding ASA, Eliquis, and Plavix (Patient was taking due to cardiac history).    Patient was on triple therapy and is therefore at high risk for bleeding event.  If hemoglobin remains stable will restart Eliquis in hold aspirin Plavix.  Can further discuss with Dr. Freire.    Pneumonia  R perihilar PNA on CXR  Fever to 101    Healthcare associated.   Vanc/cefepime/azithromycin.        Thrombocytopenia  Thrombocytopenia with platelets in the 70s and 80s.      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      Leukocytosis  No other signs/symptoms of acute infection. Likely normal stress reaction to acute GI bleed. Will continue to monitor with CBC.   Blood cultures ordered at [facility]. Will f/u with final results.    Status post total right knee replacement  On SCDs.    Currently on oxycodone 5 q4h prn and Tylenol 1,000 q6h prn for pain control.  Notify Dr. Epps of admission  1/3/22:  Restarted Eliquis on 01/03/2022.    Hyperlipidemia  On Crestor 20 at home; substituting with Lipitor 80 while in hospital.     ROSI on CPAP  Wife brought home CPAP. Patient reports he has mild ROSI and declines use tonight. States he will consider using it tomorrow night.    Hypertension  On home amlodipine 2.5 mg qd, Coreg 3.125 mg bid    Benign prostatic hyperplasia with nocturia  On terazosin at home; substituting with tamsulosin while in hospital.       VTE Risk Mitigation (From admission, onward)         Ordered     apixaban tablet 2.5 mg  2 times daily         01/03/22 1141     Place PATRIC hose  Until discontinued         12/31/21 2254     Place sequential compression device  Until discontinued         12/31/21 2254     IP VTE HIGH RISK PATIENT  Once         12/31/21 2254                Discharge Planning   BRAULIO:      Code Status: Full Code   Is the patient medically ready for discharge?:     Reason for patient still in hospital (select all that apply): Treatment  Discharge Plan A: Other (Swingbed at [facility])                  Jhoan Crawford MD  Department of Hospital Medicine   [address]

## 2022-01-05 NOTE — PLAN OF CARE
Problem: Adult Inpatient Plan of Care  Goal: Plan of Care Review  Outcome: Ongoing, Progressing  Flowsheets (Taken 1/5/2022 0423)  Plan of Care Reviewed With: patient  Goal: Patient-Specific Goal (Individualized)  Outcome: Ongoing, Progressing  Goal: Absence of Hospital-Acquired Illness or Injury  Outcome: Ongoing, Progressing  Goal: Optimal Comfort and Wellbeing  Outcome: Ongoing, Progressing   Pt cooperative with POC et eager to improve current state of health.

## 2022-01-05 NOTE — CARE UPDATE
Patient awake and alert without any new complaints today.  Wound clean and dry no signs of infection.  Does have some tenderness expected postoperatively.  Bowel function has returned today.  Continue to ambulate.  Can go back to swing bed if bowel function continues to be corrected.

## 2022-01-05 NOTE — PLAN OF CARE
Problem: Adult Inpatient Plan of Care  Goal: Plan of Care Review  1/4/2022 2003 by Tanisha Torre RN  Outcome: Ongoing, Progressing  1/4/2022 2003 by Tanisha Torre RN  Outcome: Ongoing, Progressing  Goal: Patient-Specific Goal (Individualized)  1/4/2022 2003 by Tanisha Torre RN  Outcome: Ongoing, Progressing  1/4/2022 2003 by Tanisha Torre RN  Outcome: Ongoing, Progressing  Goal: Absence of Hospital-Acquired Illness or Injury  1/4/2022 2003 by Tanisha Torre RN  Outcome: Ongoing, Progressing  1/4/2022 2003 by Tanisha Torre RN  Outcome: Ongoing, Progressing  Goal: Optimal Comfort and Wellbeing  1/4/2022 2003 by Tanisha Torre RN  Outcome: Ongoing, Progressing  1/4/2022 2003 by Tanisha Torre RN  Outcome: Ongoing, Progressing  Goal: Readiness for Transition of Care  1/4/2022 2003 by Tanisha Torre RN  Outcome: Ongoing, Progressing  1/4/2022 2003 by Tanisha Torre RN  Outcome: Ongoing, Progressing     Problem: Gas Exchange Impaired  Goal: Optimal Gas Exchange  1/4/2022 2003 by Tanisha Torre RN  Outcome: Ongoing, Progressing  1/4/2022 2003 by Tanisha Torre RN  Outcome: Ongoing, Progressing     Problem: Fall Injury Risk  Goal: Absence of Fall and Fall-Related Injury  1/4/2022 2003 by Tanisha Torre RN  Outcome: Ongoing, Progressing  1/4/2022 2003 by Tanisha Torre RN  Outcome: Ongoing, Progressing   POC reviewed and continues

## 2022-01-06 PROCEDURE — 11000001 HC ACUTE MED/SURG PRIVATE ROOM

## 2022-01-06 PROCEDURE — 25000003 PHARM REV CODE 250: Performed by: HOSPITALIST

## 2022-01-06 PROCEDURE — 99233 SBSQ HOSP IP/OBS HIGH 50: CPT | Mod: ,,, | Performed by: HOSPITALIST

## 2022-01-06 PROCEDURE — 97110 THERAPEUTIC EXERCISES: CPT

## 2022-01-06 PROCEDURE — 63700000 PHARM REV CODE 250 ALT 637 W/O HCPCS: Performed by: HOSPITALIST

## 2022-01-06 PROCEDURE — 25500020 PHARM REV CODE 255: Performed by: HOSPITALIST

## 2022-01-06 PROCEDURE — 63600175 PHARM REV CODE 636 W HCPCS: Performed by: HOSPITALIST

## 2022-01-06 PROCEDURE — 99233 PR SUBSEQUENT HOSPITAL CARE,LEVL III: ICD-10-PCS | Mod: ,,, | Performed by: HOSPITALIST

## 2022-01-06 RX ADMIN — CEFEPIME HYDROCHLORIDE 1 G: 1 INJECTION, POWDER, FOR SOLUTION INTRAMUSCULAR; INTRAVENOUS at 11:01

## 2022-01-06 RX ADMIN — IOPAMIDOL 100 ML: 755 INJECTION, SOLUTION INTRAVENOUS at 09:01

## 2022-01-06 RX ADMIN — APIXABAN 2.5 MG: 2.5 TABLET, FILM COATED ORAL at 09:01

## 2022-01-06 RX ADMIN — CEFEPIME HYDROCHLORIDE 1 G: 1 INJECTION, POWDER, FOR SOLUTION INTRAMUSCULAR; INTRAVENOUS at 10:01

## 2022-01-06 RX ADMIN — OXYCODONE HYDROCHLORIDE 5 MG: 5 TABLET ORAL at 10:01

## 2022-01-06 RX ADMIN — APIXABAN 2.5 MG: 2.5 TABLET, FILM COATED ORAL at 08:01

## 2022-01-06 RX ADMIN — PANTOPRAZOLE SODIUM 40 MG: 40 TABLET, DELAYED RELEASE ORAL at 05:01

## 2022-01-06 RX ADMIN — CEFEPIME HYDROCHLORIDE 1 G: 1 INJECTION, POWDER, FOR SOLUTION INTRAMUSCULAR; INTRAVENOUS at 05:01

## 2022-01-06 RX ADMIN — AZITHROMYCIN MONOHYDRATE 250 MG: 250 TABLET ORAL at 08:01

## 2022-01-06 RX ADMIN — ATORVASTATIN CALCIUM 80 MG: 80 TABLET, FILM COATED ORAL at 09:01

## 2022-01-06 RX ADMIN — CEFEPIME HYDROCHLORIDE 1 G: 1 INJECTION, POWDER, FOR SOLUTION INTRAMUSCULAR; INTRAVENOUS at 04:01

## 2022-01-06 RX ADMIN — TAMSULOSIN HYDROCHLORIDE 0.4 MG: 0.4 CAPSULE ORAL at 08:01

## 2022-01-06 RX ADMIN — LINACLOTIDE 145 MCG: 145 CAPSULE, GELATIN COATED ORAL at 06:01

## 2022-01-06 RX ADMIN — VANCOMYCIN HYDROCHLORIDE 1500 MG: 5 INJECTION, POWDER, LYOPHILIZED, FOR SOLUTION INTRAVENOUS at 05:01

## 2022-01-06 NOTE — PROGRESS NOTES
[address] Medicine  Progress Note    Patient Name: Elliott Peters  MRN: [id number]  Patient Class: IP- Inpatient   Admission Date: 12/31/2021  Length of Stay: 6 days  Attending Physician: Jhoan Crawford MD  Primary Care Provider: Izzy Olivier NP        Subjective:     Principal Problem:UGIB (upper gastrointestinal bleed)      HPI:  82 yo M present to [facility] from [place] for acute GI bleed. History obtained from wife and patient. Patient recently had total right knee replacement on 12/28/21 by Dr. Epps and transferred to [facility] for physical rehabilitation. On Wednesday, patient complained of abdominal bloating and constipation. He was given an oral laxative Thursday which made him nauseous and subsequently vomited twice. Vomit was characterized as coffee ground and bloody. He still did not have a bowel movement and was given enema twice around lunch time. Thursday morning he had a large bowel movement; black in color. No bright red blood was noted in stool. This was associated with malaise and pallor. Wife reports he did have have minimal black, tarry, pellet-like, coffee ground colored, stool this afternoon; no episodes of vomiting since Thursday morning. Currently reports no vomiting, lower GI bleed, abdominal pain, or malaise which resolved after he received blood. CBC was ordered with H/H of 6.4/19.3 (was 7.8/23.3 on admission to [facility] facility); FOBT was positive. ASA, Plavix, Eliquis was discontinued. He was transfused with 2 units of blood at [place] and transferred to [facility] for further evaluation by GI specialist.     Of note, wife reports patient had similar GI symptoms when he had left total knee replacement about 4 years ago. He had an EGD and colonoscopy which showed several polyps. Wife reports patient had no colonoscopy since. Patient has past medical hx of HTN, HLD, renal stones, osteoarthritis, CAD with 3 stents placed, ROSI on CPAP. His cardiologist is  Dr. Ferire at [facility]. He is followed by Dr. Allen for his nephrolithiasis.         Overview/Hospital Course:  01/01 -Records reviewed.  Patient admitted after having vomiting and passage per rectum of dark liquid felt to possibly be blood.  No red blood was seen.  Patient had just been discharged from here to swing bed at [facility] after right total knee replacement 12/28 by Dr. Epps.  Patient has been on Eliquis 2.5 mg twice daily.  Also been on Plavix 75 mg daily after having cardiac stents placed in May 2021 by Dr. Freire.  Had preop evaluation by Dr. Freire including stress study.  Do not see any iron therapy patient has been receiving.  Mr. Peters similar problem with abdominal distention and vomiting after previous orthopedic surgeon.  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  Has had previous colonoscopy with polyp removed.  KUB done prior day did show some gaseous distension nonspecific    Past Medical History:   Diagnosis Date    Hyperlipidemia      Hypertension      Kidney stone      MI (myocardial infarction)      Sleep apnea      Urinary tract infection        01/02 - patient without new issues.  No evidence of bleeding.  H&H similar to prior day; any lower will consider transfusion.  Therapy team to see, at discharge look at going back to [facility] bed.  Dr. Epps to recheck.  Still with some abdominal distention but less; talk with patient about trying to avoid narcotics as much as possible and to increase activity during this acute abdominal problem.  As prior stated had summer GI complaints after prior orthopedic surgery.  No DVT noted on recent ultrasound; right leg is swollen associated with recent surgery.  Dr. Crawford to assume care am.  Recheck CBC to follow H&H but also platelet count which is been low not clear etiology    1/3 - EGD performed on 01/01/2022.  · Multiple small, superficial ulcers in the cardia; performed cold forceps biopsy  · 3 cm  hiatal hernia  Monitoring hemoglobin and will plan to return to [place] when stable.  Will plan to restart Eliquis 2.5 mg for prophylaxis and hold Plavix while on Eliquis and then restart Plavix after 2 weeks post right knee repair which would be approximately 1/10/22.  May check with Dr. Freire if there is any uncertainty.      1/4 - will recheck cbc today.  Eliquis restarted as DVT ppx.  Holding plavix and will resume on Melecio 10.  May discuss plan with Dr. Freire, patient's cardiologist.      1/5 - fever to 101 overnight and severe nausea with gastric distention.  Initiate antibiotics.      1/6 - fever again last night.  CT chest unrevealing.  Possibly post-op pneumonia contributing to fever.  No fever today.  Will monitor tomorrow.  If no fever and inflammatory markers are fine, will discharge.          Review of Systems   Constitutional: Negative for appetite change, fatigue and fever.   HENT: Negative for congestion, hearing loss and trouble swallowing.    Respiratory: Negative for chest tightness, shortness of breath and wheezing.    Cardiovascular: Positive for leg swelling. Negative for chest pain and palpitations.   Gastrointestinal: Negative for abdominal pain, constipation and nausea.   Genitourinary: Negative for difficulty urinating and dysuria.   Musculoskeletal: Positive for gait problem. Negative for back pain and neck stiffness.   Skin: Positive for wound. Negative for pallor and rash.   Neurological: Negative for dizziness, speech difficulty and headaches.   Psychiatric/Behavioral: Negative for confusion and suicidal ideas.     Objective:     Vital Signs (Most Recent):  Temp: 99 °F (37.2 °C) (01/06/22 1200)  Pulse: 81 (01/06/22 1200)  Resp: 18 (01/06/22 1200)  BP: (!) 99/23 (Nurse Joanne was informed of the blood pressure.) (01/06/22 1200)  SpO2: 96 % (01/06/22 1200) Vital Signs (24h Range):  Temp:  [97.9 °F (36.6 °C)-99 °F (37.2 °C)] 99 °F (37.2 °C)  Pulse:  [71-81] 81  Resp:  [16-20]  18  SpO2:  [95 %-97 %] 96 %  BP: ()/(23-60) 99/23     Weight: 81.7 kg (180 lb 1.9 oz)  Body mass index is 29.07 kg/m².  No intake or output data in the 24 hours ending 01/06/22 1656   Physical Exam  Vitals reviewed.   Constitutional:       General: He is not in acute distress.  Eyes:      Pupils: Pupils are equal, round, and reactive to light.   Cardiovascular:      Rate and Rhythm: Normal rate and regular rhythm.      Pulses: Normal pulses.   Pulmonary:      Effort: Pulmonary effort is normal. No respiratory distress.      Breath sounds: Normal breath sounds. No wheezing.   Abdominal:      General: Bowel sounds are normal. There is no distension.      Tenderness: There is no abdominal tenderness.   Skin:     General: Skin is warm.   Neurological:      General: No focal deficit present.      Mental Status: He is alert, oriented to person, place, and time and easily aroused. Mental status is at baseline.   Psychiatric:         Mood and Affect: Mood normal.         Behavior: Behavior normal.         Significant Labs: All pertinent labs within the past 24 hours have been reviewed.    Significant Imaging: I have reviewed all pertinent imaging results/findings within the past 24 hours.      Assessment/Plan:      * UGIB (upper gastrointestinal bleed)   hemodynamically stable.    received 2 U blood at [place] per prior note  Trend H/H q6h.  Protonix   Consulted GI with EGD this a.m..  Holding ASA, Eliquis, and Plavix (Patient was taking due to cardiac history).    Patient was on triple therapy and is therefore at high risk for bleeding event.  If hemoglobin remains stable will restart Eliquis in hold aspirin Plavix.  Can further discuss with Dr. Freire.    Pneumonia  R perihilar PNA on CXR  Fever to 101    Healthcare associated.   Vanc/cefepime/azithromycin.        Thrombocytopenia  Thrombocytopenia with platelets in the 70s and 80s.      Abdominal distension (gaseous)  Prior history of [place] syndrome after  previous surgery  Avoid narcotics as able  Increase activity      Acute blood loss anemia  EGD done this admission showing hiatal hernia and superficial nonbleeding gastric ulcers    Restart Eliquis 2.5 mg on 01/02/2022.      Leukocytosis  No other signs/symptoms of acute infection. Likely normal stress reaction to acute GI bleed. Will continue to monitor with CBC.   Blood cultures ordered at [place]. Will f/u with final results.    Status post total right knee replacement  On SCDs.   Currently on oxycodone 5 q4h prn and Tylenol 1,000 q6h prn for pain control.  Notify Dr. Epps of admission  1/3/22:  Restarted Eliquis on 01/03/2022.    Hyperlipidemia  On Crestor 20 at home; substituting with Lipitor 80 while in hospital.     ROSI on CPAP  Wife brought home CPAP. Patient reports he has mild ROSI and declines use tonight. States he will consider using it tomorrow night.    Hypertension  On home amlodipine 2.5 mg qd, Coreg 3.125 mg bid    Benign prostatic hyperplasia with nocturia  On terazosin at home; substituting with tamsulosin while in hospital.       VTE Risk Mitigation (From admission, onward)         Ordered     apixaban tablet 2.5 mg  2 times daily         01/03/22 1141     Place PATRIC hose  Until discontinued         12/31/21 2254     Place sequential compression device  Until discontinued         12/31/21 2254     IP VTE HIGH RISK PATIENT  Once         12/31/21 2254                Discharge Planning   BRAULIO:      Code Status: Full Code   Is the patient medically ready for discharge?:     Reason for patient still in hospital (select all that apply): Treatment  Discharge Plan A: Other (Swingbed at [facility])          Jhoan Crawford MD  Department of Hospital Medicine   [address]

## 2022-01-06 NOTE — PT/OT/SLP PROGRESS
"Physical Therapy      Patient Name:  Elliott Peters   MRN:  [id number]    Patient not seen today secondary to pt stated "I just feel weak today, I have been for a CT scan already this morning and I dont feel like I can get up again today."  After consulting with BERTHA Rubio following encounter, she said pt's BP has been running low. 87/44 at 7:45am and 99/23 at 11:20 and to hold tx today.  Will follow-up 1/7/2021.        "

## 2022-01-06 NOTE — SUBJECTIVE & OBJECTIVE
Review of Systems   Constitutional: Negative for appetite change, fatigue and fever.   HENT: Negative for congestion, hearing loss and trouble swallowing.    Respiratory: Negative for chest tightness, shortness of breath and wheezing.    Cardiovascular: Positive for leg swelling. Negative for chest pain and palpitations.   Gastrointestinal: Negative for abdominal pain, constipation and nausea.   Genitourinary: Negative for difficulty urinating and dysuria.   Musculoskeletal: Positive for gait problem. Negative for back pain and neck stiffness.   Skin: Positive for wound. Negative for pallor and rash.   Neurological: Negative for dizziness, speech difficulty and headaches.   Psychiatric/Behavioral: Negative for confusion and suicidal ideas.     Objective:     Vital Signs (Most Recent):  Temp: 99 °F (37.2 °C) (01/06/22 1200)  Pulse: 81 (01/06/22 1200)  Resp: 18 (01/06/22 1200)  BP: (!) 99/23 (Nurse Joanne was informed of the blood pressure.) (01/06/22 1200)  SpO2: 96 % (01/06/22 1200) Vital Signs (24h Range):  Temp:  [97.9 °F (36.6 °C)-99 °F (37.2 °C)] 99 °F (37.2 °C)  Pulse:  [71-81] 81  Resp:  [16-20] 18  SpO2:  [95 %-97 %] 96 %  BP: ()/(23-60) 99/23     Weight: 81.7 kg (180 lb 1.9 oz)  Body mass index is 29.07 kg/m².  No intake or output data in the 24 hours ending 01/06/22 1656   Physical Exam  Vitals reviewed.   Constitutional:       General: He is not in acute distress.  Eyes:      Pupils: Pupils are equal, round, and reactive to light.   Cardiovascular:      Rate and Rhythm: Normal rate and regular rhythm.      Pulses: Normal pulses.   Pulmonary:      Effort: Pulmonary effort is normal. No respiratory distress.      Breath sounds: Normal breath sounds. No wheezing.   Abdominal:      General: Bowel sounds are normal. There is no distension.      Tenderness: There is no abdominal tenderness.   Skin:     General: Skin is warm.   Neurological:      General: No focal deficit present.      Mental Status: He  is alert, oriented to person, place, and time and easily aroused. Mental status is at baseline.   Psychiatric:         Mood and Affect: Mood normal.         Behavior: Behavior normal.         Significant Labs: All pertinent labs within the past 24 hours have been reviewed.    Significant Imaging: I have reviewed all pertinent imaging results/findings within the past 24 hours.

## 2022-01-06 NOTE — PLAN OF CARE
Problem: Adult Inpatient Plan of Care  Goal: Plan of Care Review  Outcome: Ongoing, Progressing  Goal: Patient-Specific Goal (Individualized)  Outcome: Ongoing, Progressing  Goal: Absence of Hospital-Acquired Illness or Injury  Outcome: Ongoing, Progressing  Goal: Optimal Comfort and Wellbeing  Outcome: Ongoing, Progressing  Goal: Readiness for Transition of Care  Outcome: Ongoing, Progressing     Problem: Gas Exchange Impaired  Goal: Optimal Gas Exchange  Outcome: Ongoing, Progressing     Problem: Fall Injury Risk  Goal: Absence of Fall and Fall-Related Injury  Outcome: Ongoing, Progressing     Problem: Fluid Imbalance (Pneumonia)  Goal: Fluid Balance  Outcome: Ongoing, Progressing     Problem: Infection (Pneumonia)  Goal: Resolution of Infection Signs and Symptoms  Outcome: Ongoing, Progressing     Problem: Respiratory Compromise (Pneumonia)  Goal: Effective Oxygenation and Ventilation  Outcome: Ongoing, Progressing

## 2022-01-06 NOTE — SUBJECTIVE & OBJECTIVE
Review of Systems   Constitutional: Negative for appetite change, fatigue and fever.   HENT: Negative for congestion, hearing loss and trouble swallowing.    Respiratory: Negative for chest tightness, shortness of breath and wheezing.    Cardiovascular: Positive for leg swelling. Negative for chest pain and palpitations.   Gastrointestinal: Negative for abdominal pain, constipation and nausea.   Genitourinary: Negative for difficulty urinating and dysuria.   Musculoskeletal: Positive for gait problem. Negative for back pain and neck stiffness.   Skin: Positive for wound. Negative for pallor and rash.   Neurological: Negative for dizziness, speech difficulty and headaches.   Psychiatric/Behavioral: Negative for confusion and suicidal ideas.     Objective:     Vital Signs (Most Recent):  Temp: (!) 101 °F (38.3 °C) (Nurse Manda was informed of the temperature.) (01/05/22 1600)  Pulse: 86 (01/05/22 1600)  Resp: 18 (01/05/22 1600)  BP: (!) 136/53 (Nurse Manda was informed of the blood pressure.) (01/05/22 1600)  SpO2: 95 % (01/05/22 1600) Vital Signs (24h Range):  Temp:  [98.6 °F (37 °C)-101 °F (38.3 °C)] 101 °F (38.3 °C)  Pulse:  [78-86] 86  Resp:  [16-18] 18  SpO2:  [95 %-98 %] 95 %  BP: (124-139)/(53-64) 136/53     Weight: 83.2 kg (183 lb 6.8 oz)  Body mass index is 29.61 kg/m².  No intake or output data in the 24 hours ending 01/05/22 1951   Physical Exam  Vitals reviewed.   Constitutional:       General: He is not in acute distress.  Eyes:      Pupils: Pupils are equal, round, and reactive to light.   Cardiovascular:      Rate and Rhythm: Normal rate and regular rhythm.      Pulses: Normal pulses.   Pulmonary:      Effort: Pulmonary effort is normal. No respiratory distress.      Breath sounds: Normal breath sounds. No wheezing.   Abdominal:      General: Bowel sounds are normal. There is no distension.      Tenderness: There is no abdominal tenderness.   Skin:     General: Skin is warm.   Neurological:       General: No focal deficit present.      Mental Status: He is alert, oriented to person, place, and time and easily aroused. Mental status is at baseline.   Psychiatric:         Mood and Affect: Mood normal.         Behavior: Behavior normal.         Significant Labs: All pertinent labs within the past 24 hours have been reviewed.    Significant Imaging: I have reviewed all pertinent imaging results/findings within the past 24 hours.

## 2022-01-06 NOTE — PROGRESS NOTES
[address] Medicine  Progress Note    Patient Name: Elliott Peters  MRN: [id number]  Patient Class: IP- Inpatient   Admission Date: 12/31/2021  Length of Stay: 5 days  Attending Physician: Jhoan Crawford MD  Primary Care Provider: Izzy Olivier NP        Subjective:     Principal Problem:UGIB (upper gastrointestinal bleed)    HPI:  82 yo M present to [facility] from [place] for acute GI bleed. History obtained from wife and patient. Patient recently had total right knee replacement on 12/28/21 by Dr. Epps and transferred to [facility] for physical rehabilitation. On Wednesday, patient complained of abdominal bloating and constipation. He was given an oral laxative Thursday which made him nauseous and subsequently vomited twice. Vomit was characterized as coffee ground and bloody. He still did not have a bowel movement and was given enema twice around lunch time. Thursday morning he had a large bowel movement; black in color. No bright red blood was noted in stool. This was associated with malaise and pallor. Wife reports he did have have minimal black, tarry, pellet-like, coffee ground colored, stool this afternoon; no episodes of vomiting since Thursday morning. Currently reports no vomiting, lower GI bleed, abdominal pain, or malaise which resolved after he received blood. CBC was ordered with H/H of 6.4/19.3 (was 7.8/23.3 on admission to [facility] facility); FOBT was positive. ASA, Plavix, Eliquis was discontinued. He was transfused with 2 units of blood at [place] and transferred to [facility] for further evaluation by GI specialist.     Of note, wife reports patient had similar GI symptoms when he had left total knee replacement about 4 years ago. He had an EGD and colonoscopy which showed several polyps. Wife reports patient had no colonoscopy since. Patient has past medical hx of HTN, HLD, renal stones, osteoarthritis, CAD with 3 stents placed, ROSI on CPAP. His cardiologist is  Dr. Freire at [facility]. He is followed by Dr. Allen for his nephrolithiasis.         Overview/Hospital Course:  01/01 -Records reviewed.  Patient admitted after having vomiting and passage per rectum of dark liquid felt to possibly be blood.  No red blood was seen.  Patient had just been discharged from here to swing bed at [facility] after right total knee replacement 12/28 by Dr. Epps.  Patient has been on Eliquis 2.5 mg twice daily.  Also been on Plavix 75 mg daily after having cardiac stents placed in May 2021 by Dr. Freire.  Had preop evaluation by Dr. Freire including stress study.  Do not see any iron therapy patient has been receiving.  Mr. Peters similar problem with abdominal distention and vomiting after previous orthopedic surgeon.  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  Has had previous colonoscopy with polyp removed.  KUB done prior day did show some gaseous distension nonspecific    Past Medical History:   Diagnosis Date    Hyperlipidemia      Hypertension      Kidney stone      MI (myocardial infarction)      Sleep apnea      Urinary tract infection        01/02 - patient without new issues.  No evidence of bleeding.  H&H similar to prior day; any lower will consider transfusion.  Therapy team to see, at discharge look at going back to [facility] bed.  Dr. Epps to recheck.  Still with some abdominal distention but less; talk with patient about trying to avoid narcotics as much as possible and to increase activity during this acute abdominal problem.  As prior stated had summer GI complaints after prior orthopedic surgery.  No DVT noted on recent ultrasound; right leg is swollen associated with recent surgery.  Dr. Crawford to assume care am.  Recheck CBC to follow H&H but also platelet count which is been low not clear etiology    1/3 - EGD performed on 01/01/2022.  · Multiple small, superficial ulcers in the cardia; performed cold forceps biopsy  · 3 cm  hiatal hernia  Monitoring hemoglobin and will plan to return to [place] when stable.  Will plan to restart Eliquis 2.5 mg for prophylaxis and hold Plavix while on Eliquis and then restart Plavix after 2 weeks post right knee repair which would be approximately 1/10/22.  May check with Dr. Freire if there is any uncertainty.      1/4 - will recheck cbc today.  Eliquis restarted as DVT ppx.  Holding plavix and will resume on Melecio 10.  May discuss plan with Dr. Freire, patient's cardiologist.      1/5 - fever to 101 overnight and severe nausea with gastric distention.  Initiate antibiotics.          Review of Systems   Constitutional: Negative for appetite change, fatigue and fever.   HENT: Negative for congestion, hearing loss and trouble swallowing.    Respiratory: Negative for chest tightness, shortness of breath and wheezing.    Cardiovascular: Positive for leg swelling. Negative for chest pain and palpitations.   Gastrointestinal: Negative for abdominal pain, constipation and nausea.   Genitourinary: Negative for difficulty urinating and dysuria.   Musculoskeletal: Positive for gait problem. Negative for back pain and neck stiffness.   Skin: Positive for wound. Negative for pallor and rash.   Neurological: Negative for dizziness, speech difficulty and headaches.   Psychiatric/Behavioral: Negative for confusion and suicidal ideas.     Objective:     Vital Signs (Most Recent):  Temp: (!) 101 °F (38.3 °C) (Nurse Manda was informed of the temperature.) (01/05/22 1600)  Pulse: 86 (01/05/22 1600)  Resp: 18 (01/05/22 1600)  BP: (!) 136/53 (Nurse Manda was informed of the blood pressure.) (01/05/22 1600)  SpO2: 95 % (01/05/22 1600) Vital Signs (24h Range):  Temp:  [98.6 °F (37 °C)-101 °F (38.3 °C)] 101 °F (38.3 °C)  Pulse:  [78-86] 86  Resp:  [16-18] 18  SpO2:  [95 %-98 %] 95 %  BP: (124-139)/(53-64) 136/53     Weight: 83.2 kg (183 lb 6.8 oz)  Body mass index is 29.61 kg/m².  No intake or output data in the 24  hours ending 01/05/22 1951   Physical Exam  Vitals reviewed.   Constitutional:       General: He is not in acute distress.  Eyes:      Pupils: Pupils are equal, round, and reactive to light.   Cardiovascular:      Rate and Rhythm: Normal rate and regular rhythm.      Pulses: Normal pulses.   Pulmonary:      Effort: Pulmonary effort is normal. No respiratory distress.      Breath sounds: Normal breath sounds. No wheezing.   Abdominal:      General: Bowel sounds are normal. There is no distension.      Tenderness: There is no abdominal tenderness.   Skin:     General: Skin is warm.   Neurological:      General: No focal deficit present.      Mental Status: He is alert, oriented to person, place, and time and easily aroused. Mental status is at baseline.   Psychiatric:         Mood and Affect: Mood normal.         Behavior: Behavior normal.         Significant Labs: All pertinent labs within the past 24 hours have been reviewed.    Significant Imaging: I have reviewed all pertinent imaging results/findings within the past 24 hours.      Assessment/Plan:      * UGIB (upper gastrointestinal bleed)   hemodynamically stable.    received 2 U blood at [facility] per prior note  Trend H/H q6h.  Protonix   Consulted GI with EGD this a.m..  Holding ASA, Eliquis, and Plavix (Patient was taking due to cardiac history).    Patient was on triple therapy and is therefore at high risk for bleeding event.  If hemoglobin remains stable will restart Eliquis in hold aspirin Plavix.  Can further discuss with Dr. Freire.    Pneumonia  R perihilar PNA on CXR  Fever to 101    Healthcare associated.   Vanc/cefepime/azithromycin.        Thrombocytopenia  Thrombocytopenia with platelets in the 70s and 80s.      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      Leukocytosis  No other signs/symptoms of acute infection. Likely normal stress reaction to acute GI bleed. Will continue to monitor with CBC.   Blood cultures ordered at [place]. Will f/u with final results.    Status post total right knee replacement  On SCDs.   Currently on oxycodone 5 q4h prn and Tylenol 1,000 q6h prn for pain control.  Notify Dr. Epps of admission  1/3/22:  Restarted Eliquis on 01/03/2022.    Hyperlipidemia  On Crestor 20 at home; substituting with Lipitor 80 while in hospital.     ROSI on CPAP  Wife brought home CPAP. Patient reports he has mild ROSI and declines use tonight. States he will consider using it tomorrow night.    Hypertension  On home amlodipine 2.5 mg qd, Coreg 3.125 mg bid    Benign prostatic hyperplasia with nocturia  On terazosin at home; substituting with tamsulosin while in hospital.       VTE Risk Mitigation (From admission, onward)         Ordered     apixaban tablet 2.5 mg  2 times daily         01/03/22 1141     Place PATRIC hose  Until discontinued         12/31/21 2254     Place sequential compression device  Until discontinued         12/31/21 2254     IP VTE HIGH RISK PATIENT  Once         12/31/21 2254                Discharge Planning   BRAULIO:      Code Status: Full Code   Is the patient medically ready for discharge?:     Reason for patient still in hospital (select all that apply): Treatment  Discharge Plan A: Other (Swingbed at [facility])        Anticipate discharge on 1/6/2022 if patient remains stable.             Jhoan Crawford MD  Department of Hospital Medicine   [address]

## 2022-01-06 NOTE — PT/OT/SLP PROGRESS
Occupational Therapy   Treatment    Name: Elliott Peters  MRN: [id number]  Admitting Diagnosis:  UGIB (upper gastrointestinal bleed)       Recommendations:     Discharge Recommendations: nursing facility, skilled,rehabilitation facility  Discharge Equipment Recommendations:  walker, rolling  Barriers to discharge:  None    Assessment:     Elliott Peters is a 81 y.o. male with a medical diagnosis of UGIB (upper gastrointestinal bleed).  He presents with weakness and decline with ADLs. Performance deficits affecting function are weakness,impaired endurance,impaired self care skills,impaired balance,decreased upper extremity function,decreased lower extremity function,impaired functional mobilty,decreased safety awareness.     Rehab Prognosis:  Good; patient would benefit from acute skilled OT services to address these deficits and reach maximum level of function.       Plan:     Patient to be seen 5 x/week to address the above listed problems via self-care/home management,therapeutic activities,therapeutic exercises  · Plan of Care Expires: 01/16/22  · Plan of Care Reviewed with: patient,spouse    Subjective     Pain/Comfort:  · Pain Rating 1: 0/10  · Pain Rating Post-Intervention 1: 0/10    Objective:     Communicated with: Nurse prior to session.  Patient found up in chair with peripheral IV upon OT entry to room.    General Precautions: Standard, fall   Orthopedic Precautions:N/A   Braces: N/A  Respiratory Status: Room air     Occupational Performance:     Bed Mobility:    · Not tested     Functional Mobility/Transfers:  · Patient completed Sit <> Stand Transfer with minimum assistance  with  hand-held assist   · Patient completed Bed <> Chair Transfer using Step Transfer technique with minimum assistance with hand-held assist  · Functional Mobility: Patient transferred to W/C to go for procedure with no difficulty.    Activities of Daily Living:  · Lower Body Dressing: moderate assistance to omero sock      [facility] 6  Click ADL:      Treatment & Education:  Pt performed B UE strengthening exercises to include:   Shoulder flexion   Chest press    Elbow flexion   Elbow extension   Pectoral stretches  All exercises performed 3x10 reps using 3# weight and yellow t-band.    Patient left up in chair with all lines intact, call button in reach, wife techs transporting patient to procedure room present and no compliantsEducation:      GOALS:   Multidisciplinary Problems     Occupational Therapy Goals        Problem: Occupational Therapy Goal    Goal Priority Disciplines Outcome Interventions   Occupational Therapy Goal     OT, PT/OT Ongoing, Progressing    Description: ST.Pt will perform bathing with setup and min(A)  2.Pt will perform UE dressing with setup  3.Pt will perform LE dressing with CGA using AD  4.Pt will transfer bed/chair/bsc with RW and SBA  5.Pt will perform standing task x 3 min with RW and SBA  6.Tolerate 20 min of tx without fatigue.      LTG:   Restore to max I with selfcare and mobility.                      Time Tracking:     OT Date of Treatment: 22  OT Start Time: 0840  OT Stop Time: 0857  OT Total Time (min): 17 min    Billable Minutes:Therapeutic Exercise 17 min    OT/HARRIETT: OT          2022

## 2022-01-07 ENCOUNTER — HOSPITAL ENCOUNTER (INPATIENT)
Facility: HOSPITAL | Age: 82
LOS: 7 days | Discharge: HOME HEALTH | DRG: 559 | End: 2022-01-14
Attending: EMERGENCY MEDICINE | Admitting: EMERGENCY MEDICINE
Payer: MEDICARE

## 2022-01-07 VITALS
OXYGEN SATURATION: 92 % | HEIGHT: 66 IN | HEART RATE: 76 BPM | WEIGHT: 175.5 LBS | BODY MASS INDEX: 28.21 KG/M2 | RESPIRATION RATE: 16 BRPM | TEMPERATURE: 98 F | DIASTOLIC BLOOD PRESSURE: 50 MMHG | SYSTOLIC BLOOD PRESSURE: 112 MMHG

## 2022-01-07 DIAGNOSIS — Z96.651 STATUS POST TOTAL RIGHT KNEE REPLACEMENT: ICD-10-CM

## 2022-01-07 DIAGNOSIS — D62 ACUTE BLOOD LOSS ANEMIA: ICD-10-CM

## 2022-01-07 DIAGNOSIS — D64.9 ANEMIA, UNSPECIFIED TYPE: Primary | ICD-10-CM

## 2022-01-07 DIAGNOSIS — Z95.5 H/O HEART ARTERY STENT: ICD-10-CM

## 2022-01-07 DIAGNOSIS — R53.1 WEAKNESS: ICD-10-CM

## 2022-01-07 DIAGNOSIS — I10 HTN (HYPERTENSION): ICD-10-CM

## 2022-01-07 PROBLEM — K92.2 UGIB (UPPER GASTROINTESTINAL BLEED): Status: RESOLVED | Noted: 2021-12-31 | Resolved: 2022-01-07

## 2022-01-07 PROBLEM — D72.829 LEUKOCYTOSIS: Status: RESOLVED | Noted: 2021-12-31 | Resolved: 2022-01-07

## 2022-01-07 LAB
ALBUMIN SERPL BCP-MCNC: 2.7 G/DL (ref 3.5–5)
ALBUMIN/GLOB SERPL: 0.8 {RATIO}
ALP SERPL-CCNC: 108 U/L (ref 45–115)
ALT SERPL W P-5'-P-CCNC: 85 U/L (ref 16–61)
AMORPH PHOS CRY #/AREA URNS LPF: ABNORMAL /LPF
ANION GAP SERPL CALCULATED.3IONS-SCNC: 13 MMOL/L (ref 7–16)
ANION GAP SERPL CALCULATED.3IONS-SCNC: 15 MMOL/L (ref 7–16)
ANISOCYTOSIS BLD QL SMEAR: ABNORMAL
AST SERPL W P-5'-P-CCNC: 77 U/L (ref 15–37)
BACTERIA #/AREA URNS HPF: ABNORMAL /HPF
BASOPHILS # BLD AUTO: 0.02 K/UL (ref 0–0.2)
BASOPHILS # BLD AUTO: 0.15 K/UL (ref 0–0.2)
BASOPHILS NFR BLD AUTO: 0.2 % (ref 0–1)
BASOPHILS NFR BLD AUTO: 1.7 % (ref 0–1)
BILIRUB SERPL-MCNC: 1.1 MG/DL (ref 0–1.2)
BILIRUB UR QL STRIP: NEGATIVE
BUN SERPL-MCNC: 11 MG/DL (ref 7–18)
BUN SERPL-MCNC: 12 MG/DL (ref 7–18)
BUN/CREAT SERPL: 12 (ref 6–20)
BUN/CREAT SERPL: 14 (ref 6–20)
CALCIUM SERPL-MCNC: 7.8 MG/DL (ref 8.5–10.1)
CALCIUM SERPL-MCNC: 8.4 MG/DL (ref 8.5–10.1)
CHLORIDE SERPL-SCNC: 97 MMOL/L (ref 98–107)
CHLORIDE SERPL-SCNC: 99 MMOL/L (ref 98–107)
CLARITY UR: CLEAR
CO2 SERPL-SCNC: 23 MMOL/L (ref 21–32)
CO2 SERPL-SCNC: 25 MMOL/L (ref 21–32)
COLOR UR: YELLOW
CREAT SERPL-MCNC: 0.85 MG/DL (ref 0.7–1.3)
CREAT SERPL-MCNC: 0.9 MG/DL (ref 0.7–1.3)
CRP SERPL-MCNC: >18 MG/DL (ref 0–0.8)
DIFFERENTIAL METHOD BLD: ABNORMAL
DIFFERENTIAL METHOD BLD: ABNORMAL
EOSINOPHIL # BLD AUTO: 0.15 K/UL (ref 0–0.5)
EOSINOPHIL # BLD AUTO: 0.16 K/UL (ref 0–0.5)
EOSINOPHIL NFR BLD AUTO: 1.7 % (ref 1–4)
EOSINOPHIL NFR BLD AUTO: 1.8 % (ref 1–4)
EOSINOPHIL NFR BLD MANUAL: 1 % (ref 1–4)
EOSINOPHIL NFR BLD MANUAL: 2 % (ref 1–4)
ERYTHROCYTE [DISTWIDTH] IN BLOOD BY AUTOMATED COUNT: 15.7 % (ref 11.5–14.5)
ERYTHROCYTE [DISTWIDTH] IN BLOOD BY AUTOMATED COUNT: 16 % (ref 11.5–14.5)
ERYTHROCYTE [SEDIMENTATION RATE] IN BLOOD BY WESTERGREN METHOD: 75 MM/HR (ref 0–20)
GLOBULIN SER-MCNC: 3.5 G/DL (ref 2–4)
GLUCOSE SERPL-MCNC: 105 MG/DL (ref 74–106)
GLUCOSE SERPL-MCNC: 91 MG/DL (ref 74–106)
GLUCOSE UR STRIP-MCNC: NEGATIVE MG/DL
HCT VFR BLD AUTO: 26.1 % (ref 40–54)
HCT VFR BLD AUTO: 26.3 % (ref 40–54)
HGB BLD-MCNC: 8.3 G/DL (ref 13.5–18)
HGB BLD-MCNC: 8.8 G/DL (ref 13.5–18)
HYALINE CASTS #/AREA URNS LPF: ABNORMAL /LPF
IMM GRANULOCYTES # BLD AUTO: 0.33 K/UL (ref 0–0.04)
IMM GRANULOCYTES NFR BLD: 3.6 % (ref 0–0.4)
KETONES UR STRIP-SCNC: NEGATIVE MG/DL
LEUKOCYTE ESTERASE UR QL STRIP: NEGATIVE
LYMPHOCYTES # BLD AUTO: 1.94 K/UL (ref 1–4.8)
LYMPHOCYTES # BLD AUTO: 2.21 K/UL (ref 1–4.8)
LYMPHOCYTES NFR BLD AUTO: 22.2 % (ref 27–41)
LYMPHOCYTES NFR BLD AUTO: 24.4 % (ref 27–41)
LYMPHOCYTES NFR BLD MANUAL: 28 % (ref 27–41)
LYMPHOCYTES NFR BLD MANUAL: 35 % (ref 27–41)
MCH RBC QN AUTO: 27 PG (ref 27–31)
MCH RBC QN AUTO: 27.8 PG (ref 27–31)
MCHC RBC AUTO-ENTMCNC: 31.6 G/DL (ref 32–36)
MCHC RBC AUTO-ENTMCNC: 33.7 G/DL (ref 32–36)
MCV RBC AUTO: 82.3 FL (ref 80–96)
MCV RBC AUTO: 85.7 FL (ref 80–96)
MONOCYTES # BLD AUTO: 3.73 K/UL (ref 0–0.8)
MONOCYTES # BLD AUTO: 3.78 K/UL (ref 0–0.8)
MONOCYTES NFR BLD AUTO: 41.2 % (ref 2–6)
MONOCYTES NFR BLD AUTO: 43.2 % (ref 2–6)
MONOCYTES NFR BLD MANUAL: 28 % (ref 2–6)
MONOCYTES NFR BLD MANUAL: 34 % (ref 2–6)
MPC BLD CALC-MCNC: 10.2 FL (ref 9.4–12.4)
MPC BLD CALC-MCNC: 10.5 FL (ref 9.4–12.4)
MUCOUS THREADS #/AREA URNS HPF: ABNORMAL /HPF
NEUTROPHILS # BLD AUTO: 2.62 K/UL (ref 1.8–7.7)
NEUTROPHILS # BLD AUTO: 2.71 K/UL (ref 1.8–7.7)
NEUTROPHILS NFR BLD AUTO: 28.9 % (ref 53–65)
NEUTROPHILS NFR BLD AUTO: 31.1 % (ref 53–65)
NEUTS BAND NFR BLD MANUAL: 1 % (ref 1–5)
NEUTS BAND NFR BLD MANUAL: 2 % (ref 1–5)
NEUTS SEG NFR BLD MANUAL: 34 % (ref 50–62)
NEUTS SEG NFR BLD MANUAL: 35 % (ref 50–62)
NITRITE UR QL STRIP: NEGATIVE
NRBC # BLD AUTO: 0 X10E3/UL
NRBC BLD MANUAL-RTO: ABNORMAL %
NRBC, AUTO (.00): 0 %
PH UR STRIP: 6 PH UNITS
PLATELET # BLD AUTO: 278 K/UL (ref 150–400)
PLATELET # BLD AUTO: 327 K/UL (ref 150–400)
PLATELET MORPHOLOGY: ABNORMAL
POLYCHROMASIA BLD QL SMEAR: ABNORMAL
POTASSIUM SERPL-SCNC: 3.7 MMOL/L (ref 3.5–5.1)
POTASSIUM SERPL-SCNC: 3.9 MMOL/L (ref 3.5–5.1)
PROT SERPL-MCNC: 6.2 G/DL (ref 6.4–8.2)
PROT UR QL STRIP: NEGATIVE
RBC # BLD AUTO: 3.07 M/UL (ref 4.6–6.2)
RBC # BLD AUTO: 3.17 M/UL (ref 4.6–6.2)
RBC # UR STRIP: ABNORMAL /UL
RBC #/AREA URNS HPF: ABNORMAL /HPF
SARS-COV-2 RDRP RESP QL NAA+PROBE: NEGATIVE
SODIUM SERPL-SCNC: 131 MMOL/L (ref 136–145)
SODIUM SERPL-SCNC: 133 MMOL/L (ref 136–145)
SP GR UR STRIP: 1.02
SQUAMOUS #/AREA URNS LPF: ABNORMAL /LPF
UROBILINOGEN UR STRIP-ACNC: 0.2 MG/DL
WBC # BLD AUTO: 8.74 K/UL (ref 4.5–11)
WBC # BLD AUTO: 9.06 K/UL (ref 4.5–11)
WBC #/AREA URNS HPF: ABNORMAL /HPF

## 2022-01-07 PROCEDURE — 63600175 PHARM REV CODE 636 W HCPCS: Performed by: HOSPITALIST

## 2022-01-07 PROCEDURE — 99233 SBSQ HOSP IP/OBS HIGH 50: CPT | Mod: ,,, | Performed by: HOSPITALIST

## 2022-01-07 PROCEDURE — 36415 COLL VENOUS BLD VENIPUNCTURE: CPT | Performed by: HOSPITALIST

## 2022-01-07 PROCEDURE — 85651 RBC SED RATE NONAUTOMATED: CPT | Performed by: HOSPITALIST

## 2022-01-07 PROCEDURE — 99305 PR NURSING FACILITY CARE, INIT, MOD SEVERITY: ICD-10-PCS | Mod: AI,,, | Performed by: EMERGENCY MEDICINE

## 2022-01-07 PROCEDURE — 93010 ELECTROCARDIOGRAM REPORT: CPT | Mod: ,,, | Performed by: INTERNAL MEDICINE

## 2022-01-07 PROCEDURE — 25000003 PHARM REV CODE 250: Performed by: NURSE PRACTITIONER

## 2022-01-07 PROCEDURE — 94761 N-INVAS EAR/PLS OXIMETRY MLT: CPT

## 2022-01-07 PROCEDURE — 93005 ELECTROCARDIOGRAM TRACING: CPT

## 2022-01-07 PROCEDURE — 25000003 PHARM REV CODE 250: Performed by: HOSPITALIST

## 2022-01-07 PROCEDURE — 63700000 PHARM REV CODE 250 ALT 637 W/O HCPCS: Performed by: HOSPITALIST

## 2022-01-07 PROCEDURE — 36415 COLL VENOUS BLD VENIPUNCTURE: CPT | Performed by: NURSE PRACTITIONER

## 2022-01-07 PROCEDURE — 86140 C-REACTIVE PROTEIN: CPT | Performed by: HOSPITALIST

## 2022-01-07 PROCEDURE — 80053 COMPREHEN METABOLIC PANEL: CPT | Performed by: HOSPITALIST

## 2022-01-07 PROCEDURE — 99305 1ST NF CARE MODERATE MDM 35: CPT | Mod: AI,,, | Performed by: EMERGENCY MEDICINE

## 2022-01-07 PROCEDURE — 93010 EKG 12-LEAD: ICD-10-PCS | Mod: ,,, | Performed by: INTERNAL MEDICINE

## 2022-01-07 PROCEDURE — 85025 COMPLETE CBC W/AUTO DIFF WBC: CPT | Performed by: NURSE PRACTITIONER

## 2022-01-07 PROCEDURE — 11000004 HC SNF PRIVATE

## 2022-01-07 PROCEDURE — 81001 URINALYSIS AUTO W/SCOPE: CPT | Performed by: NURSE PRACTITIONER

## 2022-01-07 PROCEDURE — 80048 BASIC METABOLIC PNL TOTAL CA: CPT | Mod: XB | Performed by: NURSE PRACTITIONER

## 2022-01-07 PROCEDURE — 84134 ASSAY OF PREALBUMIN: CPT | Performed by: NURSE PRACTITIONER

## 2022-01-07 PROCEDURE — 85025 COMPLETE CBC W/AUTO DIFF WBC: CPT | Performed by: HOSPITALIST

## 2022-01-07 PROCEDURE — 87635 SARS-COV-2 COVID-19 AMP PRB: CPT | Performed by: STUDENT IN AN ORGANIZED HEALTH CARE EDUCATION/TRAINING PROGRAM

## 2022-01-07 PROCEDURE — 99233 PR SUBSEQUENT HOSPITAL CARE,LEVL III: ICD-10-PCS | Mod: ,,, | Performed by: HOSPITALIST

## 2022-01-07 RX ORDER — POLYETHYLENE GLYCOL 3350 17 G/17G
17 POWDER, FOR SOLUTION ORAL DAILY PRN
Status: DISCONTINUED | OUTPATIENT
Start: 2022-01-07 | End: 2022-01-10

## 2022-01-07 RX ORDER — LEVOFLOXACIN 500 MG/1
500 TABLET, FILM COATED ORAL DAILY
Qty: 7 TABLET | Refills: 0 | Status: ON HOLD
Start: 2022-01-07 | End: 2022-01-13 | Stop reason: HOSPADM

## 2022-01-07 RX ORDER — NITROGLYCERIN 0.4 MG/1
0.4 TABLET SUBLINGUAL EVERY 5 MIN PRN
Status: DISCONTINUED | OUTPATIENT
Start: 2022-01-07 | End: 2022-01-14 | Stop reason: HOSPADM

## 2022-01-07 RX ORDER — GABAPENTIN 300 MG/1
300 CAPSULE ORAL NIGHTLY
Status: DISCONTINUED | OUTPATIENT
Start: 2022-01-07 | End: 2022-01-14 | Stop reason: HOSPADM

## 2022-01-07 RX ORDER — LEVOFLOXACIN 500 MG/1
500 TABLET, FILM COATED ORAL DAILY
Status: DISCONTINUED | OUTPATIENT
Start: 2022-01-08 | End: 2022-01-10

## 2022-01-07 RX ORDER — PANTOPRAZOLE SODIUM 40 MG/1
40 TABLET, DELAYED RELEASE ORAL DAILY
Status: DISCONTINUED | OUTPATIENT
Start: 2022-01-08 | End: 2022-01-14 | Stop reason: HOSPADM

## 2022-01-07 RX ORDER — HYDROCODONE BITARTRATE AND ACETAMINOPHEN 10; 325 MG/1; MG/1
1 TABLET ORAL EVERY 6 HOURS PRN
Status: DISCONTINUED | OUTPATIENT
Start: 2022-01-07 | End: 2022-01-14 | Stop reason: HOSPADM

## 2022-01-07 RX ORDER — POLYETHYLENE GLYCOL 3350 17 G/17G
17 POWDER, FOR SOLUTION ORAL DAILY
Refills: 0 | Status: ON HOLD
Start: 2022-01-08 | End: 2022-01-13 | Stop reason: HOSPADM

## 2022-01-07 RX ORDER — AMOXICILLIN 250 MG
1 CAPSULE ORAL 2 TIMES DAILY
Status: DISCONTINUED | OUTPATIENT
Start: 2022-01-07 | End: 2022-01-14 | Stop reason: HOSPADM

## 2022-01-07 RX ORDER — IMIPRAMINE HYDROCHLORIDE 25 MG/1
25 TABLET, FILM COATED ORAL NIGHTLY
Status: DISCONTINUED | OUTPATIENT
Start: 2022-01-07 | End: 2022-01-11

## 2022-01-07 RX ORDER — CETIRIZINE HYDROCHLORIDE 10 MG/1
10 TABLET ORAL DAILY
Status: DISCONTINUED | OUTPATIENT
Start: 2022-01-08 | End: 2022-01-14 | Stop reason: HOSPADM

## 2022-01-07 RX ORDER — ATORVASTATIN CALCIUM 40 MG/1
80 TABLET, FILM COATED ORAL NIGHTLY
Status: DISCONTINUED | OUTPATIENT
Start: 2022-01-07 | End: 2022-01-14 | Stop reason: HOSPADM

## 2022-01-07 RX ORDER — LANOLIN ALCOHOL/MO/W.PET/CERES
1 CREAM (GRAM) TOPICAL DAILY
Status: DISCONTINUED | OUTPATIENT
Start: 2022-01-08 | End: 2022-01-14 | Stop reason: HOSPADM

## 2022-01-07 RX ORDER — TAMSULOSIN HYDROCHLORIDE 0.4 MG/1
0.4 CAPSULE ORAL DAILY
Status: DISCONTINUED | OUTPATIENT
Start: 2022-01-08 | End: 2022-01-14 | Stop reason: HOSPADM

## 2022-01-07 RX ADMIN — HYDROCODONE BITARTRATE AND ACETAMINOPHEN 1 TABLET: 10; 325 TABLET ORAL at 09:01

## 2022-01-07 RX ADMIN — GABAPENTIN 300 MG: 300 CAPSULE ORAL at 09:01

## 2022-01-07 RX ADMIN — SENNOSIDES AND DOCUSATE SODIUM 1 TABLET: 50; 8.6 TABLET ORAL at 09:01

## 2022-01-07 RX ADMIN — VANCOMYCIN HYDROCHLORIDE 1500 MG: 5 INJECTION, POWDER, LYOPHILIZED, FOR SOLUTION INTRAVENOUS at 12:01

## 2022-01-07 RX ADMIN — CEFEPIME HYDROCHLORIDE 1 G: 1 INJECTION, POWDER, FOR SOLUTION INTRAMUSCULAR; INTRAVENOUS at 05:01

## 2022-01-07 RX ADMIN — TAMSULOSIN HYDROCHLORIDE 0.4 MG: 0.4 CAPSULE ORAL at 08:01

## 2022-01-07 RX ADMIN — APIXABAN 2.5 MG: 2.5 TABLET, FILM COATED ORAL at 09:01

## 2022-01-07 RX ADMIN — APIXABAN 2.5 MG: 2.5 TABLET, FILM COATED ORAL at 08:01

## 2022-01-07 RX ADMIN — PANTOPRAZOLE SODIUM 40 MG: 40 TABLET, DELAYED RELEASE ORAL at 05:01

## 2022-01-07 RX ADMIN — ATORVASTATIN CALCIUM 80 MG: 40 TABLET, FILM COATED ORAL at 09:01

## 2022-01-07 RX ADMIN — CEFEPIME HYDROCHLORIDE 1 G: 1 INJECTION, POWDER, FOR SOLUTION INTRAMUSCULAR; INTRAVENOUS at 10:01

## 2022-01-07 RX ADMIN — ACETAMINOPHEN 1000 MG: 500 TABLET ORAL at 10:01

## 2022-01-07 RX ADMIN — AZITHROMYCIN MONOHYDRATE 250 MG: 250 TABLET ORAL at 08:01

## 2022-01-07 NOTE — NURSING
Pt admitted to [facility] room 103. Arrived via personal car with family. NADN. VSS. Resp even et non labored. Skin warm et dry. Dressing to right knee noted C/D/I. Oriented to room. Instructed to call with needs.

## 2022-01-07 NOTE — PLAN OF CARE
Spoke with pt's wife about [facility], states she would like more information about the program. Referral sent to Graciela Sandy RN.

## 2022-01-07 NOTE — DISCHARGE SUMMARY
[address] Medicine  Discharge Summary      Patient Name: Elliott Peters  MRN: [id number]  Patient Class: IP- Inpatient  Admission Date: 12/31/2021  Hospital Length of Stay: 7 days  Discharge Date and Time:  01/07/2022 9:59 AM  Attending Physician: Jhoan Crawford MD   Discharging Provider: NICOLÁS Ott  Primary Care Provider: Izzy Olivier NP      HPI:   80 yo M present to [facility] from [place] for acute GI bleed. History obtained from wife and patient. Patient recently had total right knee replacement on 12/28/21 by Dr. Epps and transferred to [facility] for physical rehabilitation. On Wednesday, patient complained of abdominal bloating and constipation. He was given an oral laxative Thursday which made him nauseous and subsequently vomited twice. Vomit was characterized as coffee ground and bloody. He still did not have a bowel movement and was given enema twice around lunch time. Thursday morning he had a large bowel movement; black in color. No bright red blood was noted in stool. This was associated with malaise and pallor. Wife reports he did have have minimal black, tarry, pellet-like, coffee ground colored, stool this afternoon; no episodes of vomiting since Thursday morning. Currently reports no vomiting, lower GI bleed, abdominal pain, or malaise which resolved after he received blood. CBC was ordered with H/H of 6.4/19.3 (was 7.8/23.3 on admission to [facility] facility); FOBT was positive. ASA, Plavix, Eliquis was discontinued. He was transfused with 2 units of blood at [place] and transferred to [facility] for further evaluation by GI specialist.     Of note, wife reports patient had similar GI symptoms when he had left total knee replacement about 4 years ago. He had an EGD and colonoscopy which showed several polyps. Wife reports patient had no colonoscopy since. Patient has past medical hx of HTN, HLD, renal stones, osteoarthritis, CAD with 3 stents placed,  ROSI on CPAP. His cardiologist is Dr. Freire at [facility]. He is followed by Dr. Allen for his nephrolithiasis.         * No surgery found *      Hospital Course:   01/01 -Records reviewed.  Patient admitted after having vomiting and passage per rectum of dark liquid felt to possibly be blood.  No red blood was seen.  Patient had just been discharged from here to swing bed at [facility] after right total knee replacement 12/28 by Dr. Epps.  Patient has been on Eliquis 2.5 mg twice daily.  Also been on Plavix 75 mg daily after having cardiac stents placed in May 2021 by Dr. Freire.  Had preop evaluation by Dr. Freire including stress study.  Do not see any iron therapy patient has been receiving.  Mr. Peters similar problem with abdominal distention and vomiting after previous orthopedic surgeon.  Dr. Epps states he was diagnosed with [place] syndrome then.  Has had previous colonoscopy with polyp removed.  KUB done prior day did show some gaseous distension nonspecific    Past Medical History:   Diagnosis Date    Hyperlipidemia      Hypertension      Kidney stone      MI (myocardial infarction)      Sleep apnea      Urinary tract infection        01/02 - patient without new issues.  No evidence of bleeding.  H&H similar to prior day; any lower will consider transfusion.  Therapy team to see, at discharge look at going back to [facility] bed.  Dr. Epps to recheck.  Still with some abdominal distention but less; talk with patient about trying to avoid narcotics as much as possible and to increase activity during this acute abdominal problem.  As prior stated had summer GI complaints after prior orthopedic surgery.  No DVT noted on recent ultrasound; right leg is swollen associated with recent surgery.  Dr. Crawford to assume care am.  Recheck CBC to follow H&H but also platelet count which is been low not clear etiology    1/3 - EGD performed on 01/01/2022.  · Multiple small, superficial ulcers in  "the cardia; performed cold forceps biopsy  · 3 cm hiatal hernia  Monitoring hemoglobin and will plan to return to [place] when stable.  Will plan to restart Eliquis 2.5 mg for prophylaxis and hold Plavix while on Eliquis and then restart Plavix after 2 weeks post right knee repair which would be approximately 1/10/22.  May check with Dr. Freire if there is any uncertainty.      1/4 - will recheck cbc today.  Eliquis restarted as DVT ppx.  Holding plavix and will resume on Melecio 10.  May discuss plan with Dr. Freire, patient's cardiologist.      1/5 - fever to 101 overnight and severe nausea with gastric distention.  Initiate antibiotics.      1/6 - fever again last night.  CT chest unrevealing.  Possibly post-op pneumonia contributing to fever.  No fever today.  Will monitor tomorrow.  If no fever and inflammatory markers are fine, will discharge.      1/7--Pt afebrile, however, inflammatory markers are elevated.  O2 sats are in the high 90s on room air; Pt has received IV and Oral ABT during this hospitalization but will D/C on oral Levaquin.  Pt to restart Plavix and ASA on 1/10/2022, Eliquis can be stopped at that time, will need to monitor CBC and monitor for bleeding as pt has had recent UGIB.  Pt to follow up with GI-Dr. Aguirre, Dr. Epps, and cardiology in addition to his pcp.  Has met maximum benefit from this hospitalization, however, will benefit from continued therapy while inpatient at [place]; is stale for discharge.       Goals of Care Treatment Preferences:  Code Status: Full Code      Consults:   Consults (From admission, onward)        Status Ordering Provider     Pharmacy to dose Vancomycin consult  Once        Provider:  (Not yet assigned)   "And" Linked Group Details    Acknowledged MACIEL GARZA     Inpatient consult to Social Work  Once        Provider:  (Not yet assigned)    Completed BLADIMIR MIGUEL     Inpatient consult to Orthopedic Surgery  Once        Provider:  Chevy Epps MD "    Acknowledged BLADIMIR MIGUEL     Inpatient consult to Gastroenterology  Once        Provider:  (Not yet assigned)    Completed BLADIMIR MIGUEL          Pneumonia  R perihilar PNA on CXR  Fever to 101    Healthcare associated.   Vanc/cefepime/azithromycin.      01/07--Dc on oral Levaquin x's 7 days    Thrombocytopenia  Thrombocytopenia with platelets in the 70s and 80s.    1/7--Platelet counts improved- 278 on todays labs      Abdominal distension (gaseous)  Prior history of [place] syndrome after previous surgery  Avoid narcotics as able  Increase activity      Acute blood loss anemia  EGD done this admission showing hiatal hernia and superficial nonbleeding gastric ulcers    Restart Eliquis 2.5 mg on 01/02/2022.    1/7--Switch back to Plavix and ASA on 1/10/2022      Status post total right knee replacement  On SCDs.   Currently on oxycodone 5 q4h prn and Tylenol 1,000 q6h prn for pain control.  Notify Dr. Epps of admission  1/3/22:  Restarted Eliquis on 01/03/2022.    1/7--remove stables on 1/11, change dressing q 7 days, see wound care orders    Hyperlipidemia  On Crestor 20 at home; substituting with Lipitor 80 while in hospital.     01/07--resume home meds at discharge    ROSI on CPAP  Wife brought home CPAP. Patient reports he has mild ROSI and declines use tonight. States he will consider using it tomorrow night.    Hypertension  On home amlodipine 2.5 mg qd, Coreg 3.125 mg bid    1/7--discontinued during this hospitalization, may be added back if needed    Benign prostatic hyperplasia with nocturia  On terazosin at home; substituting with tamsulosin while in hospital.     01/07--resume home meds at discharge      Final Active Diagnoses:    Diagnosis Date Noted POA    Pneumonia [J18.9] 01/05/2022 Yes    Thrombocytopenia [D69.6] 01/02/2022 Yes    Abdominal distension (gaseous) [R14.0] 01/01/2022 Yes    Acute blood loss anemia [D62]  Yes    Status post total right knee replacement [Z96.651] 12/31/2021 Not  Applicable    Hyperlipidemia [E78.5] 12/28/2021 Yes    ROSI on CPAP [G47.33, Z99.89] 12/20/2021 Not Applicable    Hypertension [I10] 10/18/2021 Yes    Benign prostatic hyperplasia with nocturia [N40.1, R35.1] 04/17/2021 Yes      Problems Resolved During this Admission:    Diagnosis Date Noted Date Resolved POA    PRINCIPAL PROBLEM:  UGIB (upper gastrointestinal bleed) [K92.2] 12/31/2021 01/07/2022 Yes    Leukocytosis [D72.829] 12/31/2021 01/07/2022 Yes       Discharged Condition: stable    Disposition: Long Term Acute Care    Follow Up:   Follow-up Information     Izzy Olivier NP.    Specialty: Family Medicine  Why: 1 week post discharge from [facility]  Contact information:  [address]  [facility] [address]  [phone]             Chevy Epps MD.    Specialty: Orthopedic Surgery  Why: as Scheduled on 1/19@10:10  Contact information:  [address] [facility] [address]  Chevy Epps Md, Orthopedic & Sports Medicine, [facility] [address]  [phone]             Juan Carlos Aguirre MD In 4 weeks.    Specialties: Gastroenterology, Internal Medicine  Why: Hospital follow-up for UGIB  Contact information:  [address] [facility] [address]  [phone]             Jeremy Freire MD.    Specialty: Internal Medicine  Why: As previously scheduled  Contact information:  [address] [facility] [address]  [phone]                       Patient Instructions:      Diet Cardiac     Notify your health care provider if you experience any of the following:  temperature >100.4     Notify your health care provider if you experience any of the following:  persistent nausea and vomiting or diarrhea     Notify your health care provider if you experience any of the following:  severe uncontrolled pain     Notify your health care provider if you experience any of the following:  redness, tenderness, or signs of infection (pain, swelling, redness, odor or green/yellow discharge around incision  site)     Notify your health care provider if you experience any of the following:  difficulty breathing or increased cough     Notify your health care provider if you experience any of the following:  severe persistent headache     Notify your health care provider if you experience any of the following:  worsening rash     Notify your health care provider if you experience any of the following:  persistent dizziness, light-headedness, or visual disturbances     Notify your health care provider if you experience any of the following:  increased confusion or weakness     Change dressing (specify)   Order Comments: Dressing change: Aquacel Ag dressing q 7 days, notify of any wound drainage.  DC staples on 01/11/22 and apply steri strips.  Elevate extremity at the ankle, no pillow under the knee.  Wear PATRIC hose, may remove BID x's 1 hour     Activity as tolerated       Significant Diagnostic Studies: Labs:   BMP:   Recent Labs   Lab 01/05/22  1046 01/07/22  0316   * 91   * 133*   K 4.3 3.7    99   CO2 22 23   BUN 12 11   CREATININE 0.88 0.90   CALCIUM 8.3* 7.8*    and CBC   Recent Labs   Lab 01/05/22  1046 01/05/22  1046 01/07/22  0316   WBC 7.06  --  9.06   HGB 9.2*  --  8.3*   HCT 27.6*   < > 26.3*     --  278    < > = values in this interval not displayed.       Pending Diagnostic Studies:     None         Medications:  Reconciled Home Medications:      Medication List      START taking these medications    levoFLOXacin 500 MG tablet  Commonly known as: LEVAQUIN  Take 1 tablet (500 mg total) by mouth once daily. for 7 days     polyethylene glycol 17 gram Pwpk  Commonly known as: GLYCOLAX  Take 17 g by mouth once daily.  Start taking on: January 8, 2022        CONTINUE taking these medications    apixaban 2.5 mg Tab  Commonly known as: ELIQUIS  Take 1 tablet (2.5 mg total) by mouth 2 (two) times daily. for 12 days     cetirizine 10 MG tablet  Commonly known as: ZYRTEC  Take 1 tablet (10 mg  total) by mouth once daily.     cyclobenzaprine 10 MG tablet  Commonly known as: FLEXERIL  TAKE 1 TABLET BY MOUTH 3 TIMES DAILY AS NEEDED FOR MUSCLE SPASMS ..MAY CAUSE DROWSINESS ..NO ALCOHOL WHILE TAKING     gabapentin 100 MG capsule  Commonly known as: NEURONTIN  100 mg once daily. Prescribe 3x per day takes once daily     HYDROcodone-acetaminophen  mg per tablet  Commonly known as: NORCO  Take 1 tablet by mouth every 4 (four) hours as needed for Pain.     imipramine 25 MG tablet  Commonly known as: TOFRANIL     nitroGLYCERIN 0.4 MG SL tablet  Commonly known as: NITROSTAT     pantoprazole 40 MG tablet  Commonly known as: PROTONIX  Take 40 mg by mouth once daily.     permethrin 5 % cream  Commonly known as: ELIMITE     rosuvastatin 20 MG tablet  Commonly known as: CRESTOR  Take 20 mg by mouth once daily.     terazosin 5 MG capsule  Commonly known as: HYTRIN  Take 5 mg by mouth once daily.     triamcinolone acetonide 0.1% 0.1 % ointment  Commonly known as: KENALOG  Apply topically 2 (two) times daily.        STOP taking these medications    ALPRAZolam 0.5 MG tablet  Commonly known as: XANAX     amLODIPine 5 MG tablet  Commonly known as: NORVASC     amoxicillin-clavulanate 500-125mg 500-125 mg Tab  Commonly known as: AUGMENTIN     aspirin 81 MG EC tablet  Commonly known as: ECOTRIN     carvediloL 3.125 MG tablet  Commonly known as: COREG     clindamycin 1 % lotion  Commonly known as: CLEOCIN T     clopidogreL 75 mg tablet  Commonly known as: PLAVIX     doxycycline 100 MG Cap  Commonly known as: VIBRAMYCIN     doxycycline 100 MG tablet  Commonly known as: VIBRA-TABS     meloxicam 7.5 MG tablet  Commonly known as: MOBIC     predniSONE 10 MG tablet  Commonly known as: DELTASONE            Indwelling Lines/Drains at time of discharge:   Lines/Drains/Airways     None                 Time spent on the discharge of patient: >30 minutes         NICOLÁS Ott  Department of Hospital Medicine  [facility]  [address] [facility]

## 2022-01-07 NOTE — ASSESSMENT & PLAN NOTE
Prior history of [place] syndrome after previous surgery  Avoid narcotics as able  Increase activity

## 2022-01-07 NOTE — DISCHARGE INSTRUCTIONS
*Keep dressing dry and intact,   * Dressing change post op day # 10  *Continue incentive spirometry at least every 2 hours while awake.  *Continue white stockings remove 2 times a day for 1 hour and replace. One dressing is changed, apply other stocking to surgery leg.   *Continue cool-jet to knee. Do not apply directly to skin.   *Take laxative of choice to have a bowel movement at least by tomorrow and then every other day.  *Increase fluids by mouth.  *Staples will be removed by home health/swingbed staff 2 weeks from surgery prior to follow up appoinment.  *Elevate surgery leg on pillow at ankle. No pillow under knees.  *Notify home health staff of any concerns.

## 2022-01-07 NOTE — PLAN OF CARE
Problem: Adult Inpatient Plan of Care  Goal: Plan of Care Review  1/7/2022 1631 by Ariadne Crane RN  Outcome: Ongoing, Progressing  1/7/2022 1624 by Ariadne Crane RN  Outcome: Ongoing, Progressing

## 2022-01-07 NOTE — PLAN OF CARE
Pt d/c ready. Spoke with Ashley at [facility] to verify pt can return today. Ashley states she will look over pt's chart and call back. BOB Alvarez informed. SS following.

## 2022-01-07 NOTE — ASSESSMENT & PLAN NOTE
EGD done this admission showing hiatal hernia and superficial nonbleeding gastric ulcers    Restart Eliquis 2.5 mg on 01/02/2022.    1/7--Switch back to Plavix and ASA on 1/10/2022

## 2022-01-07 NOTE — ASSESSMENT & PLAN NOTE
On SCDs.   Currently on oxycodone 5 q4h prn and Tylenol 1,000 q6h prn for pain control.  Notify Dr. Epps of admission  1/3/22:  Restarted Eliquis on 01/03/2022.    1/7--remove stables on 1/11, change dressing q 7 days, see wound care orders

## 2022-01-07 NOTE — ASSESSMENT & PLAN NOTE
On home amlodipine 2.5 mg qd, Coreg 3.125 mg bid    1/7--discontinued during this hospitalization, may be added back if needed

## 2022-01-07 NOTE — ASSESSMENT & PLAN NOTE
Thrombocytopenia with platelets in the 70s and 80s.    1/7--Platelet counts improved- 278 on todays labs

## 2022-01-07 NOTE — ASSESSMENT & PLAN NOTE
On terazosin at home; substituting with tamsulosin while in hospital.     01/07--resume home meds at discharge

## 2022-01-07 NOTE — ASSESSMENT & PLAN NOTE
On Crestor 20 at home; substituting with Lipitor 80 while in hospital.     01/07--resume home meds at discharge

## 2022-01-07 NOTE — ASSESSMENT & PLAN NOTE
R perihilar PNA on CXR  Fever to 101    Healthcare associated.   Vanc/cefepime/azithromycin.      01/07--Dc on oral Levaquin x's 7 days

## 2022-01-08 LAB — PREALB SERPL NEPH-MCNC: 8 MG/DL (ref 20–40)

## 2022-01-08 PROCEDURE — 25000003 PHARM REV CODE 250: Performed by: NURSE PRACTITIONER

## 2022-01-08 PROCEDURE — 94761 N-INVAS EAR/PLS OXIMETRY MLT: CPT

## 2022-01-08 PROCEDURE — 99305 1ST NF CARE MODERATE MDM 35: CPT | Mod: AI,,, | Performed by: EMERGENCY MEDICINE

## 2022-01-08 PROCEDURE — 11000004 HC SNF PRIVATE

## 2022-01-08 PROCEDURE — 99305 PR NURSING FACILITY CARE, INIT, MOD SEVERITY: ICD-10-PCS | Mod: AI,,, | Performed by: EMERGENCY MEDICINE

## 2022-01-08 RX ADMIN — GABAPENTIN 300 MG: 300 CAPSULE ORAL at 09:01

## 2022-01-08 RX ADMIN — HYDROCODONE BITARTRATE AND ACETAMINOPHEN 1 TABLET: 10; 325 TABLET ORAL at 09:01

## 2022-01-08 RX ADMIN — APIXABAN 2.5 MG: 2.5 TABLET, FILM COATED ORAL at 09:01

## 2022-01-08 RX ADMIN — ATORVASTATIN CALCIUM 80 MG: 40 TABLET, FILM COATED ORAL at 09:01

## 2022-01-08 RX ADMIN — SENNOSIDES AND DOCUSATE SODIUM 1 TABLET: 50; 8.6 TABLET ORAL at 09:01

## 2022-01-08 RX ADMIN — POLYETHYLENE GLYCOL 3350 17 G: 17 POWDER, FOR SOLUTION ORAL at 08:01

## 2022-01-08 RX ADMIN — FERROUS SULFATE TAB 325 MG (65 MG ELEMENTAL FE) 1 EACH: 325 (65 FE) TAB at 09:01

## 2022-01-08 RX ADMIN — TAMSULOSIN HYDROCHLORIDE 0.4 MG: 0.4 CAPSULE ORAL at 09:01

## 2022-01-08 RX ADMIN — LEVOFLOXACIN 500 MG: 500 TABLET, FILM COATED ORAL at 09:01

## 2022-01-08 RX ADMIN — CETIRIZINE HYDROCHLORIDE 10 MG: 10 TABLET, FILM COATED ORAL at 09:01

## 2022-01-08 RX ADMIN — PANTOPRAZOLE SODIUM 40 MG: 40 TABLET, DELAYED RELEASE ORAL at 09:01

## 2022-01-08 NOTE — PROGRESS NOTES
[facility] Medicine  Progress Note    Patient Name: Elliott Peters  MRN: [id number]  Patient Class: - Swing   Admission Date: 1/7/2022  Length of Stay: 0 days  Attending Physician: Geovany Mejia MD  Primary Care Provider: Izzy Olivier NP        Subjective:     Principal Problem:Weakness        HPI:  Mr. Elliott Peters is an 80 yo WM with past medical history of HTN, HLD, renal stones, OA, CAD with 3 stents and ROSI with CPAP transferred from [facility] to [facility] bed for PT/OT rehabilitation for weakness s/p right TKR on 12/28 by Dr. Epps. Amanda on right knee to be removed on 01/11. Had work up at [place] by Dr. Aguirre for GI bleed. EGD revealed hiatal hernia and superficial non-bleeding gastric ulcer. Eliquis 2.5 mg was restarted on 01/02 until 01/10, then will be discontinue Eliquis and start back on Plavix and ASA at that time. H&H at discharge today was up to 8.3/26.3 and plt 278. He completed Maxipime and Vacomycin for pneumonia and discharged on Levaquin 500 mg daily for 7 more days.     He is followed by Dr. Ferire for CAD and Dr. Allen for nephrolithiasis.  Follow up with Dr. Epps on 01/19 and Dr. Aguirre in 4 weeks.      Overview/Hospital Course:  No notes on file    Interval History: see HPI    Review of Systems   Constitutional: Negative.  Negative for activity change, appetite change, fatigue and fever.   HENT: Negative.  Negative for congestion, hearing loss, sore throat and trouble swallowing.    Eyes: Negative.  Negative for photophobia, pain and visual disturbance.   Respiratory: Negative.  Negative for chest tightness, shortness of breath and wheezing.    Cardiovascular: Negative for chest pain, palpitations and leg swelling.   Gastrointestinal: Negative.  Negative for abdominal pain, constipation and nausea.   Genitourinary: Negative.  Negative for difficulty urinating, dysuria and frequency.   Musculoskeletal: Positive for gait problem. Negative for back  pain and neck stiffness.        Unsteady gait related to recent right TKR   Skin: Positive for wound. Negative for pallor and rash.        Right knee incision   Neurological: Positive for weakness. Negative for dizziness, speech difficulty and headaches.   Hematological: Negative.    Psychiatric/Behavioral: Negative.  Negative for confusion and suicidal ideas.     Objective:     Vital Signs (Most Recent):  Temp: 97.5 °F (36.4 °C) (01/07/22 1605)  Pulse: 87 (01/07/22 1605)  Resp: 18 (01/07/22 1605)  BP: (!) 140/68 (01/07/22 1605)  SpO2: 97 % (01/07/22 1605) Vital Signs (24h Range):  Temp:  [97.5 °F (36.4 °C)-98.4 °F (36.9 °C)] 97.5 °F (36.4 °C)  Pulse:  [74-87] 87  Resp:  [16-18] 18  SpO2:  [92 %-98 %] 97 %  BP: (112-140)/(50-68) 140/68     Weight: 80.3 kg (177 lb)  Body mass index is 28.57 kg/m².    Intake/Output Summary (Last 24 hours) at 1/7/2022 1850  Last data filed at 1/7/2022 1800  Gross per 24 hour   Intake 240 ml   Output 350 ml   Net -110 ml      Physical Exam  Vitals reviewed.   Constitutional:       General: He is awake. He is not in acute distress.     Appearance: Normal appearance. He is well-developed and well-groomed. He is not toxic-appearing or diaphoretic.   HENT:      Head: Normocephalic and atraumatic.      Right Ear: Hearing and external ear normal.      Left Ear: Hearing and external ear normal.      Nose: Nose normal.      Mouth/Throat:      Mouth: Mucous membranes are moist.      Pharynx: Oropharynx is clear.   Eyes:      General: Lids are normal. No scleral icterus.        Right eye: No discharge.         Left eye: No discharge.      Extraocular Movements: Extraocular movements intact.      Conjunctiva/sclera: Conjunctivae normal.      Comments: Right pupil irregular due to past history of retinal detachment.   Cardiovascular:      Rate and Rhythm: Normal rate and regular rhythm.      Pulses: Normal pulses.           Radial pulses are 2+ on the right side and 2+ on the left side.         Posterior tibial pulses are 2+ on the right side and 2+ on the left side.      Heart sounds: Normal heart sounds. No murmur heard.  No friction rub. No gallop.    Pulmonary:      Effort: Pulmonary effort is normal. No respiratory distress.      Breath sounds: Normal breath sounds. No stridor. No wheezing, rhonchi or rales.   Chest:      Chest wall: No tenderness.   Abdominal:      General: Bowel sounds are normal. There is no distension.      Palpations: Abdomen is soft.      Tenderness: There is no abdominal tenderness. There is no right CVA tenderness, left CVA tenderness, guarding or rebound.   Musculoskeletal:         General: No swelling.      Right lower leg: No edema.      Left lower leg: No edema.      Comments: Right knee surgical incision with staples intact without any redness or drainage noted.   Cryo-Cuff apparatus seen placed over the knee.    Lymphadenopathy:      Cervical: No cervical adenopathy.   Skin:     General: Skin is warm and dry.      Capillary Refill: Capillary refill takes 2 to 3 seconds.      Findings: No bruising or ecchymosis.   Neurological:      General: No focal deficit present.      Mental Status: He is alert, oriented to person, place, and time and easily aroused. Mental status is at baseline.      GCS: GCS eye subscore is 4. GCS verbal subscore is 5. GCS motor subscore is 6.      Cranial Nerves: Cranial nerves are intact.      Sensory: Sensation is intact.      Motor: Motor function is intact.   Psychiatric:         Attention and Perception: Attention normal.         Mood and Affect: Mood normal.         Speech: Speech normal.         Behavior: Behavior normal. Behavior is cooperative.         Thought Content: Thought content normal.         Significant Labs:   All pertinent labs within the past 24 hours have been reviewed.  CBC:   Recent Labs   Lab 01/07/22  0316 01/07/22  1718   WBC 9.06 8.74   HGB 8.3* 8.8*   HCT 26.3* 26.1*    327     CMP:   Recent Labs   Lab  01/07/22  0316 01/07/22  1717   * 131*   K 3.7 3.9   CL 99 97*   CO2 23 25   GLU 91 105   BUN 11 12   CREATININE 0.90 0.85   CALCIUM 7.8* 8.4*   PROT  --  6.2*   ALBUMIN  --  2.7*   BILITOT  --  1.1   ALKPHOS  --  108   AST  --  77*   ALT  --  85*   ANIONGAP 15 13   EGFRNONAA 86 91     Urine Studies:   Recent Labs   Lab 01/07/22  1646   COLORU Yellow   APPEARANCEUA Clear   PHUR 6.0   SPECGRAV 1.020   PROTEINUA Negative   GLUCUA Negative   KETONESU Negative   BILIRUBINUA Negative   OCCULTUA Trace-Intact*   NITRITE Negative   UROBILINOGEN 0.2   LEUKOCYTESUR Negative   RBCUA 3-5*   WBCUA 5-10*   BACTERIA Few*   HYALINECASTS 0-2*       Significant Imaging: I have reviewed all pertinent imaging results/findings within the past 24 hours.      Assessment/Plan:      * Weakness  -PT/OT to evaluate and treat      Status post total right knee replacement  -PT/OT to evaluate and treat  -Aquacel dressing change every 7 days  -Discontinue staples on 01/11/22  -Follow up with Dr. Epps on 01/19  -Continue eliquis 2.5 mg one tab by mouth BID for dvt ppx until 01/10 then discontinue. Will restart Plavix and ASA at that time for cardiac stent.  -Norco 10/325 mg take one tab by mouth every 6 hours as needed for pain      Pneumonia  -Levaquin 500 mg one tab by mouth daily x 7 days      Anemia  EGD on 01/01/22 by Dr. Aguirre revealed hiatal hernia and non-bleeding gastric ulcer. H&H8.3/26.3 with plt 278  -Continue pantoprazole 40 mg one tab by mouth daily  -Ferrous sulfate one tab by mouth daily       VTE Risk Mitigation (From admission, onward)         Ordered     apixaban tablet 2.5 mg  2 times daily         01/07/22 1739     Place PATRIC hose  Until discontinued         01/07/22 1634                Discharge Planning   BRAULIO:      Code Status: Full Code   Is the patient medically ready for discharge?:     Reason for patient still in hospital (select all that apply): Treatment and PT / OT recommendations               Discussed  patient's case and medication reconciliation with Dr Cameron Mejia. He agreed to the above POC.      Deborah Patel, NICOLÁS  Department of [facility] Medicine   [place] - Medical Surgical Unit

## 2022-01-08 NOTE — PROGRESS NOTES
[facility] Surgical [facility] Medicine  Progress Note    Patient Name: Elliott Peters  MRN: [id number]  Patient Class: - Swing   Admission Date: 1/7/2022  Length of Stay: 0 days  Attending Physician: Geovany Mejia MD  Primary Care Provider: Izzy Olivier NP        Subjective:     Principal Problem:Weakness        HPI:  Mr. Elliott Peters is an 82 yo WM with past medical history of HTN, HLD, renal stones, OA, CAD with 3 stents and ROSI with CPAP transferred from [facility] to [facility] bed for PT/OT rehabilitation for weakness s/p right TKR on 12/28 by Dr. Epps. Amanda on right knee to be removed on 01/11. Had work up at [place] by Dr. Aguirre for GI bleed. EGD revealed hiatal hernia and superficial non-bleeding gastric ulcer. Eliquis 2.5 mg was restarted on 01/02 until 01/10, then will be discontinue Eliquis and start back on Plavix and ASA at that time. H&H at discharge today was up to 8.3/26.3 and plt 278. He completed Maxipime and Vacomycin for pneumonia and discharged on Levaquin 500 mg daily for 7 more days.     He is followed by Dr. Freire for CAD and Dr. Allen for nephrolithiasis.  Follow up with Dr. Epps on 01/19 and Dr. Aguirre in 4 weeks.      Overview/Hospital Course:  No notes on file    Interval History: see HPI    Review of Systems   Constitutional: Negative.  Negative for activity change, appetite change, fatigue and fever.   HENT: Negative.  Negative for congestion, hearing loss, sore throat and trouble swallowing.    Eyes: Negative.  Negative for photophobia, pain and visual disturbance.   Respiratory: Negative.  Negative for chest tightness, shortness of breath and wheezing.    Cardiovascular: Negative for chest pain, palpitations and leg swelling.   Gastrointestinal: Negative.  Negative for abdominal pain, constipation and nausea.   Genitourinary: Negative.  Negative for difficulty urinating, dysuria and frequency.   Musculoskeletal: Positive for gait problem. Negative for back  pain and neck stiffness.        Unsteady gait related to recent right TKR   Skin: Positive for wound. Negative for pallor and rash.        Right knee incision   Neurological: Positive for weakness. Negative for dizziness, speech difficulty and headaches.   Hematological: Negative.    Psychiatric/Behavioral: Negative.  Negative for confusion and suicidal ideas.     Objective:     Vital Signs (Most Recent):  Temp: 97.5 °F (36.4 °C) (01/07/22 1605)  Pulse: 87 (01/07/22 1605)  Resp: 18 (01/07/22 1605)  BP: (!) 140/68 (01/07/22 1605)  SpO2: 97 % (01/07/22 1605) Vital Signs (24h Range):  Temp:  [97.5 °F (36.4 °C)-98.4 °F (36.9 °C)] 97.5 °F (36.4 °C)  Pulse:  [74-87] 87  Resp:  [16-18] 18  SpO2:  [92 %-98 %] 97 %  BP: (112-140)/(50-68) 140/68     Weight: 80.3 kg (177 lb)  Body mass index is 28.57 kg/m².    Intake/Output Summary (Last 24 hours) at 1/7/2022 1850  Last data filed at 1/7/2022 1800  Gross per 24 hour   Intake 240 ml   Output 350 ml   Net -110 ml      Physical Exam  Vitals reviewed.   Constitutional:       General: He is awake. He is not in acute distress.     Appearance: Normal appearance. He is well-developed and well-groomed. He is not toxic-appearing or diaphoretic.   HENT:      Head: Normocephalic and atraumatic.      Right Ear: Hearing and external ear normal.      Left Ear: Hearing and external ear normal.      Nose: Nose normal.      Mouth/Throat:      Mouth: Mucous membranes are moist.      Pharynx: Oropharynx is clear.   Eyes:      General: Lids are normal. No scleral icterus.        Right eye: No discharge.         Left eye: No discharge.      Extraocular Movements: Extraocular movements intact.      Conjunctiva/sclera: Conjunctivae normal.      Comments: Right pupil irregular due to past history of retinal detachment.   Cardiovascular:      Rate and Rhythm: Normal rate and regular rhythm.      Pulses: Normal pulses.           Radial pulses are 2+ on the right side and 2+ on the left side.         Posterior tibial pulses are 2+ on the right side and 2+ on the left side.      Heart sounds: Normal heart sounds. No murmur heard.  No friction rub. No gallop.    Pulmonary:      Effort: Pulmonary effort is normal. No respiratory distress.      Breath sounds: Normal breath sounds. No stridor. No wheezing, rhonchi or rales.   Chest:      Chest wall: No tenderness.   Abdominal:      General: Bowel sounds are normal. There is no distension.      Palpations: Abdomen is soft.      Tenderness: There is no abdominal tenderness. There is no right CVA tenderness, left CVA tenderness, guarding or rebound.   Musculoskeletal:         General: No swelling.      Right lower leg: No edema.      Left lower leg: No edema.      Comments: Right knee surgical incision with staples intact without any redness or drainage noted.   Cryo-Cuff apparatus seen placed over the knee.    Lymphadenopathy:      Cervical: No cervical adenopathy.   Skin:     General: Skin is warm and dry.      Capillary Refill: Capillary refill takes 2 to 3 seconds.      Findings: No bruising or ecchymosis.   Neurological:      General: No focal deficit present.      Mental Status: He is alert, oriented to person, place, and time and easily aroused. Mental status is at baseline.      GCS: GCS eye subscore is 4. GCS verbal subscore is 5. GCS motor subscore is 6.      Cranial Nerves: Cranial nerves are intact.      Sensory: Sensation is intact.      Motor: Motor function is intact.   Psychiatric:         Attention and Perception: Attention normal.         Mood and Affect: Mood normal.         Speech: Speech normal.         Behavior: Behavior normal. Behavior is cooperative.         Thought Content: Thought content normal.         Significant Labs:   All pertinent labs within the past 24 hours have been reviewed.  CBC:   Recent Labs   Lab 01/07/22  0316 01/07/22  1718   WBC 9.06 8.74   HGB 8.3* 8.8*   HCT 26.3* 26.1*    327     CMP:   Recent Labs   Lab  01/07/22  0316 01/07/22  1717   * 131*   K 3.7 3.9   CL 99 97*   CO2 23 25   GLU 91 105   BUN 11 12   CREATININE 0.90 0.85   CALCIUM 7.8* 8.4*   PROT  --  6.2*   ALBUMIN  --  2.7*   BILITOT  --  1.1   ALKPHOS  --  108   AST  --  77*   ALT  --  85*   ANIONGAP 15 13   EGFRNONAA 86 91     Urine Studies:   Recent Labs   Lab 01/07/22  1646   COLORU Yellow   APPEARANCEUA Clear   PHUR 6.0   SPECGRAV 1.020   PROTEINUA Negative   GLUCUA Negative   KETONESU Negative   BILIRUBINUA Negative   OCCULTUA Trace-Intact*   NITRITE Negative   UROBILINOGEN 0.2   LEUKOCYTESUR Negative   RBCUA 3-5*   WBCUA 5-10*   BACTERIA Few*   HYALINECASTS 0-2*       Significant Imaging: I have reviewed all pertinent imaging results/findings within the past 24 hours.      Assessment/Plan:      * Weakness  -PT/OT to evaluate and treat      Status post total right knee replacement  -PT/OT to evaluate and treat  -Aquacel dressing change every 7 days  -Discontinue staples on 01/11/22  -Follow up with Dr. Epps on 01/19  -Continue eliquis 2.5 mg one tab by mouth BID for dvt ppx until 01/10 then discontinue. Will restart Plavix and ASA at that time for cardiac stent.      Pneumonia  -Levaquin 500 mg one tab by mouth daily x 7 days      Anemia  EGD on 01/01/22 by Dr. Aguirre revealed hiatal hernia and non-bleeding gastric ulcer. H&H8.3/26.3 with plt 278  -Continue pantoprazole 40 mg one tab by mouth daily  -Ferrous sulfate one tab by mouth daily         VTE Risk Mitigation (From admission, onward)         Ordered     apixaban tablet 2.5 mg  2 times daily         01/07/22 1739     Place PATRIC hose  Until discontinued         01/07/22 1634                Discharge Planning   BRAULIO:      Code Status: Full Code   Is the patient medically ready for discharge?:     Reason for patient still in hospital (select all that apply): Treatment and PT / OT recommendations             Discussed patient's case, labs and medication reconciliation with Dr. Mejia. He agreed to  the above POC.      CATHI CarcamoP  Department of [facility] Medicine   [place] - Medical Surgical Unit

## 2022-01-08 NOTE — SUBJECTIVE & OBJECTIVE
Past Medical History:   Diagnosis Date    Coronary artery disease     cardiac stents    Hyperlipidemia     Hypertension     Kidney stone     MI (myocardial infarction)     Sleep apnea     Urinary tract infection        Past Surgical History:   Procedure Laterality Date    CORONARY ANGIOPLASTY WITH STENT PLACEMENT      HERNIA REPAIR      JOINT REPLACEMENT      difficult to wake after surgery     PROSTATECTOMY  2012       Review of patient's allergies indicates:  No Known Allergies    Current Facility-Administered Medications on File Prior to Encounter   Medication    [DISCONTINUED] acetaminophen tablet 1,000 mg    [DISCONTINUED] apixaban tablet 2.5 mg    [DISCONTINUED] atorvastatin tablet 80 mg    [DISCONTINUED] azithromycin tablet 250 mg    [DISCONTINUED] bisacodyL EC tablet 10 mg    [DISCONTINUED] ceFEPIme (MAXIPIME) 1 g in dextrose 5 % in water (D5W) 5 % 50 mL IVPB (MB+)    [DISCONTINUED] cetirizine tablet 10 mg    [DISCONTINUED] dextromethorphan-guaiFENesin  mg/5 ml liquid 10 mL    [DISCONTINUED] dextrose 50% injection 12.5 g    [DISCONTINUED] dextrose 50% injection 25 g    [DISCONTINUED] glucagon (human recombinant) injection 1 mg    [DISCONTINUED] hydrALAZINE injection 5 mg    [DISCONTINUED] melatonin tablet 6 mg    [DISCONTINUED] naloxone 0.4 mg/mL injection 0.02 mg    [DISCONTINUED] ondansetron injection 8 mg    [DISCONTINUED] oxyCODONE immediate release tablet 5 mg    [DISCONTINUED] pantoprazole EC tablet 40 mg    [DISCONTINUED] polyethylene glycol packet 17 g    [DISCONTINUED] simethicone chewable tablet 80 mg    [DISCONTINUED] sodium chloride 0.9% flush 10 mL    [DISCONTINUED] tamsulosin 24 hr capsule 0.4 mg    [DISCONTINUED] traZODone tablet 50 mg    [DISCONTINUED] vancomycin (VANCOCIN) 1,500 mg in dextrose 5 % 250 mL IVPB    [DISCONTINUED] vancomycin - pharmacy to dose     Current Outpatient Medications on File Prior to Encounter   Medication Sig    apixaban  (ELIQUIS) 2.5 mg Tab Take 1 tablet (2.5 mg total) by mouth 2 (two) times daily. for 12 days    cetirizine (ZYRTEC) 10 MG tablet Take 1 tablet (10 mg total) by mouth once daily.    cyclobenzaprine (FLEXERIL) 10 MG tablet TAKE 1 TABLET BY MOUTH 3 TIMES DAILY AS NEEDED FOR MUSCLE SPASMS ..MAY CAUSE DROWSINESS ..NO ALCOHOL WHILE TAKING    gabapentin (NEURONTIN) 100 MG capsule 100 mg once daily. Prescribe 3x per day takes once daily    HYDROcodone-acetaminophen (NORCO)  mg per tablet Take 1 tablet by mouth every 4 (four) hours as needed for Pain.    levoFLOXacin (LEVAQUIN) 500 MG tablet Take 1 tablet (500 mg total) by mouth once daily. for 7 days    pantoprazole (PROTONIX) 40 MG tablet Take 40 mg by mouth once daily.    polyethylene glycol (GLYCOLAX) 17 gram PwPk Take 17 g by mouth once daily.    rosuvastatin (CRESTOR) 20 MG tablet Take 20 mg by mouth once daily.    terazosin (HYTRIN) 5 MG capsule Take 5 mg by mouth once daily.    triamcinolone acetonide 0.1% (KENALOG) 0.1 % ointment Apply topically 2 (two) times daily.    imipramine (TOFRANIL) 25 MG tablet     nitroGLYCERIN (NITROSTAT) 0.4 MG SL tablet     permethrin (ELIMITE) 5 % cream     [DISCONTINUED] ALPRAZolam (XANAX) 0.5 MG tablet Take 0.5 mg by mouth daily as needed.    [DISCONTINUED] clindamycin (CLEOCIN T) 1 % lotion Apply to back, chest, and neck twice a day (Patient not taking: Reported on 6/25/2021)     Family History     Problem Relation (Age of Onset)    Cancer Mother        Tobacco Use    Smoking status: Former Smoker    Smokeless tobacco: Never Used    Tobacco comment: QUIT 50 +YEARS AGO   Substance and Sexual Activity    Alcohol use: Not Currently     Comment: QUIT 50 +YEARS AGO     Drug use: Never    Sexual activity: Not Currently     Review of Systems   Constitutional: Negative for activity change.   HENT: Negative.    Eyes: Negative.    Respiratory: Negative.    Cardiovascular: Negative.    Gastrointestinal: Positive for  blood in stool. Constipation: last week.   Genitourinary: Negative for difficulty urinating.   Musculoskeletal: Negative for arthralgias.   Neurological: Negative for dizziness.   Hematological: Negative.    Psychiatric/Behavioral: Negative.  Negative for confusion.   All other systems reviewed and are negative.    Objective:     Vital Signs (Most Recent):  Temp: 98.3 °F (36.8 °C) (01/08/22 0707)  Pulse: 80 (01/08/22 0707)  Resp: 18 (01/08/22 0707)  BP: (!) 130/59 (01/08/22 0707)  SpO2: (!) 94 % (01/08/22 0707) Vital Signs (24h Range):  Temp:  [97.5 °F (36.4 °C)-98.3 °F (36.8 °C)] 98.3 °F (36.8 °C)  Pulse:  [80-87] 80  Resp:  [18-20] 18  SpO2:  [94 %-100 %] 94 %  BP: (130-142)/(59-68) 130/59     Weight: 79.4 kg (175 lb)  Body mass index is 28.25 kg/m².    Physical Exam  Vitals reviewed.   Constitutional:       Appearance: Normal appearance.   HENT:      Head: Normocephalic.      Nose: Nose normal.      Mouth/Throat:      Mouth: Mucous membranes are moist.   Eyes:      Pupils: Pupils are equal, round, and reactive to light.   Cardiovascular:      Rate and Rhythm: Normal rate.      Pulses: Normal pulses.   Pulmonary:      Effort: Pulmonary effort is normal. No respiratory distress.      Breath sounds: Normal breath sounds. No wheezing, rhonchi or rales.   Abdominal:      General: Abdomen is flat.   Musculoskeletal:         General: Normal range of motion.      Comments: Right knee incision clean and dry. No swelling in left knee or calf   Skin:     General: Skin is warm.      Capillary Refill: Capillary refill takes less than 2 seconds.   Neurological:      General: No focal deficit present.      Mental Status: He is alert.   Psychiatric:         Mood and Affect: Mood normal.         Thought Content: Thought content normal.         Judgment: Judgment normal.           CRANIAL NERVES     CN III, IV, VI   Pupils are equal, round, and reactive to light.       Significant Labs:   All pertinent labs within the past 24  hours have been reviewed.  BMP:   Recent Labs   Lab 01/07/22  1717      *   K 3.9   CL 97*   CO2 25   BUN 12   CREATININE 0.85   CALCIUM 8.4*     CBC:   Recent Labs   Lab 01/07/22  0316 01/07/22  1718   WBC 9.06 8.74   HGB 8.3* 8.8*   HCT 26.3* 26.1*    327       Significant Imaging: I have reviewed all pertinent imaging results/findings within the past 24 hours.

## 2022-01-08 NOTE — ASSESSMENT & PLAN NOTE
-PT/OT to evaluate and treat  -Aquacel dressing change every 7 days  -Discontinue staples on 01/11/22  -Follow up with Dr. Epps on 01/19  -Continue eliquis 2.5 mg one tab by mouth BID for dvt ppx until 01/10 then discontinue. Will restart Plavix and ASA at that time for cardiac stent.

## 2022-01-08 NOTE — HPI
Mr. Elliott Peters is an 82 yo WM with past medical history of HTN, HLD, renal stones, OA, CAD with 3 stents and ROSI with CPAP transferred from [facility] to [facility] for PT/OT rehabilitation for weakness s/p right TKR on 12/28 by Dr. Epps. [place] on right knee to be removed on 01/11. Had work up at [place] by Dr. Aguirre for GI bleed. EGD revealed hiatal hernia and superficial non-bleeding gastric ulcer. Eliquis 2.5 mg was restarted on 01/02 until 01/10, then will be discontinue Eliquis and start back on Plavix and ASA at that time. H&H at discharge today was up to 8.3/26.3 and plt 278. He completed Maxipime and Vacomycin for pneumonia and discharged on Levaquin 500 mg daily for 7 more days.     He is followed by Dr. Freire for CAD and Dr. Allen for nephrolithiasis.  Follow up with Dr. Epps on 01/19 and Dr. Aguirre in 4 weeks.    1/08 He is awake and alert today. No complaints. He seems motivated for PT

## 2022-01-08 NOTE — PLAN OF CARE
Problem: Pain Acute  Goal: Acceptable Pain Control and Functional Ability  Outcome: Ongoing, Progressing

## 2022-01-08 NOTE — ASSESSMENT & PLAN NOTE
-PT/OT to evaluate and treat  -Aquacel dressing change every 7 days  -Discontinue staples on 01/11/22  -Follow up with Dr. Epps on 01/19  -Continue eliquis 2.5 mg one tab by mouth BID for dvt ppx until 01/10 then discontinue. Will restart Plavix and ASA at that time for cardiac stent.  -Norco 10/325 mg take one tab by mouth every 6 hours as needed for pain

## 2022-01-08 NOTE — ASSESSMENT & PLAN NOTE
EGD on 01/01/22 by Dr. Aguirre revealed hiatal hernia and non-bleeding gastric ulcer. H&H8.3/26.3 with plt 278  -Continue pantoprazole 40 mg one tab by mouth daily  -Ferrous sulfate one tab by mouth daily

## 2022-01-08 NOTE — SUBJECTIVE & OBJECTIVE
Interval History: see HPI    Review of Systems   Constitutional: Negative.  Negative for activity change, appetite change, fatigue and fever.   HENT: Negative.  Negative for congestion, hearing loss, sore throat and trouble swallowing.    Eyes: Negative.  Negative for photophobia, pain and visual disturbance.   Respiratory: Negative.  Negative for chest tightness, shortness of breath and wheezing.    Cardiovascular: Negative for chest pain, palpitations and leg swelling.   Gastrointestinal: Negative.  Negative for abdominal pain, constipation and nausea.   Genitourinary: Negative.  Negative for difficulty urinating, dysuria and frequency.   Musculoskeletal: Positive for gait problem. Negative for back pain and neck stiffness.        Unsteady gait related to recent right TKR   Skin: Positive for wound. Negative for pallor and rash.        Right knee incision   Neurological: Positive for weakness. Negative for dizziness, speech difficulty and headaches.   Hematological: Negative.    Psychiatric/Behavioral: Negative.  Negative for confusion and suicidal ideas.     Objective:     Vital Signs (Most Recent):  Temp: 97.5 °F (36.4 °C) (01/07/22 1605)  Pulse: 87 (01/07/22 1605)  Resp: 18 (01/07/22 1605)  BP: (!) 140/68 (01/07/22 1605)  SpO2: 97 % (01/07/22 1605) Vital Signs (24h Range):  Temp:  [97.5 °F (36.4 °C)-98.4 °F (36.9 °C)] 97.5 °F (36.4 °C)  Pulse:  [74-87] 87  Resp:  [16-18] 18  SpO2:  [92 %-98 %] 97 %  BP: (112-140)/(50-68) 140/68     Weight: 80.3 kg (177 lb)  Body mass index is 28.57 kg/m².    Intake/Output Summary (Last 24 hours) at 1/7/2022 1850  Last data filed at 1/7/2022 1800  Gross per 24 hour   Intake 240 ml   Output 350 ml   Net -110 ml      Physical Exam  Vitals reviewed.   Constitutional:       General: He is awake. He is not in acute distress.     Appearance: Normal appearance. He is well-developed and well-groomed. He is not toxic-appearing or diaphoretic.   HENT:      Head: Normocephalic and  atraumatic.      Right Ear: Hearing and external ear normal.      Left Ear: Hearing and external ear normal.      Nose: Nose normal.      Mouth/Throat:      Mouth: Mucous membranes are moist.      Pharynx: Oropharynx is clear.   Eyes:      General: Lids are normal. No scleral icterus.        Right eye: No discharge.         Left eye: No discharge.      Extraocular Movements: Extraocular movements intact.      Conjunctiva/sclera: Conjunctivae normal.      Comments: Right pupil irregular due to past history of retinal detachment.   Cardiovascular:      Rate and Rhythm: Normal rate and regular rhythm.      Pulses: Normal pulses.           Radial pulses are 2+ on the right side and 2+ on the left side.        Posterior tibial pulses are 2+ on the right side and 2+ on the left side.      Heart sounds: Normal heart sounds. No murmur heard.  No friction rub. No gallop.    Pulmonary:      Effort: Pulmonary effort is normal. No respiratory distress.      Breath sounds: Normal breath sounds. No stridor. No wheezing, rhonchi or rales.   Chest:      Chest wall: No tenderness.   Abdominal:      General: Bowel sounds are normal. There is no distension.      Palpations: Abdomen is soft.      Tenderness: There is no abdominal tenderness. There is no right CVA tenderness, left CVA tenderness, guarding or rebound.   Musculoskeletal:         General: No swelling.      Right lower leg: No edema.      Left lower leg: No edema.      Comments: Right knee surgical incision with staples intact without any redness or drainage noted.   Cryo-Cuff apparatus seen placed over the knee.    Lymphadenopathy:      Cervical: No cervical adenopathy.   Skin:     General: Skin is warm and dry.      Capillary Refill: Capillary refill takes 2 to 3 seconds.      Findings: No bruising or ecchymosis.   Neurological:      General: No focal deficit present.      Mental Status: He is alert, oriented to person, place, and time and easily aroused. Mental status is  at baseline.      GCS: GCS eye subscore is 4. GCS verbal subscore is 5. GCS motor subscore is 6.      Cranial Nerves: Cranial nerves are intact.      Sensory: Sensation is intact.      Motor: Motor function is intact.   Psychiatric:         Attention and Perception: Attention normal.         Mood and Affect: Mood normal.         Speech: Speech normal.         Behavior: Behavior normal. Behavior is cooperative.         Thought Content: Thought content normal.         Significant Labs:   All pertinent labs within the past 24 hours have been reviewed.  CBC:   Recent Labs   Lab 01/07/22 0316 01/07/22  1718   WBC 9.06 8.74   HGB 8.3* 8.8*   HCT 26.3* 26.1*    327     CMP:   Recent Labs   Lab 01/07/22 0316 01/07/22  1717   * 131*   K 3.7 3.9   CL 99 97*   CO2 23 25   GLU 91 105   BUN 11 12   CREATININE 0.90 0.85   CALCIUM 7.8* 8.4*   PROT  --  6.2*   ALBUMIN  --  2.7*   BILITOT  --  1.1   ALKPHOS  --  108   AST  --  77*   ALT  --  85*   ANIONGAP 15 13   EGFRNONAA 86 91     Urine Studies:   Recent Labs   Lab 01/07/22  1646   COLORU Yellow   APPEARANCEUA Clear   PHUR 6.0   SPECGRAV 1.020   PROTEINUA Negative   GLUCUA Negative   KETONESU Negative   BILIRUBINUA Negative   OCCULTUA Trace-Intact*   NITRITE Negative   UROBILINOGEN 0.2   LEUKOCYTESUR Negative   RBCUA 3-5*   WBCUA 5-10*   BACTERIA Few*   HYALINECASTS 0-2*       Significant Imaging: I have reviewed all pertinent imaging results/findings within the past 24 hours.

## 2022-01-08 NOTE — H&P
[facility] Surgical Unit  [facility]  History & Physical    Patient Name: Elliott Peters  MRN: [id number]  Patient Class: - Swing  Admission Date: 1/7/2022  Attending Physician: Geovany Mejia MD  Primary Care Provider: Izzy Olivier NP         Patient information was obtained from patient, past medical records and ER records.     Subjective:     Principal Problem:Weakness    Chief Complaint: No chief complaint on file.       HPI: Mr. Elliott Peters is an 80 yo WM with past medical history of HTN, HLD, renal stones, OA, CAD with 3 stents and ROSI with CPAP transferred from [facility] to [facility] bed for PT/OT rehabilitation for weakness s/p right TKR on 12/28 by Dr. Epps. Amanda on right knee to be removed on 01/11. Had work up at [place] by Dr. Aguirre for GI bleed. EGD revealed hiatal hernia and superficial non-bleeding gastric ulcer. Eliquis 2.5 mg was restarted on 01/02 until 01/10, then will be discontinue Eliquis and start back on Plavix and ASA at that time. H&H at discharge today was up to 8.3/26.3 and plt 278. He completed Maxipime and Vacomycin for pneumonia and discharged on Levaquin 500 mg daily for 7 more days.     He is followed by Dr. Freire for CAD and Dr. Allen for nephrolithiasis.  Follow up with Dr. Epps on 01/19 and Dr. Aguirre in 4 weeks.    1/08 He is awake and alert today. No complaints. He seems motivated for PT      Past Medical History:   Diagnosis Date    Coronary artery disease     cardiac stents    Hyperlipidemia     Hypertension     Kidney stone     MI (myocardial infarction)     Sleep apnea     Urinary tract infection        Past Surgical History:   Procedure Laterality Date    CORONARY ANGIOPLASTY WITH STENT PLACEMENT      HERNIA REPAIR      JOINT REPLACEMENT      difficult to wake after surgery     PROSTATECTOMY  2012       Review of patient's allergies indicates:  No Known Allergies    Current Facility-Administered Medications on File Prior to  Encounter   Medication    [DISCONTINUED] acetaminophen tablet 1,000 mg    [DISCONTINUED] apixaban tablet 2.5 mg    [DISCONTINUED] atorvastatin tablet 80 mg    [DISCONTINUED] azithromycin tablet 250 mg    [DISCONTINUED] bisacodyL EC tablet 10 mg    [DISCONTINUED] ceFEPIme (MAXIPIME) 1 g in dextrose 5 % in water (D5W) 5 % 50 mL IVPB (MB+)    [DISCONTINUED] cetirizine tablet 10 mg    [DISCONTINUED] dextromethorphan-guaiFENesin  mg/5 ml liquid 10 mL    [DISCONTINUED] dextrose 50% injection 12.5 g    [DISCONTINUED] dextrose 50% injection 25 g    [DISCONTINUED] glucagon (human recombinant) injection 1 mg    [DISCONTINUED] hydrALAZINE injection 5 mg    [DISCONTINUED] melatonin tablet 6 mg    [DISCONTINUED] naloxone 0.4 mg/mL injection 0.02 mg    [DISCONTINUED] ondansetron injection 8 mg    [DISCONTINUED] oxyCODONE immediate release tablet 5 mg    [DISCONTINUED] pantoprazole EC tablet 40 mg    [DISCONTINUED] polyethylene glycol packet 17 g    [DISCONTINUED] simethicone chewable tablet 80 mg    [DISCONTINUED] sodium chloride 0.9% flush 10 mL    [DISCONTINUED] tamsulosin 24 hr capsule 0.4 mg    [DISCONTINUED] traZODone tablet 50 mg    [DISCONTINUED] vancomycin (VANCOCIN) 1,500 mg in dextrose 5 % 250 mL IVPB    [DISCONTINUED] vancomycin - pharmacy to dose     Current Outpatient Medications on File Prior to Encounter   Medication Sig    apixaban (ELIQUIS) 2.5 mg Tab Take 1 tablet (2.5 mg total) by mouth 2 (two) times daily. for 12 days    cetirizine (ZYRTEC) 10 MG tablet Take 1 tablet (10 mg total) by mouth once daily.    cyclobenzaprine (FLEXERIL) 10 MG tablet TAKE 1 TABLET BY MOUTH 3 TIMES DAILY AS NEEDED FOR MUSCLE SPASMS ..MAY CAUSE DROWSINESS ..NO ALCOHOL WHILE TAKING    gabapentin (NEURONTIN) 100 MG capsule 100 mg once daily. Prescribe 3x per day takes once daily    HYDROcodone-acetaminophen (NORCO)  mg per tablet Take 1 tablet by mouth every 4 (four) hours as needed for Pain.     levoFLOXacin (LEVAQUIN) 500 MG tablet Take 1 tablet (500 mg total) by mouth once daily. for 7 days    pantoprazole (PROTONIX) 40 MG tablet Take 40 mg by mouth once daily.    polyethylene glycol (GLYCOLAX) 17 gram PwPk Take 17 g by mouth once daily.    rosuvastatin (CRESTOR) 20 MG tablet Take 20 mg by mouth once daily.    terazosin (HYTRIN) 5 MG capsule Take 5 mg by mouth once daily.    triamcinolone acetonide 0.1% (KENALOG) 0.1 % ointment Apply topically 2 (two) times daily.    imipramine (TOFRANIL) 25 MG tablet     nitroGLYCERIN (NITROSTAT) 0.4 MG SL tablet     permethrin (ELIMITE) 5 % cream     [DISCONTINUED] ALPRAZolam (XANAX) 0.5 MG tablet Take 0.5 mg by mouth daily as needed.    [DISCONTINUED] clindamycin (CLEOCIN T) 1 % lotion Apply to back, chest, and neck twice a day (Patient not taking: Reported on 6/25/2021)     Family History     Problem Relation (Age of Onset)    Cancer Mother        Tobacco Use    Smoking status: Former Smoker    Smokeless tobacco: Never Used    Tobacco comment: QUIT 50 +YEARS AGO   Substance and Sexual Activity    Alcohol use: Not Currently     Comment: QUIT 50 +YEARS AGO     Drug use: Never    Sexual activity: Not Currently     Review of Systems   Constitutional: Negative for activity change.   HENT: Negative.    Eyes: Negative.    Respiratory: Negative.    Cardiovascular: Negative.    Gastrointestinal: Positive for blood in stool. Constipation: last week.   Genitourinary: Negative for difficulty urinating.   Musculoskeletal: Negative for arthralgias.   Neurological: Negative for dizziness.   Hematological: Negative.    Psychiatric/Behavioral: Negative.  Negative for confusion.   All other systems reviewed and are negative.    Objective:     Vital Signs (Most Recent):  Temp: 98.3 °F (36.8 °C) (01/08/22 0707)  Pulse: 80 (01/08/22 0707)  Resp: 18 (01/08/22 0707)  BP: (!) 130/59 (01/08/22 0707)  SpO2: (!) 94 % (01/08/22 0707) Vital Signs (24h Range):  Temp:  [97.5 °F  (36.4 °C)-98.3 °F (36.8 °C)] 98.3 °F (36.8 °C)  Pulse:  [80-87] 80  Resp:  [18-20] 18  SpO2:  [94 %-100 %] 94 %  BP: (130-142)/(59-68) 130/59     Weight: 79.4 kg (175 lb)  Body mass index is 28.25 kg/m².    Physical Exam  Vitals reviewed.   Constitutional:       Appearance: Normal appearance.   HENT:      Head: Normocephalic.      Nose: Nose normal.      Mouth/Throat:      Mouth: Mucous membranes are moist.   Eyes:      Pupils: Pupils are equal, round, and reactive to light.   Cardiovascular:      Rate and Rhythm: Normal rate.      Pulses: Normal pulses.   Pulmonary:      Effort: Pulmonary effort is normal. No respiratory distress.      Breath sounds: Normal breath sounds. No wheezing, rhonchi or rales.   Abdominal:      General: Abdomen is flat.   Musculoskeletal:         General: Normal range of motion.      Comments: Right knee incision clean and dry. No swelling in left knee or calf   Skin:     General: Skin is warm.      Capillary Refill: Capillary refill takes less than 2 seconds.   Neurological:      General: No focal deficit present.      Mental Status: He is alert.   Psychiatric:         Mood and Affect: Mood normal.         Thought Content: Thought content normal.         Judgment: Judgment normal.           CRANIAL NERVES     CN III, IV, VI   Pupils are equal, round, and reactive to light.       Significant Labs:   All pertinent labs within the past 24 hours have been reviewed.  BMP:   Recent Labs   Lab 01/07/22  1717      *   K 3.9   CL 97*   CO2 25   BUN 12   CREATININE 0.85   CALCIUM 8.4*     CBC:   Recent Labs   Lab 01/07/22  0316 01/07/22  1718   WBC 9.06 8.74   HGB 8.3* 8.8*   HCT 26.3* 26.1*    327       Significant Imaging: I have reviewed all pertinent imaging results/findings within the past 24 hours.    Assessment/Plan:     * Weakness  -PT/OT to evaluate and treat      Anemia  EGD on 01/01/22 by Dr. Aguirre revealed hiatal hernia and non-bleeding gastric ulcer. H&H8.3/26.3 with  plt 278  -Continue pantoprazole 40 mg one tab by mouth daily  -Ferrous sulfate one tab by mouth daily       Pneumonia  -Levaquin 500 mg one tab by mouth daily x 7 days      Status post total right knee replacement  -PT/OT to evaluate and treat  -Aquacel dressing change every 7 days  -Discontinue staples on 01/11/22  -Follow up with Dr. Epps on 01/19  -Continue eliquis 2.5 mg one tab by mouth BID for dvt ppx until 01/10 then discontinue. Will restart Plavix and ASA at that time for cardiac stent.  -Norco 10/325 mg take one tab by mouth every 6 hours as needed for pain      Surgical wound present  1/08   (11 days S/P ORIF Rt Kne        VTE Risk Mitigation (From admission, onward)         Ordered     apixaban tablet 2.5 mg  2 times daily         01/07/22 4720     Place PATRIC hose  Until discontinued         01/07/22 5527                   Geovany Mejia MD  Department of Hospital Medicine   [facility] Surgical Unit

## 2022-01-09 PROCEDURE — 11000004 HC SNF PRIVATE

## 2022-01-09 PROCEDURE — 25000003 PHARM REV CODE 250: Performed by: NURSE PRACTITIONER

## 2022-01-09 PROCEDURE — 94761 N-INVAS EAR/PLS OXIMETRY MLT: CPT

## 2022-01-09 RX ADMIN — TAMSULOSIN HYDROCHLORIDE 0.4 MG: 0.4 CAPSULE ORAL at 09:01

## 2022-01-09 RX ADMIN — POLYETHYLENE GLYCOL 3350 17 G: 17 POWDER, FOR SOLUTION ORAL at 09:01

## 2022-01-09 RX ADMIN — SENNOSIDES AND DOCUSATE SODIUM 1 TABLET: 50; 8.6 TABLET ORAL at 09:01

## 2022-01-09 RX ADMIN — PANTOPRAZOLE SODIUM 40 MG: 40 TABLET, DELAYED RELEASE ORAL at 09:01

## 2022-01-09 RX ADMIN — HYDROCODONE BITARTRATE AND ACETAMINOPHEN 1 TABLET: 10; 325 TABLET ORAL at 09:01

## 2022-01-09 RX ADMIN — GABAPENTIN 300 MG: 300 CAPSULE ORAL at 09:01

## 2022-01-09 RX ADMIN — ATORVASTATIN CALCIUM 80 MG: 40 TABLET, FILM COATED ORAL at 09:01

## 2022-01-09 RX ADMIN — FERROUS SULFATE TAB 325 MG (65 MG ELEMENTAL FE) 1 EACH: 325 (65 FE) TAB at 09:01

## 2022-01-09 RX ADMIN — LEVOFLOXACIN 500 MG: 500 TABLET, FILM COATED ORAL at 09:01

## 2022-01-09 RX ADMIN — APIXABAN 2.5 MG: 2.5 TABLET, FILM COATED ORAL at 09:01

## 2022-01-09 RX ADMIN — CETIRIZINE HYDROCHLORIDE 10 MG: 10 TABLET, FILM COATED ORAL at 09:01

## 2022-01-09 NOTE — PLAN OF CARE
Problem: Fluid Imbalance (Pneumonia)  Goal: Fluid Balance  Outcome: Ongoing, Progressing     Problem: Infection (Pneumonia)  Goal: Resolution of Infection Signs and Symptoms  Outcome: Ongoing, Progressing     Problem: Respiratory Compromise (Pneumonia)  Goal: Effective Oxygenation and Ventilation  Outcome: Ongoing, Progressing

## 2022-01-09 NOTE — PLAN OF CARE
Problem: Adult Inpatient Plan of Care  Goal: Plan of Care Review  Outcome: Ongoing, Progressing     Problem: Fall Injury Risk  Goal: Absence of Fall and Fall-Related Injury  Outcome: Ongoing, Progressing     Problem: Skin Injury Risk Increased  Goal: Skin Health and Integrity  Outcome: Ongoing, Progressing    Problem: Constipation  Goal: Effective Bowel Elimination  Outcome: Ongoing, Progressing

## 2022-01-10 PROCEDURE — 94761 N-INVAS EAR/PLS OXIMETRY MLT: CPT

## 2022-01-10 PROCEDURE — 25000003 PHARM REV CODE 250: Performed by: EMERGENCY MEDICINE

## 2022-01-10 PROCEDURE — 25000003 PHARM REV CODE 250: Performed by: NURSE PRACTITIONER

## 2022-01-10 PROCEDURE — 97162 PT EVAL MOD COMPLEX 30 MIN: CPT

## 2022-01-10 PROCEDURE — 97165 OT EVAL LOW COMPLEX 30 MIN: CPT

## 2022-01-10 PROCEDURE — 11000004 HC SNF PRIVATE

## 2022-01-10 RX ORDER — LEVOFLOXACIN 500 MG/1
500 TABLET, FILM COATED ORAL DAILY
Status: DISCONTINUED | OUTPATIENT
Start: 2022-01-10 | End: 2022-01-11

## 2022-01-10 RX ORDER — IMIPRAMINE HYDROCHLORIDE 50 MG/1
TABLET, FILM COATED ORAL
Status: DISPENSED
Start: 2022-01-10 | End: 2022-01-11

## 2022-01-10 RX ORDER — ASPIRIN 325 MG
325 TABLET, DELAYED RELEASE (ENTERIC COATED) ORAL DAILY
Status: DISCONTINUED | OUTPATIENT
Start: 2022-01-11 | End: 2022-01-14 | Stop reason: HOSPADM

## 2022-01-10 RX ORDER — LEVOFLOXACIN 500 MG/1
500 TABLET, FILM COATED ORAL DAILY
Status: DISCONTINUED | OUTPATIENT
Start: 2022-01-11 | End: 2022-01-10

## 2022-01-10 RX ORDER — CLOPIDOGREL BISULFATE 75 MG/1
75 TABLET ORAL DAILY
Status: DISCONTINUED | OUTPATIENT
Start: 2022-01-11 | End: 2022-01-14 | Stop reason: HOSPADM

## 2022-01-10 RX ORDER — POLYETHYLENE GLYCOL 3350 17 G/17G
17 POWDER, FOR SOLUTION ORAL DAILY
Status: DISCONTINUED | OUTPATIENT
Start: 2022-01-11 | End: 2022-01-14 | Stop reason: HOSPADM

## 2022-01-10 RX ADMIN — LEVOFLOXACIN 500 MG: 500 TABLET, FILM COATED ORAL at 11:01

## 2022-01-10 RX ADMIN — APIXABAN 2.5 MG: 2.5 TABLET, FILM COATED ORAL at 09:01

## 2022-01-10 RX ADMIN — HYDROCODONE BITARTRATE AND ACETAMINOPHEN 1 TABLET: 10; 325 TABLET ORAL at 08:01

## 2022-01-10 RX ADMIN — IMIPRAMINE HYDROCHLORIDE 25 MG: 50 TABLET ORAL at 08:01

## 2022-01-10 RX ADMIN — CETIRIZINE HYDROCHLORIDE 10 MG: 10 TABLET, FILM COATED ORAL at 09:01

## 2022-01-10 RX ADMIN — FERROUS SULFATE TAB 325 MG (65 MG ELEMENTAL FE) 1 EACH: 325 (65 FE) TAB at 09:01

## 2022-01-10 RX ADMIN — PANTOPRAZOLE SODIUM 40 MG: 40 TABLET, DELAYED RELEASE ORAL at 09:01

## 2022-01-10 RX ADMIN — SENNOSIDES AND DOCUSATE SODIUM 1 TABLET: 50; 8.6 TABLET ORAL at 09:01

## 2022-01-10 RX ADMIN — SENNOSIDES AND DOCUSATE SODIUM 1 TABLET: 50; 8.6 TABLET ORAL at 08:01

## 2022-01-10 RX ADMIN — TAMSULOSIN HYDROCHLORIDE 0.4 MG: 0.4 CAPSULE ORAL at 09:01

## 2022-01-10 RX ADMIN — GABAPENTIN 300 MG: 300 CAPSULE ORAL at 08:01

## 2022-01-10 RX ADMIN — POLYETHYLENE GLYCOL 3350 17 G: 17 POWDER, FOR SOLUTION ORAL at 09:01

## 2022-01-10 RX ADMIN — ATORVASTATIN CALCIUM 80 MG: 40 TABLET, FILM COATED ORAL at 08:01

## 2022-01-10 NOTE — PLAN OF CARE
Problem: Adult Inpatient Plan of Care  Goal: Plan of Care Review  Outcome: Ongoing, Progressing  Goal: Patient-Specific Goal (Individualized)  Outcome: Ongoing, Progressing  Goal: Absence of Hospital-Acquired Illness or Injury  Outcome: Ongoing, Progressing  Goal: Optimal Comfort and Wellbeing  Outcome: Ongoing, Progressing     Problem: Fall Injury Risk  Goal: Absence of Fall and Fall-Related Injury  Outcome: Ongoing, Progressing     Problem: Fall Injury Risk  Goal: Absence of Fall and Fall-Related Injury  Outcome: Ongoing, Progressing     Problem: Infection  Goal: Absence of Infection Signs and Symptoms  Outcome: Ongoing, Progressing     Problem: Pain Acute  Goal: Acceptable Pain Control and Functional Ability  Outcome: Ongoing, Progressing

## 2022-01-10 NOTE — PLAN OF CARE
Problem: Fluid Imbalance (Pneumonia)  Goal: Fluid Balance  Outcome: Met     Problem: Infection (Pneumonia)  Goal: Resolution of Infection Signs and Symptoms  Outcome: Met     Problem: Adjustment to Illness (Gastrointestinal Bleeding)  Goal: Optimal Coping with Acute Illness  Outcome: Ongoing, Progressing     Problem: Bleeding (Gastrointestinal Bleeding)  Goal: Hemostasis  Outcome: Ongoing, Progressing     Problem: Functional Ability Impaired (Knee Arthroplasty)  Goal: Optimal Functional Ability  Outcome: Ongoing, Progressing     Problem: Infection (Knee Arthroplasty)  Goal: Absence of Infection Signs and Symptoms  Outcome: Ongoing, Progressing     Problem: Neurovascular Compromise (Knee Arthroplasty)  Goal: Intact Neurovascular Status  Outcome: Ongoing, Progressing

## 2022-01-10 NOTE — PLAN OF CARE
Problem: Occupational Therapy Goal  Goal: Occupational Therapy Goal  Description: Goals to be met by: Discharge     Patient will increase functional independence with ADLs by performing:    LE Dressing with Modified [place].  Toileting from toilet with Modified [place] for hygiene and clothing management.   Bathing from  edge of bed with Modified [place].  Toilet transfer to toilet with Modified [place].  Increased functional strength to 5/5 for increased safety and independence with self care and mobility tasks.    Outcome: Ongoing, Progressing

## 2022-01-10 NOTE — PLAN OF CARE
Problem: Physical Therapy Goal  Goal: Physical Therapy Goal  Description: Description: Goals to be met by: Discharge      Patient will increase functional independence with mobility by performin. Supine to sit with [place]  2. Sit to stand transfer with [place]  3. Bed to chair transfer with [place] using Rolling Walker  4. Gait  x 300 feet with Modified [place] using Rolling Walker.   Outcome: Ongoing, Progressing

## 2022-01-10 NOTE — PLAN OF CARE
Problem: Adult Inpatient Plan of Care  Goal: Plan of Care Review  Outcome: Ongoing, Progressing     Problem: Fall Injury Risk  Goal: Absence of Fall and Fall-Related Injury  Outcome: Ongoing, Progressing     Problem: Constipation  Goal: Effective Bowel Elimination  Outcome: Ongoing, Progressing     Problem: Pain Acute  Goal: Acceptable Pain Control and Functional Ability  Outcome: Ongoing, Progressing

## 2022-01-10 NOTE — PT/OT/SLP EVAL
Occupational Therapy   Evaluation    Name: Elliott Peters  MRN: [id number]  Admitting Diagnosis:  Weakness  Recent Surgery: * No surgery found *      Recommendations:     Discharge Recommendations: home health OT,home with home health  Discharge Equipment Recommendations:  walker, rolling,shower chair  Barriers to discharge:  None    Assessment:     Elliott Peters is a 81 y.o. male with a medical diagnosis of Weakness.  He presents with generalized muscle weakness and a decline in functional abilities. Performance deficits affecting function: weakness,impaired endurance,impaired self care skills,impaired functional mobilty.      Rehab Prognosis: Good; patient would benefit from acute skilled OT services to address these deficits and reach maximum level of function.       Plan:     Patient to be seen 5 x/week to address the above listed problems via self-care/home management,therapeutic activities,therapeutic exercises,neuromuscular re-education  · Plan of Care Expires: 02/07/22  · Plan of Care Reviewed with: patient    Subjective     Chief Complaint: decreased functional independence   Patient/Family Comments/goals: return to PLOF    Occupational Profile:  Living Environment: lives at home with spouse  Previous level of function: Independent  Roles and Routines: Retired  Equipment Used at Home:  bedside commode,cane, straight  Assistance upon Discharge: wife will assist as needed    Pain/Comfort:  · Pain Rating 1: 0/10  · Pain Addressed 1: Reposition,Distraction,Cessation of Activity  · Pain Rating Post-Intervention 1: 5/10    Patients cultural, spiritual, Quaker conflicts given the current situation: no    Objective:     Communicated with: nurse prior to session.  Patient found supine with cryotherapy upon OT entry to room.    General Precautions: Standard, fall   Orthopedic Precautions:N/A   Braces: N/A  Respiratory Status: Room air    Occupational Performance:    Bed Mobility:    · Patient completed  Rolling/Turning to Left with  modified independence  · Patient completed Rolling/Turning to Right with modified independence  · Patient completed Scooting/Bridging with modified independence  · Patient completed Supine to Sit with supervision and stand by assistance  · Patient completed Sit to Supine with stand by assistance    Functional Mobility/Transfers:  · Patient completed Sit <> Stand Transfer with stand by assistance  with  rolling walker   · Patient completed Toilet Transfer Step Transfer technique with stand by assistance with  rolling walker  · Functional Mobility: Patient ambulated in room and hallway.  Patient ambulated from EOB to bathroom to complete toileting with SBA.    Activities of Daily Living:  · Feeding:  independence .  · Grooming: modified independence while standing at sink  · Bathing: stand by assistance and contact guard assistance occasional steadying assistance   · Upper Body Dressing: supervision /setup  · Lower Body Dressing: stand by assistance and contact guard assistance steadying assistance while donning/doffing garments over hips  · Toileting: stand by assistance .    Cognitive/Visual Perceptual:  Cognitive/Psychosocial Skills:  -       Oriented to: Person, Place, Time and Situation   -       Follows Commands/attention:Follows multistep  commands  -       Communication: clear/fluent  -       Memory: No Deficits noted  -       Safety awareness/insight to disability: intact   -       Mood/Affect/Coping skills/emotional control: Appropriate to situation    Physical Exam:  Upper Extremity Range of Motion:  -       Right Upper Extremity: WFL  -       Left Upper Extremity: WFL  Upper Extremity Strength: -       Right Upper Extremity: -4/5  -       Left Upper Extremity: -4/5   Strength: -       Right Upper Extremity: WFL  -       Left Upper Extremity: WFL    AMPAC 6 Click ADL:  AMPAC Total Score: 20    Treatment & Education: Supervisory visit conducted with Debbie ANDERSON  re:POC and goals.  Patient educated on OTs role and POC.   Education:    Patient left supine with all lines intact and call button in reach    GOALS:   Multidisciplinary Problems     Occupational Therapy Goals        Problem: Occupational Therapy Goal    Goal Priority Disciplines Outcome Interventions   Occupational Therapy Goal     OT, PT/OT Ongoing, Progressing    Description: Goals to be met by: Discharge     Patient will increase functional independence with ADLs by performing:    LE Dressing with Modified [place].  Toileting from toilet with Modified [place] for hygiene and clothing management.   Bathing from  edge of bed with Modified [place].  Toilet transfer to toilet with Modified [place].  Increased functional strength to 5/5 for increased safety and independence with self care and mobility tasks.                     History:     Past Medical History:   Diagnosis Date    Coronary artery disease     cardiac stents    Hyperlipidemia     Hypertension     Kidney stone     MI (myocardial infarction)     Sleep apnea     Urinary tract infection        Past Surgical History:   Procedure Laterality Date    CORONARY ANGIOPLASTY WITH STENT PLACEMENT      HERNIA REPAIR      JOINT REPLACEMENT      difficult to wake after surgery     PROSTATECTOMY  2012       Time Tracking:     OT Date of Treatment: 01/10/22  OT Start Time: 0900  OT Stop Time: 0920  OT Total Time (min): 20 min    Billable Minutes:Evaluation low complexity    1/10/2022

## 2022-01-10 NOTE — PLAN OF CARE
[facility], [place] - [facility] Telemetry  Discharge Final Note    Primary Care Provider: Izzy Olivier NP    Expected Discharge Date: 1/7/2022    Final Discharge Note (most recent)     Final Note - 01/10/22 0814        Final Note    Assessment Type Final Discharge Note     Anticipated Discharge Disposition Swing Bed        Post-Acute Status    Post-Acute Authorization Placement     Post-Acute Placement Status Set-up Complete/Auth obtained     Patient choice form signed by patient/caregiver List with quality metrics by geographic area provided;List from CMS Compare;List from System Post-Acute Care     Discharge Delays None known at this time                 Important Message from Medicare  Important Message from Medicare regarding Discharge Appeal Rights: Explained to patient/caregiver     Date IMM was signed: 01/02/22  Time IMM was signed: 0822    Contact Info     Izzy Olivier NP   Specialty: Family Medicine   Relationship: PCP - General    [address] [address]  [facility] [address]   Phone: [phone]       Next Steps: Follow up    Instructions: 1 week post discharge from [facility]    Chevy Epps MD   Specialty: Orthopedic Surgery    [address]  [place], Orthopedic [facility] Sports Medicine, [facility] [address]   Phone: [phone]       Next Steps: Follow up    Instructions: as Scheduled on 1/19@10:10    Juan Carlos Aguirre MD   Specialty: Gastroenterology, Internal Medicine    [address]  [facility] [address]   Phone: [phone]       Next Steps: Follow up in 4 week(s)    Instructions: Hospital follow-up for ANA Freire MD   Specialty: Internal Medicine    [address] [facility] [address]   Phone: [phone]       Next Steps: Follow up    Instructions: As previously scheduled      pt d/c to [facility] 1/7/22. D/c summary faxed to [facility].

## 2022-01-10 NOTE — PLAN OF CARE
[place] - Medical Surgical Unit  Initial Discharge Assessment       Primary Care Provider: Izzy Olivier NP    Admission Diagnosis: Weakness [R53.1]    Admission Date: 1/7/2022  Expected Discharge Date:     Discharge Barriers Identified: None    Payor: MEDICARE / Plan: MEDICARE PART A & B / Product Type: Government /     Extended Emergency Contact Information  Primary Emergency Contact: Teresa Peters  Mobile Phone: [phone]  Relation: Spouse    Discharge Plan A: Home,Home Health  Discharge Plan B: Home,Home Health      [facility] - [place], [place] - [address] AT Portal to Registered [facility] Sites  [address]  [facility] [address]  Phone: [phone] Fax: [phone]    [facility] - [place], [place] - [address]  [address]  [place] [address]  Phone: [phone] Fax: [phone]    [facility] - [place], [place] - [address]  [address]  [place] [address]  Phone: [phone] Fax: [phone]    [facility], [place] - [address]  [address] [place] [address] [place] [address]  Phone: [phone] Fax: [phone]      Initial Assessment (most recent)     Adult Discharge Assessment - 01/10/22 0916        Discharge Assessment    Assessment Type Discharge Planning Assessment     Source of Information patient     Lives With spouse     Do you expect to return to your current living situation? Yes     Do you have help at home or someone to help you manage your care at home? Yes     Who are your caregiver(s) and their phone number(s)? teresa peters spouse, [phone]     Equipment Currently Used at Home bedside commode;cane, straight;CPAP     Do you currently have service(s) that help you manage your care at home? No     Are you on dialysis? No     Discharge Plan A Home;Home Health     Discharge Plan B Home;Home Health     DME Needed Upon Discharge  none     Discharge Plan discussed with: Patient     Name(s) and Number(s) Teresa  Fran, spouse, [phone]     Discharge Barriers Identified None               Admitted to [facility] on 1/7/22 for PT/OT. Pt lives at home with spouse, Teresa. Has BSC, cane and CPAP. Pt will need RW at dc choice obtained for [facility]. No current with . Choice obtained for [facility]. Ss notified nestor at [facility] and clinical faxed. SS following for dc needs.

## 2022-01-11 PROCEDURE — 97116 GAIT TRAINING THERAPY: CPT

## 2022-01-11 PROCEDURE — 97110 THERAPEUTIC EXERCISES: CPT

## 2022-01-11 PROCEDURE — 25000003 PHARM REV CODE 250: Performed by: NURSE PRACTITIONER

## 2022-01-11 PROCEDURE — 97530 THERAPEUTIC ACTIVITIES: CPT

## 2022-01-11 PROCEDURE — 97535 SELF CARE MNGMENT TRAINING: CPT

## 2022-01-11 PROCEDURE — 94761 N-INVAS EAR/PLS OXIMETRY MLT: CPT

## 2022-01-11 PROCEDURE — 25000003 PHARM REV CODE 250: Performed by: EMERGENCY MEDICINE

## 2022-01-11 PROCEDURE — 11000004 HC SNF PRIVATE

## 2022-01-11 RX ORDER — LEVOFLOXACIN 500 MG/1
500 TABLET, FILM COATED ORAL DAILY
Status: COMPLETED | OUTPATIENT
Start: 2022-01-12 | End: 2022-01-14

## 2022-01-11 RX ORDER — LEVOFLOXACIN 500 MG/1
500 TABLET, FILM COATED ORAL DAILY
Status: DISCONTINUED | OUTPATIENT
Start: 2022-01-11 | End: 2022-01-11

## 2022-01-11 RX ADMIN — CLOPIDOGREL 75 MG: 75 TABLET, FILM COATED ORAL at 08:01

## 2022-01-11 RX ADMIN — HYDROCODONE BITARTRATE AND ACETAMINOPHEN 1 TABLET: 10; 325 TABLET ORAL at 08:01

## 2022-01-11 RX ADMIN — ASPIRIN 325 MG: 325 TABLET ORAL at 08:01

## 2022-01-11 RX ADMIN — SENNOSIDES AND DOCUSATE SODIUM 1 TABLET: 50; 8.6 TABLET ORAL at 08:01

## 2022-01-11 RX ADMIN — LEVOFLOXACIN 500 MG: 500 TABLET, FILM COATED ORAL at 08:01

## 2022-01-11 RX ADMIN — FERROUS SULFATE TAB 325 MG (65 MG ELEMENTAL FE) 1 EACH: 325 (65 FE) TAB at 08:01

## 2022-01-11 RX ADMIN — ATORVASTATIN CALCIUM 80 MG: 40 TABLET, FILM COATED ORAL at 08:01

## 2022-01-11 RX ADMIN — POLYETHYLENE GLYCOL 3350 17 G: 17 POWDER, FOR SOLUTION ORAL at 07:01

## 2022-01-11 RX ADMIN — GABAPENTIN 300 MG: 300 CAPSULE ORAL at 08:01

## 2022-01-11 RX ADMIN — CETIRIZINE HYDROCHLORIDE 10 MG: 10 TABLET, FILM COATED ORAL at 08:01

## 2022-01-11 RX ADMIN — TAMSULOSIN HYDROCHLORIDE 0.4 MG: 0.4 CAPSULE ORAL at 08:01

## 2022-01-11 RX ADMIN — PANTOPRAZOLE SODIUM 40 MG: 40 TABLET, DELAYED RELEASE ORAL at 08:01

## 2022-01-11 NOTE — PT/OT/SLP PROGRESS
Occupational Therapy   Treatment    Name: Elliott Peters  MRN: [id number]  Admitting Diagnosis:  Weakness       Recommendations:     Discharge Recommendations: home health OT  Discharge Equipment Recommendations:  walker, rolling,shower chair  Barriers to discharge:  None    Assessment:     Elliott Peters is a 81 y.o. male with a medical diagnosis of Weakness.  He presents with generalized muscle weakness and a decline in functional abilities. Performance deficits affecting function are weakness,impaired endurance,impaired self care skills,impaired functional mobilty.     Rehab Prognosis:  Good; patient would benefit from acute skilled OT services to address these deficits and reach maximum level of function.       Plan:     Patient to be seen 5 x/week to address the above listed problems via self-care/home management,therapeutic activities,therapeutic exercises,neuromuscular re-education  · Plan of Care Expires: 02/07/22  · Plan of Care Reviewed with: patient    Subjective     Pain/Comfort:  · Pain Rating 1: 0/10    Objective:     Communicated with: nurse prior to session.  Patient found up in chair with cryotherapy upon OT entry to room.    General Precautions: Standard, fall   Orthopedic Precautions:N/A   Braces: N/A  Respiratory Status: Room air     Occupational Performance:      Functional Mobility/Transfers:  · Patient completed Sit <> Stand Transfer with modified independence and supervision  with  rolling walker   · Patient completed Toilet Transfer Step Transfer technique with modified independence and supervision with  rolling walker  · Functional Mobility:     Activities of Daily Living:  · Feeding:  independence .  · Grooming: independence standing at sink  · Bathing: stand by assistance steadying assistance while standing to wash, rinse, and dry perineal and LEs.  · Upper Body Dressing: supervision /setup  · Lower Body Dressing: stand by assistance .  · Toileting: modified independence and supervision  ".      [facility] 6 Click ADL: 21    Treatment & Education: Supervisory visit conducted with Debbie ANDERSON re:POC and goals.  Patient completed green flex bar ex "ups", "downs", and "twists" 3x10; 3# bicep curls; 4# retraction/protraction; 3# overhead press.  Patient facilitated in reaching activity requiring Pt to lift objects from floor to increase ability/independence with LB dressing tasks.  Rest taken t/o as needed.     Patient left up in chair with all lines intact and call button in reachEducation:      GOALS:   Multidisciplinary Problems     Occupational Therapy Goals        Problem: Occupational Therapy Goal    Goal Priority Disciplines Outcome Interventions   Occupational Therapy Goal     OT, PT/OT Ongoing, Progressing    Description: Goals to be met by: Discharge     Patient will increase functional independence with ADLs by performing:    LE Dressing with Modified [place].  Toileting from toilet with Modified [place] for hygiene and clothing management.   Bathing from  edge of bed with Modified [place].  Toilet transfer to toilet with Modified [place].  Increased functional strength to 5/5 for increased safety and independence with self care and mobility tasks.                     Time Tracking:     OT Date of Treatment: 01/11/22    Billable Minutes:Self Care/Home Management x15  Therapeutic Activity x15  Therapeutic Exercise x15    OT/HARRIETT: OT          1/11/2022    "

## 2022-01-11 NOTE — PT/OT/SLP PROGRESS
Physical Therapy Treatment    Patient Name:  Elliott Peters   MRN:  [id number]    Recommendations:     Discharge Recommendations:  home health PT   Discharge Equipment Recommendations: walker, rolling,shower chair   Barriers to discharge: None    Assessment:     Elliott Peters is a 81 y.o. male admitted with a medical diagnosis of Weakness.  He presents with the following impairments/functional limitations:  weakness,gait instability,impaired endurance,impaired balance,impaired self care skills,pain,decreased lower extremity function .    Rehab Prognosis: Good; patient would benefit from acute skilled PT services to address these deficits and reach maximum level of function.    Recent Surgery: * No surgery found *      Plan:     During this hospitalization, patient to be seen BID to address the identified rehab impairments via gait training,therapeutic activities,therapeutic exercises,neuromuscular re-education and progress toward the following goals:    · Plan of Care Expires:       Subjective     Chief Complaint: Patient without complaints upon therapist arrival and was agreeable to tx today.   Patient/Family Comments/goals:   Pain/Comfort:  · Pain Rating 1: 0/10      Objective:     Communicated with nursing staff prior to session.  Patient found up in chair with cryotherapy upon PT entry to room.     General Precautions: Standard, fall   Orthopedic Precautions:N/A   Braces: N/A  Respiratory Status: Room air     Functional Mobility:  · Bed Mobility:     · Rolling Left:  stand by assistance  · Rolling Right: stand by assistance  · Scooting: stand by assistance  · Supine to Sit: stand by assistance  · Transfers:     · Sit to Stand:  stand by assistance with rolling walker  · Bed to Chair: stand by assistance with  rolling walker  using  Step Transfer  · Gait: 250 feet with one standing rest break with SBA and use of front wheeled walker      AM-PAC 6 CLICK MOBILITY  Turning over in bed (including adjusting  bedclothes, sheets and blankets)?: 4  Sitting down on and standing up from a chair with arms (e.g., wheelchair, bedside commode, etc.): 4  Moving from lying on back to sitting on the side of the bed?: 4  Moving to and from a bed to a chair (including a wheelchair)?: 3  Need to walk in hospital room?: 3  Climbing 3-5 steps with a railing?: 2  Basic Mobility Total Score: 20       Therapeutic Activities and Exercises:  Therapist facilitated transfers sit<>stand with SBA and ambulation on level surfaces providing cuesing for sequencing and safety with use of front wheeled walker. Patient also performed LE there ex to increase mobility and strength of RLE for improved gait mechanics and stability during ambulation to reduce risk of falls including Nu-step x10 mins, Heel slides, HS stretch, SAQ, Knee extension stretch    Patient left up in chair with call button in reach..    GOALS:   Multidisciplinary Problems     Physical Therapy Goals        Problem: Physical Therapy Goal    Goal Priority Disciplines Outcome Goal Variances Interventions   Physical Therapy Goal     PT, PT/OT Ongoing, Progressing     Description: Description: Goals to be met by: Discharge      Patient will increase functional independence with mobility by performin. Supine to sit with [place]  2. Sit to stand transfer with [place]  3. Bed to chair transfer with [place] using Rolling Walker  4. Gait  x 300 feet with Modified [place] using Rolling Walker.                    Time Tracking:     PT Received On: 22  PT Start Time: 40     PT Stop Time: 1019  PT Total Time (min): 39 min     Billable Minutes: Gait Training 14 and Therapeutic Exercise 25    Treatment Type: Treatment  PT/PTA: PT     PTA Visit Number: 0     2022

## 2022-01-11 NOTE — CONSULTS
Wt: 175# Pt known to me with recent transfer to [facility] R/T GI bleed.  Noted HH and ulcer on EGD.  Noted order for Levaquin.  Hgb 8.8, Hct 26.1. B-130.  Intake ~50% per recall. Opt gone to therapy during RDN visit.  RDN discussed pt with nursing and no GI complaints noted.  Last BM was .  Will f/u to further assess.

## 2022-01-11 NOTE — PLAN OF CARE
Problem: Occupational Therapy Goal  Goal: Occupational Therapy Goal  Description: Goals to be met by: Discharge     Patient will increase functional independence with ADLs by performing:    LE Dressing with Modified [place].  Toileting from toilet with Modified [place] for hygiene and clothing management.   Bathing from  edge of bed with Modified [place].  Toilet transfer to toilet with Modified [place].  Increased functional strength to 5/5 for increased safety and independence with self care and mobility tasks.    Current Functional Status:  Patient is making steady progress towards stated goals.    Outcome: Ongoing, Progressing

## 2022-01-11 NOTE — PLAN OF CARE
Problem: Adult Inpatient Plan of Care  Goal: Plan of Care Review  Outcome: Ongoing, Progressing  Goal: Patient-Specific Goal (Individualized)  Outcome: Ongoing, Progressing  Goal: Absence of Hospital-Acquired Illness or Injury  Outcome: Ongoing, Progressing  Goal: Optimal Comfort and Wellbeing  Outcome: Ongoing, Progressing     Problem: Respiratory Compromise (Pneumonia)  Goal: Effective Oxygenation and Ventilation  Outcome: Ongoing, Progressing     Problem: Fall Injury Risk  Goal: Absence of Fall and Fall-Related Injury  Outcome: Ongoing, Progressing     Problem: Infection  Goal: Absence of Infection Signs and Symptoms  Outcome: Ongoing, Progressing     Problem: Constipation  Goal: Effective Bowel Elimination  Outcome: Ongoing, Progressing     Problem: Skin Injury Risk Increased  Goal: Skin Health and Integrity  Outcome: Ongoing, Progressing     Problem: Pain Acute  Goal: Acceptable Pain Control and Functional Ability  Outcome: Ongoing, Progressing     Problem: Adjustment to Illness (Gastrointestinal Bleeding)  Goal: Optimal Coping with Acute Illness  Outcome: Ongoing, Progressing     Problem: Bleeding (Gastrointestinal Bleeding)  Goal: Hemostasis  Outcome: Ongoing, Progressing     Problem: Functional Ability Impaired (Knee Arthroplasty)  Goal: Optimal Functional Ability  Outcome: Ongoing, Progressing     Problem: Infection (Knee Arthroplasty)  Goal: Absence of Infection Signs and Symptoms  Outcome: Ongoing, Progressing     Problem: Neurovascular Compromise (Knee Arthroplasty)  Goal: Intact Neurovascular Status  Outcome: Ongoing, Progressing

## 2022-01-11 NOTE — PT/OT/SLP PROGRESS
Physical Therapy Treatment    Patient Name:  Elliott Peters   MRN:  [id number]    Recommendations:     Discharge Recommendations:  home health PT   Discharge Equipment Recommendations: walker, rolling,shower chair   Barriers to discharge: None    Assessment:     Elliott Peters is a 81 y.o. male admitted with a medical diagnosis of Weakness.  He presents with the following impairments/functional limitations:  weakness,gait instability,impaired endurance,impaired balance,impaired self care skills,pain,decreased lower extremity function .    Rehab Prognosis: Good; patient would benefit from acute skilled PT services to address these deficits and reach maximum level of function.    Recent Surgery: * No surgery found *      Plan:     During this hospitalization, patient to be seen BID to address the identified rehab impairments via gait training,therapeutic activities,therapeutic exercises,neuromuscular re-education and progress toward the following goals:    · Plan of Care Expires:       Subjective     Chief Complaint: Patient without complaints upon therapist arrival and was agreeable to tx today.   Patient/Family Comments/goals:   Pain/Comfort:  Pain Rating 1: 0/10      Objective:     Communicated with nursing staff prior to session.  Patient found up in chair with cryotherapy upon PT entry to room.     General Precautions: Standard, fall   Orthopedic Precautions:N/A   Braces: N/A  Respiratory Status: Room air     Functional Mobility:  · Bed Mobility:     · Rolling Left:  stand by assistance  · Rolling Right: stand by assistance  · Scooting: stand by assistance  · Supine to Sit: stand by assistance  · Transfers:     · Sit to Stand:  stand by assistance with rolling walker  · Bed to Chair: stand by assistance with  rolling walker  using  Step Transfer  · Gait: 250 feet with one standing rest break with SBA and use of front wheeled walker      AM-PAC 6 CLICK MOBILITY  Turning over in bed (including adjusting bedclothes,  sheets and blankets)?: 4  Sitting down on and standing up from a chair with arms (e.g., wheelchair, bedside commode, etc.): 4  Moving from lying on back to sitting on the side of the bed?: 4  Moving to and from a bed to a chair (including a wheelchair)?: 3  Need to walk in hospital room?: 3  Climbing 3-5 steps with a railing?: 2  Basic Mobility Total Score: 20       Therapeutic Activities and Exercises:  Therapist facilitated ambulation on level surfaces with use of front wheeled walker with SBA for 100 feet with therapist providing cues for sequencing and gait mechanics. Patient reported no increase in pain in right knee post treatment    Patient left up in chair with call button in reach..    GOALS:   Multidisciplinary Problems     Physical Therapy Goals        Problem: Physical Therapy Goal    Goal Priority Disciplines Outcome Goal Variances Interventions   Physical Therapy Goal     PT, PT/OT Ongoing, Progressing     Description: Description: Goals to be met by: Discharge      Patient will increase functional independence with mobility by performin. Supine to sit with [place]  2. Sit to stand transfer with [place]  3. Bed to chair transfer with [place] using Rolling Walker  4. Gait  x 300 feet with Modified [place] using Rolling Walker.                    Time Tracking:     PT Received On: 22  PT Start Time: 1415     PT Stop Time: 1430  PT Total Time (min): 15 min     Billable Minutes: Gait Training 15    Treatment Type: Treatment  PT/PTA: PT     PTA Visit Number: 0     2022

## 2022-01-12 PROCEDURE — 25000003 PHARM REV CODE 250: Performed by: EMERGENCY MEDICINE

## 2022-01-12 PROCEDURE — 97530 THERAPEUTIC ACTIVITIES: CPT | Mod: CO

## 2022-01-12 PROCEDURE — 25000003 PHARM REV CODE 250: Performed by: NURSE PRACTITIONER

## 2022-01-12 PROCEDURE — 97535 SELF CARE MNGMENT TRAINING: CPT | Mod: CO

## 2022-01-12 PROCEDURE — 94761 N-INVAS EAR/PLS OXIMETRY MLT: CPT

## 2022-01-12 PROCEDURE — 97116 GAIT TRAINING THERAPY: CPT | Mod: CQ

## 2022-01-12 PROCEDURE — 11000004 HC SNF PRIVATE

## 2022-01-12 PROCEDURE — 97110 THERAPEUTIC EXERCISES: CPT | Mod: CQ

## 2022-01-12 PROCEDURE — 97110 THERAPEUTIC EXERCISES: CPT | Mod: CO

## 2022-01-12 RX ADMIN — LEVOFLOXACIN 500 MG: 500 TABLET, FILM COATED ORAL at 09:01

## 2022-01-12 RX ADMIN — SENNOSIDES AND DOCUSATE SODIUM 1 TABLET: 50; 8.6 TABLET ORAL at 09:01

## 2022-01-12 RX ADMIN — FERROUS SULFATE TAB 325 MG (65 MG ELEMENTAL FE) 1 EACH: 325 (65 FE) TAB at 09:01

## 2022-01-12 RX ADMIN — CETIRIZINE HYDROCHLORIDE 10 MG: 10 TABLET, FILM COATED ORAL at 09:01

## 2022-01-12 RX ADMIN — CLOPIDOGREL 75 MG: 75 TABLET, FILM COATED ORAL at 09:01

## 2022-01-12 RX ADMIN — PANTOPRAZOLE SODIUM 40 MG: 40 TABLET, DELAYED RELEASE ORAL at 09:01

## 2022-01-12 RX ADMIN — TAMSULOSIN HYDROCHLORIDE 0.4 MG: 0.4 CAPSULE ORAL at 09:01

## 2022-01-12 RX ADMIN — ATORVASTATIN CALCIUM 80 MG: 40 TABLET, FILM COATED ORAL at 09:01

## 2022-01-12 RX ADMIN — POLYETHYLENE GLYCOL 3350 17 G: 17 POWDER, FOR SOLUTION ORAL at 05:01

## 2022-01-12 RX ADMIN — HYDROCODONE BITARTRATE AND ACETAMINOPHEN 1 TABLET: 10; 325 TABLET ORAL at 09:01

## 2022-01-12 RX ADMIN — GABAPENTIN 300 MG: 300 CAPSULE ORAL at 09:01

## 2022-01-12 RX ADMIN — ASPIRIN 325 MG: 325 TABLET ORAL at 09:01

## 2022-01-12 NOTE — PLAN OF CARE
Care Plan reviewed  Problem: Neurovascular Compromise (Knee Arthroplasty)  Goal: Intact Neurovascular Status  Outcome: Ongoing, Progressing     Problem: Infection (Knee Arthroplasty)  Goal: Absence of Infection Signs and Symptoms  Outcome: Ongoing, Progressing     Problem: Functional Ability Impaired (Knee Arthroplasty)  Goal: Optimal Functional Ability  Outcome: Ongoing, Progressing

## 2022-01-12 NOTE — PT/OT/SLP PROGRESS
Physical Therapy Treatment    Patient Name:  Elliott Peters   MRN:  [id number]    Recommendations:     Discharge Recommendations:  home with home health   Discharge Equipment Recommendations: walker, rolling,bedside commode,shower chair   Barriers to discharge: None    Assessment:     Elliott Peters is a 81 y.o. male admitted with a medical diagnosis of Weakness.  He presents with the following impairments/functional limitations:  weakness,impaired balance,impaired self care skills,impaired functional mobilty,orthopedic precautions.    Patient demonstrating progression towards goals with improving functional strength, ROM, and mobility with the use of AD.     Rehab Prognosis: Good; patient would benefit from acute skilled PT services to address these deficits and reach maximum level of function.    Recent Surgery: * No surgery found *      Plan:     During this hospitalization, patient to be seen BID to address the identified rehab impairments via gait training,therapeutic activities,therapeutic exercises,neuromuscular re-education and progress toward the following goals:    · Plan of Care Expires:  02/02/22    Subjective     Chief Complaint: complaints of mild right knee pain   Patient/Family Comments/goals: return to home  Pain/Comfort:  Pain Rating 1: 2/10  Location - Side 1: Right  Location - Orientation 1: generalized  Location 1: knee  Pain Addressed 1: Distraction  Pain Rating Post-Intervention 1: 2/10      Objective:     Communicated with nursing staff prior to session.  Patient found up in chair upon PT entry to room.     General Precautions: Standard, fall   Orthopedic Precautions:RLE weight bearing as tolerated   Braces: N/A  Respiratory Status: Room air     Functional Mobility:  · Transfers:     · Sit to Stand:  contact guard assistance with rolling walker  Gait:   Patient performed 220 feet with the use of rolling walker requiring Stand-by Assistance and Contact Guard Assistance  · Patient demonstrated  the following gait deviations: improving trent, step length, no rest break required, no episodes of LOB noted.       AM-PAC 6 CLICK MOBILITY  Turning over in bed (including adjusting bedclothes, sheets and blankets)?: 4  Sitting down on and standing up from a chair with arms (e.g., wheelchair, bedside commode, etc.): 4  Moving from lying on back to sitting on the side of the bed?: 4  Moving to and from a bed to a chair (including a wheelchair)?: 4  Need to walk in hospital room?: 4  Climbing 3-5 steps with a railing?: 2  Basic Mobility Total Score: 22       Therapeutic Activities and Exercises:   Nu step x10 minutes level 3 followed by BLE exercises including: LAQ 3#, march 3#, heel raises 3#, hip abduction GTB, heel slides, manual knee / stretch, hamstring stretch. Increased time, effort, and rest breaks required. Gait trial (see details above)    Patient left supine with call button in reach and wife present.    GOALS:   Multidisciplinary Problems     Physical Therapy Goals        Problem: Physical Therapy Goal    Goal Priority Disciplines Outcome Goal Variances Interventions   Physical Therapy Goal     PT, PT/OT Ongoing, Progressing     Description: Description: Goals to be met by: Discharge      Patient will increase functional independence with mobility by performin. Supine to sit with [place]  2. Sit to stand transfer with [place]  3. Bed to chair transfer with [place] using Rolling Walker  4. Gait  x 300 feet with Modified [place] using Rolling Walker.                    Time Tracking:     PT Received On: 22  PT Total Time (min): 15    Billable Minutes: Gait Training 15     Treatment Type: Treatment  PT/PTA: PTA     PTA Visit Number: 2     2022

## 2022-01-12 NOTE — PLAN OF CARE
[facility] - Medical Surgical Unit  Discharge Reassessment    Primary Care Provider: Izzy Olivier NP    Expected Discharge Date:     Reassessment (most recent)     Discharge Reassessment - 01/12/22 1126        Discharge Reassessment    Assessment Type Discharge Planning Reassessment     Did the patient's condition or plan change since previous assessment? Yes     Discharge Plan discussed with: Patient     Communicated BRAULIO with patient/caregiver Yes     Discharge Plan A Home with family;Home Health        Post-Acute Status    Post-Acute Authorization Home Health     Home Health Status Referrals Sent               discussed with pt discharge planned for Friday and [facility] he has all needed DME

## 2022-01-12 NOTE — PT/OT/SLP PROGRESS
Occupational Therapy   Treatment    Name: Elliott Peters  MRN: [id number]  Admitting Diagnosis:  Weakness       Recommendations:     Discharge Recommendations: home health OT,home with home health  Discharge Equipment Recommendations:  walker, rolling,shower chair,bedside commode  Barriers to discharge:  None    Assessment:     Elliott Peters is a 81 y.o. male with a medical diagnosis of Weakness.  He presents with the following Performance deficits affecting function are weakness,impaired endurance,impaired balance,impaired self care skills,impaired functional mobilty,decreased coordination.     Rehab Prognosis:  Good; patient would benefit from acute skilled OT services to address these deficits and reach maximum level of function.       Plan:     Patient to be seen 5 x/week to address the above listed problems via self-care/home management,therapeutic activities,therapeutic exercises  · Plan of Care Expires: 02/07/22  · Plan of Care Reviewed with: patient    Subjective   Patient was laying supine in bed upon ANDERSON arrival. Patient stated he was feeling fine with no complaints of pain and was agreeable to participate in todays session.   Pain/Comfort:  · Pain Rating 1: 0/10    Objective:     Communicated with: nursing staff prior to session.  Patient found supine with cryotherapy upon OT entry to room.    General Precautions: Standard, fall   Orthopedic Precautions:N/A   Braces: N/A  Respiratory Status: Room air     Occupational Performance:     Bed Mobility:    · Patient completed Rolling/Turning to Left with  stand by assistance  · Patient completed Rolling/Turning to Right with stand by assistance  · Patient completed Scooting/Bridging with stand by assistance  · Patient completed Supine to Sit with stand by assistance     Functional Mobility/Transfers:  · Patient completed Sit <> Stand Transfer with stand by assistance  with  rolling walker   · Patient completed Bed <> Chair Transfer using Step Transfer  technique with stand by assistance with rolling walker  · Patient completed Chair <> Mat Step Transfer technique with stand by assistance with rolling walker     Activities of Daily Living:  · Upper Body Dressing: stand by assistance While sitting EOB, patient completed donning clean gown with SVN.  · Lower Body Dressing: minimum assistance While sitting EOB, patient required MIN A to don socks on RLE but was able to don sock on LLE with SVN. Increased time needed.       [facility] 6 Click ADL: 21    Therapeutic activities:  Patient completed 10 minutes on UBE ergometer to work on UB strength and endurance in order to complete ADLs and transfers w/ less A.   While standing, pt engaged in standing tolerance activity to increase overall tolerance and static/dynamic stand balance in order to complete ADLs standing at sink with less assistance. Pt stood 5:51 minutes with Stand-by Assistance before requiring a rest break.     Therapeutic exercises:  Patient completed the following exercises to work on UB strength in order to complete ADLs, bed mobility, and transfers with less assistance.    -Bicep curls: 3 sets of 10 with 2# dumbbell  -Chest press: 3 sets of 10 with 2# dumbbell  -Shoulder flex: 3 sets of 10 with 2# dumbbell  -Internal/External rotation: 3 sets of 10 with 2# dumbbell    Increased time/effort needed to complete each exercise.       Patient left up in chair with call button in reach and RN notifiedEducation:      GOALS:   Multidisciplinary Problems     Occupational Therapy Goals        Problem: Occupational Therapy Goal    Goal Priority Disciplines Outcome Interventions   Occupational Therapy Goal     OT, PT/OT Ongoing, Progressing    Description: Goals to be met by: Discharge     Patient will increase functional independence with ADLs by performing:    LE Dressing with Modified [place].  Toileting from toilet with Modified [place] for hygiene and clothing management.   Bathing from  edge of bed with  Modified [place].  Toilet transfer to toilet with Modified [place].  Increased functional strength to 5/5 for increased safety and independence with self care and mobility tasks.                     Time Tracking:     OT Date of Treatment: 01/12/22  OT Start Time: 0900  OT Stop Time: 0938  OT Total Time (min): 38 min    Billable Minutes:Self Care/Home Management 8 minutes  Therapeutic Activity 15 minutes  Therapeutic Exercise 15 minutes    OT/HARRIETT: HARRIETT LORENZANA Visit Number: 1    HARRIETT Goode  1/12/2022  12:16 PM

## 2022-01-12 NOTE — PT/OT/SLP PROGRESS
Physical Therapy Treatment    Patient Name:  Elliott Peters   MRN:  [id number]    Recommendations:     Discharge Recommendations:  home with home health   Discharge Equipment Recommendations: walker, rolling,bedside commode,shower chair   Barriers to discharge: None    Assessment:     Elliott Peters is a 81 y.o. male admitted with a medical diagnosis of Weakness.  He presents with the following impairments/functional limitations:  weakness,impaired balance,impaired self care skills,impaired functional mobilty,orthopedic precautions.    Rehab Prognosis: Good; patient would benefit from acute skilled PT services to address these deficits and reach maximum level of function.    Recent Surgery: * No surgery found *      Plan:     During this hospitalization, patient to be seen BID to address the identified rehab impairments via gait training,therapeutic activities,therapeutic exercises,neuromuscular re-education and progress toward the following goals:    · Plan of Care Expires:  02/02/22    Subjective     Chief Complaint: complaints of mild right knee pain   Patient/Family Comments/goals: return to home  Pain/Comfort:  · Pain Rating 1: 2/10  · Location - Side 1: Right  · Location - Orientation 1: generalized  · Location 1: knee  · Pain Addressed 1: Distraction  · Pain Rating Post-Intervention 1: 3/10      Objective:     Communicated with nursing staff prior to session.  Patient found up in chair upon PT entry to room.     General Precautions: Standard, fall   Orthopedic Precautions:RLE weight bearing as tolerated   Braces: N/A  Respiratory Status: Room air     Functional Mobility:  · Transfers:     · Sit to Stand:  contact guard assistance with rolling walker  Gait:   Patient performed 200 feet with the use of rolling walker requiring Stand-by Assistance and Contact Guard Assistance  · Patient demonstrated the following gait deviations: improving trent, step length, no rest break required, no episodes of LOB noted.        AM-PAC 6 CLICK MOBILITY  Turning over in bed (including adjusting bedclothes, sheets and blankets)?: 4  Sitting down on and standing up from a chair with arms (e.g., wheelchair, bedside commode, etc.): 4  Moving from lying on back to sitting on the side of the bed?: 4  Moving to and from a bed to a chair (including a wheelchair)?: 4  Need to walk in hospital room?: 4  Climbing 3-5 steps with a railing?: 2  Basic Mobility Total Score: 22       Therapeutic Activities and Exercises:   Nu step x10 minutes level 3 followed by BLE exercises including: LAQ 3#, march 3#, heel raises 3#, hip abduction GTB, heel slides, manual knee / stretch, hamstring stretch. Increased time, effort, and rest breaks required. Gait trial (see details above)    Patient left up in chair with call button in reach..    GOALS:   Multidisciplinary Problems     Physical Therapy Goals        Problem: Physical Therapy Goal    Goal Priority Disciplines Outcome Goal Variances Interventions   Physical Therapy Goal     PT, PT/OT Ongoing, Progressing     Description: Description: Goals to be met by: Discharge      Patient will increase functional independence with mobility by performin. Supine to sit with [place]  2. Sit to stand transfer with [place]  3. Bed to chair transfer with [place] using Rolling Walker  4. Gait  x 300 feet with Modified [place] using Rolling Walker.                    Time Tracking:     PT Received On: 22  PT Total Time (min): 40    Billable Minutes: Gait Training 15 and Therapeutic Exercise 25    Treatment Type: Treatment  PT/PTA: PTA     PTA Visit Number: 1     2022

## 2022-01-12 NOTE — PLAN OF CARE
IM explained and obtained. Choice obtained for hh and dme. Ss notified nestor at [facility] hh and clinicals faxed. Ss faxed dme order to the medical store. Ss following for dc needs.

## 2022-01-13 PROBLEM — R53.1 WEAKNESS: Status: RESOLVED | Noted: 2021-12-31 | Resolved: 2022-01-13

## 2022-01-13 PROBLEM — J18.9 PNEUMONIA: Status: RESOLVED | Noted: 2022-01-05 | Resolved: 2022-01-13

## 2022-01-13 PROCEDURE — 99315 PR NURSING FAC DISCHRGE DAY,1-30 MIN: ICD-10-PCS | Mod: ,,, | Performed by: EMERGENCY MEDICINE

## 2022-01-13 PROCEDURE — 99315 NF DSCHRG MGMT 30 MIN/LESS: CPT | Mod: ,,, | Performed by: EMERGENCY MEDICINE

## 2022-01-13 PROCEDURE — 25000003 PHARM REV CODE 250: Performed by: EMERGENCY MEDICINE

## 2022-01-13 PROCEDURE — 94761 N-INVAS EAR/PLS OXIMETRY MLT: CPT

## 2022-01-13 PROCEDURE — 97110 THERAPEUTIC EXERCISES: CPT

## 2022-01-13 PROCEDURE — 11000004 HC SNF PRIVATE

## 2022-01-13 PROCEDURE — 25000003 PHARM REV CODE 250: Performed by: NURSE PRACTITIONER

## 2022-01-13 PROCEDURE — 97110 THERAPEUTIC EXERCISES: CPT | Mod: CQ

## 2022-01-13 PROCEDURE — 97530 THERAPEUTIC ACTIVITIES: CPT

## 2022-01-13 PROCEDURE — 97116 GAIT TRAINING THERAPY: CPT | Mod: CQ

## 2022-01-13 RX ORDER — LANOLIN ALCOHOL/MO/W.PET/CERES
1 CREAM (GRAM) TOPICAL DAILY
Qty: 30 TABLET | Refills: 0 | Status: SHIPPED | OUTPATIENT
Start: 2022-01-14 | End: 2022-10-19

## 2022-01-13 RX ORDER — GABAPENTIN 300 MG/1
300 CAPSULE ORAL NIGHTLY
Qty: 30 CAPSULE | Refills: 11 | Status: SHIPPED | OUTPATIENT
Start: 2022-01-13 | End: 2022-10-19

## 2022-01-13 RX ORDER — ASPIRIN 81 MG/1
81 TABLET ORAL DAILY
Qty: 30 TABLET | Refills: 0 | Status: SHIPPED | OUTPATIENT
Start: 2022-01-14 | End: 2022-04-19

## 2022-01-13 RX ORDER — CLOPIDOGREL BISULFATE 75 MG/1
75 TABLET ORAL DAILY
Qty: 30 TABLET | Refills: 11 | Status: SHIPPED | OUTPATIENT
Start: 2022-01-14 | End: 2022-10-19 | Stop reason: SDUPTHER

## 2022-01-13 RX ORDER — NITROGLYCERIN 0.4 MG/1
0.4 TABLET SUBLINGUAL EVERY 5 MIN PRN
Qty: 100 TABLET | Refills: 0 | Status: SHIPPED | OUTPATIENT
Start: 2022-01-13 | End: 2023-12-01

## 2022-01-13 RX ORDER — TAMSULOSIN HYDROCHLORIDE 0.4 MG/1
0.4 CAPSULE ORAL DAILY
Qty: 30 CAPSULE | Refills: 11 | Status: SHIPPED | OUTPATIENT
Start: 2022-01-14 | End: 2022-10-19

## 2022-01-13 RX ADMIN — SENNOSIDES AND DOCUSATE SODIUM 1 TABLET: 50; 8.6 TABLET ORAL at 10:01

## 2022-01-13 RX ADMIN — CETIRIZINE HYDROCHLORIDE 10 MG: 10 TABLET, FILM COATED ORAL at 08:01

## 2022-01-13 RX ADMIN — LEVOFLOXACIN 500 MG: 500 TABLET, FILM COATED ORAL at 08:01

## 2022-01-13 RX ADMIN — POLYETHYLENE GLYCOL 3350 17 G: 17 POWDER, FOR SOLUTION ORAL at 05:01

## 2022-01-13 RX ADMIN — SENNOSIDES AND DOCUSATE SODIUM 1 TABLET: 50; 8.6 TABLET ORAL at 08:01

## 2022-01-13 RX ADMIN — ASPIRIN 325 MG: 325 TABLET ORAL at 08:01

## 2022-01-13 RX ADMIN — GABAPENTIN 300 MG: 300 CAPSULE ORAL at 10:01

## 2022-01-13 RX ADMIN — ATORVASTATIN CALCIUM 80 MG: 40 TABLET, FILM COATED ORAL at 10:01

## 2022-01-13 RX ADMIN — FERROUS SULFATE TAB 325 MG (65 MG ELEMENTAL FE) 1 EACH: 325 (65 FE) TAB at 08:01

## 2022-01-13 RX ADMIN — CLOPIDOGREL 75 MG: 75 TABLET, FILM COATED ORAL at 08:01

## 2022-01-13 RX ADMIN — PANTOPRAZOLE SODIUM 40 MG: 40 TABLET, DELAYED RELEASE ORAL at 08:01

## 2022-01-13 RX ADMIN — TAMSULOSIN HYDROCHLORIDE 0.4 MG: 0.4 CAPSULE ORAL at 08:01

## 2022-01-13 NOTE — ASSESSMENT & PLAN NOTE
EGD on 01/01/22 by Dr. Aguirre revealed hiatal hernia and non-bleeding gastric ulcer. H&H8.3/26.3 with plt 278  -Continue pantoprazole 40 mg one tab by mouth daily  -Ferrous sulfate one tab by mouth daily   1/13   Last HGB 8.8

## 2022-01-13 NOTE — DISCHARGE SUMMARY
[facility] Surgical Unit  Hospital Medicine  Discharge Summary      Patient Name: Elliott Peters  MRN: [id number]  Patient Class: - Swing  Admission Date: 1/7/2022  Hospital Length of Stay: 6 days  Discharge Date and Time:  01/14/2022 10  AM  Attending Physician: Geovany Mejia MD   Discharging Provider: Geovany Mejia MD  Primary Care Provider: Izzy Olivier NP      HPI:   Mr. Elliott Peters is an 82 yo WM with past medical history of HTN, HLD, renal stones, OA, CAD with 3 stents and ROSI with CPAP transferred [facility] to [facility] bed for PT/OT rehabilitation for weakness s/p right TKR on 12/28 by Dr. Epps. [place] on right knee to be removed on 01/11. Had work up at [place] by Dr. Aguirre for GI bleed. EGD revealed hiatal hernia and superficial non-bleeding gastric ulcer. Eliquis 2.5 mg was restarted on 01/02 until 01/10, then will be discontinue Eliquis and start back on Plavix and ASA at that time. H&H at discharge today was up to 8.3/26.3 and plt 278. He completed Maxipime and Vacomycin for pneumonia and discharged on Levaquin 500 mg daily for 7 more days.     He is followed by Dr. Freire for CAD and Dr. Allen for nephrolithiasis.  Follow up with Dr. Epps on 01/19 and Dr. Aguirre in 4 weeks.    1/08 He is awake and alert today. No complaints. He seems motivated for PT      * No surgery found *      Hospital Course:   1/13   He is almost 2 weeks S/P ORIF RT Knee by dr Epps. He is doing  well. His GI issue is stable with no bleeding. Knee incision healing   Ready for discharge.       Goals of Care Treatment Preferences:  Code Status: Full Code      Consults:   Consults (From admission, onward)        Status Ordering Provider     Inpatient consult to Registered Dietitian/Nutritionist  Once        Provider:  (Not yet assigned)    Completed LETY GILBERT          Anemia  EGD on 01/01/22 by Dr. Aguirre revealed hiatal hernia and non-bleeding gastric ulcer. 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    Specialty: Family Medicine  Contact information:  [address]  [facility] [place] [address]  [phone]                       Patient Instructions:   No discharge procedures on file.    Significant Diagnostic Studies: Labs: BMP: No results for input(s): GLU, NA, K, CL, CO2, BUN, CREATININE, CALCIUM, MG in the last 48 hours. and CBC No results for input(s): WBC, HGB, HCT, PLT in the last 48 hours.    Pending Diagnostic Studies:     None         Medications:  Reconciled Home Medications:      Medication List      START taking these medications    aspirin 81 MG EC tablet  Commonly known as: ECOTRIN  Take 1 tablet (81 mg total) by mouth once daily.  Start taking on: January 14, 2022     clopidogreL 75 mg tablet  Commonly known as: PLAVIX  Take 1 tablet (75 mg total) by mouth once daily.  Start taking on: January 14, 2022     ferrous sulfate Tab tablet  Commonly known as: FEOSOL  Take 1 tablet (1 each total) by mouth once daily.  Start taking on: January 14, 2022     tamsulosin 0.4 mg Cap  Commonly known as: FLOMAX  Take 1 capsule (0.4 mg total) by mouth once daily.  Start taking on: January 14, 2022  Replaces: terazosin 5 MG capsule        CHANGE how you take  these medications    gabapentin 300 MG capsule  Commonly known as: NEURONTIN  Take 1 capsule (300 mg total) by mouth every evening.  What changed:   · medication strength  · how much to take  · how to take this  · when to take this  · additional instructions     nitroGLYCERIN 0.4 MG SL tablet  Commonly known as: NITROSTAT  Place 1 tablet (0.4 mg total) under the tongue every 5 (five) minutes as needed for Chest pain.  What changed:   · how much to take  · how to take this  · when to take this  · reasons to take this        CONTINUE taking these medications    pantoprazole 40 MG tablet  Commonly known as: PROTONIX  Take 40 mg by mouth once daily.     rosuvastatin 20 MG tablet  Commonly known as: CRESTOR  Take 20 mg by mouth once daily.        STOP taking these medications    ALPRAZolam 0.5 MG tablet  Commonly known as: XANAX     amLODIPine 5 MG tablet  Commonly known as: NORVASC     apixaban 2.5 mg Tab  Commonly known as: ELIQUIS     carvediloL 3.125 MG tablet  Commonly known as: COREG     cetirizine 10 MG tablet  Commonly known as: ZYRTEC     clindamycin 1 % lotion  Commonly known as: CLEOCIN T     cyclobenzaprine 10 MG tablet  Commonly known as: FLEXERIL     HYDROcodone-acetaminophen  mg per tablet  Commonly known as: NORCO     levoFLOXacin 500 MG tablet  Commonly known as: LEVAQUIN     meloxicam 7.5 MG tablet  Commonly known as: MOBIC     permethrin 5 % cream  Commonly known as: ELIMITE     polyethylene glycol 17 gram Pwpk  Commonly known as: GLYCOLAX     predniSONE 10 MG tablet  Commonly known as: DELTASONE     terazosin 5 MG capsule  Commonly known as: HYTRIN  Replaced by: tamsulosin 0.4 mg Cap     triamcinolone acetonide 0.1% 0.1 % ointment  Commonly known as: KENALOG            Indwelling Lines/Drains at time of discharge:   Lines/Drains/Airways     None                 Time spent on the discharge of patient: 30 minutes         Geovany Mejia MD  Department of Hospital Medicine  [facility]  Surgical Unit

## 2022-01-13 NOTE — HOSPITAL COURSE
1/13   He is almost 2 weeks S/P ORIF RT Knee by dr Epps. He is doing  well. His GI issue is stable with no bleeding. Knee incision healing   Ready for discharge.

## 2022-01-13 NOTE — PT/OT/SLP PROGRESS
Occupational Therapy   Treatment    Name: Elliott Peters  MRN: [id number]  Admitting Diagnosis:  Weakness       Recommendations:     Discharge Recommendations: home health OT,home with home health  Discharge Equipment Recommendations:  walker, rolling,shower chair,bedside commode  Barriers to discharge:  None    Assessment:     Elliott Peters is a 81 y.o. male with a medical diagnosis of Weakness.  He presents with generalized muscle weakness. Performance deficits affecting function are weakness,impaired endurance,impaired self care skills,impaired functional mobilty.     Rehab Prognosis:  Good; patient would benefit from acute skilled OT services to address these deficits and reach maximum level of function.       Plan:     Patient to be seen 5 x/week to address the above listed problems via self-care/home management,therapeutic activities,therapeutic exercises  · Plan of Care Expires: 02/07/22  · Plan of Care Reviewed with: patient    Subjective     Pain/Comfort:  ·      Objective:     Communicated with: nurse prior to session.  Patient found up in chair with cryotherapy upon OT entry to room.    General Precautions: Standard, fall   Orthopedic Precautions:N/A   Braces:    Respiratory Status: Room air     Occupational Performance:      Functional Mobility/Transfers:  · Patient completed Sit <> Stand Transfer with modified independence  with  rolling walker   · Functional Mobility: Patient facilitated in dyn stand activity requiring Patient to reach in various planes to challenge postural control mechanisms needed for fall prevention.  Patient also facilitated in stand activity to increase overall tolerance for stand position as it relates to Patient's ability to perform self care and mobility tasks independently and efficiently.  Patient noted to tolerate greater than 5 minutes of stand activity without overt s/sx of fatigue. Patient SBA to Modified Independent t/o with Patient demonstrating good safety awareness  t/o.     Treatment & Education:  Patient engaged in endurance building ex via UBE with minimum/moderate resistance x 10 minutes to increase overall activity tolerance needed for ADL performance.     Patient left up in chair with all lines intact and call button in reachEducation:      GOALS:   Multidisciplinary Problems     Occupational Therapy Goals        Problem: Occupational Therapy Goal    Goal Priority Disciplines Outcome Interventions   Occupational Therapy Goal     OT, PT/OT Ongoing, Progressing    Description: Goals to be met by: Discharge     Patient will increase functional independence with ADLs by performing:    LE Dressing with Modified [place].  Toileting from toilet with Modified [place] for hygiene and clothing management.   Bathing from  edge of bed with Modified [place].  Toilet transfer to toilet with Modified [place].  Increased functional strength to 5/5 for increased safety and independence with self care and mobility tasks.                     Time Tracking:     OT Date of Treatment: 01/13/22    Billable Minutes:Therapeutic Activity x20  Therapeutic Exercise x10    OT/HARRIETT: OT     HARRIETT Visit Number: 1 1/13/2022

## 2022-01-13 NOTE — PT/OT/SLP PROGRESS
Physical Therapy Treatment    Patient Name:  Elliott Peters   MRN:  [id number]    Recommendations:     Discharge Recommendations:  home with home health   Discharge Equipment Recommendations: walker, rolling,bedside commode,shower chair   Barriers to discharge: None    Assessment:     Elliott Peters is a 81 y.o. male admitted with a medical diagnosis of Weakness.  He presents with the following impairments/functional limitations:  weakness,impaired balance,impaired self care skills,impaired functional mobilty,orthopedic precautions.    Rehab Prognosis: Good; patient would benefit from acute skilled PT services to address these deficits and reach maximum level of function.    Recent Surgery: * No surgery found *      Plan:     During this hospitalization, patient to be seen BID to address the identified rehab impairments via gait training,therapeutic activities,therapeutic exercises,neuromuscular re-education and progress toward the following goals:    · Plan of Care Expires:  02/02/22    Subjective     Chief Complaint: complaints of mild right knee pain and soreness  Patient/Family Comments/goals: return to home  Pain/Comfort:  Pain Rating 1: 2/10  Location - Side 1: Right  Location - Orientation 1: generalized  Location 1: knee  Pain Addressed 1: Distraction  Pain Rating Post-Intervention 1: 2/10      Objective:     Communicated with nursing staff prior to session.  Patient found up in chair upon PT entry to room.     General Precautions: Standard, fall   Orthopedic Precautions:RLE weight bearing as tolerated   Braces: N/A  Respiratory Status: Room air     Functional Mobility:  · Transfers:     · Sit to Stand:  contact guard assistance with rolling walker  Gait:   Patient performed 200 feet with the use of rolling walker requiring Stand-by Assistance and Contact Guard Assistance  · Patient demonstrated the following gait deviations: improving trent, step length, no rest break required, no episodes of LOB noted.        AM-PAC 6 CLICK MOBILITY  Turning over in bed (including adjusting bedclothes, sheets and blankets)?: 4  Sitting down on and standing up from a chair with arms (e.g., wheelchair, bedside commode, etc.): 4  Moving from lying on back to sitting on the side of the bed?: 4  Moving to and from a bed to a chair (including a wheelchair)?: 4  Need to walk in hospital room?: 4  Climbing 3-5 steps with a railing?: 3  Basic Mobility Total Score: 23       Therapeutic Activities and Exercises:   Nu step x10 minutes level 3 followed by BLE exercises including: SAQ 3#, standing heel raises, march, mini squats, manual knee / stretch, hamstring stretch. Increased time, effort, and rest breaks required. Gait trial (see details above)    Patient left supine with call button in reach..    GOALS:   Multidisciplinary Problems     Physical Therapy Goals        Problem: Physical Therapy Goal    Goal Priority Disciplines Outcome Goal Variances Interventions   Physical Therapy Goal     PT, PT/OT Ongoing, Progressing     Description: Description: Goals to be met by: Discharge      Patient will increase functional independence with mobility by performin. Supine to sit with [place]  2. Sit to stand transfer with [place]  3. Bed to chair transfer with [place] using Rolling Walker  4. Gait  x 300 feet with Modified [place] using Rolling Walker.                    Time Tracking:     PT Received On: 22  PT Total Time (min): 40    Billable Minutes: Gait Training 15 and Therapeutic Exercise 25    Treatment Type: Treatment  PT/PTA: PTA     PTA Visit Number: 3     2022

## 2022-01-13 NOTE — PLAN OF CARE
Care plan reviewed  Problem: Adult Inpatient Plan of Care  Goal: Readiness for Transition of Care  Outcome: Ongoing, Progressing

## 2022-01-14 VITALS
SYSTOLIC BLOOD PRESSURE: 118 MMHG | OXYGEN SATURATION: 95 % | RESPIRATION RATE: 19 BRPM | DIASTOLIC BLOOD PRESSURE: 63 MMHG | WEIGHT: 174.81 LBS | HEIGHT: 66 IN | HEART RATE: 93 BPM | BODY MASS INDEX: 28.09 KG/M2 | TEMPERATURE: 99 F

## 2022-01-14 PROCEDURE — 25000003 PHARM REV CODE 250: Performed by: NURSE PRACTITIONER

## 2022-01-14 PROCEDURE — 25000003 PHARM REV CODE 250: Performed by: EMERGENCY MEDICINE

## 2022-01-14 PROCEDURE — 94761 N-INVAS EAR/PLS OXIMETRY MLT: CPT

## 2022-01-14 RX ADMIN — ASPIRIN 325 MG: 325 TABLET ORAL at 09:01

## 2022-01-14 RX ADMIN — FERROUS SULFATE TAB 325 MG (65 MG ELEMENTAL FE) 1 EACH: 325 (65 FE) TAB at 09:01

## 2022-01-14 RX ADMIN — SENNOSIDES AND DOCUSATE SODIUM 1 TABLET: 50; 8.6 TABLET ORAL at 09:01

## 2022-01-14 RX ADMIN — POLYETHYLENE GLYCOL 3350 17 G: 17 POWDER, FOR SOLUTION ORAL at 09:01

## 2022-01-14 RX ADMIN — TAMSULOSIN HYDROCHLORIDE 0.4 MG: 0.4 CAPSULE ORAL at 09:01

## 2022-01-14 RX ADMIN — CLOPIDOGREL 75 MG: 75 TABLET, FILM COATED ORAL at 09:01

## 2022-01-14 RX ADMIN — CETIRIZINE HYDROCHLORIDE 10 MG: 10 TABLET, FILM COATED ORAL at 09:01

## 2022-01-14 RX ADMIN — PANTOPRAZOLE SODIUM 40 MG: 40 TABLET, DELAYED RELEASE ORAL at 09:01

## 2022-01-14 RX ADMIN — LEVOFLOXACIN 500 MG: 500 TABLET, FILM COATED ORAL at 09:01

## 2022-01-14 NOTE — PROGRESS NOTES
Mr. Elliott ePters is an 80 y/o WM who is a swingbed day #8 for PT/OT rehabilitation s/p right TKR on 12/28. HRRR. Lungs CTA. Right knee incision with steri strips healing well without signs or symptoms of infection. Reports feeling well today, denies any complaints today. Will proceed with discharge home today as scheduled.

## 2022-01-14 NOTE — NURSING
Discharge instructions given to patient and spouse.  Voiced understanding.  Covid 19 vaccination information given. Patient discharged home with Home Health.

## 2022-01-14 NOTE — PLAN OF CARE
Patient discharged to home with Home Health.    Problem: Adult Inpatient Plan of Care  Goal: Plan of Care Review  Outcome: Adequate for Care Transition  Goal: Patient-Specific Goal (Individualized)  Outcome: Adequate for Care Transition  Goal: Absence of Hospital-Acquired Illness or Injury  Outcome: Adequate for Care Transition  Goal: Optimal Comfort and Wellbeing  Outcome: Adequate for Care Transition  Goal: Readiness for Transition of Care  Outcome: Adequate for Care Transition

## 2022-01-17 ENCOUNTER — TELEPHONE (OUTPATIENT)
Dept: FAMILY MEDICINE | Facility: CLINIC | Age: 82
End: 2022-01-17
Payer: MEDICARE

## 2022-01-18 NOTE — TELEPHONE ENCOUNTER
Spoke with pt for TCC call. He reports he is doing good. He has family to assist with care.  Pt is current with HH and has all needed DME. Reviewed d/c instructions. Pt vu of all f/u appts, meds, activity and when to notify HH or dr for changes. Instructed to take meds to all dr appts.

## 2022-01-19 ENCOUNTER — HOSPITAL ENCOUNTER (OUTPATIENT)
Dept: RADIOLOGY | Facility: HOSPITAL | Age: 82
Discharge: HOME | End: 2022-01-19
Attending: ORTHOPAEDIC SURGERY
Payer: MEDICARE

## 2022-01-19 DIAGNOSIS — Z96.651 STATUS POST TOTAL RIGHT KNEE REPLACEMENT: ICD-10-CM

## 2022-01-19 PROCEDURE — 73560 X-RAY EXAM OF KNEE 1 OR 2: CPT | Mod: TC,RT

## 2022-01-24 ENCOUNTER — OFFICE VISIT (OUTPATIENT)
Dept: FAMILY MEDICINE | Facility: CLINIC | Age: 82
End: 2022-01-24
Payer: MEDICARE

## 2022-01-24 VITALS
OXYGEN SATURATION: 98 % | HEART RATE: 94 BPM | DIASTOLIC BLOOD PRESSURE: 54 MMHG | BODY MASS INDEX: 29.94 KG/M2 | HEIGHT: 64 IN | RESPIRATION RATE: 20 BRPM | TEMPERATURE: 99 F | SYSTOLIC BLOOD PRESSURE: 102 MMHG | WEIGHT: 175.38 LBS

## 2022-01-24 DIAGNOSIS — Z96.651 STATUS POST TOTAL KNEE REPLACEMENT USING CEMENT, RIGHT: ICD-10-CM

## 2022-01-24 DIAGNOSIS — K92.1 GASTROINTESTINAL HEMORRHAGE WITH MELENA: ICD-10-CM

## 2022-01-24 DIAGNOSIS — D64.9 ANEMIA, UNSPECIFIED TYPE: Primary | ICD-10-CM

## 2022-01-24 LAB
ANISOCYTOSIS BLD QL SMEAR: ABNORMAL
ATYPICAL LYMPHOCYTES: ABNORMAL
BASOPHILS # BLD AUTO: 0.17 K/UL (ref 0–0.2)
BASOPHILS NFR BLD AUTO: 2.9 % (ref 0–1)
DIFFERENTIAL METHOD BLD: ABNORMAL
EOSINOPHIL # BLD AUTO: 0.4 K/UL (ref 0–0.5)
EOSINOPHIL NFR BLD AUTO: 6.9 % (ref 1–4)
EOSINOPHIL NFR BLD MANUAL: 8 % (ref 1–4)
ERYTHROCYTE [DISTWIDTH] IN BLOOD BY AUTOMATED COUNT: 18.9 % (ref 11.5–14.5)
HCT VFR BLD AUTO: 32.6 % (ref 40–54)
HGB BLD-MCNC: 10.5 G/DL (ref 13.5–18)
LYMPHOCYTES # BLD AUTO: 2.21 K/UL (ref 1–4.8)
LYMPHOCYTES NFR BLD AUTO: 38.1 % (ref 27–41)
LYMPHOCYTES NFR BLD MANUAL: 40 % (ref 27–41)
MCH RBC QN AUTO: 27.7 PG (ref 27–31)
MCHC RBC AUTO-ENTMCNC: 32.2 G/DL (ref 32–36)
MCV RBC AUTO: 86 FL (ref 80–96)
MONOCYTES # BLD AUTO: 1.8 K/UL (ref 0–0.8)
MONOCYTES NFR BLD AUTO: 31 % (ref 2–6)
MONOCYTES NFR BLD MANUAL: 25 % (ref 2–6)
MPC BLD CALC-MCNC: 9.1 FL (ref 9.4–12.4)
NEUTROPHILS # BLD AUTO: 1.22 K/UL (ref 1.8–7.7)
NEUTROPHILS NFR BLD AUTO: 21.1 % (ref 53–65)
NEUTS SEG NFR BLD MANUAL: 27 % (ref 50–62)
NRBC BLD MANUAL-RTO: ABNORMAL %
PLATELET # BLD AUTO: 188 K/UL (ref 150–400)
POIKILOCYTOSIS BLD QL SMEAR: ABNORMAL
RBC # BLD AUTO: 3.79 M/UL (ref 4.6–6.2)
WBC # BLD AUTO: 5.8 K/UL (ref 4.5–11)

## 2022-01-24 PROCEDURE — 85025 COMPLETE CBC W/AUTO DIFF WBC: CPT | Performed by: NURSE PRACTITIONER

## 2022-01-24 PROCEDURE — 99495 TRANSJ CARE MGMT MOD F2F 14D: CPT | Mod: ,,, | Performed by: NURSE PRACTITIONER

## 2022-01-24 PROCEDURE — 99495 TCM SERVICES (MODERATE COMPLEXITY): ICD-10-PCS | Mod: ,,, | Performed by: NURSE PRACTITIONER

## 2022-01-24 NOTE — PROGRESS NOTES
Izzy Olivier NP   [facility]  [address]  [place]     PATIENT NAME: Elliott Peters  : 1940  DATE: 22  MRN: [id number]      Billing Provider: Izzy Olivier NP  Level of Service:   Patient PCP Information     Provider PCP Type    Izzy Olivier NP General          Reason for Visit / Chief Complaint: Follow-up (TCC visit.  D/C [facility] 2022. Did not bring meds in for appt- - brought in hospital discharge sheet. Pt had rt total knee replacement Dec  per Dr Wen. Being followed by [facility] )       Update PCP  Update Chief Complaint         History of Present Illness / Problem Focused Workflow     Elliott Peters presents to the clinic f/u ttc vbisit, was dcd from [facility] on 2022,did not bring meds to appointment, brought hosital discharge sheet, meds were reconciled, stated does not knee any new equipment in the home, Had right total hip replacement ion 2022 per dr wen. Being followed by [facility].       Review of Systems     Review of Systems   Constitutional: Positive for fatigue. Negative for chills and fever.   HENT: Negative for nasal congestion, ear pain, facial swelling, hearing loss, mouth dryness, mouth sores, postnasal drip, rhinorrhea, sinus pressure/congestion and goiter.    Eyes: Negative for discharge and itching.   Respiratory: Negative for cough, shortness of breath and wheezing.    Cardiovascular: Negative for chest pain and leg swelling.   Gastrointestinal: Negative for abdominal pain, change in bowel habit and change in bowel habit.   Genitourinary: Negative for difficulty urinating, dysuria, enuresis, frequency, hematuria and urgency.   Musculoskeletal:        Right knee replacement,    Neurological: Negative for dizziness, vertigo, syncope, weakness and headaches.   Psychiatric/Behavioral: Negative for decreased concentration.        Medical / Social / Family History     Past Medical History:   Diagnosis Date     Coronary artery disease     cardiac stents    Hyperlipidemia     Hypertension     Kidney stone     MI (myocardial infarction)     Sleep apnea     Urinary tract infection        Past Surgical History:   Procedure Laterality Date    CORONARY ANGIOPLASTY WITH STENT PLACEMENT      HERNIA REPAIR      JOINT REPLACEMENT      difficult to wake after surgery     PROSTATECTOMY  2012       Social History    reports that he has quit smoking. He has never used smokeless tobacco. He reports previous alcohol use. He reports that he does not use drugs.    Family History  's family history includes Cancer in his mother.    Medications and Allergies     Medications  Outpatient Medications Marked as Taking for the 1/24/22 encounter (Office Visit) with Izzy Olivier NP   Medication Sig Dispense Refill    aspirin (ECOTRIN) 81 MG EC tablet Take 1 tablet (81 mg total) by mouth once daily. 30 tablet 0    clopidogreL (PLAVIX) 75 mg tablet Take 1 tablet (75 mg total) by mouth once daily. 30 tablet 11    ferrous sulfate (FEOSOL) Tab tablet Take 1 tablet (1 each total) by mouth once daily. 30 tablet 0    gabapentin (NEURONTIN) 300 MG capsule Take 1 capsule (300 mg total) by mouth every evening. 30 capsule 11    HYDROcodone-acetaminophen (NORCO)  mg per tablet Take 1 tablet by mouth every 8 (eight) hours as needed for Pain. 28 tablet 0    nitroGLYCERIN (NITROSTAT) 0.4 MG SL tablet Place 1 tablet (0.4 mg total) under the tongue every 5 (five) minutes as needed for Chest pain. 100 tablet 0    pantoprazole (PROTONIX) 40 MG tablet Take 40 mg by mouth once daily.      rosuvastatin (CRESTOR) 20 MG tablet Take 20 mg by mouth once daily.      tamsulosin (FLOMAX) 0.4 mg Cap Take 1 capsule (0.4 mg total) by mouth once daily. 30 capsule 11       Allergies  Review of patient's allergies indicates:  No Known Allergies    Physical Examination     Vitals:    01/24/22 1034   BP: (!) 102/54   BP Location: Left arm   Patient  "Position: Sitting   BP Method: Medium (Manual)   Pulse: 94   Resp: 20   Temp: 98.5 °F (36.9 °C)   SpO2: 98%   Weight: 79.6 kg (175 lb 6.4 oz)   Height: 5' 3.78" (1.62 m)      Physical Exam  Constitutional:       Appearance: Normal appearance.   HENT:      Head: Normocephalic.      Right Ear: Tympanic membrane, ear canal and external ear normal.      Left Ear: Tympanic membrane, ear canal and external ear normal.      Nose: Nose normal.      Mouth/Throat:      Mouth: Mucous membranes are moist.      Pharynx: Oropharynx is clear.   Eyes:      Extraocular Movements: Extraocular movements intact.      Conjunctiva/sclera: Conjunctivae normal.      Pupils: Pupils are equal, round, and reactive to light.   Cardiovascular:      Rate and Rhythm: Normal rate and regular rhythm.      Pulses: Normal pulses.      Heart sounds: Normal heart sounds.      Comments: Rate 96  Pulmonary:      Effort: Pulmonary effort is normal.      Breath sounds: Normal breath sounds.   Abdominal:      General: Bowel sounds are normal.      Palpations: Abdomen is soft.   Musculoskeletal:         General: Normal range of motion.   Skin:     General: Skin is warm and dry.      Capillary Refill: Capillary refill takes less than 2 seconds.      Comments: Surgical site right knee without signs of infection several steristrips intact, denies pain , partial rom   Neurological:      General: No focal deficit present.      Mental Status: He is alert and oriented to person, place, and time.   Psychiatric:         Mood and Affect: Mood normal.         Behavior: Behavior normal.          Assessment and Plan (including Health Maintenance)      Problem List  Smart Sets  Document Outside HM   :    Plan: make sure to increase iron in diet and take meds ordered, return to clinic as scheduled    Anemia, unspecified type  -     CBC Auto Differential; Future; Expected date: 01/24/2022    Status post total knee replacement using cement, right    Gastrointestinal " hemorrhage with melena            Health Maintenance Due   Topic Date Due    COVID-19 Vaccine (3 - Booster for Pfizer series) 09/15/2021       Problem List Items Addressed This Visit        Oncology    Anemia - Primary    Relevant Orders    CBC Auto Differential (Completed)       GI    Gastrointestinal hemorrhage with melena       Orthopedic    Status post total knee replacement using cement, right            Health Maintenance Topics with due status: Not Due       Topic Last Completion Date    Colonoscopy 05/13/2019    TETANUS VACCINE 06/25/2021    Lipid Panel 10/12/2021    PROSTATE-SPECIFIC ANTIGEN 12/17/2021       Procedures     Future Appointments   Date Time Provider Department Center   1/26/2022  8:45 AM NICOLÁS Ott [facility] ASC   3/2/2022  8:30 AM [facility]   4/18/2022 10:00 AM Izzy Olivier NP [facility] LUCY [facility]   5/18/2022  1:30 PM Abram Allen Jr., MD [facility]   6/20/2022  2:00 PM Noam Smart DO [facility] SLEEP [facility]    6/29/2022  9:30 AM AWV [facility]URSE[facility]        Follow up for scheduled.       Signature:  Izzy Olivier NP    Date of encounter: 1/24/22

## 2022-01-24 NOTE — PATIENT INSTRUCTIONS
Patient Education       Preventing Falls   The Basics   Written by the doctors and editors at [facility]   Am I at risk of falling? -- Your risk of falling increases as you grow older. That's because getting older can make it harder to walk steadily and keep your balance. Also, the effects of falls are more serious in older people.  Overall, 3 to 4 out of every 10 people over the age of 65 fall each year. Up to 75 percent of people who fracture a hip never recover to the point they were before they had their fracture. If you have fallen in the past, you are at higher risk of falling again.  Several things can increase your risk of a fall, including:  · Illness  · A change in the medicines you take  · An unsafe or unfamiliar setting (for example, a room with rugs or furniture that might trip you, or an area you don't know well)  How can my doctor help me to avoid falling? -- Your doctor can talk to you about the following things:  · Past falls - It is important to tell your doctor about any times you have fallen or almost fallen. He or she can then suggest ways to prevent another fall.  · Your health conditions - Some health problems can put you at risk of falling. These include conditions that affect eyesight, hearing, muscle strength, or balance.  · The medicines you take - Certain medicines can increase the risk of falling. These include some medicines that are used for sleeping problems, anxiety, high blood pressure, or depression. Adding new medicines, or changing doses of some medicines, can also affect your risk of falling.  The more your doctor knows about your situation, the better he or she will be able to help you. For example, if you fell because you have a condition that causes pain, your doctor might suggest treatments to deal with the pain. Or if one of your medicines is making you dizzy and more likely to fall, your doctor might switch you to a different medicine.  Is there anything I can do on my  own? -- Yes. To help keep from falling, you can:  · Make your home safer - To avoid falling at home, get rid of things that might make you trip or slip. This might include furniture, electrical cords, clutter, and loose rugs (figure 1). Keep your home well-lit so that you can easily see where you are going. Avoid storing things in high places so you don't have to reach or climb.  · Wear sturdy shoes that fit well - Wearing shoes with high heels or slippery soles, or shoes that are too loose, can lead to falls. Walking around in bare feet, or only socks, can also increase your risk of falling.  · Take vitamin D pills - Taking vitamin D might lower the risk of falls in older people. This is because vitamin D helps make bones and muscles stronger. Your doctor can talk to you about whether you should take extra vitamin D, and how much.  · Stay active - Exercising on a regular basis can help lower your risk of falling. It might also help prevent you from getting hurt if you do fall. It is best to do a few different activities that help with both strength and balance. There are many kinds of exercise that can be safe for older people. These include walking, swimming, and Moo Chi (a Chinese martial art that involves slow, gentle movements).  · Use a cane, walker, and other safety devices - If your doctor recommends that you use a cane or walker, be sure that it's the right size and you know how to use it. There are other devices that might help you avoid falling, too. These include grab bars or a sturdy seat for the shower, non-slip bath mats, and hand rails or treads for the stairs (to prevent slipping).  If you worry that you could fall, there are also alarm buttons that let you call for help if you fall and can't get up.  What should I do if I fall? -- If you fall, see your doctor right away, even if you aren't hurt. Your doctor can try to figure out what caused you to fall, and how likely you are to fall again. He or  she will do an exam and talk to you about your health problems, medicines, and activities. Then he or she can suggest things you can do to avoid falling again.  Many older people have a hard time recovering after a fall. Doing things to prevent falling can help you to protect your health and independence.  All topics are updated as new evidence becomes available and our peer review process is complete.  This topic retrieved from [employer] on: Sep 21, 2021.  Topic 52112 Version 18.0  Release: 29.4.2 - C29.263  © 2021 UpToDate, Inc. and/or its affiliates. All rights reserved.  figure 1: How to avoid falling at home     This picture shows some of the things that can cause a fall in your home. Look around and remove any loose rugs, electrical cords, clutter, or furniture that could trip you.  Graphic 67627 Version 1.0    Consumer Information Use and Disclaimer   This information is not specific medical advice and does not replace information you receive from your health care provider. This is only a brief summary of general information. It does NOT include all information about conditions, illnesses, injuries, tests, procedures, treatments, therapies, discharge instructions or life-style choices that may apply to you. You must talk with your health care provider for complete information about your health and treatment options. This information should not be used to decide whether or not to accept your health care provider's advice, instructions or recommendations. Only your health care provider has the knowledge and training to provide advice that is right for you. The use of this information is governed by the [employer] End User License Agreement, available at https://www.[employer]/en/solutions/[employer]/about/digna.The use of [employer] content is governed by the [employer] Terms of Use. ©2021 UpToDate, Inc. All rights reserved.  Copyright   © 2021 UpToDate, Inc. and/or its affiliates. All rights reserved.  Patient  Education       Anemia Caused by Low Iron Discharge Instructions, Adult   About this topic   Anemia means you have too few red blood cells. Your red blood cells are the cells that carry oxygen to all parts of your body.  The most common reason for anemia is a low level of iron in your body. Your body uses iron to make red blood cells. You may have low iron because you have lost blood over time or your body may have trouble taking in the iron from the food you eat. The doctors will work to find out what is causing your anemia and help you get more iron if needed.  What care is needed at home?   · Ask your doctor what you need to do when you go home. Make sure you ask questions if you do not understand what the doctor says.  · If the doctor suggested you take iron pills, be sure you know the strength and how often to take them. The best way to take iron is once every other day or Monday, Wednesday, and Friday of each week. Be sure you can keep track of when you are to take your iron. You may have to take the pills for a few months.  · Store your iron pills safely if you have them. Take extra care that children cannot get them. A child can get seriously poisoned if they take iron pills on accident. If a child takes your iron pills, call poison control right away.  · Feeling tired or short of breath is a sign of anemia. You may need to rest more often until your red blood cell counts increase. You may also have other symptoms like having very little energy, restless legs, and craving ice. It may take a few weeks until the anemia starts to get better.  What follow-up care is needed?   · Your doctor may ask you to make visits to the office to check on your progress. Be sure to keep these visits.  · You may need to have some blood tests.  · If your red blood cell counts are too low, you may need to get a blood transfusion.  · You may need to have tests to see if your body is absorbing all the iron you are taking in.  · You  may need other tests to see if you are bleeding slowly from inside your body.  What drugs may be needed?   The doctor may order drugs to:  · Replace the iron in your body. These are iron supplements.  · Help your body absorb iron. This is vitamin C.  · Stool softeners to help control possible constipation.  Take your drug as ordered by your doctor.  What changes to diet are needed?   Eat food rich in iron, such as:  · Meats and proteins like: Eggs (especially egg yolks), liver, lean red meat (especially beef), oysters, clams, poultry, salmon, tuna, shrimp, tofu  · Iron-fortified breads and grains like: Breads, pastas, cornmeal, white rice, cereals, and whole grains  · Fruits like: Dried fruits such as prunes, raisins, and apricots; prune juice  · Vegetables like: Spinach and other dark green leafy vegetables; dried beans; white, red, and baked beans; soybeans; peas; lentils; chickpeas  Also eat foods rich in vitamin C such as:  · Fruits like: Oranges, tangerines, kiwi, strawberries, cantaloupe  · Vegetables like: Broccoli, cauliflower, bell peppers, tomatoes, cabbage, sweet potatoes  You may not absorb as much iron from your food if you drink black or pekoe tea or excessive amounts of milk. Limit drinking these with meals. Drugs for heartburn may also limit how much iron your body takes in. Talk to your doctor if you take these kind of drugs.     What can be done to prevent this health problem?   · Make sure you eat foods rich in iron each day. Also, make sure you are getting vitamin C to help the body take up iron.  · Ask if you should take an iron supplement.  When do I need to call the doctor?   · You develop chest pain or severe trouble breathing.  · You throw up blood or something that looks like coffee grounds.  · Your stools are black or tar-colored.  · You feel weak or you pass out.  Helpful tips   Do not take more iron pills than ordered. Large amounts of iron can be harmful. Take extra iron only as ordered  by your doctor.  Teach Back: Helping You Understand   The Teach Back Method helps you understand the information we are giving you. After you talk with the staff, tell them in your own words what you learned. This helps to make sure the staff has described each thing clearly. It also helps to explain things that may have been confusing. Before going home, make sure you can do these:  · I can tell you about my condition.  · I can tell you what changes I need to make with my diet or drugs.  · I can tell you what I will do if I have blood in my stool or if my stool is black or tarry looking.  Where can I learn more?   American Society of Hematology  https://www.hematology.org/education/patients/anemia/iron-deficiency   NHS Choices  https://www.nhs.uk/conditions/iron-deficiency-anaemia/   Last Reviewed Date   2021-06-08  Consumer Information Use and Disclaimer   This information is not specific medical advice and does not replace information you receive from your health care provider. This is only a brief summary of general information. It does NOT include all information about conditions, illnesses, injuries, tests, procedures, treatments, therapies, discharge instructions or life-style choices that may apply to you. You must talk with your health care provider for complete information about your health and treatment options. This information should not be used to decide whether or not to accept your health care providers advice, instructions or recommendations. Only your health care provider has the knowledge and training to provide advice that is right for you.  Copyright   Copyright © 2021 UpToDate, Inc. and its affiliates and/or licensors. All rights reserved.

## 2022-01-26 ENCOUNTER — OFFICE VISIT (OUTPATIENT)
Dept: GASTROENTEROLOGY | Facility: CLINIC | Age: 82
End: 2022-01-26
Payer: MEDICARE

## 2022-01-26 VITALS
RESPIRATION RATE: 14 BRPM | SYSTOLIC BLOOD PRESSURE: 111 MMHG | WEIGHT: 175 LBS | DIASTOLIC BLOOD PRESSURE: 59 MMHG | BODY MASS INDEX: 29.16 KG/M2 | HEART RATE: 110 BPM | HEIGHT: 65 IN | OXYGEN SATURATION: 98 %

## 2022-01-26 DIAGNOSIS — R53.1 WEAKNESS GENERALIZED: ICD-10-CM

## 2022-01-26 DIAGNOSIS — K59.04 CHRONIC IDIOPATHIC CONSTIPATION: ICD-10-CM

## 2022-01-26 DIAGNOSIS — K25.9 GASTRIC ULCER, UNSPECIFIED CHRONICITY, UNSPECIFIED WHETHER GASTRIC ULCER HEMORRHAGE OR PERFORATION PRESENT: Primary | ICD-10-CM

## 2022-01-26 DIAGNOSIS — D50.0 IRON DEFICIENCY ANEMIA DUE TO CHRONIC BLOOD LOSS: ICD-10-CM

## 2022-01-26 DIAGNOSIS — K25.3 CAMERON LESION, ACUTE: ICD-10-CM

## 2022-01-26 DIAGNOSIS — R74.8 ELEVATED LIVER ENZYMES: ICD-10-CM

## 2022-01-26 PROCEDURE — 99203 OFFICE O/P NEW LOW 30 MIN: CPT | Mod: ,,, | Performed by: NURSE PRACTITIONER

## 2022-01-26 PROCEDURE — 99203 PR OFFICE/OUTPT VISIT, NEW, LEVL III, 30-44 MIN: ICD-10-PCS | Mod: ,,, | Performed by: NURSE PRACTITIONER

## 2022-01-26 NOTE — PROGRESS NOTES
Pt is an 80 y/o WM here for follow up. Had knee replacement, sent to [facility] for swingbed rehab, was on Eliquis for DVT prophylaxis, and developed coffee ground emesis and melena. Rec'd 2 units PRBCs. Had EGD by Dr. 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 Normal biopsies. On Protonix 40 mg daily. H&H up to 10&32 two days ago at PCP from 8&26 two weeks ago. Pt denies any further GIB. On PO iron. Still has dark stool. Off Eliquis. On Plavix & ASA (hx MI/card stents).  Pt c/o constipation but on Linzess and states working well.   Pt also c/o weakness. States he was weak after last knee surgery 4 years ago but worse this time - wife notes he is out of energy even feeding himself.  Also noted incidentally AST/ALT are chronically elevated last few checks on CMP. Pt denies any prior hx of liver issues of which he's aware.    Past Medical History:   Diagnosis Date    Coronary artery disease     cardiac stents    Hyperlipidemia     Hypertension     Kidney stone     MI (myocardial infarction)     Sleep apnea     Urinary tract infection      Review of Systems   Constitutional: Negative for chills and fever.   Respiratory: Negative for shortness of breath.    Cardiovascular: Negative for chest pain.   Gastrointestinal: Negative for abdominal pain, blood in stool, constipation, diarrhea, melena, nausea and vomiting.   Skin: Negative for rash.   Neurological: Negative for weakness.     Physical Exam  Vitals reviewed. Exam conducted with a chaperone present.   Constitutional:       Appearance: Normal appearance.   HENT:      Head: Normocephalic.   Eyes:      General: No scleral icterus.     Pupils: Pupils are equal, round, and reactive to light.   Cardiovascular:      Rate and Rhythm: Normal rate.      Pulses: Normal pulses.   Pulmonary:      Effort: Pulmonary effort is normal.    Abdominal:      General: Abdomen is flat. There is no distension.      Palpations: Abdomen is soft.      Tenderness: There is no abdominal tenderness. There is no guarding or rebound.   Musculoskeletal:         General: No swelling.   Skin:     General: Skin is warm and dry.      Coloration: Skin is not jaundiced.   Neurological:      General: No focal deficit present.      Mental Status: He is alert and oriented to person, place, and time.   Psychiatric:         Mood and Affect: Mood normal.         Behavior: Behavior normal.         Thought Content: Thought content normal.         Judgment: Judgment normal.     Plan  -cont Protonix 40 mg daily  -cont PO iron   -CMP, iron studies, ferritin, TSH, T4 - pt feeling more weak than usual, even eating is hard to do for him  -liver labs for elevated AST/ALT  -RTC 3 months, sooner PRN

## 2022-01-28 ENCOUNTER — TELEPHONE (OUTPATIENT)
Dept: GASTROENTEROLOGY | Facility: CLINIC | Age: 82
End: 2022-01-28
Payer: MEDICARE

## 2022-01-28 NOTE — TELEPHONE ENCOUNTER
D/w pt regarding all lab results - liver, thyroid, iron studies. Everything looks good. Keep taking PO iron and f/u 3 months, sooner PRN. Pt voiced understanding and no further complaints.

## 2022-01-30 ENCOUNTER — HOSPITAL ENCOUNTER (EMERGENCY)
Facility: HOSPITAL | Age: 82
Discharge: HOME | End: 2022-01-30
Payer: MEDICARE

## 2022-01-30 VITALS
WEIGHT: 170 LBS | BODY MASS INDEX: 28.32 KG/M2 | HEIGHT: 65 IN | OXYGEN SATURATION: 99 % | RESPIRATION RATE: 16 BRPM | TEMPERATURE: 98 F | HEART RATE: 79 BPM | SYSTOLIC BLOOD PRESSURE: 149 MMHG | DIASTOLIC BLOOD PRESSURE: 70 MMHG

## 2022-01-30 DIAGNOSIS — R53.1 WEAKNESS: Primary | ICD-10-CM

## 2022-01-30 LAB
ALBUMIN SERPL BCP-MCNC: 2.9 G/DL (ref 3.5–5)
ALBUMIN/GLOB SERPL: 0.9 {RATIO}
ALP SERPL-CCNC: 91 U/L (ref 45–115)
ALT SERPL W P-5'-P-CCNC: 17 U/L (ref 16–61)
ANION GAP SERPL CALCULATED.3IONS-SCNC: 13 MMOL/L (ref 7–16)
ANISOCYTOSIS BLD QL SMEAR: ABNORMAL
AST SERPL W P-5'-P-CCNC: 15 U/L (ref 15–37)
BACTERIA #/AREA URNS HPF: ABNORMAL /HPF
BASOPHILS # BLD AUTO: 0.1 K/UL (ref 0–0.2)
BASOPHILS NFR BLD AUTO: 2.3 % (ref 0–1)
BILIRUB SERPL-MCNC: 0.5 MG/DL (ref 0–1.2)
BILIRUB UR QL STRIP: NEGATIVE
BUN SERPL-MCNC: 16 MG/DL (ref 7–18)
BUN/CREAT SERPL: 17 (ref 6–20)
CALCIUM SERPL-MCNC: 7.9 MG/DL (ref 8.5–10.1)
CHLORIDE SERPL-SCNC: 102 MMOL/L (ref 98–107)
CLARITY UR: CLEAR
CO2 SERPL-SCNC: 24 MMOL/L (ref 21–32)
COLOR UR: YELLOW
CREAT SERPL-MCNC: 0.94 MG/DL (ref 0.7–1.3)
DIFFERENTIAL METHOD BLD: ABNORMAL
EOSINOPHIL # BLD AUTO: 0.47 K/UL (ref 0–0.5)
EOSINOPHIL NFR BLD AUTO: 10.7 % (ref 1–4)
EOSINOPHIL NFR BLD MANUAL: 12 % (ref 1–4)
ERYTHROCYTE [DISTWIDTH] IN BLOOD BY AUTOMATED COUNT: 18.3 % (ref 11.5–14.5)
FLUAV AG UPPER RESP QL IA.RAPID: NEGATIVE
FLUBV AG UPPER RESP QL IA.RAPID: NEGATIVE
GLOBULIN SER-MCNC: 3.1 G/DL (ref 2–4)
GLUCOSE SERPL-MCNC: 135 MG/DL (ref 74–106)
GLUCOSE UR STRIP-MCNC: NEGATIVE MG/DL
HCT VFR BLD AUTO: 30.9 % (ref 40–54)
HGB BLD-MCNC: 10 G/DL (ref 13.5–18)
KETONES UR STRIP-SCNC: NEGATIVE MG/DL
LEUKOCYTE ESTERASE UR QL STRIP: NEGATIVE
LYMPHOCYTES # BLD AUTO: 1.71 K/UL (ref 1–4.8)
LYMPHOCYTES NFR BLD AUTO: 39 % (ref 27–41)
LYMPHOCYTES NFR BLD MANUAL: 48 % (ref 27–41)
MAGNESIUM SERPL-MCNC: 1.8 MG/DL (ref 1.7–2.3)
MCH RBC QN AUTO: 27.8 PG (ref 27–31)
MCHC RBC AUTO-ENTMCNC: 32.4 G/DL (ref 32–36)
MCV RBC AUTO: 85.8 FL (ref 80–96)
MONOCYTES # BLD AUTO: 1.23 K/UL (ref 0–0.8)
MONOCYTES NFR BLD AUTO: 28 % (ref 2–6)
MONOCYTES NFR BLD MANUAL: 18 % (ref 2–6)
MPC BLD CALC-MCNC: 9.7 FL (ref 9.4–12.4)
MUCOUS THREADS #/AREA URNS HPF: ABNORMAL /HPF
NEUTROPHILS # BLD AUTO: 0.88 K/UL (ref 1.8–7.7)
NEUTROPHILS NFR BLD AUTO: 20 % (ref 53–65)
NEUTS SEG NFR BLD MANUAL: 22 % (ref 50–62)
NITRITE UR QL STRIP: NEGATIVE
NRBC BLD MANUAL-RTO: ABNORMAL %
PH UR STRIP: 6 PH UNITS
PLATELET # BLD AUTO: 148 K/UL (ref 150–400)
POTASSIUM SERPL-SCNC: 3.8 MMOL/L (ref 3.5–5.1)
PROT SERPL-MCNC: 6 G/DL (ref 6.4–8.2)
PROT UR QL STRIP: NEGATIVE
RBC # BLD AUTO: 3.6 M/UL (ref 4.6–6.2)
RBC # UR STRIP: ABNORMAL /UL
RBC #/AREA URNS HPF: ABNORMAL /HPF
SARS-COV+SARS-COV-2 AG RESP QL IA.RAPID: NEGATIVE
SODIUM SERPL-SCNC: 135 MMOL/L (ref 136–145)
SP GR UR STRIP: >=1.03
SQUAMOUS #/AREA URNS LPF: ABNORMAL /LPF
TROPONIN I SERPL HS-MCNC: 9.9 PG/ML
UROBILINOGEN UR STRIP-ACNC: 0.2 MG/DL
WBC # BLD AUTO: 4.39 K/UL (ref 4.5–11)
WBC #/AREA URNS HPF: ABNORMAL /HPF

## 2022-01-30 PROCEDURE — 93010 ELECTROCARDIOGRAM REPORT: CPT | Mod: ,,, | Performed by: STUDENT IN AN ORGANIZED HEALTH CARE EDUCATION/TRAINING PROGRAM

## 2022-01-30 PROCEDURE — 99283 EMERGENCY DEPT VISIT LOW MDM: CPT | Mod: GF | Performed by: REGISTERED NURSE

## 2022-01-30 PROCEDURE — 36415 COLL VENOUS BLD VENIPUNCTURE: CPT | Performed by: REGISTERED NURSE

## 2022-01-30 PROCEDURE — 83735 ASSAY OF MAGNESIUM: CPT | Performed by: REGISTERED NURSE

## 2022-01-30 PROCEDURE — 87428 SARSCOV & INF VIR A&B AG IA: CPT | Performed by: REGISTERED NURSE

## 2022-01-30 PROCEDURE — 80053 COMPREHEN METABOLIC PANEL: CPT | Performed by: REGISTERED NURSE

## 2022-01-30 PROCEDURE — 85025 COMPLETE CBC W/AUTO DIFF WBC: CPT | Performed by: REGISTERED NURSE

## 2022-01-30 PROCEDURE — 84484 ASSAY OF TROPONIN QUANT: CPT | Performed by: REGISTERED NURSE

## 2022-01-30 PROCEDURE — 99285 EMERGENCY DEPT VISIT HI MDM: CPT | Mod: 25

## 2022-01-30 PROCEDURE — 93010 EKG 12-LEAD: ICD-10-PCS | Mod: ,,, | Performed by: STUDENT IN AN ORGANIZED HEALTH CARE EDUCATION/TRAINING PROGRAM

## 2022-01-30 PROCEDURE — 81001 URINALYSIS AUTO W/SCOPE: CPT | Performed by: REGISTERED NURSE

## 2022-01-30 PROCEDURE — 93005 ELECTROCARDIOGRAM TRACING: CPT

## 2022-01-30 RX ORDER — TERAZOSIN 5 MG/1
5 CAPSULE ORAL NIGHTLY
COMMUNITY
End: 2022-10-19 | Stop reason: SDUPTHER

## 2022-01-30 RX ORDER — POLYETHYLENE GLYCOL 3350 17 G/17G
POWDER, FOR SOLUTION ORAL
COMMUNITY

## 2022-01-30 RX ORDER — DOCUSATE SODIUM 100 MG/1
100 CAPSULE, LIQUID FILLED ORAL DAILY
COMMUNITY

## 2022-01-30 NOTE — ED PROVIDER NOTES
"Encounter Date: 1/30/2022       History     Chief Complaint   Patient presents with    Weakness     X 5 weeks     81y-old  male received to ED room 3 with complaint of generalized weakness. Patient s/p right knee arthroplasty on 12/28/2021. Admitted to [facility] on 12/29/2021 and was progressing with therapy until 01/01/2022 when he was found to have a decrease in his H&H (6.4/19.3) and a hem positive stool. Patient was transferred back to [facility] for GI eval at that time and received 2 units PRBC. States that he was seen by his PCP on 01/24/2022 and the GI nurse practitioner on 01/26/2022 for f/u. Patient expressed this weakness at both visits. "They said that my blood work looked good." Denies any pain/discomfort. Denies any dyspnea. "I just want to get back to my normal."        Review of patient's allergies indicates:  No Known Allergies  Past Medical History:   Diagnosis Date    Coronary artery disease     cardiac stents    Hyperlipidemia     Hypertension     Kidney stone     MI (myocardial infarction)     Sleep apnea     Urinary tract infection      Past Surgical History:   Procedure Laterality Date    CORONARY ANGIOPLASTY WITH STENT PLACEMENT      HERNIA REPAIR      JOINT REPLACEMENT      difficult to wake after surgery     PROSTATECTOMY  2012     Family History   Problem Relation Age of Onset    Cancer Mother      Social History     Tobacco Use    Smoking status: Former Smoker    Smokeless tobacco: Never Used    Tobacco comment: QUIT 50 +YEARS AGO   Substance Use Topics    Alcohol use: Not Currently     Comment: QUIT 50 +YEARS AGO     Drug use: Never     Review of Systems   Constitutional: Positive for activity change (Decreased), appetite change (Decreased) and fatigue.   HENT: Negative.    Eyes: Negative.    Respiratory: Negative.    Cardiovascular: Negative.    Gastrointestinal: Negative.    Endocrine: Negative.    Genitourinary: Negative.    Musculoskeletal: " Negative.    Skin: Negative.    Allergic/Immunologic: Negative.    Neurological: Negative.    Hematological: Negative.    Psychiatric/Behavioral: Negative.        Physical Exam     Initial Vitals [01/30/22 0910]   BP Pulse Resp Temp SpO2   (!) 145/67 92 18 97.9 °F (36.6 °C) 99 %      MAP       --         Physical Exam    Constitutional: He appears well-developed and well-nourished.   HENT:   Head: Normocephalic.   Eyes: Conjunctivae and EOM are normal.   Neck: Neck supple.   Normal range of motion.  Cardiovascular: Normal rate, regular rhythm, normal heart sounds and intact distal pulses.   Pulmonary/Chest: Breath sounds normal.   Abdominal: Abdomen is soft. Bowel sounds are normal.   Musculoskeletal:         General: Normal range of motion.      Cervical back: Normal range of motion and neck supple.     Neurological: He is alert and oriented to person, place, and time. He has normal strength. GCS score is 15. GCS eye subscore is 4. GCS verbal subscore is 5. GCS motor subscore is 6.   Skin: Skin is warm and dry. Capillary refill takes less than 2 seconds.   Psychiatric: He has a normal mood and affect.         Medical Screening Exam   See Full Note    ED Course   Procedures  Labs Reviewed   CBC W/ AUTO DIFFERENTIAL    Narrative:     The following orders were created for panel order CBC auto differential.  Procedure                               Abnormality         Status                     ---------                               -----------         ------                     CBC with Differential[[id number]]                                                         Please view results for these tests on the individual orders.   COMPREHENSIVE METABOLIC PANEL   TROPONIN I   CBC WITH DIFFERENTIAL   SARS-COV2 (COVID) W/ FLU ANTIGEN   MAGNESIUM   URINALYSIS, REFLEX TO URINE CULTURE          Imaging Results    None          Medications - No data to display  Medical Decision Making:   ED Management:  Labs reviewed with   Roberto. Patient instructed on lab results and treatment plan. Patient is to return for any changes or worsening symptoms. Patient/wife verbalize understanding and agree with plan.   Other:   I discussed test(s) with the performing physician.                   Clinical Impression:   Final diagnoses:  [R53.1] Weakness                 CHRISTIE Bone  01/30/22 1110

## 2022-01-31 ENCOUNTER — TELEPHONE (OUTPATIENT)
Dept: GASTROENTEROLOGY | Facility: CLINIC | Age: 82
End: 2022-01-31
Payer: MEDICARE

## 2022-01-31 NOTE — TELEPHONE ENCOUNTER
----- Message from NICOLÁS Ott sent at 1/27/2022  1:25 PM CST -----  LFTs back to normal. Other liver labs ok so far.

## 2022-02-01 ENCOUNTER — TELEPHONE (OUTPATIENT)
Dept: EMERGENCY MEDICINE | Facility: HOSPITAL | Age: 82
End: 2022-02-01
Payer: MEDICARE

## 2022-02-18 ENCOUNTER — HOSPITAL ENCOUNTER (OUTPATIENT)
Dept: RADIOLOGY | Facility: HOSPITAL | Age: 82
Discharge: HOME | End: 2022-02-18
Attending: ORTHOPAEDIC SURGERY
Payer: MEDICARE

## 2022-02-18 DIAGNOSIS — M79.604 RIGHT LEG PAIN: ICD-10-CM

## 2022-02-18 PROCEDURE — 93971 EXTREMITY STUDY: CPT | Mod: TC,RT

## 2022-02-23 ENCOUNTER — OFFICE VISIT (OUTPATIENT)
Dept: DERMATOLOGY | Facility: CLINIC | Age: 82
End: 2022-02-23
Payer: MEDICARE

## 2022-02-23 ENCOUNTER — HOSPITAL ENCOUNTER (OUTPATIENT)
Dept: RADIOLOGY | Facility: HOSPITAL | Age: 82
Discharge: HOME | End: 2022-02-23
Attending: ORTHOPAEDIC SURGERY
Payer: MEDICARE

## 2022-02-23 VITALS — RESPIRATION RATE: 18 BRPM | BODY MASS INDEX: 28.32 KG/M2 | WEIGHT: 170 LBS | HEIGHT: 65 IN

## 2022-02-23 DIAGNOSIS — L57.8 OTHER SKIN CHANGES DUE TO CHRONIC EXPOSURE TO NONIONIZING RADIATION: ICD-10-CM

## 2022-02-23 DIAGNOSIS — Z96.651 STATUS POST TOTAL KNEE REPLACEMENT USING CEMENT, RIGHT: ICD-10-CM

## 2022-02-23 DIAGNOSIS — L23.5 ALLERGIC DERMATITIS DUE TO OTHER CHEMICAL PRODUCT: Primary | ICD-10-CM

## 2022-02-23 PROCEDURE — 99214 OFFICE O/P EST MOD 30 MIN: CPT | Mod: ,,, | Performed by: DERMATOLOGY

## 2022-02-23 PROCEDURE — 73560 X-RAY EXAM OF KNEE 1 OR 2: CPT | Mod: TC,RT

## 2022-02-23 PROCEDURE — 99214 PR OFFICE/OUTPT VISIT, EST, LEVL IV, 30-39 MIN: ICD-10-PCS | Mod: ,,, | Performed by: DERMATOLOGY

## 2022-02-23 RX ORDER — PREDNISONE 10 MG/1
TABLET ORAL
Qty: 36 TABLET | Refills: 0 | Status: SHIPPED | OUTPATIENT
Start: 2022-02-23 | End: 2022-04-06 | Stop reason: SDUPTHER

## 2022-02-23 NOTE — PROGRESS NOTES
[facility]   Aurora Haskins MD    Patient Name: Elliott Peters  Patient YOB: 1940   Date of Service: 2/23/22    CC: Rash    HPI: Elliott Peters is a 81 y.o. male here today for rash, located on the back.  Rash has been present for 1 years.  Previous treatments include TAC 0.1 ointment with Dr. Olivier Patient was treated in the past for folliculitis. He was prescribed bp/clinda lotion, doxy, and hydroxyzine, states it had no improvement      Past Medical History:   Diagnosis Date    Coronary artery disease     cardiac stents    Hyperlipidemia     Hypertension     Kidney stone     MI (myocardial infarction)     Sleep apnea     Urinary tract infection      Past Surgical History:   Procedure Laterality Date    CORONARY ANGIOPLASTY WITH STENT PLACEMENT      HERNIA REPAIR      JOINT REPLACEMENT      difficult to wake after surgery     PROSTATECTOMY  2012     Review of patient's allergies indicates:  No Known Allergies    Current Outpatient Medications:     aspirin (ECOTRIN) 81 MG EC tablet, Take 1 tablet (81 mg total) by mouth once daily., Disp: 30 tablet, Rfl: 0    clopidogreL (PLAVIX) 75 mg tablet, Take 1 tablet (75 mg total) by mouth once daily., Disp: 30 tablet, Rfl: 11    cyclobenzaprine (FLEXERIL) 10 MG tablet, Take 1 tablet (10 mg total) by mouth 3 (three) times daily as needed for Muscle spasms., Disp: 30 tablet, Rfl: 1    docusate sodium (COLACE) 100 MG capsule, Take 100 mg by mouth once daily., Disp: , Rfl:     ferrous sulfate (FEOSOL) Tab tablet, Take 1 tablet (1 each total) by mouth once daily., Disp: 30 tablet, Rfl: 0    gabapentin (NEURONTIN) 300 MG capsule, Take 1 capsule (300 mg total) by mouth every evening., Disp: 30 capsule, Rfl: 11    HYDROcodone-acetaminophen (NORCO)  mg per tablet, Take 1 tablet by mouth every 8 (eight) hours as needed for Pain., Disp: 28 tablet, Rfl: 0    HYDROcodone-acetaminophen (NORCO)  mg per tablet, Take 1 tablet by mouth  every 6 (six) hours as needed for Pain., Disp: 20 tablet, Rfl: 0    nitroGLYCERIN (NITROSTAT) 0.4 MG SL tablet, Place 1 tablet (0.4 mg total) under the tongue every 5 (five) minutes as needed for Chest pain., Disp: 100 tablet, Rfl: 0    pantoprazole (PROTONIX) 40 MG tablet, Take 40 mg by mouth once daily., Disp: , Rfl:     polyethylene glycol (GLYCOLAX) 17 gram PwPk, Take by mouth., Disp: , Rfl:     predniSONE (DELTASONE) 10 MG tablet, Take 6 tab po daily x1 day, 4 tab po daily x2 days, 3 tabs po daily x3 days, 2 tabs po daily x4 days, 1 tab po daily x5 days, Disp: 36 tablet, Rfl: 0    rosuvastatin (CRESTOR) 20 MG tablet, Take 20 mg by mouth once daily., Disp: , Rfl:     tamsulosin (FLOMAX) 0.4 mg Cap, Take 1 capsule (0.4 mg total) by mouth once daily., Disp: 30 capsule, Rfl: 11    terazosin (HYTRIN) 5 MG capsule, Take 5 mg by mouth every evening., Disp: , Rfl:     ROS: A focused review of systems was obtained and negative.     Exam: A focused skin exam was performed. All areas examined were normal except as mentioned in the assessment and plan below.  General Appearance of the patient is well developed and well nourished.  Orientation: alert and oriented x 3.  Mood and affect: pleasant.    Assessment:   The primary encounter diagnosis was Allergic dermatitis due to other chemical product. A diagnosis of Other skin changes due to chronic exposure to nonionizing radiation was also pertinent to this visit.    Plan:   Medications Ordered This Encounter   Medications    predniSONE (DELTASONE) 10 MG tablet     Sig: Take 6 tab po daily x1 day, 4 tab po daily x2 days, 3 tabs po daily x3 days, 2 tabs po daily x4 days, 1 tab po daily x5 days     Dispense:  36 tablet     Refill:  0     Allergic Contact Dermatitis (L23.89)  - Well demarcated, geometric eczematous patches  Status: Inadequately controlled    Plan: Counseling.  I counseled the patient regarding the following:  Skin care: Patient should use hypoallergenic  products such as unscented soaps. Eliminate exposure to all new  cosmetics, fragrances, hair products, nail products shampoos, scented soaps, plants, metals and sunscreens.  Expectations: Allergic contact dermatitis can persist for several weeks before it fully resolves. Sometimes, patch  testing is necessary if reactions persist or if the patient is in contact with several potential allergens.  Contact office if: Allergic contact dermatitis worsens or fails to improve despite several weeks of treatment.    - Allergies reviewed and scanned   - Recommended OTC VaniCream products (gave samples) which does not have iodopropynyl butylcarbamate  - will start pt on short course of prednisone while we are working on allergy compliance  - will consider a second opinion with Dr. Chaney if needed     Follow up in about 6 weeks (around 4/6/2022).    Aurora Haskins MD

## 2022-02-28 ENCOUNTER — CLINICAL SUPPORT (OUTPATIENT)
Dept: REHABILITATION | Facility: HOSPITAL | Age: 82
End: 2022-02-28
Payer: MEDICARE

## 2022-02-28 DIAGNOSIS — Z96.651 STATUS POST TOTAL KNEE REPLACEMENT USING CEMENT, RIGHT: ICD-10-CM

## 2022-02-28 PROCEDURE — 97162 PT EVAL MOD COMPLEX 30 MIN: CPT

## 2022-03-01 NOTE — PLAN OF CARE
[facility]  Physical Therapy Initial Evaluation    Name: Elliott Peters  Clinic Number: [id number]    Therapy Diagnosis:   Encounter Diagnosis   Name Primary?    Status post total knee replacement using cement, right      Physician: Chevy Epps MD    Physician Orders: PT Eval and Treat   Medical Diagnosis from Referral: Z96.651 Status post total knee replacement using cement, right  Evaluation Date: 2/28/2022  Authorization Period Expiration:   Plan of Care Expiration: 3/28/22  Visit # / Visits authorized: 1/ 8    Time In: 12:30 PM  Time Out: 1:05 PM  Total Appointment Time (timed & untimed codes): 35 minutes    Precautions: Standard    Subjective   Date of onset: Surgery date: 12/28/22  History of current condition - Elliott reports: pain in the calf just distal to the popliteal fossa on both medial and lateral sides. He has received home health therapy services for his knee for the past 4 weeks being recently discharged. He lacks full flexion or flexion within 85% or non involved side.      Medical History:   Past Medical History:   Diagnosis Date    Coronary artery disease     cardiac stents    Hyperlipidemia     Hypertension     Kidney stone     MI (myocardial infarction)     Sleep apnea     Urinary tract infection        Surgical History:   Elliott Peters  has a past surgical history that includes Coronary angioplasty with stent; Hernia repair; Joint replacement; and Prostatectomy (2012).    Medications:   Elliott has a current medication list which includes the following prescription(s): aspirin, clopidogrel, cyclobenzaprine, docusate sodium, ferrous sulfate, gabapentin, hydrocodone-acetaminophen, hydrocodone-acetaminophen, nitroglycerin, pantoprazole, polyethylene glycol, prednisone, rosuvastatin, tamsulosin, terazosin, [DISCONTINUED] alprazolam, and [DISCONTINUED] clindamycin.    Allergies:   Review of patient's allergies indicates:  No Known Allergies     Prior Therapy: Home health for the past  4 weeks  Social History:  lives with their spouse  Occupation: Retired  Prior Level of Function: Independent  Current Level of Function: Independent    Pain:  Current 3/10, worst 8/10, best 2/10   Location: right knee   Description: Aching  Aggravating Factors: Bending  Easing Factors: pain medication        Objective     Incisions: Dry and Intact  Girth Measurements: Absence of significant swelling in comparison to LLE  Comments:      Range of motion:  Motion Right Left    Hip flexion   115   115   Hip extension   10   10   Hip abduction   35   35   Hip adduction   10   10   Internal rotation   12   12   External rotation   38   38   Knee extension   -3   0   Knee flexion   3-110   0-125   Ankle DF  WITHIN FUNCTIONAL LIMITS  WITHIN FUNCTIONAL LIMITS   Ankle PF  WITHIN FUNCTIONAL LIMITS  WITHIN FUNCTIONAL LIMITS   Ankle Inversion  WITHIN FUNCTIONAL LIMITS  WITHIN FUNCTIONAL LIMITS   Ankle Eversion  WITHIN FUNCTIONAL LIMITS  WITHIN FUNCTIONAL LIMITS       Manual muscle test   Muscle Right  Left    Hip flexion  MMT strength: 4/5  MMT strength: 4/5   Hip extension  MMT strength: 3+/5  MMT strength: 4/5   Hip abduction  MMT strength: 4/5  MMT strength: 4+/5   Hip adduction  MMT strength: 3/5  MMT strength: 4-/5   Hip internal rotation  MMT strength: 3/5  MMT strength: 3/5   Hip external rotation  MMT strength: 4/5  MMT strength: 4/5   Knee extension  MMT strength: 3+/5  MMT strength: 4+/5   Knee flexion  MMT strength: 3/5  MMT strength: 4+/5   Ankle DF  MMT strength: 4+/5  MMT strength: 4+/5   Ankle PF  MMT strength: 4+/5  MMT strength: 4+/5   Ankle inversion  MMT strength: 4+/5  MMT strength: 4+/5   Ankle eversion  MMT strength: 4+/5  MMT strength: 4+/5       Gait:  Weight bearing precautions: WBAT  Assistive device: none  Ambulation distance and deviations: Community level ambulator, absence of full knee extension on terminal stance and initial contact on RLE  Stairs: Pain with descent using step through gait  pattern  Comments:      Limitation/Restriction for FOTO knee Survey    Therapist reviewed FOTO scores for Elliott Peters on 2/28/2022.   FOTO documents entered into [employer] - see Media section.    Limitation Score: 42%         Assessment   Elliott is a 81 y.o. male referred to outpatient Physical Therapy with a medical diagnosis of RLE knee replacement. Pt presents with pain in superior lower leg, reduced ROM, reduced strength, gait abnormalities indicative of increased fall risks, difficulty with negotiation of steps    Pt prognosis is Excellent.   Pt will benefit from skilled outpatient Physical Therapy to address the deficits stated above and in the chart below, provide pt/family education, and to maximize pt's level of independence.     Plan of care discussed with patient: Yes  Pt's spiritual, cultural and educational needs considered and patient is agreeable to the plan of care and goals as stated below:     Anticipated Barriers for therapy: None    Medical Necessity is demonstrated by the following    Goals:   4 weeks   1. Independent with Home Exercise Program   2. Increase Left Knee Range of Motion to 0 Degrees to 120 Degrees  3. Increase Left Knee Strength to grossly 4+/5  4. Patient will ambulate 500 feet with Normal Gait pattern without complaints of pain       Plan   Plan of care Certification: 2/28/2022 to 3/28/2022.    Outpatient Physical Therapy 2 times weekly for 4 weeks to include the following interventions: Gait Training, Manual Therapy, Moist Heat/ Ice, Neuromuscular Re-ed, Patient Education, Therapeutic Activities, and Therapeutic Exercise.     John Peters, PT

## 2022-03-03 ENCOUNTER — CLINICAL SUPPORT (OUTPATIENT)
Dept: REHABILITATION | Facility: HOSPITAL | Age: 82
End: 2022-03-03
Payer: MEDICARE

## 2022-03-03 DIAGNOSIS — Z96.651 STATUS POST TOTAL KNEE REPLACEMENT USING CEMENT, RIGHT: Primary | ICD-10-CM

## 2022-03-03 PROCEDURE — 97110 THERAPEUTIC EXERCISES: CPT | Mod: CQ

## 2022-03-03 PROCEDURE — 97112 NEUROMUSCULAR REEDUCATION: CPT | Mod: CQ

## 2022-03-03 NOTE — PROGRESS NOTES
Physical Therapy Treatment Note     Name: Elliott Peters  Clinic Number: [id number]    Therapy Diagnosis: No diagnosis found.  Physician: Chevy Epps MD    Visit Date: 3/3/2022    Physician Orders: PT Eval and Treat  Medical Diagnosis from Referral: Z96.651 Status post total knee replacement using cement, right  Evaluation Date: 2/28/2022  Authorization Period Expiration: 2/28/2022 to 3/28/2022.  Plan of Care Certification Period: 2/28/2022 to 3/28/2022  Updated Plan of Care Due: 3/28/2022  Visit # / Visits authorized: 2/8   PTA Visit #: 1    Time In: 10:15 AM  Time Out: 11:10 AM  Total Billable Time: 55 minutes    Precautions: Standard  Functional Level Prior to Evaluation: independent     Subjective     Pt reports: minimal pain in right knee upon arrival. Chief complaint is calf pain and tightness.  He will be administered HEP this visit.   Response to previous treatment: N/A  Functional change: no change noted    Pain: 1/10  Location: right knee      Objective     Elliott received therapeutic exercises to develop strength and ROM for 43 minutes including:  Nu step x10 minutes L1  Slant board stretch 3x30 sec  Hamstring stretch 3x20 sec  Heel raises x20  Mini squats x20  Step ups (forward x20, lateral x10)  Static knee extension stretch x3 min 7.5#  Supine heel slides x20  TKE x30  SLR x30  Leg press-  LAQ-  HS curl -  HEP education     Elliott participated in neuromuscular re-education activities to improve: Balance and Coordination for 12 minutes. The following activities were included:  Alternating toe taps x20 ea  Tandem stance with EC 3x20 sec  Single leg stance 4x15 sec    Home Exercises Provided and Patient Education Provided     Education provided: PTA educated patient on HEP providing verbal and tactile cues to ensure proper execution of exercises.     Written Home Exercises Provided: yes.  Exercises were reviewed and Elliott was able to demonstrate them prior to the end of the session.  Elliott demonstrated  good  understanding of the education provided.     See EMR under Media for exercises provided 3/3/2022.    Assessment     Patient tolerated treatment well demonstrating progression towards goals. PTA assessed right knee AAROM measured at 1-115 with a 1 degree lag. States his calf discomfort had decreased post therapy session and leg was fatigued.   Elliott Is progressing well towards his goals.   Pt prognosis is Excellent.     Pt will continue to benefit from skilled outpatient physical therapy to address the deficits listed in the problem list box on initial evaluation, provide pt/family education and to maximize pt's level of independence in the home and community environment.     Pt's spiritual, cultural and educational needs considered and pt agreeable to plan of care and goals.     Anticipated barriers to physical therapy: none    Goals:   4 weeks   1. Independent with Home Exercise Program   2. Increase Left Knee Range of Motion to 0 Degrees to 120 Degrees  3. Increase Left Knee Strength to grossly 4+/5  4. Patient will ambulate 500 feet with Normal Gait pattern without complaints of pain     Plan     Continue with plan of care as indicated.     Outpatient Physical Therapy 2 times weekly for 4 weeks to include the following interventions: Gait Training, Manual Therapy, Moist Heat/ Ice, Neuromuscular Re-ed, Patient Education, Therapeutic Activities, and Therapeutic Exercise.     Rosalva Ellis, PTA  3/3/2022

## 2022-03-07 ENCOUNTER — CLINICAL SUPPORT (OUTPATIENT)
Dept: REHABILITATION | Facility: HOSPITAL | Age: 82
End: 2022-03-07
Payer: MEDICARE

## 2022-03-07 DIAGNOSIS — Z96.651 STATUS POST TOTAL KNEE REPLACEMENT USING CEMENT, RIGHT: Primary | ICD-10-CM

## 2022-03-07 PROCEDURE — 97110 THERAPEUTIC EXERCISES: CPT

## 2022-03-07 NOTE — PROGRESS NOTES
Physical Therapy Treatment Note     Name: Elliott Peters  Clinic Number: [id number]    Therapy Diagnosis:   Encounter Diagnosis   Name Primary?    Status post total knee replacement using cement, right Yes     Physician: Chevy Epps MD    Visit Date: 3/7/2022    Physician Orders: PT Eval and Treat  Medical Diagnosis from Referral: Z96.651 Status post total knee replacement using cement, right  Evaluation Date: 2/28/2022  Authorization Period Expiration: 2/28/2022 to 3/28/2022.  Plan of Care Certification Period: 2/28/2022 to 3/28/2022  Updated Plan of Care Due: 3/28/2022  Visit # / Visits authorized: 3/8   PTA Visit #: 0    Time In: 12:35 PM  Time Out:1:20 PM  Total Billable Time: 45 minutes    Precautions: Standard  Functional Level Prior to Evaluation: independent     Subjective     Pt reports: minimal pain in right knee upon arrival. Chief complaint is calf pain and tightness.  HEP provided on this visit.    Response to previous treatment: N/A  Functional change: no change noted    Pain: 1/10  Location: right knee      Objective     Elliott received therapeutic exercises to develop strength and ROM for 45 minutes including:  Nu step x10 minutes L1  Slant board stretch 3x30 sec  Hamstring stretch 3x20 sec  Heel raises   Mini squats x20  Step ups (forward x20, lateral x10)  Static knee extension stretch x1 min x5 4#  Supine heel slides x20  TKE x30 BTB  SLR x30  Leg press-  LAQ-  HS curl -  HEP education     Elliott participated in neuromuscular re-education activities to improve: Balance and Coordination for  minutes. The following activities were included:  Alternating toe taps x20 ea  Tandem stance with EC 3x20 sec  Single leg stance 4x15 sec    Home Exercises Provided and Patient Education Provided     Education provided: PTA educated patient on HEP providing verbal and tactile cues to ensure proper execution of exercises.     Written Home Exercises Provided: yes.  Exercises were reviewed and Elliott was able  to demonstrate them prior to the end of the session.  Elliott demonstrated good  understanding of the education provided.     See EMR under Media for exercises provided 3/3/2022.    Assessment     Patient tolerated treatment well demonstrating progression towards goals. PT assessed right knee AAROM measured at 1-118 with a 1 degree lag. States his calf discomfort had decreased post therapy session and leg was fatigued.   Elliott Is progressing well towards his goals.   Pt prognosis is Excellent.     Pt will continue to benefit from skilled outpatient physical therapy to address the deficits listed in the problem list box on initial evaluation, provide pt/family education and to maximize pt's level of independence in the home and community environment.     Pt's spiritual, cultural and educational needs considered and pt agreeable to plan of care and goals.     Anticipated barriers to physical therapy: none    Goals:   4 weeks   1. Independent with Home Exercise Program   2. Increase Left Knee Range of Motion to 0 Degrees to 120 Degrees  3. Increase Left Knee Strength to grossly 4+/5  4. Patient will ambulate 500 feet with Normal Gait pattern without complaints of pain     Plan     Continue with plan of care as indicated.     Outpatient Physical Therapy 2 times weekly for 4 weeks to include the following interventions: Gait Training, Manual Therapy, Moist Heat/ Ice, Neuromuscular Re-ed, Patient Education, Therapeutic Activities, and Therapeutic Exercise.     John Peters, PT  3/7/2022

## 2022-03-09 ENCOUNTER — CLINICAL SUPPORT (OUTPATIENT)
Dept: REHABILITATION | Facility: HOSPITAL | Age: 82
End: 2022-03-09
Payer: MEDICARE

## 2022-03-09 DIAGNOSIS — Z96.651 STATUS POST TOTAL KNEE REPLACEMENT USING CEMENT, RIGHT: Primary | ICD-10-CM

## 2022-03-09 PROCEDURE — 97140 MANUAL THERAPY 1/> REGIONS: CPT | Mod: CQ

## 2022-03-09 PROCEDURE — 97110 THERAPEUTIC EXERCISES: CPT | Mod: CQ

## 2022-03-09 NOTE — PROGRESS NOTES
"    Physical Therapy Treatment Note     Name: Elliott Peters  Clinic Number: [id number]    Therapy Diagnosis:   No diagnosis found.  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  Plan of Care Certification Period: 2/28/2022 to 3/28/2022  Updated Plan of Care Due: 3/28/2022  Visit # / Visits authorized: 4/8   PTA Visit #: 1    Time In: 12:31 PM  Time Out:1:18 PM  Total Billable Time: 47 minutes    Precautions: Standard  Functional Level Prior to Evaluation: independent     Subjective     Pt reports: moderate pain in right LE upon arrival. Chief complaint is calf pain and tightness. "It seems like it has gotten worse over the weekend."  HEP provided on this visit.    Response to previous treatment: N/A  Functional change: no change noted    Pain: 6/10  Location: right knee and calf    Objective     Elliott received therapeutic exercises to develop strength and ROM for 37 minutes including:  Nu step x10 minutes L3  Slant board stretch 4x30 sec  Manual heel cord 4x30 sec  Manual hamstring stretch 4x20 sec  Heel raises-  Mini squats -  Step ups (forward -, lateral-)  Static knee extension stretch x3 min x7.5  Seated heel slides x30  TKE x30   SAQ x30  SLR x30  Leg press-  LAQ x30 5#  HS curl -    Elliott participated in neuromuscular re-education activities to improve: Balance and Coordination for -minutes. The following activities were included:  Alternating toe taps x20 ea/  Tandem stance with EC 3x20 sec  Single leg stance 4x15 sec    Elliott received manual therapy consisting of ice massage to the right calf region to help reduce swelling and inflammation for 10 minutes. No adverse effects assessed post modalities.     Home Exercises Provided and Patient Education Provided     Education provided: " PTA provided verbal and tactile cues to ensure proper execution of exercises.     Written Home Exercises Provided: Patient instructed to cont prior HEP.  Exercises were reviewed and Elliott was able to demonstrate them prior to the end of the session.  Elliott demonstrated good  understanding of the education provided.     See EMR under Media for exercises provided prior visit.    Assessment     Patient tolerated treatment well demonstrating progression towards goals. PTA assessed right knee AAROM measured at 1-119 degrees with absence of lag and limited by edema. PTA addressed calf discomfort performing negative Evans's Sign and performed more stretching and prolonged ice massage to decrease muscle spasms and inflammation. States his calf discomfort had decreased post therapy session and leg was fatigued.   Elliott Is progressing well towards his goals.   Pt prognosis is Excellent.     Pt will continue to benefit from skilled outpatient physical therapy to address the deficits listed in the problem list box on initial evaluation, provide pt/family education and to maximize pt's level of independence in the home and community environment.     Pt's spiritual, cultural and educational needs considered and pt agreeable to plan of care and goals.     Anticipated barriers to physical therapy: none    Goals:   4 weeks   1. Independent with Home Exercise Program   2. Increase Left Knee Range of Motion to 0 Degrees to 120 Degrees  3. Increase Left Knee Strength to grossly 4+/5  4. Patient will ambulate 500 feet with Normal Gait pattern without complaints of pain     Plan     Continue with plan of care as indicated.     Outpatient Physical Therapy 2 times weekly for 4 weeks to include the following interventions: Gait Training, Manual Therapy, Moist Heat/ Ice, Neuromuscular Re-ed, Patient Education, Therapeutic Activities, and Therapeutic Exercise.     Rosalva Ellis, PTA  3/9/2022

## 2022-03-11 DIAGNOSIS — Z71.89 COMPLEX CARE COORDINATION: ICD-10-CM

## 2022-03-14 ENCOUNTER — CLINICAL SUPPORT (OUTPATIENT)
Dept: REHABILITATION | Facility: HOSPITAL | Age: 82
End: 2022-03-14
Payer: MEDICARE

## 2022-03-14 DIAGNOSIS — Z96.651 STATUS POST TOTAL KNEE REPLACEMENT USING CEMENT, RIGHT: Primary | ICD-10-CM

## 2022-03-14 PROCEDURE — 97110 THERAPEUTIC EXERCISES: CPT | Mod: CQ

## 2022-03-14 NOTE — PROGRESS NOTES
Physical Therapy Treatment Note     Name: Elliott Peters  Clinic Number: [id number]    Therapy Diagnosis:   No diagnosis found.  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  Plan of Care Certification Period: 2/28/2022 to 3/28/2022  Updated Plan of Care Due: 3/28/2022  Visit # / Visits authorized: 5/8   PTA Visit #: 1  Progress note needed: 3/28/2022    Time In: 12:32 PM  Time Out: 1:16 PM  Total Billable Time: 44 minutes    Precautions: Standard  Functional Level Prior to Evaluation: independent     Subjective     Pt reports: decreased calf pain and muscle spasms reported with prolonged icing and stretching at home; reports mild pain in right knee and calf region  Patient reports compliance with HEP.    Response to previous treatment: mild soreness  Functional change: no change noted    Pain: 3/10  Location: right knee and calf    Objective     Elliott received therapeutic exercises to develop strength and ROM for 44 minutes including:  Nu step x10 minutes L3  Slant board stretch 4x30 sec  Manual heel cord 4x30 sec  Hamstring stretch on steps 4x20 sec  Heel raises -  Mini squats x20  Step ups (forward x15, lateral x15)  Knee flexion stretch at steps 15x5 sec  Static knee extension stretch x3 min 10#  Seated heel slides x30  TKE x30 BTB  SAQ x30 3#  SLR x30 2#  Leg press B x30 60#  LAQ x30 7.5#  HS curl x30 BTB    Elliott participated in neuromuscular re-education activities to improve: Balance and Coordination for -minutes. The following activities were included:  Alternating toe taps x20 ea/  Tandem stance with EC 3x20 sec  Single leg stance 4x15 sec     Home Exercises Provided and Patient Education Provided     Education provided: PTA provided verbal and tactile cues to ensure proper  execution of exercises.     Written Home Exercises Provided: Patient instructed to cont prior HEP.  Exercises were reviewed and Elliott was able to demonstrate them prior to the end of the session.  Elliott demonstrated good  understanding of the education provided.     See EMR under Media for exercises provided prior visit.    Assessment     Patient tolerated treatment well demonstrating progression towards goals. PTA assessed right knee AAROM measured at 1-119 degrees with absence of lag and limited by edema. PTA instructed patient to continue prolonged ice massage to decrease muscle spasms and inflammation at home. States his calf discomfort had decreased post therapy session and leg was fatigued.     Elliott Is progressing well towards his goals.   Pt prognosis is Excellent.     Pt will continue to benefit from skilled outpatient physical therapy to address the deficits listed in the problem list box on initial evaluation, provide pt/family education and to maximize pt's level of independence in the home and community environment.     Pt's spiritual, cultural and educational needs considered and pt agreeable to plan of care and goals.     Anticipated barriers to physical therapy: none    Goals:   4 weeks   1. Independent with Home Exercise Program   2. Increase Left Knee Range of Motion to 0 Degrees to 120 Degrees  3. Increase Left Knee Strength to grossly 4+/5  4. Patient will ambulate 500 feet with Normal Gait pattern without complaints of pain     Plan     Continue with plan of care as indicated.     Outpatient Physical Therapy 2 times weekly for 4 weeks to include the following interventions: Gait Training, Manual Therapy, Moist Heat/ Ice, Neuromuscular Re-ed, Patient Education, Therapeutic Activities, and Therapeutic Exercise.     Rosalva Ellis, PTA  3/14/2022

## 2022-03-16 ENCOUNTER — CLINICAL SUPPORT (OUTPATIENT)
Dept: REHABILITATION | Facility: HOSPITAL | Age: 82
End: 2022-03-16
Payer: MEDICARE

## 2022-03-16 DIAGNOSIS — Z96.651 STATUS POST TOTAL KNEE REPLACEMENT USING CEMENT, RIGHT: Primary | ICD-10-CM

## 2022-03-16 PROCEDURE — 97110 THERAPEUTIC EXERCISES: CPT | Mod: CQ

## 2022-03-16 NOTE — PROGRESS NOTES
Physical Therapy Treatment Note     Name: Elliott Peters  Clinic Number: [id number]    Therapy Diagnosis:   No diagnosis found.  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  Plan of Care Certification Period: 2/28/2022 to 3/28/2022  Updated Plan of Care Due: 3/28/2022  Visit # / Visits authorized: 6/8   PTA Visit #: 3  Progress note needed: 3/28/2022    Time In: 12:35 PM  Time Out: 1:17 PM  Total Billable Time: 42 minutes    Precautions: Standard  Functional Level Prior to Evaluation: independent     Subjective     Pt reports: no pain in the right knee; chief complaint continues to be tightness in the R calf  Patient reports compliance with HEP.    Response to previous treatment: mild soreness  Functional change: no change noted    Pain: 0/10  Location: right knee    Objective     Elliott received therapeutic exercises to develop strength and ROM for 42 minutes including:  Nu step x10 minutes L3  Slant board stretch 4x30 sec  Manual heel cord 4x30 sec  Hamstring stretch on steps 4x20 sec  Heel raises x20  Mini squats x20  Step ups (forward x15, lateral x15)  Knee flexion stretch at steps -  Static knee extension stretch x3 min 10#  Seated heel slides 20x5 sec  TKE x30 BTB  SAQ x20 7.5#  SLR x30 2#  Leg press R x30 20#  LAQ R  HS curl R x20 20#    Elliott participated in neuromuscular re-education activities to improve: Balance and Coordination for -minutes. The following activities were included:  Alternating toe taps x20 ea/  Tandem stance with EC 3x20 sec  Single leg stance 4x15 sec     Home Exercises Provided and Patient Education Provided     Education provided: PTA provided verbal and tactile cues to ensure proper execution of exercises.     Written Home Exercises Provided: Patient  instructed to cont prior HEP.  Exercises were reviewed and Elliott was able to demonstrate them prior to the end of the session.  Elliott demonstrated good  understanding of the education provided.     See EMR under Media for exercises provided prior visit.    Assessment     Patient tolerated treatment well demonstrating progression towards goals. PTA assessed right knee PROM measured at 0-113 degrees with absence of lag and limited by edema. PTA instructed patient to continue prolonged ice massage to decrease muscle spasms and inflammation at home. States his calf discomfort had decreased post therapy session and leg was fatigued.     Elliott Is progressing well towards his goals.   Pt prognosis is Excellent.     Pt will continue to benefit from skilled outpatient physical therapy to address the deficits listed in the problem list box on initial evaluation, provide pt/family education and to maximize pt's level of independence in the home and community environment.     Pt's spiritual, cultural and educational needs considered and pt agreeable to plan of care and goals.     Anticipated barriers to physical therapy: none    Goals:   4 weeks   1. Independent with Home Exercise Program   2. Increase Left Knee Range of Motion to 0 Degrees to 120 Degrees  3. Increase Left Knee Strength to grossly 4+/5  4. Patient will ambulate 500 feet with Normal Gait pattern without complaints of pain     Plan     Continue with plan of care as indicated.     Outpatient Physical Therapy 2 times weekly for 4 weeks to include the following interventions: Gait Training, Manual Therapy, Moist Heat/ Ice, Neuromuscular Re-ed, Patient Education, Therapeutic Activities, and Therapeutic Exercise.     Rosalva Ellis, PARTHA  3/16/2022

## 2022-03-21 ENCOUNTER — CLINICAL SUPPORT (OUTPATIENT)
Dept: REHABILITATION | Facility: HOSPITAL | Age: 82
End: 2022-03-21
Payer: MEDICARE

## 2022-03-21 DIAGNOSIS — Z96.651 STATUS POST TOTAL KNEE REPLACEMENT USING CEMENT, RIGHT: Primary | ICD-10-CM

## 2022-03-21 PROCEDURE — 97110 THERAPEUTIC EXERCISES: CPT | Mod: CQ

## 2022-03-21 PROCEDURE — 97140 MANUAL THERAPY 1/> REGIONS: CPT | Mod: CQ

## 2022-03-21 NOTE — PROGRESS NOTES
Physical Therapy Treatment Note     Name: Elliott Peters  Clinic Number: [id number]    Therapy Diagnosis:   No diagnosis found.  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  Plan of Care Certification Period: 2/28/2022 to 3/28/2022  Updated Plan of Care Due: 3/28/2022  Visit # / Visits authorized: 7/8   PTA Visit #: 4  Progress note needed: 3/28/2022    Time In: 12:33 PM  Time Out: 1:27 PM  Total Billable Time: 54 minutes    Precautions: Standard  Functional Level Prior to Evaluation: independent     Subjective     Pt reports: Mild to moderate pain in the R knee and calf. States his calf was doing better then began to get extremely tight over the weekend again.   Patient reports compliance with HEP and stretching calf as instructed regularly.  Response to previous treatment: mild soreness  Functional change: no change noted    Pain: 4/10  Location: right knee    Objective     Elliott received therapeutic exercises to develop strength and ROM for 44 minutes including:  Nu step x6 minutes L5  Slant board stretch 4x30 sec  Long sitting gastroc stretch with strap-   Manual heel cord 4x30 sec  Hamstring stretch on steps 3x20 sec  Mini squats x20  Step ups 6'' (forward x15, lateral x15)  Knee flexion stretch at steps 20x5 sec  Static knee extension stretch x3 min 10#  Seated heel slides 20x5 sec  TKE x30 BTB  SAQ x20 7.5#  SLR x30 2#  Leg press R x30 20#  LAQ R x30 10#  HS curl R 20#-    Elliott participated in neuromuscular re-education activities to improve: Balance and Coordination for -minutes. The following activities were included:  Alternating toe taps x20 ea/  Tandem stance with EC 3x20 sec  Single leg stance 4x15 sec     Elliott received manual for 10 minutes to RLE to improve overall  functional mobility and decrease inflammation including:  Manual gastroc stretch 6x20 sec  Ice massage to calf region x8 minutes     Home Exercises Provided and Patient Education Provided     Education provided: PTA provided verbal and tactile cues to ensure proper execution of exercises.     Written Home Exercises Provided: Patient instructed to cont prior HEP.  Exercises were reviewed and Elliott was able to demonstrate them prior to the end of the session.  Elliott demonstrated good  understanding of the education provided.     See EMR under Media for exercises provided prior visit.    Assessment     Patient tolerated treatment well demonstrating progression towards goals. PTA assessed increased edema in the right LE and right knee PROM measured at 0-114 degrees with absence of lag and limited by edema. PTA instructed patient to continue prolonged ice massage to decrease muscle spasms and inflammation at home. States his calf discomfort had decreased post therapy session and leg was fatigued.     Elliott Is progressing well towards his goals.   Pt prognosis is Excellent.     Pt will continue to benefit from skilled outpatient physical therapy to address the deficits listed in the problem list box on initial evaluation, provide pt/family education and to maximize pt's level of independence in the home and community environment.     Pt's spiritual, cultural and educational needs considered and pt agreeable to plan of care and goals.     Anticipated barriers to physical therapy: none    Goals:   4 weeks   1. Independent with Home Exercise Program   2. Increase Left Knee Range of Motion to 0 Degrees to 120 Degrees  3. Increase Left Knee Strength to grossly 4+/5  4. Patient will ambulate 500 feet with Normal Gait pattern without complaints of pain     Plan     Continue with plan of care as indicated. Plans to have a follow up appointment on 4/6/2022.     Outpatient Physical Therapy 2 times weekly for 4 weeks to include the  following interventions: Gait Training, Manual Therapy, Moist Heat/ Ice, Neuromuscular Re-ed, Patient Education, Therapeutic Activities, and Therapeutic Exercise.     Rosalva Ellis, PTA  3/21/2022

## 2022-03-23 ENCOUNTER — CLINICAL SUPPORT (OUTPATIENT)
Dept: REHABILITATION | Facility: HOSPITAL | Age: 82
End: 2022-03-23
Payer: MEDICARE

## 2022-03-23 DIAGNOSIS — Z96.651 STATUS POST TOTAL KNEE REPLACEMENT USING CEMENT, RIGHT: Primary | ICD-10-CM

## 2022-03-23 PROCEDURE — 97140 MANUAL THERAPY 1/> REGIONS: CPT | Mod: CQ

## 2022-03-23 PROCEDURE — 97110 THERAPEUTIC EXERCISES: CPT | Mod: CQ

## 2022-03-23 NOTE — PROGRESS NOTES
"    Physical Therapy Treatment Note     Name: Elliott Peters  Clinic Number: [id number]    Therapy Diagnosis:   No diagnosis found.  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  Plan of Care Certification Period: 2/28/2022 to 3/28/2022  Updated Plan of Care Due: 3/28/2022  Visit # / Visits authorized: 7/8   PTA Visit #: 5  Progress note needed: 3/28/2022    Time In: 12:31 PM  Time Out: 1:27 PM  Total Billable Time: 56 minutes    Precautions: Standard  Functional Level Prior to Evaluation: independent     Subjective     Pt reports: calf is feeling much better. "I believe the stretching and ice helps a lot."   Patient reports compliance with HEP and stretching calf as instructed regularly.  Response to previous treatment: mild soreness  Functional change: no change noted    Pain: 0/10  Location: right knee    Objective     Elliott received therapeutic exercises to develop strength and ROM for 46 minutes including:  Nu step x8 minutes L5  R slant board stretch 4x30 sec  Long sitting gastroc stretch with strap 4x20 sec  Hamstring stretch on steps 3x20 sec  Mini squats x30  Step ups 6'' (forward x15, lateral x15) 2#  Knee flexion stretch at steps 20x5 sec  Manual knee extension stretch 10x10 sec hold  Seated heel slides 20x5 sec  TKE x30 BTB  SAQ x20 10#  SLR x30 2#  Leg press R x30 20#  LAQ R x30 10#  HS curl R 20#-    Elliott participated in neuromuscular re-education activities to improve: Balance and Coordination for -minutes. The following activities were included:  Alternating toe taps x20 ea/  Tandem stance with EC 3x20 sec  Single leg stance 4x15 sec     Elliott received manual for 10 minutes to RLE to improve overall functional mobility and decrease inflammation including:  Manual gastroc " stretch 6x20 sec  Ice massage to calf region x8 minutes     Home Exercises Provided and Patient Education Provided     Education provided: PTA provided verbal and tactile cues to ensure proper execution of exercises.     Written Home Exercises Provided: Patient instructed to cont prior HEP.  Exercises were reviewed and Elliott was able to demonstrate them prior to the end of the session.  Elliott demonstrated good  understanding of the education provided.     See EMR under Media for exercises provided prior visit.    Assessment     Patient tolerated treatment well demonstrating progression towards goals. PTA assessed edema in the right LE and right knee PROM measured at 0-120 degrees. PTA instructed patient to continue prolonged ice massage to decrease muscle spasms and inflammation at home. States his calf discomfort had decreased post therapy session and leg was fatigued. No new complaints of pain reported.     Elliott Is progressing well towards his goals.   Pt prognosis is Excellent.     Pt will continue to benefit from skilled outpatient physical therapy to address the deficits listed in the problem list box on initial evaluation, provide pt/family education and to maximize pt's level of independence in the home and community environment.     Pt's spiritual, cultural and educational needs considered and pt agreeable to plan of care and goals.     Anticipated barriers to physical therapy: none    Goals:   4 weeks   1. Independent with Home Exercise Program   2. Increase Left Knee Range of Motion to 0 Degrees to 120 Degrees  3. Increase Left Knee Strength to grossly 4+/5  4. Patient will ambulate 500 feet with Normal Gait pattern without complaints of pain     Plan     Continue with plan of care as indicated. Plans to have a follow up appointment on 4/6/2022.     Outpatient Physical Therapy 2 times weekly for 4 weeks to include the following interventions: Gait Training, Manual Therapy, Moist Heat/ Ice, Neuromuscular  Re-ed, Patient Education, Therapeutic Activities, and Therapeutic Exercise.     Rosalva Ellis, PTA  3/23/2022

## 2022-03-28 ENCOUNTER — CLINICAL SUPPORT (OUTPATIENT)
Dept: REHABILITATION | Facility: HOSPITAL | Age: 82
End: 2022-03-28
Payer: MEDICARE

## 2022-03-28 DIAGNOSIS — Z96.651 STATUS POST TOTAL KNEE REPLACEMENT USING CEMENT, RIGHT: Primary | ICD-10-CM

## 2022-03-28 PROCEDURE — 97530 THERAPEUTIC ACTIVITIES: CPT

## 2022-03-28 NOTE — PLAN OF CARE
Outpatient Therapy Discharge Summary     Name: Elliott Peters  Clinic Number: [id number]    Therapy Diagnosis: No diagnosis found.  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 Independent with Home Exercise Program   2. Increase Left Knee Range of Motion to 0 Degrees to 120 Degrees  3. Increase Left Knee Strength to grossly 4+/5  4. Patient will ambulate 500 feet with Normal Gait pattern without complaints of pain       Discharge reason: Patient has met all of his/her goals    Plan   This patient is discharged from Physical Therapy.

## 2022-03-28 NOTE — PROGRESS NOTES
"    Physical Therapy Treatment Note     Name: Elliott Peters  Clinic Number: [id number]    Therapy Diagnosis:   Encounter Diagnosis   Name Primary?   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  Plan of Care Certification Period: 2/28/2022 to 3/28/2022  Updated Plan of Care Due: 3/28/2022  Visit # / Visits authorized: 8/8   PTA Visit #: 0  Progress note needed: 3/28/2022    Time In: 12:31 PM  Time Out: 1:05 PM  Total Billable Time: 34 minutes    Precautions: Standard  Functional Level Prior to Evaluation: independent     Subjective     Pt reports: calf is feeling much better. "I believe the stretching and ice helps a lot."   Patient reports compliance with HEP and stretching calf as instructed regularly.  Response to previous treatment: mild soreness  Functional change: no change noted    Pain: 0/10  Location: right knee    Objective     Elliott received therapeutic exercises to develop strength and ROM for 34 minutes including:  Nu step x8 minutes L5  R slant board stretch 4x30 sec  Long sitting gastroc stretch with strap 4x20 sec  Hamstring stretch on steps 3x20 sec  Mini squats x30  Step ups 6'' (forward x15, lateral x15) 2#  Knee flexion stretch at steps 20x5 sec  Manual knee extension stretch 10x10 sec hold  Seated heel slides 20x5 sec  TKE x30 BTB  SAQ x20 10#  SLR x30 2#  Leg press R x30 20#  LAQ R x30 10#  HS curl R 20#-    Elliott participated in neuromuscular re-education activities to improve: Balance and Coordination for -minutes. The following activities were included:  Alternating toe taps x20 ea/  Tandem stance with EC 3x20 sec  Single leg stance 4x15 sec     Elliott received manual for 10 minutes to RLE to improve " overall functional mobility and decrease inflammation including:  Manual gastroc stretch 6x20 sec  Ice massage to calf region x8 minutes     Home Exercises Provided and Patient Education Provided     Education provided: PTA provided verbal and tactile cues to ensure proper execution of exercises.     Written Home Exercises Provided: Patient instructed to cont prior HEP.  Exercises were reviewed and Elliott was able to demonstrate them prior to the end of the session.  Elliott demonstrated good  understanding of the education provided.     See EMR under Media for exercises provided prior visit.    Assessment     Patient assessed for all set goals and will be discharged on this date due to meeting all set goals.     Goals:   4 weeks   1. Independent with Home Exercise Program   2. Increase Left Knee Range of Motion to 0 Degrees to 120 Degrees  3. Increase Left Knee Strength to grossly 4+/5  4. Patient will ambulate 500 feet with Normal Gait pattern without complaints of pain     Plan     Continue with plan of care as indicated. Plans to have a follow up appointment on 4/6/2022.     Outpatient Physical Therapy 2 times weekly for 4 weeks to include the following interventions: Gait Training, Manual Therapy, Moist Heat/ Ice, Neuromuscular Re-ed, Patient Education, Therapeutic Activities, and Therapeutic Exercise.     John Peters, PT  3/28/2022

## 2022-04-06 ENCOUNTER — OFFICE VISIT (OUTPATIENT)
Dept: DERMATOLOGY | Facility: CLINIC | Age: 82
End: 2022-04-06
Payer: MEDICARE

## 2022-04-06 ENCOUNTER — HOSPITAL ENCOUNTER (OUTPATIENT)
Dept: RADIOLOGY | Facility: HOSPITAL | Age: 82
Discharge: HOME | End: 2022-04-06
Attending: ORTHOPAEDIC SURGERY
Payer: MEDICARE

## 2022-04-06 DIAGNOSIS — Z96.651 STATUS POST TOTAL KNEE REPLACEMENT USING CEMENT, RIGHT: ICD-10-CM

## 2022-04-06 DIAGNOSIS — L23.5 ALLERGIC DERMATITIS DUE TO OTHER CHEMICAL PRODUCT: Primary | ICD-10-CM

## 2022-04-06 PROCEDURE — 99214 OFFICE O/P EST MOD 30 MIN: CPT | Mod: ,,, | Performed by: DERMATOLOGY

## 2022-04-06 PROCEDURE — 99214 PR OFFICE/OUTPT VISIT, EST, LEVL IV, 30-39 MIN: ICD-10-PCS | Mod: ,,, | Performed by: DERMATOLOGY

## 2022-04-06 PROCEDURE — 73560 X-RAY EXAM OF KNEE 1 OR 2: CPT | Mod: TC,RT

## 2022-04-06 RX ORDER — PREDNISONE 10 MG/1
TABLET ORAL
Qty: 36 TABLET | Refills: 0 | Status: SHIPPED | OUTPATIENT
Start: 2022-04-06 | End: 2022-10-19 | Stop reason: ALTCHOICE

## 2022-04-06 NOTE — PROGRESS NOTES
[facility]   Aurora Hasikns MD    Patient Name: Elliott Peters  Patient YOB: 1940   Date of Service: 4/6/22    CC: Follow-up Allergic Contact Dermatitis    HPI: Elliott Peters is a 81 y.o. male here today for follow-up of ACD, last seen 02/23/22.  Previous treatments include Benzoyl Peroxide/ Clindamycin lotion, doxycycline, and hydroxyzine previously and Prednisone and Vanicream currently.  Overall, the ACD is improved.  Treatment plan was not followed as directed.    Past Medical History:   Diagnosis Date    Coronary artery disease     cardiac stents    Hyperlipidemia     Hypertension     Kidney stone     MI (myocardial infarction)     Sleep apnea     Urinary tract infection      Past Surgical History:   Procedure Laterality Date    CORONARY ANGIOPLASTY WITH STENT PLACEMENT      HERNIA REPAIR      JOINT REPLACEMENT      difficult to wake after surgery     PROSTATECTOMY  2012     Review of patient's allergies indicates:  No Known Allergies    Current Outpatient Medications:     aspirin (ECOTRIN) 81 MG EC tablet, Take 1 tablet (81 mg total) by mouth once daily., Disp: 30 tablet, Rfl: 0    clopidogreL (PLAVIX) 75 mg tablet, Take 1 tablet (75 mg total) by mouth once daily., Disp: 30 tablet, Rfl: 11    docusate sodium (COLACE) 100 MG capsule, Take 100 mg by mouth once daily., Disp: , Rfl:     ferrous sulfate (FEOSOL) Tab tablet, Take 1 tablet (1 each total) by mouth once daily., Disp: 30 tablet, Rfl: 0    gabapentin (NEURONTIN) 300 MG capsule, Take 1 capsule (300 mg total) by mouth every evening., Disp: 30 capsule, Rfl: 11    HYDROcodone-acetaminophen (NORCO)  mg per tablet, Take 1 tablet by mouth every 8 (eight) hours as needed for Pain., Disp: 28 tablet, Rfl: 0    HYDROcodone-acetaminophen (NORCO)  mg per tablet, Take 1 tablet by mouth every 6 (six) hours as needed for Pain., Disp: 20 tablet, Rfl: 0    nitroGLYCERIN (NITROSTAT) 0.4 MG SL tablet, Place 1 tablet  (0.4 mg total) under the tongue every 5 (five) minutes as needed for Chest pain., Disp: 100 tablet, Rfl: 0    pantoprazole (PROTONIX) 40 MG tablet, Take 40 mg by mouth once daily., Disp: , Rfl:     polyethylene glycol (GLYCOLAX) 17 gram PwPk, Take by mouth., Disp: , Rfl:     predniSONE (DELTASONE) 10 MG tablet, Take 6 tab po daily x1 day, 4 tab po daily x2 days, 3 tabs po daily x3 days, 2 tabs po daily x4 days, 1 tab po daily x5 days, Disp: 36 tablet, Rfl: 0    rosuvastatin (CRESTOR) 20 MG tablet, Take 20 mg by mouth once daily., Disp: , Rfl:     tamsulosin (FLOMAX) 0.4 mg Cap, Take 1 capsule (0.4 mg total) by mouth once daily., Disp: 30 capsule, Rfl: 11    terazosin (HYTRIN) 5 MG capsule, Take 5 mg by mouth every evening., Disp: , Rfl:     ROS: A focused review of systems was obtained and negative.     Exam: A focused skin exam was performed. All areas examined were normal except as mentioned in the assessment and plan below.  General Appearance of the patient is well developed and well nourished.  Orientation: alert and oriented x 3.  Mood and affect: pleasant.    Assessment:   The encounter diagnosis was Allergic dermatitis due to other chemical product.    Plan:   Medications Ordered This Encounter   Medications    predniSONE (DELTASONE) 10 MG tablet     Sig: Take 6 tab po daily x1 day, 4 tab po daily x2 days, 3 tabs po daily x3 days, 2 tabs po daily x4 days, 1 tab po daily x5 days     Dispense:  36 tablet     Refill:  0        Allergic Contact Dermatitis (L23.89)  - Well demarcated, geometric eczematous patches  Status: Improved, but still flaring    Plan: Counseling.  I counseled the patient regarding the following:  Skin care: Patient should use hypoallergenic products such as unscented soaps. Eliminate exposure to all new  cosmetics, fragrances, hair products, nail products shampoos, scented soaps, plants, metals and sunscreens.  Expectations: Allergic contact dermatitis can persist for several weeks  before it fully resolves. Sometimes, patch  testing is necessary if reactions persist or if the patient is in contact with several potential allergens.  Contact office if: Allergic contact dermatitis worsens or fails to improve despite several weeks of treatment.    - Will refer for a second opinion due to rash reoccurrence after stopping prednisone and avoiding iodopropynyl butylcarbamate which was found on TRUE test  - Will send with Vanicream shampoo and hypoallergenic laundry detergent.   - will give another short course of prednisone to have if it takes more than a few weeks to get in with Dr. Ashton or Dr. Chaney      Follow up in about 6 months (around 10/6/2022).    Aurora Haskins MD

## 2022-04-19 ENCOUNTER — OFFICE VISIT (OUTPATIENT)
Dept: FAMILY MEDICINE | Facility: CLINIC | Age: 82
End: 2022-04-19
Payer: MEDICARE

## 2022-04-19 VITALS
RESPIRATION RATE: 20 BRPM | BODY MASS INDEX: 30.2 KG/M2 | HEIGHT: 65 IN | WEIGHT: 181.25 LBS | SYSTOLIC BLOOD PRESSURE: 138 MMHG | DIASTOLIC BLOOD PRESSURE: 68 MMHG | HEART RATE: 66 BPM | OXYGEN SATURATION: 97 % | TEMPERATURE: 98 F

## 2022-04-19 DIAGNOSIS — Z79.899 ENCOUNTER FOR LONG-TERM (CURRENT) DRUG USE: ICD-10-CM

## 2022-04-19 DIAGNOSIS — I10 PRIMARY HYPERTENSION: Primary | ICD-10-CM

## 2022-04-19 DIAGNOSIS — F41.9 ANXIETY: ICD-10-CM

## 2022-04-19 LAB
CTP QC/QA: YES
POC (AMP) AMPHETAMINE: NEGATIVE
POC (BAR) BARBITURATES: NEGATIVE
POC (BUP) BUPRENORPHINE: NEGATIVE
POC (BZO) BENZODIAZEPINES: ABNORMAL
POC (COC) COCAINE: NEGATIVE
POC (MDMA) METHYLENEDIOXYMETHAMPHETAMINE 3,4: NEGATIVE
POC (MET) METHAMPHETAMINE: NEGATIVE
POC (MOP) OPIATES: NEGATIVE
POC (MTD) METHADONE: NEGATIVE
POC (OXY) OXYCODONE: NEGATIVE
POC (PCP) PHENCYCLIDINE: NEGATIVE
POC (TCA) TRICYCLIC ANTIDEPRESSANTS: NEGATIVE
POC TEMPERATURE (URINE): 90
POC THC: NEGATIVE

## 2022-04-19 PROCEDURE — 80305 DRUG TEST PRSMV DIR OPT OBS: CPT | Mod: RHCUB | Performed by: NURSE PRACTITIONER

## 2022-04-19 PROCEDURE — 99214 OFFICE O/P EST MOD 30 MIN: CPT | Mod: ,,, | Performed by: NURSE PRACTITIONER

## 2022-04-19 PROCEDURE — 99214 PR OFFICE/OUTPT VISIT, EST, LEVL IV, 30-39 MIN: ICD-10-PCS | Mod: ,,, | Performed by: NURSE PRACTITIONER

## 2022-04-19 RX ORDER — CARVEDILOL 3.12 MG/1
3.12 TABLET ORAL 2 TIMES DAILY WITH MEALS
COMMUNITY
End: 2022-04-19 | Stop reason: SDUPTHER

## 2022-04-19 RX ORDER — AMLODIPINE BESYLATE 5 MG/1
2.5 TABLET ORAL DAILY
Qty: 180 TABLET | Refills: 1 | Status: SHIPPED | OUTPATIENT
Start: 2022-04-19

## 2022-04-19 RX ORDER — MELOXICAM 7.5 MG/1
7.5 TABLET ORAL DAILY
COMMUNITY
End: 2022-07-07 | Stop reason: SDUPTHER

## 2022-04-19 RX ORDER — ALPRAZOLAM 0.5 MG/1
0.5 TABLET ORAL 2 TIMES DAILY PRN
COMMUNITY
End: 2022-10-19

## 2022-04-19 RX ORDER — PANTOPRAZOLE SODIUM 40 MG/1
40 TABLET, DELAYED RELEASE ORAL DAILY
Qty: 90 TABLET | Refills: 1 | Status: SHIPPED | OUTPATIENT
Start: 2022-04-19 | End: 2022-10-19 | Stop reason: SDUPTHER

## 2022-04-19 RX ORDER — AMLODIPINE BESYLATE 5 MG/1
2.5 TABLET ORAL DAILY
COMMUNITY
End: 2022-04-19 | Stop reason: SDUPTHER

## 2022-04-19 RX ORDER — ALPRAZOLAM 0.5 MG/1
0.5 TABLET ORAL 2 TIMES DAILY PRN
Qty: 90 TABLET | Refills: 0 | Status: SHIPPED | OUTPATIENT
Start: 2022-04-19 | End: 2022-10-19 | Stop reason: SDUPTHER

## 2022-04-19 RX ORDER — CARVEDILOL 3.12 MG/1
3.12 TABLET ORAL 2 TIMES DAILY WITH MEALS
Qty: 180 TABLET | Refills: 1 | Status: SHIPPED | OUTPATIENT
Start: 2022-04-19 | End: 2022-10-19 | Stop reason: SDUPTHER

## 2022-04-19 NOTE — PROGRESS NOTES
Izzy Olivier NP   [facility]  [address]  [place]     PATIENT NAME: Elliott Peters  : 1940  DATE: 22  MRN: [id number]      Billing Provider: Izzy Olivier NP  Level of Service:   Patient PCP Information     Provider PCP Type    Izzy Olivier NP General          Reason for Visit / Chief Complaint: Follow-up (6 month checkup ) and Hypertension (Need refills on medications/)       Update PCP  Update Chief Complaint         History of Present Illness / Problem Focused Workflow     Elliott Peters presents to the clinic here for eval of htn, voices n/c today, except occ has anxiety attacks, keeps xanax on hand      Review of Systems     Review of Systems   Constitutional: Negative for chills, fatigue and fever.   HENT: Negative for nasal congestion, ear pain, facial swelling, hearing loss, mouth dryness, mouth sores, postnasal drip, rhinorrhea, sinus pressure/congestion and goiter.    Eyes: Negative for discharge and itching.   Respiratory: Negative for cough, shortness of breath and wheezing.    Cardiovascular: Negative for chest pain and leg swelling.   Gastrointestinal: Negative for abdominal pain, change in bowel habit and change in bowel habit.   Genitourinary: Negative for difficulty urinating, dysuria, enuresis, frequency, hematuria and urgency.   Neurological: Negative for dizziness, vertigo, syncope, weakness and headaches.   Psychiatric/Behavioral: Negative for decreased concentration.        Medical / Social / Family History     Past Medical History:   Diagnosis Date    Coronary artery disease     cardiac stents    Hyperlipidemia     Hypertension     Kidney stone     MI (myocardial infarction)     Sleep apnea     Urinary tract infection        Past Surgical History:   Procedure Laterality Date    CORONARY ANGIOPLASTY WITH STENT PLACEMENT      HERNIA REPAIR      JOINT REPLACEMENT      difficult to wake after surgery     PROSTATECTOMY         Social History     reports that he has quit smoking. He has never used smokeless tobacco. He reports previous alcohol use. He reports that he does not use drugs.    Family History  's family history includes Cancer in his mother.    Medications and Allergies     Medications  Outpatient Medications Marked as Taking for the 4/19/22 encounter (Office Visit) with Izzy Olivier NP   Medication Sig Dispense Refill    ALPRAZolam (XANAX) 0.5 MG tablet Take 0.5 mg by mouth 2 (two) times daily as needed.      aspirin (ECOTRIN) 81 MG EC tablet Take 1 tablet (81 mg total) by mouth once daily. 30 tablet 0    clopidogreL (PLAVIX) 75 mg tablet Take 1 tablet (75 mg total) by mouth once daily. 30 tablet 11    docusate sodium (COLACE) 100 MG capsule Take 100 mg by mouth once daily.      gabapentin (NEURONTIN) 300 MG capsule Take 1 capsule (300 mg total) by mouth every evening. 30 capsule 11    meloxicam (MOBIC) 7.5 MG tablet Take 7.5 mg by mouth once daily.      nitroGLYCERIN (NITROSTAT) 0.4 MG SL tablet Place 1 tablet (0.4 mg total) under the tongue every 5 (five) minutes as needed for Chest pain. 100 tablet 0    polyethylene glycol (GLYCOLAX) 17 gram PwPk Take by mouth.      rosuvastatin (CRESTOR) 20 MG tablet Take 20 mg by mouth once daily.      tamsulosin (FLOMAX) 0.4 mg Cap Take 1 capsule (0.4 mg total) by mouth once daily. 30 capsule 11    terazosin (HYTRIN) 5 MG capsule Take 5 mg by mouth every evening.      [DISCONTINUED] ALPRAZolam (XANAX) 0.5 MG tablet Take 0.5 mg by mouth daily as needed.      [DISCONTINUED] amLODIPine (NORVASC) 5 MG tablet Take 2.5 mg by mouth once daily.      [DISCONTINUED] carvediloL (COREG) 3.125 MG tablet Take 3.125 mg by mouth 2 (two) times daily with meals.      [DISCONTINUED] pantoprazole (PROTONIX) 40 MG tablet Take 40 mg by mouth once daily.         Allergies  Review of patient's allergies indicates:  No Known Allergies    Physical Examination     Vitals:    04/19/22 1411   BP: 138/68   BP  "Location: Right arm   Patient Position: Sitting   BP Method: Medium (Manual)   Pulse: 66   Resp: 20   Temp: 97.7 °F (36.5 °C)   TempSrc: Oral   SpO2: 97%   Weight: 82.2 kg (181 lb 4 oz)   Height: 5' 5" (1.651 m)      Physical Exam  Vitals and nursing note reviewed.   Constitutional:       Appearance: Normal appearance.   HENT:      Head: Normocephalic.      Right Ear: Tympanic membrane, ear canal and external ear normal.      Left Ear: Tympanic membrane, ear canal and external ear normal.      Nose: Nose normal.      Mouth/Throat:      Mouth: Mucous membranes are moist.      Pharynx: Oropharynx is clear.   Eyes:      Extraocular Movements: Extraocular movements intact.      Conjunctiva/sclera: Conjunctivae normal.      Pupils: Pupils are equal, round, and reactive to light.   Cardiovascular:      Rate and Rhythm: Normal rate and regular rhythm.      Pulses: Normal pulses.      Heart sounds: Normal heart sounds.   Pulmonary:      Effort: Pulmonary effort is normal.      Breath sounds: Normal breath sounds.   Abdominal:      General: Bowel sounds are normal.      Palpations: Abdomen is soft.   Musculoskeletal:         General: Normal range of motion.      Cervical back: Normal range of motion and neck supple.   Skin:     General: Skin is warm and dry.      Capillary Refill: Capillary refill takes less than 2 seconds.   Neurological:      General: No focal deficit present.      Mental Status: He is alert and oriented to person, place, and time.   Psychiatric:         Mood and Affect: Mood normal.         Behavior: Behavior normal.          Assessment and Plan (including Health Maintenance)      Problem List  Smart Sets  Document Outside HM   :    Plan:   and uds reviewed, consistent with findings, cont current meds, exercise daily, return to [facility] as needed and as scheduled      Encounter for long-term (current) drug use  -     POCT Urine Drug Screen Presump    Other orders  -     amLODIPine (NORVASC) 5 MG tablet; " Take 0.5 tablets (2.5 mg total) by mouth once daily.  Dispense: 180 tablet; Refill: 1  -     carvediloL (COREG) 3.125 MG tablet; Take 1 tablet (3.125 mg total) by mouth 2 (two) times daily with meals.  Dispense: 180 tablet; Refill: 1  -     pantoprazole (PROTONIX) 40 MG tablet; Take 1 tablet (40 mg total) by mouth once daily.  Dispense: 90 tablet; Refill: 1  -     ALPRAZolam (XANAX) 0.5 MG tablet; Take 1 tablet (0.5 mg total) by mouth 2 (two) times daily as needed for Anxiety.  Dispense: 90 tablet; Refill: 0            Health Maintenance Due   Topic Date Due    Sign Pain Contract  Never done    Complete Opioid Risk Tool  Never done    COVID-19 Vaccine (3 - Booster for Pfizer series) 08/15/2021    Colonoscopy  05/13/2022       Problem List Items Addressed This Visit    None     Visit Diagnoses     Encounter for long-term (current) drug use    -  Primary    Relevant Orders    POCT Urine Drug Screen Presump            Health Maintenance Topics with due status: Not Due       Topic Last Completion Date    TETANUS VACCINE 06/25/2021    PROSTATE-SPECIFIC ANTIGEN 12/17/2021    Lipid Panel 04/11/2022       Procedures     Future Appointments   Date Time Provider Department Center   5/18/2022  1:30 PM Abram Allen Jr., MD [facility] UROL [facility]   6/20/2022  2:00 PM Noam Smart DO [facility] SLEEP [facility]   6/29/2022  9:30 AM AWV NURSE, [facility] FAMILY MEDICINE [facility] LUCY Vivar   10/3/2022  8:40 AM Chevy Epps MD [facility] JRWMD Epps   10/12/2022  9:45 AM Aurora Haskins MD [facility]   10/19/2022  9:15 AM Izzy Olivier NP [facility]        No follow-ups on file.       Signature:  Izzy Olivier NP    Date of encounter: 4/19/22

## 2022-04-20 PROBLEM — B86 SCABIES: Status: RESOLVED | Noted: 2021-05-17 | Resolved: 2022-04-20

## 2022-04-20 PROBLEM — Z79.899 ENCOUNTER FOR LONG-TERM (CURRENT) DRUG USE: Status: ACTIVE | Noted: 2022-04-20

## 2022-04-20 PROBLEM — T14.8XXA SURGICAL WOUND PRESENT: Status: RESOLVED | Noted: 2021-12-30 | Resolved: 2022-04-20

## 2022-04-20 PROBLEM — I10 PRIMARY HYPERTENSION: Status: ACTIVE | Noted: 2022-04-20

## 2022-04-20 PROBLEM — F41.9 ANXIETY: Status: ACTIVE | Noted: 2022-04-20

## 2022-04-20 PROBLEM — N48.29: Status: RESOLVED | Noted: 2021-04-17 | Resolved: 2022-04-20

## 2022-05-18 ENCOUNTER — OFFICE VISIT (OUTPATIENT)
Dept: UROLOGY | Facility: CLINIC | Age: 82
End: 2022-05-18
Payer: MEDICARE

## 2022-05-18 VITALS
DIASTOLIC BLOOD PRESSURE: 66 MMHG | HEART RATE: 72 BPM | SYSTOLIC BLOOD PRESSURE: 129 MMHG | HEIGHT: 65 IN | WEIGHT: 182 LBS | BODY MASS INDEX: 30.32 KG/M2

## 2022-05-18 DIAGNOSIS — N13.8 BENIGN PROSTATIC HYPERPLASIA WITH URINARY OBSTRUCTION: Primary | ICD-10-CM

## 2022-05-18 DIAGNOSIS — R97.20 ELEVATED PSA: ICD-10-CM

## 2022-05-18 DIAGNOSIS — N40.1 BENIGN PROSTATIC HYPERPLASIA WITH URINARY OBSTRUCTION: Primary | ICD-10-CM

## 2022-05-18 DIAGNOSIS — N47.6 BALANOPOSTHITIS: ICD-10-CM

## 2022-05-18 DIAGNOSIS — N41.1 CHRONIC PROSTATITIS: ICD-10-CM

## 2022-05-18 DIAGNOSIS — N32.0 BLADDER OUTLET OBSTRUCTION: ICD-10-CM

## 2022-05-18 PROCEDURE — 99215 OFFICE O/P EST HI 40 MIN: CPT | Mod: PBBFAC | Performed by: UROLOGY

## 2022-05-18 PROCEDURE — 99213 OFFICE O/P EST LOW 20 MIN: CPT | Mod: S$PBB,,, | Performed by: UROLOGY

## 2022-05-18 PROCEDURE — 99213 PR OFFICE/OUTPT VISIT, EST, LEVL III, 20-29 MIN: ICD-10-PCS | Mod: S$PBB,,, | Performed by: UROLOGY

## 2022-05-18 RX ORDER — ASPIRIN 81 MG/1
81 TABLET ORAL DAILY
COMMUNITY

## 2022-05-18 NOTE — PROGRESS NOTES
"Subjective:       Patient ID: Elliott Peters is a 81 y.o. male.    Chief Complaint: Follow-up (6 month visit)     Urology History  Symptoms:  No dysuria, No urethral discharge, Nocturia, Nocturia list 4 or greater times each night  Associated Symptoms:  No abdominal pain  Conditions:  Erectile dysfunction (ED), No hydronephrosis, Benign prostatic hyperplasia, No obstructive nephropathy, No polycystic kidney disease, No kidney cancer, No bladder cancer, No testicular cancer, No prostate cancer, History of recurrent urinary tract infections, No history of urolithiasis, No history of pyelonephritis, History of prostatitis  Past Medical History:  No diabetes mellitus, History of heart attack, No History of lung disease, No History of HIV/AIDS, No History of Hepatitis, No history of cancer  Medication History:  Not on NSAID, Not on aspirin  Family History:  No family history of urolithiasis, No family history of cystic kidneys, No family history of chronic renal disease, No Family history of stones, No Family history of bladder cancer, No Family history of kidney cancer  Kidney Stone:  First Stone at least 15 years ago, Last stone  05/30/2018, Total numer of stones >10, ESL 2003, PRESENT STONE STARTED 2 days ago, passed last night, Pain severity 1, Back pain not worse with movement, No gastrointestinal complaints, No fever, No adequate pain medicine, No KUB performed, No C&S performed, No urinalysis performed, Lab, Dietary counseling, CT performed  Current Conditions:  Nocturia, No UTI's, PANCHITO current  AT TIMES. HAS A LIST OF HIS MEDS."    History of Present Illness  General  UROLOGICAL HISTORY PER NOTES OF DR. SCHWARZ:  This 72-year-old white male has been under my care for years. He developed urinary retention and a lot of pain around Sonu of 2012. We received his records from [facility]. He had a green light laser vaporization and enucleation of the prostate by Dr. Israel Coyne on 12/27/12. He has recovered. He " "is doing well at this time.     His recent PSA is 3.36. This is much better than his previous PSAs but I don?t have all those numbers in front of me.     I saw him as recently as 2/25/12 when he had a stone. We talked about stone prevention. I have told him at that time to come back in a year and to stop his Hytrin. He is still on the Hytrin. He is voiding well. I told him he can stop the Hytrin. He is already off the Avodart.     By rectal examination, he has a 30 to 40 gram sized prostate without obvious cancer.     I am returning him to the care of his family physician, Dr. Wiggins. He needs a PANCHITO and a PSA once a year for several years. The question of how long to continue annual screening is uncertain. He wants to be checked.     I will see him on a p.r.n. basis. "  ----------------------------------------------------------------------------------------------------------    Mr. Peters has been referred back to [place] for his urological tx.  He currently has multiple stones in bilateral kidneys.  He was last seen 6/13/2013, and was released to the care of Dr. Wiggins at that time.  PANCHITO and PSA was last checked last a year ago by Dr. Coyne.  He requests for all his urological needs to be taken care of here.   (May 23, 2018)    Mr. Peters has been worked into clinic for c/o suspected stone lodged in his penis.  He states he feels he passed this stone last night, but voided outside without straining.  Reports pain resolved after painful urination.  Denies fever, chills or constitutional sxs.  Currently taking Bactrim DS for one month - tx of bacterial prostatitis.  Urine cultured 2,000 E. Coli last visit.  (May 31, 2018)    Mr. Peters is here for f/u stone disease (20+).  Patient states he has passed 2 smaller stones since his last visit without need for pain meds provided last visit.  He denies sensation of infection today.  Declines KUB for confirmation / evaluation of remaining stones.  He is joined today by " "his spouse.  No microhematuria or c/o flank pain since passing last stone.  June 25, 2018)    Mr. Peters is here today with c/o 3 seperate episodes of gross hematuria (lasting 3 days) before resolving without treatment.  All episodes occurring since the first of this year.  He has a hx of stone disease, but denies bleeding associate with stone pain.    -  CT which was performed 2/2019 resulted "several subcentimeter calcific densities along the dorsal aspect of the urinary bladder may reflect urinary bladder calculi or prostate calcifications;  and subcentimater bilateral nephrolithiasis".      Patient denies voiding complaints today.  (July 23, 2019)    Benign Prostatic Hyperplasia:  Frequent voiding at night, Nocturia list 4 or greater times each night, Blood in urine, No pain with urination, No incomplete emptying of bladder, No, No, Splitting or spraying of urinary stream, No, Frequency of incontinence occasional, Incontinence associated with a strong urge to void, No  Kidney Stone:  First Stone at least 15 years ago, Last stone  05/30/2018, Total numer of stones >10, ESL 2003, PRESENT STONE STARTED 2 days ago, passed last night, Pain severity 1, No fever, No, Back pain not worse with movement, No gastrointestinal complaints, No adequate pain medicine, No KUB performed, No C&S performed, No urinalysis performed, CT performed, Lab, Dietary counseling    Urology History  Conditions:  Erectile dysfunction (ED), No hydronephrosis, Benign prostatic hyperplasia, No obstructive nephropathy, No polycystic kidney disease, No kidney cancer, No bladder cancer, No testicular cancer, No prostate cancer, History of recurrent urinary tract infections, No history of urolithiasis, No history of pyelonephritis, History of prostatitis  Past Medical History:  Hypertension, No diabetes mellitus, History of heart attack, No History of stroke, No History of lung disease, No History of HIV/AIDS, No History of Hepatitis, No history of " cancer  s/p TURP  Medication History:  Not on NSAID, Not on aspirin  Plavix  Family History:  No family history of urolithiasis, No family history of cystic kidneys, No family history of chronic renal disease, Family history of prostate cancer, No Family history of stones, No Family history of bladder cancer, No Family history of kidney cancer  Current Conditions:  Nocturia, No UTI's, PANCHITO current  --------------------------------------------------------------------------------------------------------------------------------------------------------------------------    The above notes arenotes ofmrs.CynthiaSmith.isscheduledforcystoscopybymenextweekforevaluationofthebladdercalcifications.Hehascontinuedtohaveintermittenthematuria.Bxxlmhaenrriuszxbydbvvtmsrnyvhwsrswnjxyjqqncxeaq7MSlmiyixlzrlabtcvaf a stone.Hehassome tramadolsDr.Bing hadpreviouslygiventhatheistakenissomebetternow.HedidhaveESLbyDr.mqulerni01ectgtzny. He has had gross hematuria 3 times this year. He is still somewhat uncomfortable at present.  (August 5, 2019)    Mr. Peters returned today for his cystoscopy. He was treated for a low-grade UTI after the above visit. He did see a small stone pass after the above. He has had no bleeding since the stone passed and still has all of his pain medicine left should he need additional medication. Patient is on Plavix and aspirin. Passed his stone just a few days after being seen. Feeling okay today and returned for his workup. (August 15, 2019)    Mr. Peters and his wife returned today because of concern about the amount of blood in his urine. He did not remove his Brennan this morning because of continued bleeding. Came in for check on his catheter. (August 16, 2019)    Mr. Peters is in for one month follow-up. He took his catheter out morning after he was here as his urine was completely clear at that time. He's had no further problem with bleeding. He is back to baseline with voiding with  nocturia 3 or 4 times nightly. He feels he is doing well at present. Nothing to suggest stone problems etc. (September 17, 2019)    Mr. Peters is in a swing bed now at [facility]. He is receiving IV antibiotics for his UTI. He went to emergency room Saturday afternoon 9 days ago because of fever which reached as high as 104 apparently. Treated for UTI, but it was felt he needed stronger antibiotics. I had talked to Dr. Juan Kirkpatrick about him the other day and suggested that he be switched from Rocephin to gentamicin. He is receiving 400 mg gentamycin every 36 hours now. He's had a little bit of diarrhea the last 2 days and no energy. Never really feels well. His BUN/creatinine was 19/1.2 today. Patient not feeling well but he is on the right medication based on recent culture results. (September 30, 2019)    Mr. Peters returns for follow-up after treatment of UTI. Feeling much better and has no sensation of infection now. We treated him with gentamicin and feels he is doing okay. Still has nocturia 4 or 5 times nightly but that is stable and has been that way for the last few years. He feels like he is back to baseline now. (October 14, 2019)    Mr. Peters is in for six-month follow-up. He has had no further urinary tract infections since he was last seen. He feels he is emptying his bladder satisfactorily although he does have nocturia about 3 times nightly. Patient is feeling better in general since he's taking Mobic on a regular basis for foot pain. Overall he is improved. (June 11, 2020)    PSA Results:  Past PSA Results   5.430 ON 11/6/2016   3.360 ON 06/13/2013  5.350 ON 11/14/2012  5.06 ON 02/06/2012  4.42 ON  08/17/2011  --------------------------------------------------------------------------------------------------------------------------------------------------------------------------------------------------------------------------------------------------------------------------------------------------------------------------------  The above notes are notes from the old electronic medical record system.        Mr. Peters comes in ahead of his yearly checkup.  Former patient of Dr. Hernando James.  He had laser prostatectomy by Dr. Israel Coyne in 2012. Followed more recently by Ms. Andria Queen for a couple years and I started seeing him around 2019. He had had urinary retention again +he had trouble with difficult urinary tract infection about that time.  He was doing well when he was seen last June and had another appointment but in ahead of that scheduled appointment because of irritation and apparent infection of the glans penis.  He continues to void reasonably well with nocturia 2-3 times nightly.  He has not had any recent stone problems.  Using antibiotic ointment which has not helped the lesion on his penis.  (04/13/2021)        Mr. Peters was in on the above date because of a problem with balanoposthitis.  His problem has essentially resolved and he has had no further irritation since treating as a yeast infection.  He is in for his regular 1 year follow-up today.  Voiding reasonably well but does have nocturia 2-3 times nightly.  He does not feel he is having any major issue with that.  Overall satisfied with the way he is doing.  We are not doing PSAs routinely on him now because of age.  He did have his KUB checking for nephrolithiasis as he has known stones in his kidneys.  He has not had any pain from stones in recent past.  (June 14, 2021)    Mr. Peters is in for the 1st visit in nearly 1 year.  He has not had any active stone problems or any other major  problems.  He has nocturia 2-3 times  nightly.  He has had no further problem with his foreskin like he did few years ago.  No visible blood in the urine and has not had any new issues.  He did have a PSA done by Ms. Olivier in December that was still elevated at 5.5. (May 18, 2022)    Review of Systems      Objective:      Physical Exam  Constitutional:       Appearance: Normal appearance. He is normal weight.   Genitourinary:     Prostate: Normal.      Rectum: Normal.      Comments: Prostate 30-40 g smooth symmetrical and feels benign  Neurological:      Mental Status: He is alert.   Psychiatric:         Mood and Affect: Mood normal.         Behavior: Behavior normal.       urinalysis reveals 0-2 pus cells per high-powered field.  The dipstick showed a trace of protein with pH of 5.0 and urine specific gravity 1.020.  Assessment:       Problem List Items Addressed This Visit        Renal/    Chronic prostatitis    Elevated PSA      Other Visit Diagnoses     Benign prostatic hyperplasia with urinary obstruction    -  Primary    Bladder outlet obstruction        Balanoposthitis              Plan:     Patient feels he is voiding okay and desires no extra help with the way he is voiding.  He is on tamsulosin and probably also on terazosin.  I would not worry too much about the PSA of 5.5 at age 81. I do not feel he should have prostate biopsies, but probably should have another PSA in a couple years since we know now it is elevated.  Continue tamsulosin.  I will see again on p.r.n. basis.  *

## 2022-05-19 NOTE — PATIENT INSTRUCTIONS
Patient feels he is voiding okay and desires no extra help with the way he is voiding.  He is on tamsulosin and probably also on terazosin.  I would not worry too much about the PSA of 5.5 at age 81. I do not feel he should have prostate biopsies, but probably should have another PSA in a couple years since we know it is elevated.  Continue tamsulosin.  No recent stone problems.  I will see again on p.r.n. basis

## 2022-05-31 ENCOUNTER — EXTERNAL CHRONIC CARE MANAGEMENT (OUTPATIENT)
Dept: FAMILY MEDICINE | Facility: CLINIC | Age: 82
End: 2022-05-31
Payer: MEDICARE

## 2022-05-31 PROCEDURE — G0511 CCM/BHI BY RHC/FQHC 20MIN MO: HCPCS | Mod: ,,, | Performed by: NURSE PRACTITIONER

## 2022-05-31 PROCEDURE — G0511 PR CHRONIC CARE MGMT, RHC OR FQHC ONLY, 20 MINS OR MORE: ICD-10-PCS | Mod: ,,, | Performed by: NURSE PRACTITIONER

## 2022-06-30 ENCOUNTER — EXTERNAL CHRONIC CARE MANAGEMENT (OUTPATIENT)
Dept: FAMILY MEDICINE | Facility: CLINIC | Age: 82
End: 2022-06-30
Payer: MEDICARE

## 2022-06-30 PROCEDURE — G0511 PR CHRONIC CARE MGMT, RHC OR FQHC ONLY, 20 MINS OR MORE: ICD-10-PCS | Mod: ,,, | Performed by: NURSE PRACTITIONER

## 2022-06-30 PROCEDURE — G0511 CCM/BHI BY RHC/FQHC 20MIN MO: HCPCS | Mod: ,,, | Performed by: NURSE PRACTITIONER

## 2022-07-07 ENCOUNTER — OFFICE VISIT (OUTPATIENT)
Dept: FAMILY MEDICINE | Facility: CLINIC | Age: 82
End: 2022-07-07
Payer: MEDICARE

## 2022-07-07 VITALS
WEIGHT: 180 LBS | TEMPERATURE: 98 F | HEIGHT: 65 IN | BODY MASS INDEX: 29.99 KG/M2 | DIASTOLIC BLOOD PRESSURE: 74 MMHG | SYSTOLIC BLOOD PRESSURE: 120 MMHG | RESPIRATION RATE: 16 BRPM | OXYGEN SATURATION: 98 % | HEART RATE: 54 BPM

## 2022-07-07 DIAGNOSIS — Z00.00 ENCOUNTER FOR SUBSEQUENT ANNUAL WELLNESS VISIT (AWV) IN MEDICARE PATIENT: Primary | ICD-10-CM

## 2022-07-07 DIAGNOSIS — R73.01 IMPAIRED FASTING GLUCOSE: ICD-10-CM

## 2022-07-07 DIAGNOSIS — F41.9 ANXIETY: ICD-10-CM

## 2022-07-07 DIAGNOSIS — I10 PRIMARY HYPERTENSION: ICD-10-CM

## 2022-07-07 DIAGNOSIS — D64.9 ANEMIA, UNSPECIFIED TYPE: ICD-10-CM

## 2022-07-07 DIAGNOSIS — D69.6 THROMBOCYTOPENIA: ICD-10-CM

## 2022-07-07 LAB
EST. AVERAGE GLUCOSE BLD GHB EST-MCNC: 87 MG/DL
HBA1C MFR BLD HPLC: 5.2 % (ref 4.5–6.6)

## 2022-07-07 PROCEDURE — 83036 HEMOGLOBIN GLYCOSYLATED A1C: CPT | Mod: ,,, | Performed by: CLINICAL MEDICAL LABORATORY

## 2022-07-07 PROCEDURE — G0439 PPPS, SUBSEQ VISIT: HCPCS | Mod: ,,, | Performed by: NURSE PRACTITIONER

## 2022-07-07 PROCEDURE — G0439 PR MEDICARE ANNUAL WELLNESS SUBSEQUENT VISIT: ICD-10-PCS | Mod: ,,, | Performed by: NURSE PRACTITIONER

## 2022-07-07 PROCEDURE — 83036 HEMOGLOBIN A1C: ICD-10-PCS | Mod: ,,, | Performed by: CLINICAL MEDICAL LABORATORY

## 2022-07-07 RX ORDER — TRIAMCINOLONE ACETONIDE 1 MG/G
CREAM TOPICAL
COMMUNITY
Start: 2022-06-14 | End: 2022-10-19

## 2022-07-07 RX ORDER — DOXYCYCLINE HYCLATE 100 MG
TABLET ORAL
COMMUNITY
Start: 2022-06-14 | End: 2022-10-19

## 2022-07-07 RX ORDER — MELOXICAM 7.5 MG/1
7.5 TABLET ORAL DAILY
Qty: 90 TABLET | Refills: 1 | Status: SHIPPED | OUTPATIENT
Start: 2022-07-07 | End: 2022-10-19 | Stop reason: SDUPTHER

## 2022-07-07 RX ORDER — HYDROXYZINE HYDROCHLORIDE 10 MG/1
TABLET, FILM COATED ORAL
COMMUNITY
Start: 2022-06-07 | End: 2022-10-19

## 2022-07-07 NOTE — PROGRESS NOTES
[facility]     PATIENT NAME: Elliott Peters   : 1940    AGE: 81 y.o. DATE: 2022   MRN: [id number]        Reason for Visit / Chief Complaint: Medicare AWV (Subsequent Medicare AWV)        Elliott Peters presents for a subsequentMedicare AWV today.     The following components were reviewed and updated:    Medical/Social/Family History:  Past Medical History:   Diagnosis Date    Coronary artery disease     cardiac stents    Hyperlipidemia     Hypertension     Kidney stone     MI (myocardial infarction)     Sleep apnea     Urinary tract infection         Family History   Problem Relation Age of Onset    Cancer Mother         Social History     Tobacco Use   Smoking Status Former Smoker   Smokeless Tobacco Never Used   Tobacco Comment    QUIT 50 +YEARS AGO      Social History     Substance and Sexual Activity   Alcohol Use Not Currently    Comment: QUIT 50 +YEARS AGO        Family History   Problem Relation Age of Onset    Cancer Mother        Past Surgical History:   Procedure Laterality Date    CORONARY ANGIOPLASTY WITH STENT PLACEMENT      HERNIA REPAIR      JOINT REPLACEMENT      difficult to wake after surgery     PROSTATECTOMY           · Allergies and Current Medications   Review of patient's allergies indicates:  No Known Allergies    Current Outpatient Medications:     ALPRAZolam (XANAX) 0.5 MG tablet, Take 1 tablet (0.5 mg total) by mouth 2 (two) times daily as needed for Anxiety., Disp: 90 tablet, Rfl: 0    ALPRAZolam (XANAX) 0.5 MG tablet, Take 0.5 mg by mouth 2 (two) times daily as needed., Disp: , Rfl:     amLODIPine (NORVASC) 5 MG tablet, Take 0.5 tablets (2.5 mg total) by mouth once daily., Disp: 180 tablet, Rfl: 1    aspirin (ECOTRIN) 81 MG EC tablet, Take 81 mg by mouth once daily., Disp: , Rfl:     carvediloL (COREG) 3.125 MG tablet, Take 1 tablet (3.125 mg total) by mouth 2 (two) times daily with meals., Disp: 180 tablet, Rfl:  1    clopidogreL (PLAVIX) 75 mg tablet, Take 1 tablet (75 mg total) by mouth once daily., Disp: 30 tablet, Rfl: 11    docusate sodium (COLACE) 100 MG capsule, Take 100 mg by mouth once daily., Disp: , Rfl:     ferrous sulfate (FEOSOL) Tab tablet, Take 1 tablet (1 each total) by mouth once daily. (Patient not taking: No sig reported), Disp: 30 tablet, Rfl: 0    gabapentin (NEURONTIN) 300 MG capsule, Take 1 capsule (300 mg total) by mouth every evening., Disp: 30 capsule, Rfl: 11    HYDROcodone-acetaminophen (NORCO)  mg per tablet, Take 1 tablet by mouth every 8 (eight) hours as needed for Pain. (Patient not taking: No sig reported), Disp: 28 tablet, Rfl: 0    HYDROcodone-acetaminophen (NORCO)  mg per tablet, Take 1 tablet by mouth every 6 (six) hours as needed for Pain. (Patient not taking: No sig reported), Disp: 20 tablet, Rfl: 0    meloxicam (MOBIC) 7.5 MG tablet, Take 7.5 mg by mouth once daily., Disp: , Rfl:     nitroGLYCERIN (NITROSTAT) 0.4 MG SL tablet, Place 1 tablet (0.4 mg total) under the tongue every 5 (five) minutes as needed for Chest pain., Disp: 100 tablet, Rfl: 0    pantoprazole (PROTONIX) 40 MG tablet, Take 1 tablet (40 mg total) by mouth once daily., Disp: 90 tablet, Rfl: 1    polyethylene glycol (GLYCOLAX) 17 gram PwPk, Take by mouth., Disp: , Rfl:     predniSONE (DELTASONE) 10 MG tablet, Take 6 tab po daily x1 day, 4 tab po daily x2 days, 3 tabs po daily x3 days, 2 tabs po daily x4 days, 1 tab po daily x5 days (Patient not taking: No sig reported), Disp: 36 tablet, Rfl: 0    rosuvastatin (CRESTOR) 20 MG tablet, Take 20 mg by mouth once daily., Disp: , Rfl:     tamsulosin (FLOMAX) 0.4 mg Cap, Take 1 capsule (0.4 mg total) by mouth once daily., Disp: 30 capsule, Rfl: 11    terazosin (HYTRIN) 5 MG capsule, Take 5 mg by mouth every evening., Disp: , Rfl:     · Health Risk Assessment   Fall Risk: no   Advance Directive: Does not have an advanced directive. Verbal education  and written education included in today's AVS.   Depression: PHQ9 0    HTN:  DASH diet, exercise, weight management, med compliance, home BP monitoring, and follow-up discussed.   T2DM: NO   Tobacco use: quit smoking over 50 yrs  STI: N/A    Alcohol misuse: no   Statin Use: yes      · Health Risk Assessment       · Health Maintenance   Last eye exam: Dec 2021-    Last CV screen with lipids: 04/11/2022   Diabetes screening with fasting glucose or A1c: 04/11/2022   Colorectal cancer screen: 05/13/2019   Flu Vaccine: 10/18/2021   Pneumonia vaccines: Prevnar 13 10/23/2013, Pneumo 23 09/26/2017   COVID vaccine: Pfizer 02/22/2021, 03/15/2021, booster 09/27/2021   Hep B vaccine: N/A   DEXA: N/A   Last pap/pelvic: N/A   Last Mammogram: N/A   Last PSA screen: sees Dr Allen   AAA screening: N/A (once in lifetime for males 65-75 who have smoked > 100 cigarettes in lifetime)  HIV Screeing: N/A  Hepatitis C Screen: 01/26/2022  Low Dose CT Scan: N/A    Health Maintenance Topics with due status: Not Due       Topic Last Completion Date    TETANUS VACCINE 06/25/2021    Influenza Vaccine 10/18/2021    PROSTATE-SPECIFIC ANTIGEN 12/17/2021    Lipid Panel 04/11/2022    Colonoscopy 07/07/2022     Health Maintenance Due   Topic Date Due    Shingles Vaccine (1 of 2) Never done    COVID-19 Vaccine (4 - Booster for Pfizer series) 01/27/2022        Incontinence  Bowel: no  Bladder: no    Lab results available in Epic or see dates from [facility] above:   Lab Results   Component Value Date    CHOL 84 04/11/2022    CHOL 83 10/12/2021     Lab Results   Component Value Date    HDL 43 04/11/2022    HDL 36 (L) 10/12/2021     Lab Results   Component Value Date    LDLCALC 33 04/11/2022    LDLCALC 32 10/12/2021     Lab Results   Component Value Date    TRIG 41 04/11/2022    TRIG 75 10/12/2021     Lab Results   Component Value Date    CHOLHDL 2.0 04/11/2022    CHOLHDL 2.3 10/12/2021       No results found for: LABA1C, HGBA1C    Sodium   Date  Value Ref Range Status   04/11/2022 143 136 - 145 mmol/L Final     Potassium   Date Value Ref Range Status   04/11/2022 4.1 3.5 - 5.1 mmol/L Final     Chloride   Date Value Ref Range Status   04/11/2022 111 (H) 98 - 107 mmol/L Final     CO2   Date Value Ref Range Status   04/11/2022 26 21 - 32 mmol/L Final     Glucose   Date Value Ref Range Status   04/11/2022 93 74 - 106 mg/dL Final     BUN   Date Value Ref Range Status   04/11/2022 21 (H) 7 - 18 mg/dL Final     Creatinine   Date Value Ref Range Status   04/11/2022 0.90 0.70 - 1.30 mg/dL Final     Calcium   Date Value Ref Range Status   04/11/2022 9.2 8.5 - 10.1 mg/dL Final     Total Protein   Date Value Ref Range Status   04/11/2022 6.8 6.4 - 8.2 g/dL Final     Albumin   Date Value Ref Range Status   04/11/2022 4.0 3.5 - 5.0 g/dL Final     Bilirubin, Total   Date Value Ref Range Status   04/11/2022 0.5 0.0 - 1.2 mg/dL Final     Alk Phos   Date Value Ref Range Status   04/11/2022 84 45 - 115 U/L Final     AST   Date Value Ref Range Status   04/11/2022 14 (L) 15 - 37 U/L Final     ALT   Date Value Ref Range Status   04/11/2022 31 16 - 61 U/L Final     Anion Gap   Date Value Ref Range Status   04/11/2022 10 7 - 16 mmol/L Final     eGFR   Date Value Ref Range Status   04/11/2022 86 >=60 mL/min/1.73m² Final         Lab Results   Component Value Date    PSA 5.500 (H) 12/17/2021    PSA 5.170 (H) 10/12/2021            · Care Team   PCP: stover    Eye specialist: Dr. Cerda  Other: Dr. Allen, Dr. Aurora Haskins, Dr Freire       **See Completed Assessments for Annual Wellness visit within the encounter summary    The following assessments were completed & reviewed:  · Depression Screening  · Cognitive function Screening  · Timed Get Up Test  · Whisper Test  · Vision Screen  · Health Risk Assessment  · Checklist of ADLs and IADLs      Objective  There were no vitals filed for this visit.   There is no height or weight on file to calculate BMI.  Patient weight not recorded    Heart healthy diet and exercise encouraged and education given.     Physical Exam  Constitutional:       Appearance: Normal appearance.   HENT:      Head: Normocephalic.      Right Ear: Tympanic membrane, ear canal and external ear normal.      Left Ear: Tympanic membrane, ear canal and external ear normal.      Nose: Nose normal.      Mouth/Throat:      Mouth: Mucous membranes are moist.      Pharynx: Oropharynx is clear.   Eyes:      Extraocular Movements: Extraocular movements intact.      Conjunctiva/sclera: Conjunctivae normal.      Pupils: Pupils are equal, round, and reactive to light.   Cardiovascular:      Rate and Rhythm: Normal rate and regular rhythm.      Pulses: Normal pulses.      Heart sounds: Normal heart sounds.   Pulmonary:      Effort: Pulmonary effort is normal.      Breath sounds: Normal breath sounds.   Abdominal:      General: Bowel sounds are normal.      Palpations: Abdomen is soft.   Musculoskeletal:         General: Normal range of motion.   Skin:     General: Skin is warm and dry.      Capillary Refill: Capillary refill takes less than 2 seconds.   Neurological:      General: No focal deficit present.      Mental Status: He is alert and oriented to person, place, and time.   Psychiatric:         Mood and Affect: Mood normal.         Behavior: Behavior normal.           Assessment:     There are no diagnoses linked to this encounter.       Plan:    Discussed and provided with a screening schedule and personal prevention plan in accordance with USPSTF age appropriate recommendations and Medicare screening guidelines.   Education, counseling, and referrals were provided as needed.  After Visit Summary printed and given to patient which includes written education and a list of any referrals if indicated.     F/u plan for yearly AWV.

## 2022-07-07 NOTE — PATIENT INSTRUCTIONS
Counseling and Referral of Other Preventative  (Italic type indicates deductible and co-insurance are waived)    Patient Name: Elliott Peters  Today's Date: 7/7/2022    Health Maintenance         Date Due Completion Date    Shingles Vaccine (1 of 2) 07/07/2023 (Originally 8/22/1990) ---    Influenza Vaccine (1) 09/01/2022 10/18/2021    PROSTATE-SPECIFIC ANTIGEN 12/17/2022 12/17/2021    Lipid Panel 04/11/2023 4/11/2022    Override on 3/4/2021: Done (CHOL 94 TRIG 69 HDL 44 LDL 36)    Colonoscopy 07/07/2025 7/7/2022    Override on 5/13/2019: Done (repeat in 3 years- Dr. Forrest)    TETANUS VACCINE 06/25/2031 6/25/2021          Orders Placed This Encounter   Procedures    Hemoglobin A1C    HM COLONOSCOPY    HM COLONOSCOPY

## 2022-07-31 ENCOUNTER — EXTERNAL CHRONIC CARE MANAGEMENT (OUTPATIENT)
Dept: FAMILY MEDICINE | Facility: CLINIC | Age: 82
End: 2022-07-31
Payer: MEDICARE

## 2022-07-31 PROCEDURE — G0511 CCM/BHI BY RHC/FQHC 20MIN MO: HCPCS | Mod: ,,, | Performed by: NURSE PRACTITIONER

## 2022-07-31 PROCEDURE — G0511 PR CHRONIC CARE MGMT, RHC OR FQHC ONLY, 20 MINS OR MORE: ICD-10-PCS | Mod: ,,, | Performed by: NURSE PRACTITIONER

## 2022-08-08 ENCOUNTER — OFFICE VISIT (OUTPATIENT)
Dept: DERMATOLOGY | Facility: CLINIC | Age: 82
End: 2022-08-08
Payer: MEDICARE

## 2022-08-08 DIAGNOSIS — L23.5 ALLERGIC DERMATITIS DUE TO OTHER CHEMICAL PRODUCT: Primary | ICD-10-CM

## 2022-08-08 PROCEDURE — 99214 OFFICE O/P EST MOD 30 MIN: CPT | Mod: ,,, | Performed by: DERMATOLOGY

## 2022-08-08 PROCEDURE — 99214 PR OFFICE/OUTPT VISIT, EST, LEVL IV, 30-39 MIN: ICD-10-PCS | Mod: ,,, | Performed by: DERMATOLOGY

## 2022-08-08 RX ORDER — FEXOFENADINE HCL 60 MG
60 TABLET ORAL DAILY
COMMUNITY
End: 2022-10-19

## 2022-08-08 NOTE — PROGRESS NOTES
[facility]   Aurora Haskins MD    Patient Name: Elliott Peters  Patient YOB: 1940   Date of Service: 8/8/22    CC: Follow-up ACD    HPI: Elliott Peters is a 81 y.o. male here today for follow-up of ACD, last seen 04/22.  Previous treatments include Patch testing with Dr. Chaney.  Overall, the ACD is stable.  Treatment plan was followed as directed.    Positive reactions to:  · Bronopol  · Propylene glycol   · Methyldibromo glutaronitrile       Past Medical History:   Diagnosis Date    Coronary artery disease     cardiac stents    Hyperlipidemia     Hypertension     Kidney stone     MI (myocardial infarction)     Sleep apnea     Urinary tract infection      Past Surgical History:   Procedure Laterality Date    CORONARY ANGIOPLASTY WITH STENT PLACEMENT      HERNIA REPAIR      JOINT REPLACEMENT      difficult to wake after surgery     PROSTATECTOMY  2012     Review of patient's allergies indicates:   Allergen Reactions    Bronopol Dermatitis    Propylene glycol Dermatitis       Current Outpatient Medications:     fexofenadine (ALLEGRA) 60 MG tablet, Take 60 mg by mouth once daily., Disp: , Rfl:     ALPRAZolam (XANAX) 0.5 MG tablet, Take 1 tablet (0.5 mg total) by mouth 2 (two) times daily as needed for Anxiety. (Patient not taking: Reported on 7/7/2022), Disp: 90 tablet, Rfl: 0    ALPRAZolam (XANAX) 0.5 MG tablet, Take 0.5 mg by mouth 2 (two) times daily as needed., Disp: , Rfl:     amLODIPine (NORVASC) 5 MG tablet, Take 0.5 tablets (2.5 mg total) by mouth once daily., Disp: 180 tablet, Rfl: 1    aspirin (ECOTRIN) 81 MG EC tablet, Take 81 mg by mouth once daily., Disp: , Rfl:     carvediloL (COREG) 3.125 MG tablet, Take 1 tablet (3.125 mg total) by mouth 2 (two) times daily with meals., Disp: 180 tablet, Rfl: 1    clopidogreL (PLAVIX) 75 mg tablet, Take 1 tablet (75 mg total) by mouth once daily., Disp: 30 tablet, Rfl: 11    docusate sodium (COLACE) 100 MG capsule, Take  100 mg by mouth once daily., Disp: , Rfl:     doxycycline (VIBRA-TABS) 100 MG tablet, Take by mouth., Disp: , Rfl:     ferrous sulfate (FEOSOL) Tab tablet, Take 1 tablet (1 each total) by mouth once daily. (Patient not taking: No sig reported), Disp: 30 tablet, Rfl: 0    gabapentin (NEURONTIN) 300 MG capsule, Take 1 capsule (300 mg total) by mouth every evening. (Patient not taking: Reported on 7/7/2022), Disp: 30 capsule, Rfl: 11    HYDROcodone-acetaminophen (NORCO)  mg per tablet, Take 1 tablet by mouth every 8 (eight) hours as needed for Pain. (Patient not taking: No sig reported), Disp: 28 tablet, Rfl: 0    HYDROcodone-acetaminophen (NORCO)  mg per tablet, Take 1 tablet by mouth every 6 (six) hours as needed for Pain. (Patient not taking: No sig reported), Disp: 20 tablet, Rfl: 0    hydrOXYzine HCL (ATARAX) 10 MG Tab, Take one tablet at dinner time for itching. Can make you sleepy, Disp: , Rfl:     meloxicam (MOBIC) 7.5 MG tablet, Take 1 tablet (7.5 mg total) by mouth once daily., Disp: 90 tablet, Rfl: 1    nitroGLYCERIN (NITROSTAT) 0.4 MG SL tablet, Place 1 tablet (0.4 mg total) under the tongue every 5 (five) minutes as needed for Chest pain., Disp: 100 tablet, Rfl: 0    pantoprazole (PROTONIX) 40 MG tablet, Take 1 tablet (40 mg total) by mouth once daily., Disp: 90 tablet, Rfl: 1    polyethylene glycol (GLYCOLAX) 17 gram PwPk, Take by mouth., Disp: , Rfl:     predniSONE (DELTASONE) 10 MG tablet, Take 6 tab po daily x1 day, 4 tab po daily x2 days, 3 tabs po daily x3 days, 2 tabs po daily x4 days, 1 tab po daily x5 days (Patient not taking: No sig reported), Disp: 36 tablet, Rfl: 0    rosuvastatin (CRESTOR) 20 MG tablet, Take 20 mg by mouth once daily., Disp: , Rfl:     tamsulosin (FLOMAX) 0.4 mg Cap, Take 1 capsule (0.4 mg total) by mouth once daily., Disp: 30 capsule, Rfl: 11    terazosin (HYTRIN) 5 MG capsule, Take 5 mg by mouth every evening., Disp: , Rfl:     triamcinolone  acetonide 0.1% (KENALOG) 0.1 % cream, SMARTSI Application Topical 2-3 Times Daily, Disp: , Rfl:     ROS: A focused review of systems was obtained and negative.     Exam: A focused skin exam was performed. All areas examined were normal except as mentioned in the assessment and plan below.  General Appearance of the patient is well developed and well nourished.  Orientation: alert and oriented x 3.  Mood and affect: pleasant.    Assessment:   The encounter diagnosis was Allergic dermatitis due to other chemical product.    Plan:   Allergic Contact Dermatitis (L23.89)  - Well demarcated, geometric eczematous patches  Status: Inadequately controlled     Plan: Counseling.  I counseled the patient regarding the following:  Skin care: Patient should use hypoallergenic products such as unscented soaps. Eliminate exposure to all new  cosmetics, fragrances, hair products, nail products shampoos, scented soaps, plants, metals and sunscreens.  Expectations: Allergic contact dermatitis can persist for several weeks before it fully resolves. Sometimes, patch  testing is necessary if reactions persist or if the patient is in contact with several potential allergens.  Contact office if: Allergic contact dermatitis worsens or fails to improve despite several weeks of treatment.    Reviewed patch testing results:   Positive reactions to:  · Bronopol  · Propylene glycol   · Methyldibromo glutaronitrile     - I reviewed pt's instructions from Dr. Chaney. He is currently using perigo Triamcinolone but she specifically said to use only Fugero.  He has triamcinolone manufactured by fugero at home that he will switch to.  Will consider another course of doxycycline for hx of folliculitis if he does not improve with the new triamcinolone formulation            Follow up in about 3 weeks (around 2022).    Aurora Haskins MD

## 2022-08-22 ENCOUNTER — OFFICE VISIT (OUTPATIENT)
Dept: SLEEP MEDICINE | Facility: CLINIC | Age: 82
End: 2022-08-22
Attending: FAMILY MEDICINE
Payer: MEDICARE

## 2022-08-22 VITALS
WEIGHT: 179.19 LBS | DIASTOLIC BLOOD PRESSURE: 75 MMHG | SYSTOLIC BLOOD PRESSURE: 144 MMHG | BODY MASS INDEX: 29.85 KG/M2 | OXYGEN SATURATION: 100 % | HEART RATE: 54 BPM | HEIGHT: 65 IN

## 2022-08-22 DIAGNOSIS — G47.33 OSA ON CPAP: Primary | ICD-10-CM

## 2022-08-22 PROCEDURE — 99213 OFFICE O/P EST LOW 20 MIN: CPT | Mod: PBBFAC | Performed by: FAMILY MEDICINE

## 2022-08-22 PROCEDURE — 99212 OFFICE O/P EST SF 10 MIN: CPT | Mod: S$PBB | Performed by: FAMILY MEDICINE

## 2022-08-22 PROCEDURE — 99999 PR STA SHADOW: ICD-10-PCS | Mod: PBBFAC,,, | Performed by: FAMILY MEDICINE

## 2022-08-22 PROCEDURE — 99999 PR STA SHADOW: CPT | Mod: PBBFAC,,, | Performed by: FAMILY MEDICINE

## 2022-08-22 NOTE — PROCEDURES
Procedures  Patient presents to clinic for CPAP management.  Patient's ESS is 15.  Patient wears a nasal mask and non-heated tubing. Patient gets supplies through [employer].

## 2022-08-22 NOTE — PROGRESS NOTES
Subjective:       Patient ID: Elliott Peters is a 82 y.o. male.    Chief Complaint: Sleep Apnea (CPAP management)    Patient is seen in Sleep Clinic for CPAP management having no problems with his mask or CPAP.  His machine is on recombinant this has been reviewed with the patient during past visits.  Patient's initial AHI was 6.1 with a low O2 saturation of 87%.  Compliance is 80% with an average AHI of 3.6 at a pressure of 6 cm H2O.  The patient is using an benefitting from CPAP.  The patient's [place] Score is 15 but it appears he is only sleeping 6-1/2 hours per night on average now reviewed sleep hygiene with the patient.    Review of Systems   Constitutional: Positive for fatigue.        Negative EDS   Respiratory: Negative for apnea.    Psychiatric/Behavioral: Negative for sleep disturbance.         Objective:      Physical Exam  Cardiovascular:      Rate and Rhythm: Normal rate and regular rhythm.      Heart sounds: No murmur heard.  Pulmonary:      Breath sounds: Normal breath sounds.   Skin:     General: Skin is warm and dry.   Neurological:      Mental Status: He is alert and oriented to person, place, and time.         Assessment:       Problem List Items Addressed This Visit        Other    ROSI on CPAP - Primary          Plan:       1. Continue CPAP @ 6.0 cm H20  2. Caution while operating a motor vehicle  3. Prescription for new supplies  4. Follow up sleep clinic in 6 months.

## 2022-08-29 ENCOUNTER — OFFICE VISIT (OUTPATIENT)
Dept: DERMATOLOGY | Facility: CLINIC | Age: 82
End: 2022-08-29
Payer: MEDICARE

## 2022-08-29 DIAGNOSIS — L23.89 ALLERGIC CONTACT DERMATITIS DUE TO OTHER AGENTS: Primary | ICD-10-CM

## 2022-08-29 PROCEDURE — 99214 PR OFFICE/OUTPT VISIT, EST, LEVL IV, 30-39 MIN: ICD-10-PCS | Mod: ,,, | Performed by: DERMATOLOGY

## 2022-08-29 PROCEDURE — 99214 OFFICE O/P EST MOD 30 MIN: CPT | Mod: ,,, | Performed by: DERMATOLOGY

## 2022-08-29 NOTE — PROGRESS NOTES
[facility]   Aurora Haskins MD    Patient Name: Elliott Peters  Patient YOB: 1940   Date of Service: 8/29/22    CC: Follow-up Allergic Contact Dermatitis    HPI: Elliott Peters is a 82 y.o. male here today for follow-up of ACD, last seen 08/22.  Previous treatments include patch testing and propylene glycol free TAC.  Overall, the ACD is improved.  Treatment plan was followed as directed.    Past Medical History:   Diagnosis Date    Coronary artery disease     cardiac stents    Hyperlipidemia     Hypertension     Kidney stone     MI (myocardial infarction)     Sleep apnea     Urinary tract infection      Past Surgical History:   Procedure Laterality Date    CORONARY ANGIOPLASTY WITH STENT PLACEMENT      HERNIA REPAIR      JOINT REPLACEMENT      difficult to wake after surgery     PROSTATECTOMY  2012     Review of patient's allergies indicates:   Allergen Reactions    Bronopol Dermatitis    Propylene glycol Dermatitis       Current Outpatient Medications:     ALPRAZolam (XANAX) 0.5 MG tablet, Take 1 tablet (0.5 mg total) by mouth 2 (two) times daily as needed for Anxiety. (Patient not taking: Reported on 7/7/2022), Disp: 90 tablet, Rfl: 0    ALPRAZolam (XANAX) 0.5 MG tablet, Take 0.5 mg by mouth 2 (two) times daily as needed., Disp: , Rfl:     amLODIPine (NORVASC) 5 MG tablet, Take 0.5 tablets (2.5 mg total) by mouth once daily., Disp: 180 tablet, Rfl: 1    aspirin (ECOTRIN) 81 MG EC tablet, Take 81 mg by mouth once daily., Disp: , Rfl:     carvediloL (COREG) 3.125 MG tablet, Take 1 tablet (3.125 mg total) by mouth 2 (two) times daily with meals., Disp: 180 tablet, Rfl: 1    clopidogreL (PLAVIX) 75 mg tablet, Take 1 tablet (75 mg total) by mouth once daily., Disp: 30 tablet, Rfl: 11    docusate sodium (COLACE) 100 MG capsule, Take 100 mg by mouth once daily., Disp: , Rfl:     doxycycline (VIBRA-TABS) 100 MG tablet, Take by mouth., Disp: , Rfl:     ferrous sulfate (FEOSOL) Tab tablet, Take 1  tablet (1 each total) by mouth once daily. (Patient not taking: No sig reported), Disp: 30 tablet, Rfl: 0    fexofenadine (ALLEGRA) 60 MG tablet, Take 60 mg by mouth once daily., Disp: , Rfl:     gabapentin (NEURONTIN) 300 MG capsule, Take 1 capsule (300 mg total) by mouth every evening. (Patient not taking: Reported on 2022), Disp: 30 capsule, Rfl: 11    HYDROcodone-acetaminophen (NORCO)  mg per tablet, Take 1 tablet by mouth every 8 (eight) hours as needed for Pain. (Patient not taking: No sig reported), Disp: 28 tablet, Rfl: 0    HYDROcodone-acetaminophen (NORCO)  mg per tablet, Take 1 tablet by mouth every 6 (six) hours as needed for Pain. (Patient not taking: No sig reported), Disp: 20 tablet, Rfl: 0    hydrOXYzine HCL (ATARAX) 10 MG Tab, Take one tablet at dinner time for itching. Can make you sleepy, Disp: , Rfl:     meloxicam (MOBIC) 7.5 MG tablet, Take 1 tablet (7.5 mg total) by mouth once daily., Disp: 90 tablet, Rfl: 1    nitroGLYCERIN (NITROSTAT) 0.4 MG SL tablet, Place 1 tablet (0.4 mg total) under the tongue every 5 (five) minutes as needed for Chest pain., Disp: 100 tablet, Rfl: 0    pantoprazole (PROTONIX) 40 MG tablet, Take 1 tablet (40 mg total) by mouth once daily., Disp: 90 tablet, Rfl: 1    polyethylene glycol (GLYCOLAX) 17 gram PwPk, Take by mouth., Disp: , Rfl:     predniSONE (DELTASONE) 10 MG tablet, Take 6 tab po daily x1 day, 4 tab po daily x2 days, 3 tabs po daily x3 days, 2 tabs po daily x4 days, 1 tab po daily x5 days (Patient not taking: No sig reported), Disp: 36 tablet, Rfl: 0    rosuvastatin (CRESTOR) 20 MG tablet, Take 20 mg by mouth once daily., Disp: , Rfl:     tamsulosin (FLOMAX) 0.4 mg Cap, Take 1 capsule (0.4 mg total) by mouth once daily., Disp: 30 capsule, Rfl: 11    terazosin (HYTRIN) 5 MG capsule, Take 5 mg by mouth every evening., Disp: , Rfl:     triamcinolone acetonide 0.1% (KENALOG) 0.1 % cream, SMARTSI Application Topical 2-3 Times Daily, Disp: ,  Rfl:     ROS: A focused review of systems was obtained and negative.     Exam: A focused skin exam was performed. All areas examined were normal except as mentioned in the assessment and plan below.  General Appearance of the patient is well developed and well nourished.  Orientation: alert and oriented x 3.  Mood and affect: pleasant.    Assessment:   The encounter diagnosis was Allergic contact dermatitis due to other agents.    Plan:      Allergic Contact Dermatitis (L23.89)  - clear except for a few eczematous papules on the right knee (previously replaced)   Status: Improved    Reviewed patch testing results and reminded pt to avoid his allergens:   Positive reactions to:  · Bronopol  · Propylene glycol   · Methyldibromo glutaronitrile     Pt has significantly improved after switching to Fugero Triamcinolone (propylene glycol free formulation).  He will continue to taper as his triamcinolone as his rash improves.     He does mention that he first noticed his rash after a joint replacement and was curious if he could be reacting to a component in his artificial joint.  I think this is less likely given his allergens are typically found in personal care products but I have discussed his case with Dr. Epps who will check his allergens and joint materials.        Follow up in about 6 months (around 2/28/2023).    Aurora Haskins MD

## 2022-08-31 ENCOUNTER — EXTERNAL CHRONIC CARE MANAGEMENT (OUTPATIENT)
Dept: FAMILY MEDICINE | Facility: CLINIC | Age: 82
End: 2022-08-31
Payer: MEDICARE

## 2022-08-31 PROCEDURE — G0511 CCM/BHI BY RHC/FQHC 20MIN MO: HCPCS | Mod: ,,, | Performed by: NURSE PRACTITIONER

## 2022-08-31 PROCEDURE — G0511 PR CHRONIC CARE MGMT, RHC OR FQHC ONLY, 20 MINS OR MORE: ICD-10-PCS | Mod: ,,, | Performed by: NURSE PRACTITIONER

## 2022-10-03 ENCOUNTER — HOSPITAL ENCOUNTER (OUTPATIENT)
Dept: RADIOLOGY | Facility: HOSPITAL | Age: 82
Discharge: HOME | End: 2022-10-03
Attending: ORTHOPAEDIC SURGERY
Payer: MEDICARE

## 2022-10-03 DIAGNOSIS — Z96.651 STATUS POST TOTAL KNEE REPLACEMENT USING CEMENT, RIGHT: ICD-10-CM

## 2022-10-03 PROCEDURE — 73560 X-RAY EXAM OF KNEE 1 OR 2: CPT | Mod: TC,RT

## 2022-10-10 PROBLEM — Z00.00 ENCOUNTER FOR SUBSEQUENT ANNUAL WELLNESS VISIT (AWV) IN MEDICARE PATIENT: Status: RESOLVED | Noted: 2022-07-07 | Resolved: 2022-10-10

## 2022-10-19 ENCOUNTER — OFFICE VISIT (OUTPATIENT)
Dept: FAMILY MEDICINE | Facility: CLINIC | Age: 82
End: 2022-10-19
Payer: MEDICARE

## 2022-10-19 VITALS
HEIGHT: 65 IN | SYSTOLIC BLOOD PRESSURE: 110 MMHG | BODY MASS INDEX: 29.99 KG/M2 | HEART RATE: 51 BPM | RESPIRATION RATE: 18 BRPM | OXYGEN SATURATION: 95 % | DIASTOLIC BLOOD PRESSURE: 64 MMHG | WEIGHT: 180 LBS | TEMPERATURE: 98 F

## 2022-10-19 DIAGNOSIS — F41.9 ANXIETY: ICD-10-CM

## 2022-10-19 DIAGNOSIS — E78.5 HYPERLIPIDEMIA, UNSPECIFIED HYPERLIPIDEMIA TYPE: ICD-10-CM

## 2022-10-19 DIAGNOSIS — Z79.899 HIGH RISK MEDICATION USE: ICD-10-CM

## 2022-10-19 DIAGNOSIS — Z23 ENCOUNTER FOR IMMUNIZATION: Primary | ICD-10-CM

## 2022-10-19 DIAGNOSIS — R59.9 ENLARGED LYMPH NODE: ICD-10-CM

## 2022-10-19 DIAGNOSIS — D64.9 ANEMIA, UNSPECIFIED TYPE: ICD-10-CM

## 2022-10-19 DIAGNOSIS — T14.8XXA HEMATOMA: ICD-10-CM

## 2022-10-19 DIAGNOSIS — I10 HYPERTENSION, UNSPECIFIED TYPE: ICD-10-CM

## 2022-10-19 LAB
CTP QC/QA: YES
POC (AMP) AMPHETAMINE: NEGATIVE
POC (BAR) BARBITURATES: NEGATIVE
POC (BUP) BUPRENORPHINE: NEGATIVE
POC (BZO) BENZODIAZEPINES: NEGATIVE
POC (COC) COCAINE: NEGATIVE
POC (MDMA) METHYLENEDIOXYMETHAMPHETAMINE 3,4: NEGATIVE
POC (MET) METHAMPHETAMINE: NEGATIVE
POC (MOP) OPIATES: NEGATIVE
POC (MTD) METHADONE: NEGATIVE
POC (OXY) OXYCODONE: NEGATIVE
POC (PCP) PHENCYCLIDINE: NEGATIVE
POC (TCA) TRICYCLIC ANTIDEPRESSANTS: NORMAL
POC TEMPERATURE (URINE): 92
POC THC: NEGATIVE

## 2022-10-19 PROCEDURE — 99214 PR OFFICE/OUTPT VISIT, EST, LEVL IV, 30-39 MIN: ICD-10-PCS | Mod: ,,, | Performed by: NURSE PRACTITIONER

## 2022-10-19 PROCEDURE — G0008 ADMIN INFLUENZA VIRUS VAC: HCPCS | Mod: ,,, | Performed by: NURSE PRACTITIONER

## 2022-10-19 PROCEDURE — 90694 FLU VACCINE - QUADRIVALENT - ADJUVANTED: ICD-10-PCS | Mod: ,,, | Performed by: NURSE PRACTITIONER

## 2022-10-19 PROCEDURE — 99214 OFFICE O/P EST MOD 30 MIN: CPT | Mod: ,,, | Performed by: NURSE PRACTITIONER

## 2022-10-19 PROCEDURE — 80305 DRUG TEST PRSMV DIR OPT OBS: CPT | Mod: RHCUB | Performed by: NURSE PRACTITIONER

## 2022-10-19 PROCEDURE — 90694 VACC AIIV4 NO PRSRV 0.5ML IM: CPT | Mod: ,,, | Performed by: NURSE PRACTITIONER

## 2022-10-19 PROCEDURE — G0008 FLU VACCINE - QUADRIVALENT - ADJUVANTED: ICD-10-PCS | Mod: ,,, | Performed by: NURSE PRACTITIONER

## 2022-10-19 RX ORDER — ROSUVASTATIN CALCIUM 20 MG/1
20 TABLET, COATED ORAL DAILY
Qty: 90 TABLET | Refills: 1 | Status: SHIPPED | OUTPATIENT
Start: 2022-10-19

## 2022-10-19 RX ORDER — CARVEDILOL 3.12 MG/1
3.12 TABLET ORAL 2 TIMES DAILY WITH MEALS
Qty: 180 TABLET | Refills: 1 | Status: SHIPPED | OUTPATIENT
Start: 2022-10-19 | End: 2023-11-22 | Stop reason: SDUPTHER

## 2022-10-19 RX ORDER — MELOXICAM 7.5 MG/1
7.5 TABLET ORAL DAILY
Qty: 90 TABLET | Refills: 1 | Status: SHIPPED | OUTPATIENT
Start: 2022-10-19 | End: 2022-12-02

## 2022-10-19 RX ORDER — ALPRAZOLAM 0.5 MG/1
0.5 TABLET ORAL 2 TIMES DAILY PRN
Qty: 90 TABLET | Refills: 0 | Status: CANCELLED | OUTPATIENT
Start: 2022-10-19

## 2022-10-19 RX ORDER — CEPHALEXIN 500 MG/1
500 CAPSULE ORAL EVERY 8 HOURS
Qty: 30 CAPSULE | Refills: 0 | Status: SHIPPED | OUTPATIENT
Start: 2022-10-19 | End: 2022-12-02 | Stop reason: ALTCHOICE

## 2022-10-19 RX ORDER — CLOPIDOGREL BISULFATE 75 MG/1
75 TABLET ORAL DAILY
Qty: 90 TABLET | Refills: 1 | Status: SHIPPED | OUTPATIENT
Start: 2022-10-19

## 2022-10-19 RX ORDER — ALPRAZOLAM 0.5 MG/1
0.5 TABLET ORAL 2 TIMES DAILY PRN
Qty: 90 TABLET | Refills: 0 | Status: SHIPPED | OUTPATIENT
Start: 2022-10-19 | End: 2023-12-01 | Stop reason: SDUPTHER

## 2022-10-19 RX ORDER — PANTOPRAZOLE SODIUM 40 MG/1
40 TABLET, DELAYED RELEASE ORAL DAILY
Qty: 90 TABLET | Refills: 1 | Status: SHIPPED | OUTPATIENT
Start: 2022-10-19 | End: 2023-11-22 | Stop reason: SDUPTHER

## 2022-10-19 RX ORDER — TERAZOSIN 5 MG/1
5 CAPSULE ORAL NIGHTLY
Qty: 90 CAPSULE | Refills: 1 | Status: SHIPPED | OUTPATIENT
Start: 2022-10-19 | End: 2023-05-30

## 2022-10-19 NOTE — PROGRESS NOTES
Izzy Olivier NP   [facility]  [address]  [place]     PATIENT NAME: Elliott Peters  : 1940  DATE: 10/19/22  MRN: [id number]      Billing Provider: Izzy Olivier NP  Level of Service:   Patient PCP Information       Provider PCP Type    Izzy Olivier NP General            Reason for Visit / Chief Complaint: Follow-up (6 month f/up: HTN, HLD)       Update PCP  Update Chief Complaint         History of Present Illness / Problem Focused Workflow     Elliott Peters presents to the clinic pt here for htn, hld and knot in left groin      Review of Systems     Review of Systems   Constitutional:  Negative for chills, fatigue and fever.   HENT:  Negative for nasal congestion, ear pain, facial swelling, hearing loss, mouth dryness, mouth sores, postnasal drip, rhinorrhea, sinus pressure/congestion and goiter.    Eyes:  Negative for discharge and itching.   Respiratory:  Negative for cough, shortness of breath and wheezing.    Cardiovascular:  Negative for chest pain and leg swelling.   Gastrointestinal:  Negative for abdominal pain, change in bowel habit and change in bowel habit.   Endocrine:        Knot in left groin   Genitourinary:  Negative for difficulty urinating, dysuria, enuresis, frequency, hematuria and urgency.   Neurological:  Negative for dizziness, vertigo, syncope, weakness and headaches.   Psychiatric/Behavioral:  Negative for decreased concentration.    All other systems reviewed and are negative.     Medical / Social / Family History     Past Medical History:   Diagnosis Date    Coronary artery disease     cardiac stents    Hyperlipidemia     Hypertension     Kidney stone     MI (myocardial infarction)     Sleep apnea     Urinary tract infection        Past Surgical History:   Procedure Laterality Date    CORONARY ANGIOPLASTY WITH STENT PLACEMENT      HERNIA REPAIR      JOINT REPLACEMENT      difficult to wake after surgery     PROSTATECTOMY         Social History     reports that he has quit smoking. He has never used smokeless tobacco. He reports that he does not currently use alcohol. He reports that he does not use drugs.    Family History  's family history includes Colon cancer in his mother; Heart attack in his father.    Medications and Allergies     Medications  Outpatient Medications Marked as Taking for the 10/19/22 encounter (Office Visit) with Izzy Olivier NP   Medication Sig Dispense Refill    ALPRAZolam (XANAX) 0.5 MG tablet Take 1 tablet (0.5 mg total) by mouth 2 (two) times daily as needed for Anxiety. 90 tablet 0    amLODIPine (NORVASC) 5 MG tablet Take 0.5 tablets (2.5 mg total) by mouth once daily. 180 tablet 1    aspirin (ECOTRIN) 81 MG EC tablet Take 81 mg by mouth once daily.      docusate sodium (COLACE) 100 MG capsule Take 100 mg by mouth once daily.      nitroGLYCERIN (NITROSTAT) 0.4 MG SL tablet Place 1 tablet (0.4 mg total) under the tongue every 5 (five) minutes as needed for Chest pain. 100 tablet 0    polyethylene glycol (GLYCOLAX) 17 gram PwPk Take by mouth.      [DISCONTINUED] carvediloL (COREG) 3.125 MG tablet Take 1 tablet (3.125 mg total) by mouth 2 (two) times daily with meals. 180 tablet 1    [DISCONTINUED] clopidogreL (PLAVIX) 75 mg tablet Take 1 tablet (75 mg total) by mouth once daily. 30 tablet 11    [DISCONTINUED] meloxicam (MOBIC) 7.5 MG tablet Take 1 tablet (7.5 mg total) by mouth once daily. 90 tablet 1    [DISCONTINUED] pantoprazole (PROTONIX) 40 MG tablet Take 1 tablet (40 mg total) by mouth once daily. 90 tablet 1    [DISCONTINUED] rosuvastatin (CRESTOR) 20 MG tablet Take 20 mg by mouth once daily.      [DISCONTINUED] terazosin (HYTRIN) 5 MG capsule Take 5 mg by mouth every evening.         Allergies  Review of patient's allergies indicates:   Allergen Reactions    Bronopol Dermatitis    Propylene glycol Dermatitis       Physical Examination     Vitals:    10/19/22 0839   BP: 110/64   BP Location: Right arm   Patient  "Position: Sitting   Pulse: (!) 51   Resp: 18   Temp: 98.3 °F (36.8 °C)   TempSrc: Oral   SpO2: 95%   Weight: 81.6 kg (180 lb)   Height: 5' 5" (1.651 m)      Physical Exam  Vitals and nursing note reviewed.   Constitutional:       Appearance: Normal appearance.   HENT:      Head: Normocephalic.      Right Ear: Tympanic membrane, ear canal and external ear normal.      Left Ear: Tympanic membrane, ear canal and external ear normal.      Nose: Nose normal.      Mouth/Throat:      Mouth: Mucous membranes are moist.      Pharynx: Oropharynx is clear.   Eyes:      Extraocular Movements: Extraocular movements intact.      Conjunctiva/sclera: Conjunctivae normal.      Pupils: Pupils are equal, round, and reactive to light.   Cardiovascular:      Rate and Rhythm: Normal rate and regular rhythm.      Pulses: Normal pulses.      Heart sounds: Normal heart sounds.   Pulmonary:      Effort: Pulmonary effort is normal.      Breath sounds: Normal breath sounds.   Abdominal:      General: Bowel sounds are normal.      Palpations: Abdomen is soft.   Musculoskeletal:         General: Normal range of motion.      Cervical back: Normal range of motion and neck supple.   Skin:     General: Skin is warm and dry.      Capillary Refill: Capillary refill takes less than 2 seconds.      Findings: Bruising (large area old bruising noted on shiela upper thighs/groin, left scrotal area, goldie 1cm x 2cm enlarge node noted on left groin) present.   Neurological:      General: No focal deficit present.      Mental Status: He is alert and oriented to person, place, and time.   Psychiatric:         Mood and Affect: Mood normal.         Behavior: Behavior normal.        Assessment and Plan (including Health Maintenance)      Problem List  Smart Sets  Document Outside HM   :    Plan: will try keflex on enlarge node, has goldie with cardiologist next week and encouraged to keep goldie, meds as ordered, return to clinic as needed and as scheudled    Encounter for " immunization  -     Influenza (FLUAD) - Quadrivalent (Adjuvanted) *Preferred* (65+) (PF)    High risk medication use  -     POCT Urine Drug Screen Presump    Other orders  -     terazosin (HYTRIN) 5 MG capsule; Take 1 capsule (5 mg total) by mouth every evening.  Dispense: 90 capsule; Refill: 1  -     carvediloL (COREG) 3.125 MG tablet; Take 1 tablet (3.125 mg total) by mouth 2 (two) times daily with meals.  Dispense: 180 tablet; Refill: 1  -     pantoprazole (PROTONIX) 40 MG tablet; Take 1 tablet (40 mg total) by mouth once daily.  Dispense: 90 tablet; Refill: 1  -     rosuvastatin (CRESTOR) 20 MG tablet; Take 1 tablet (20 mg total) by mouth once daily.  Dispense: 90 tablet; Refill: 1  -     meloxicam (MOBIC) 7.5 MG tablet; Take 1 tablet (7.5 mg total) by mouth once daily.  Dispense: 90 tablet; Refill: 1  -     clopidogreL (PLAVIX) 75 mg tablet; Take 1 tablet (75 mg total) by mouth once daily.  Dispense: 90 tablet; Refill: 1            Health Maintenance Due   Topic Date Due    COVID-19 Vaccine (5 - Booster for Pfizer series) 06/06/2022    Influenza Vaccine (1) 09/01/2022       Problem List Items Addressed This Visit    None  Visit Diagnoses       Encounter for immunization    -  Primary    Relevant Orders    Influenza (FLUAD) - Quadrivalent (Adjuvanted) *Preferred* (65+) (PF)    High risk medication use        Relevant Orders    POCT Urine Drug Screen Presump              Health Maintenance Topics with due status: Not Due       Topic Last Completion Date    TETANUS VACCINE 06/25/2021    PROSTATE-SPECIFIC ANTIGEN 12/17/2021    Colonoscopy 07/07/2022    Lipid Panel 10/12/2022       Procedures     Future Appointments   Date Time Provider Department Center   11/21/2022 10:00 AM Abram Allen Jr., MD [facility] UROL [facility]   2/20/2023  9:00 AM Noam Smart DO [facility] SLEEP [facility]   3/30/2023 10:15 AM Aurora Haskins MD [facility] DERM [place]   7/10/2023  9:30 AM AWV NURSE, [facility] FAMILY MEDICINE [facility]   10/4/2023  8:30 AM Chevy Epps MD [facility] JRWMD [place]        No follow-ups on file.       Signature:  Izzy Olivier NP    Date of encounter: 10/19/22

## 2022-12-02 ENCOUNTER — OFFICE VISIT (OUTPATIENT)
Dept: FAMILY MEDICINE | Facility: CLINIC | Age: 82
End: 2022-12-02
Payer: MEDICARE

## 2022-12-02 ENCOUNTER — HOSPITAL ENCOUNTER (OUTPATIENT)
Dept: RADIOLOGY | Facility: HOSPITAL | Age: 82
Discharge: HOME | End: 2022-12-02
Attending: NURSE PRACTITIONER
Payer: MEDICARE

## 2022-12-02 VITALS
DIASTOLIC BLOOD PRESSURE: 70 MMHG | HEART RATE: 66 BPM | RESPIRATION RATE: 18 BRPM | TEMPERATURE: 98 F | HEIGHT: 65 IN | BODY MASS INDEX: 29.99 KG/M2 | SYSTOLIC BLOOD PRESSURE: 110 MMHG | WEIGHT: 180 LBS | OXYGEN SATURATION: 97 %

## 2022-12-02 DIAGNOSIS — M79.671 ACUTE PAIN OF RIGHT FOOT: ICD-10-CM

## 2022-12-02 DIAGNOSIS — M79.671 ACUTE PAIN OF RIGHT FOOT: Primary | ICD-10-CM

## 2022-12-02 LAB — URATE SERPL-MCNC: 4.4 MG/DL (ref 3.5–7.2)

## 2022-12-02 PROCEDURE — 84550 ASSAY OF BLOOD/URIC ACID: CPT | Mod: ,,, | Performed by: CLINICAL MEDICAL LABORATORY

## 2022-12-02 PROCEDURE — 73620 X-RAY EXAM OF FOOT: CPT | Mod: TC,RT

## 2022-12-02 PROCEDURE — 96372 THER/PROPH/DIAG INJ SC/IM: CPT | Mod: ,,, | Performed by: NURSE PRACTITIONER

## 2022-12-02 PROCEDURE — 84550 URIC ACID: ICD-10-PCS | Mod: ,,, | Performed by: CLINICAL MEDICAL LABORATORY

## 2022-12-02 PROCEDURE — 99213 OFFICE O/P EST LOW 20 MIN: CPT | Mod: ,,, | Performed by: NURSE PRACTITIONER

## 2022-12-02 PROCEDURE — 96372 PR INJECTION,THERAP/PROPH/DIAG2ST, IM OR SUBCUT: ICD-10-PCS | Mod: ,,, | Performed by: NURSE PRACTITIONER

## 2022-12-02 PROCEDURE — 99213 PR OFFICE/OUTPT VISIT, EST, LEVL III, 20-29 MIN: ICD-10-PCS | Mod: ,,, | Performed by: NURSE PRACTITIONER

## 2022-12-02 RX ORDER — TRIAMCINOLONE ACETONIDE 1 MG/G
CREAM TOPICAL
COMMUNITY
Start: 2022-11-28 | End: 2023-05-03 | Stop reason: SDUPTHER

## 2022-12-02 RX ORDER — DEXAMETHASONE SODIUM PHOSPHATE 4 MG/ML
4 INJECTION, SOLUTION INTRA-ARTICULAR; INTRALESIONAL; INTRAMUSCULAR; INTRAVENOUS; SOFT TISSUE
Status: COMPLETED | OUTPATIENT
Start: 2022-12-02 | End: 2022-12-02

## 2022-12-02 RX ADMIN — DEXAMETHASONE SODIUM PHOSPHATE 4 MG: 4 INJECTION, SOLUTION INTRA-ARTICULAR; INTRALESIONAL; INTRAMUSCULAR; INTRAVENOUS; SOFT TISSUE at 08:12

## 2022-12-02 NOTE — PROGRESS NOTES
"Subjective:      Elliott Peters is a 82 y.o. male who presents with {right/left/bi:50810} foot pain. Onset of the symptoms was {onset:54976}. Precipitating event: {causes:28667}. Current symptoms include: {symptoms:85238}. Aggravating factors: {aggravating factors:39985}. Symptoms have {symptom progression:19445}. Patient {has had:47514} prior foot problems. Evaluation to date: {evaluation:98402}. Treatment to date: {treatment:14089}.    {Common ambulatory SmartLinks:52369}    Review of Systems  {ros; complete:47299}      Objective:      BP (!) 168/70 (BP Location: Left arm, Patient Position: Sitting)   Pulse 66   Temp 98.4 °F (36.9 °C) (Oral)   Resp 18   Ht 5' 5" (1.651 m)   Wt 81.6 kg (180 lb)   SpO2 97%   BMI 29.95 kg/m²   Right foot:  {exam; foot:5774}   Left foot:  {exam; root:5774}     Imaging:  X-ray of {right/left foot:12721}: {findings:5769}      Assessment:      {Foot pain Dx:46290}      Plan:      {Plan; foot pain:81601}   "

## 2022-12-02 NOTE — PROGRESS NOTES
NICOLÁS Ko   [facility]  [address]  [place] [address]     PATIENT NAME: Elliott Petres  : 1940  DATE: 22  MRN: [id number]      Billing Provider: NICLOÁS Ko  Level of Service:   Patient PCP Information       Provider PCP Type    Izzy Olivier NP General            Reason for Visit / Chief Complaint: Foot Pain (C/o left foot pain ongoing x2 weeks, has gotten worse. Some swelling and redness. Pain on top of foot and bottom)       Update PCP  Update Chief Complaint         History of Present Illness / Problem Focused Workflow     Elliott Peters presents to the clinic right foot pain for two weeks no injury more painful with weight bearing improved with elevation      Review of Systems     Review of Systems   Respiratory:  Negative for cough.    Cardiovascular:  Negative for chest pain.   Musculoskeletal:  Positive for arthralgias and joint swelling.      Medical / Social / Family History     Past Medical History:   Diagnosis Date    Coronary artery disease     cardiac stents    Hyperlipidemia     Hypertension     Kidney stone     MI (myocardial infarction)     Sleep apnea     Urinary tract infection        Past Surgical History:   Procedure Laterality Date    CORONARY ANGIOPLASTY WITH STENT PLACEMENT      HERNIA REPAIR      JOINT REPLACEMENT      difficult to wake after surgery     PROSTATECTOMY         Social History    reports that he has quit smoking. He has never used smokeless tobacco. He reports that he does not currently use alcohol. He reports that he does not use drugs.    Family History  's family history includes Colon cancer in his mother; Heart attack in his father.    Medications and Allergies     Medications  Outpatient Medications Marked as Taking for the 22 encounter (Office Visit) with NICOLÁS Ko   Medication Sig Dispense Refill    ALPRAZolam (XANAX) 0.5 MG tablet Take 1 tablet (0.5 mg total)  "by mouth 2 (two) times daily as needed for Anxiety. 90 tablet 0    amLODIPine (NORVASC) 5 MG tablet Take 0.5 tablets (2.5 mg total) by mouth once daily. 180 tablet 1    aspirin (ECOTRIN) 81 MG EC tablet Take 81 mg by mouth once daily.      carvediloL (COREG) 3.125 MG tablet Take 1 tablet (3.125 mg total) by mouth 2 (two) times daily with meals. 180 tablet 1    clopidogreL (PLAVIX) 75 mg tablet Take 1 tablet (75 mg total) by mouth once daily. 90 tablet 1    docusate sodium (COLACE) 100 MG capsule Take 100 mg by mouth once daily.      nitroGLYCERIN (NITROSTAT) 0.4 MG SL tablet Place 1 tablet (0.4 mg total) under the tongue every 5 (five) minutes as needed for Chest pain. 100 tablet 0    pantoprazole (PROTONIX) 40 MG tablet Take 1 tablet (40 mg total) by mouth once daily. 90 tablet 1    polyethylene glycol (GLYCOLAX) 17 gram PwPk Take by mouth.      rosuvastatin (CRESTOR) 20 MG tablet Take 1 tablet (20 mg total) by mouth once daily. 90 tablet 1    terazosin (HYTRIN) 5 MG capsule Take 1 capsule (5 mg total) by mouth every evening. 90 capsule 1    triamcinolone acetonide 0.1% (KENALOG) 0.1 % cream SMARTSI Application Topical 2-3 Times Daily       Current Facility-Administered Medications for the 22 encounter (Office Visit) with NICOLÁS Ko   Medication Dose Route Frequency Provider Last Rate Last Admin    [COMPLETED] dexAMETHasone injection 4 mg  4 mg Intramuscular 1 time in Clinic/HOD NICOLÁS Ko   4 mg at 22 0833       Allergies  Review of patient's allergies indicates:   Allergen Reactions    Bronopol Dermatitis    Propylene glycol Dermatitis       Physical Examination     Vitals:    22 0809   BP: (!) 168/70   BP Location: Left arm   Patient Position: Sitting   Pulse: 66   Resp: 18   Temp: 98.4 °F (36.9 °C)   TempSrc: Oral   SpO2: 97%   Weight: 81.6 kg (180 lb)   Height: 5' 5" (1.651 m)      Physical Exam  Vitals and nursing note reviewed.   Constitutional:       " General: He is not in acute distress.     Appearance: Normal appearance. He is obese. He is not ill-appearing, toxic-appearing or diaphoretic.   Cardiovascular:      Rate and Rhythm: Normal rate and regular rhythm.      Pulses:           Dorsalis pedis pulses are 2+ on the right side.        Posterior tibial pulses are 2+ on the right side.      Heart sounds: Normal heart sounds.   Pulmonary:      Breath sounds: Normal breath sounds. No wheezing, rhonchi or rales.   Musculoskeletal:      Right foot: Normal range of motion. Bunion and prominent metatarsal heads present.        Feet:    Feet:      Right foot:      Skin integrity: Warmth present. No ulcer, blister, skin breakdown, dry skin or fissure.   Skin:     General: Skin is warm and dry.      Capillary Refill: Capillary refill takes less than 2 seconds.   Neurological:      General: No focal deficit present.      Mental Status: He is alert and oriented to person, place, and time.   Psychiatric:         Mood and Affect: Mood normal.         Behavior: Behavior normal.    X-Ray Foot 2 View Right  Narrative: EXAMINATION:  XR FOOT 2 VIEW RIGHT    CLINICAL HISTORY:  Pain in right foot    COMPARISON:  None available    FINDINGS:  No evidence of fracture seen.  The alignment of the joints appears normal.  Mild midfoot and 1st metatarsophalangeal joint degenerative change is present.  No soft tissue abnormality is seen.  Impression: Foot osteoarthrosis as described above.  No acute injury.    Electronically signed by: Kirill aButista  Date:    12/02/2022  Time:    09:45     Assessment and Plan (including Health Maintenance)      Problem List  Smart Sets  Document Outside HM   :    Plan:     Uric acid xray at [facility] elevated to help with pain, if abnormal will return and splint, negative xray will send to podiatry    Health Maintenance Due   Topic Date Due    COVID-19 Vaccine (5 - Booster for Pfizer series) 06/06/2022       Problem List Items Addressed This Visit     None  Visit Diagnoses       Acute pain of right foot    -  Primary    Relevant Medications    dexAMETHasone injection 4 mg (Completed) (Start on 12/2/2022  8:45 AM)    Other Relevant Orders    X-Ray Foot 2 View Right    Uric Acid          Acute pain of right foot  -     X-Ray Foot 2 View Right; Future; Expected date: 12/02/2022  -     Uric Acid; Future; Expected date: 12/02/2022  -     dexAMETHasone injection 4 mg     Health Maintenance Topics with due status: Not Due       Topic Last Completion Date    TETANUS VACCINE 06/25/2021    PROSTATE-SPECIFIC ANTIGEN 12/17/2021    Hemoglobin A1c (Prediabetes) 07/07/2022    Colonoscopy 07/07/2022    Lipid Panel 10/12/2022       Procedures     Future Appointments   Date Time Provider Department Center   2/20/2023  9:00 AM Noam Smart DO [facility] SLEEP [facility]    3/30/2023 10:15 AM Aurora Haskins MD [facility]   4/19/2023 10:15 AM Izzy Olivier NP [facility]   7/10/2023  9:30 AM AWV NURSE, [facility] FAMILY MEDICINE [facility] [place]   10/4/2023  8:30 AM Chevy Epps MD [facility] [place]        No follow-ups on file.     Signature:  NICOLÁS Ko    Date of encounter: 12/2/22

## 2022-12-12 ENCOUNTER — OFFICE VISIT (OUTPATIENT)
Dept: FAMILY MEDICINE | Facility: CLINIC | Age: 82
End: 2022-12-12
Payer: MEDICARE

## 2022-12-12 VITALS
TEMPERATURE: 99 F | WEIGHT: 180.25 LBS | HEIGHT: 65 IN | DIASTOLIC BLOOD PRESSURE: 70 MMHG | RESPIRATION RATE: 20 BRPM | HEART RATE: 80 BPM | BODY MASS INDEX: 30.03 KG/M2 | SYSTOLIC BLOOD PRESSURE: 130 MMHG | OXYGEN SATURATION: 99 %

## 2022-12-12 DIAGNOSIS — J01.00 ACUTE NON-RECURRENT MAXILLARY SINUSITIS: Primary | ICD-10-CM

## 2022-12-12 PROCEDURE — 69209 EAR CERUMEN REMOVAL: ICD-10-PCS | Mod: RT,,, | Performed by: NURSE PRACTITIONER

## 2022-12-12 PROCEDURE — 96372 THER/PROPH/DIAG INJ SC/IM: CPT | Mod: ,,, | Performed by: NURSE PRACTITIONER

## 2022-12-12 PROCEDURE — 99214 OFFICE O/P EST MOD 30 MIN: CPT | Mod: ,,, | Performed by: NURSE PRACTITIONER

## 2022-12-12 PROCEDURE — 96372 PR INJECTION,THERAP/PROPH/DIAG2ST, IM OR SUBCUT: ICD-10-PCS | Mod: ,,, | Performed by: NURSE PRACTITIONER

## 2022-12-12 PROCEDURE — 69209 REMOVE IMPACTED EAR WAX UNI: CPT | Mod: RT,,, | Performed by: NURSE PRACTITIONER

## 2022-12-12 PROCEDURE — 99214 PR OFFICE/OUTPT VISIT, EST, LEVL IV, 30-39 MIN: ICD-10-PCS | Mod: ,,, | Performed by: NURSE PRACTITIONER

## 2022-12-12 RX ORDER — AMOXICILLIN 500 MG/1
500 CAPSULE ORAL 3 TIMES DAILY
Qty: 30 CAPSULE | Refills: 0 | Status: SHIPPED | OUTPATIENT
Start: 2022-12-12 | End: 2023-01-09 | Stop reason: ALTCHOICE

## 2022-12-12 RX ORDER — FLUTICASONE PROPIONATE 50 MCG
1 SPRAY, SUSPENSION (ML) NASAL DAILY
Qty: 15.8 ML | Refills: 0 | Status: SHIPPED | OUTPATIENT
Start: 2022-12-12 | End: 2023-05-01

## 2022-12-12 RX ORDER — AMOXICILLIN 500 MG/1
500 CAPSULE ORAL 3 TIMES DAILY
Qty: 30 CAPSULE | Refills: 0 | Status: SHIPPED | OUTPATIENT
Start: 2022-12-12 | End: 2022-12-12 | Stop reason: SDUPTHER

## 2022-12-12 RX ORDER — FEXOFENADINE HCL AND PSEUDOEPHEDRINE HCI 60; 120 MG/1; MG/1
1 TABLET, EXTENDED RELEASE ORAL EVERY 12 HOURS
Qty: 20 TABLET | Refills: 0 | Status: SHIPPED | OUTPATIENT
Start: 2022-12-12 | End: 2022-12-22

## 2022-12-12 RX ORDER — CEFTRIAXONE 1 G/1
1 INJECTION, POWDER, FOR SOLUTION INTRAMUSCULAR; INTRAVENOUS
Status: COMPLETED | OUTPATIENT
Start: 2022-12-12 | End: 2022-12-12

## 2022-12-12 RX ORDER — FLUTICASONE PROPIONATE 50 MCG
1 SPRAY, SUSPENSION (ML) NASAL DAILY
Qty: 15.8 ML | Refills: 0 | Status: SHIPPED | OUTPATIENT
Start: 2022-12-12 | End: 2022-12-12 | Stop reason: SDUPTHER

## 2022-12-12 RX ORDER — DEXAMETHASONE SODIUM PHOSPHATE 4 MG/ML
4 INJECTION, SOLUTION INTRA-ARTICULAR; INTRALESIONAL; INTRAMUSCULAR; INTRAVENOUS; SOFT TISSUE
Status: COMPLETED | OUTPATIENT
Start: 2022-12-12 | End: 2022-12-12

## 2022-12-12 RX ADMIN — CEFTRIAXONE 1 G: 1 INJECTION, POWDER, FOR SOLUTION INTRAMUSCULAR; INTRAVENOUS at 10:12

## 2022-12-12 RX ADMIN — DEXAMETHASONE SODIUM PHOSPHATE 4 MG: 4 INJECTION, SOLUTION INTRA-ARTICULAR; INTRALESIONAL; INTRAMUSCULAR; INTRAVENOUS; SOFT TISSUE at 10:12

## 2022-12-12 NOTE — PROGRESS NOTES
Izzy Olivier NP   [facility]  [address]  [place]     PATIENT NAME: Elliott Peters  : 1940  DATE: 22  MRN: [id number]      Billing Provider: Izzy Olivier NP  Level of Service:   Patient PCP Information       Provider PCP Type    Izzy Olivier NP General            Reason for Visit / Chief Complaint: Headache (C/o headache pain behind both eyes x 1 wk.)       Update PCP  Update Chief Complaint         History of Present Illness / Problem Focused Workflow     Elliott Peters presents to the clinic c/o headache behind both eyes x 1 week, c/o sinus congestion, no drainage      Review of Systems     Review of Systems   Constitutional:  Negative for chills, fatigue and fever.   HENT:  Positive for nasal congestion. Negative for ear pain, facial swelling, hearing loss, mouth dryness, mouth sores, postnasal drip, rhinorrhea, sinus pressure/congestion and goiter.    Eyes:  Negative for discharge and itching.   Respiratory:  Negative for cough, shortness of breath and wheezing.    Cardiovascular:  Negative for chest pain and leg swelling.   Gastrointestinal:  Negative for abdominal pain, change in bowel habit and change in bowel habit.   Genitourinary:  Negative for difficulty urinating, dysuria, enuresis, frequency, hematuria and urgency.   Neurological:  Positive for headaches. Negative for dizziness, vertigo, syncope and weakness.   Psychiatric/Behavioral:  Negative for decreased concentration.       Medical / Social / Family History     Past Medical History:   Diagnosis Date    Coronary artery disease     cardiac stents    Hyperlipidemia     Hypertension     Kidney stone     MI (myocardial infarction)     Sleep apnea     Urinary tract infection        Past Surgical History:   Procedure Laterality Date    CORONARY ANGIOPLASTY WITH STENT PLACEMENT      HERNIA REPAIR      JOINT REPLACEMENT      difficult to wake after surgery     PROSTATECTOMY         Social History    reports that  he has quit smoking. He has never used smokeless tobacco. He reports that he does not currently use alcohol. He reports that he does not use drugs.    Family History  's family history includes Colon cancer in his mother; Heart attack in his father.    Medications and Allergies     Medications  Outpatient Medications Marked as Taking for the 22 encounter (Office Visit) with Izzy Olivier NP   Medication Sig Dispense Refill    ALPRAZolam (XANAX) 0.5 MG tablet Take 1 tablet (0.5 mg total) by mouth 2 (two) times daily as needed for Anxiety. 90 tablet 0    amLODIPine (NORVASC) 5 MG tablet Take 0.5 tablets (2.5 mg total) by mouth once daily. 180 tablet 1    aspirin (ECOTRIN) 81 MG EC tablet Take 81 mg by mouth once daily.      carvediloL (COREG) 3.125 MG tablet Take 1 tablet (3.125 mg total) by mouth 2 (two) times daily with meals. 180 tablet 1    clopidogreL (PLAVIX) 75 mg tablet Take 1 tablet (75 mg total) by mouth once daily. 90 tablet 1    docusate sodium (COLACE) 100 MG capsule Take 100 mg by mouth once daily.      nitroGLYCERIN (NITROSTAT) 0.4 MG SL tablet Place 1 tablet (0.4 mg total) under the tongue every 5 (five) minutes as needed for Chest pain. 100 tablet 0    pantoprazole (PROTONIX) 40 MG tablet Take 1 tablet (40 mg total) by mouth once daily. 90 tablet 1    polyethylene glycol (GLYCOLAX) 17 gram PwPk Take by mouth.      rosuvastatin (CRESTOR) 20 MG tablet Take 1 tablet (20 mg total) by mouth once daily. 90 tablet 1    terazosin (HYTRIN) 5 MG capsule Take 1 capsule (5 mg total) by mouth every evening. 90 capsule 1    triamcinolone acetonide 0.1% (KENALOG) 0.1 % cream SMARTSI Application Topical 2-3 Times Daily       Current Facility-Administered Medications for the 22 encounter (Office Visit) with Izzy Olivier NP   Medication Dose Route Frequency Provider Last Rate Last Admin    [COMPLETED] cefTRIAXone injection 1 g  1 g Intramuscular 1 time in Clinic/HOD Izzy Olivier NP   1 g at  "12/12/22 1016    [COMPLETED] dexAMETHasone injection 4 mg  4 mg Intramuscular 1 time in Clinic/HOD Izzy BOB Olivier   4 mg at 12/12/22 1017       Allergies  Review of patient's allergies indicates:   Allergen Reactions    Bronopol Dermatitis    Propylene glycol Dermatitis       Physical Examination     Vitals:    12/12/22 0919   BP: 130/70   BP Location: Right arm   Patient Position: Sitting   BP Method: Medium (Manual)   Pulse: 80   Resp: 20   Temp: 98.6 °F (37 °C)   TempSrc: Oral   SpO2: 99%   Weight: 81.8 kg (180 lb 4 oz)   Height: 5' 5" (1.651 m)      Physical Exam  Vitals and nursing note reviewed.   Constitutional:       Appearance: Normal appearance.   HENT:      Head: Normocephalic.      Right Ear: Tympanic membrane, ear canal and external ear normal. There is impacted cerumen.      Left Ear: Tympanic membrane, ear canal and external ear normal.      Nose: Nose normal.      Mouth/Throat:      Mouth: Mucous membranes are moist.      Pharynx: Oropharynx is clear.   Eyes:      Extraocular Movements: Extraocular movements intact.      Conjunctiva/sclera: Conjunctivae normal.      Pupils: Pupils are equal, round, and reactive to light.   Cardiovascular:      Rate and Rhythm: Normal rate and regular rhythm.      Pulses: Normal pulses.      Heart sounds: Normal heart sounds.   Pulmonary:      Effort: Pulmonary effort is normal.      Breath sounds: Normal breath sounds.   Abdominal:      General: Bowel sounds are normal.      Palpations: Abdomen is soft.   Musculoskeletal:         General: Normal range of motion.      Cervical back: Normal range of motion and neck supple.   Skin:     General: Skin is warm and dry.      Capillary Refill: Capillary refill takes less than 2 seconds.   Neurological:      General: No focal deficit present.      Mental Status: He is alert and oriented to person, place, and time.   Psychiatric:         Mood and Affect: Mood normal.         Behavior: Behavior normal.        Assessment and " Plan (including Health Maintenance)      Problem List  Smart Sets  Document Outside HM   :    Plan: avoid irritants, meds as ordered, return to clinic as scheduled and as needed,  reviewed.     Acute non-recurrent maxillary sinusitis  -     cefTRIAXone injection 1 g  -     dexAMETHasone injection 4 mg  -     Ear Cerumen Removal    Other orders  -     Discontinue: amoxicillin (AMOXIL) 500 MG capsule; Take 1 capsule (500 mg total) by mouth 3 (three) times daily.  Dispense: 30 capsule; Refill: 0  -     Discontinue: fluticasone propionate (FLONASE) 50 mcg/actuation nasal spray; 1 spray (50 mcg total) by Each Nostril route once daily.  Dispense: 15.8 mL; Refill: 0  -     fexofenadine-pseudoephedrine  mg (ALLEGRA-D)  mg per tablet; Take 1 tablet by mouth every 12 (twelve) hours. for 10 days  Dispense: 20 tablet; Refill: 0  -     amoxicillin (AMOXIL) 500 MG capsule; Take 1 capsule (500 mg total) by mouth 3 (three) times daily.  Dispense: 30 capsule; Refill: 0  -     fluticasone propionate (FLONASE) 50 mcg/actuation nasal spray; 1 spray (50 mcg total) by Each Nostril route once daily.  Dispense: 15.8 mL; Refill: 0            Health Maintenance Due   Topic Date Due    COVID-19 Vaccine (5 - Booster for Pfizer series) 06/06/2022       Problem List Items Addressed This Visit    None  Visit Diagnoses       Acute non-recurrent maxillary sinusitis    -  Primary    Relevant Medications    cefTRIAXone injection 1 g (Completed)    dexAMETHasone injection 4 mg (Completed)    Other Relevant Orders    Ear Cerumen Removal              Health Maintenance Topics with due status: Not Due       Topic Last Completion Date    TETANUS VACCINE 06/25/2021    PROSTATE-SPECIFIC ANTIGEN 12/17/2021    Hemoglobin A1c (Prediabetes) 07/07/2022    Colonoscopy 07/07/2022    Lipid Panel 10/12/2022       Ear Cerumen Removal    Date/Time: 12/12/2022 11:00 AM  Performed by: Izzy Olivier NP  Authorized by: Izzy Olivier NP     Consent  Done?:  Yes (Verbal)    Local anesthetic:  None  Medication Used:  Debrox  Location details:  Right ear  Procedure type: irrigation    Cerumen  Removal Results:  Cerumen completely removed  Patient tolerance:  Patient tolerated the procedure well with no immediate complications     Future Appointments   Date Time Provider Department Center   12/12/2022 11:00 AM Izzy Olivier NP [facility]   2/20/2023  9:00 AM Noam Smart DO [facility] SLEEP [facility]   3/30/2023 10:15 AM Aurora Haskins MD [facility]   4/19/2023 10:15 AM Izzy Olivier NP [facility]   7/10/2023  9:30 AM AWV NURSE, [facility] FAMILY [facility]IC[facility]   10/4/2023  8:30 AM Chevy Epps MD [facility] [place]        Follow up for as scheduled.       Signature:  Izzy Olivier NP    Date of encounter: 12/12/22

## 2022-12-19 PROBLEM — M84.375A STRESS FRACTURE OF LEFT FOOT: Status: ACTIVE | Noted: 2022-12-19

## 2023-01-09 ENCOUNTER — OFFICE VISIT (OUTPATIENT)
Dept: FAMILY MEDICINE | Facility: CLINIC | Age: 83
End: 2023-01-09
Payer: MEDICARE

## 2023-01-09 ENCOUNTER — APPOINTMENT (OUTPATIENT)
Dept: RADIOLOGY | Facility: CLINIC | Age: 83
End: 2023-01-09
Attending: NURSE PRACTITIONER
Payer: MEDICARE

## 2023-01-09 VITALS
HEIGHT: 65 IN | RESPIRATION RATE: 18 BRPM | DIASTOLIC BLOOD PRESSURE: 74 MMHG | WEIGHT: 182.63 LBS | SYSTOLIC BLOOD PRESSURE: 162 MMHG | OXYGEN SATURATION: 98 % | TEMPERATURE: 98 F | BODY MASS INDEX: 30.43 KG/M2 | HEART RATE: 68 BPM

## 2023-01-09 DIAGNOSIS — J32.1 CHRONIC FRONTAL SINUSITIS: Primary | ICD-10-CM

## 2023-01-09 DIAGNOSIS — R51.9 ACUTE NONINTRACTABLE HEADACHE, UNSPECIFIED HEADACHE TYPE: ICD-10-CM

## 2023-01-09 PROCEDURE — 70210 X-RAY EXAM OF SINUSES: CPT | Mod: 26,,, | Performed by: RADIOLOGY

## 2023-01-09 PROCEDURE — 70210 X-RAY EXAM OF SINUSES: CPT | Mod: TC,RHCUB | Performed by: NURSE PRACTITIONER

## 2023-01-09 PROCEDURE — 96372 THER/PROPH/DIAG INJ SC/IM: CPT | Mod: ,,, | Performed by: NURSE PRACTITIONER

## 2023-01-09 PROCEDURE — 99214 PR OFFICE/OUTPT VISIT, EST, LEVL IV, 30-39 MIN: ICD-10-PCS | Mod: ,,, | Performed by: NURSE PRACTITIONER

## 2023-01-09 PROCEDURE — 99214 OFFICE O/P EST MOD 30 MIN: CPT | Mod: ,,, | Performed by: NURSE PRACTITIONER

## 2023-01-09 PROCEDURE — 70210 XR SINUSES 1 VIEW WATERS: ICD-10-PCS | Mod: 26,,, | Performed by: RADIOLOGY

## 2023-01-09 PROCEDURE — 96372 PR INJECTION,THERAP/PROPH/DIAG2ST, IM OR SUBCUT: ICD-10-PCS | Mod: ,,, | Performed by: NURSE PRACTITIONER

## 2023-01-09 RX ORDER — MELOXICAM 7.5 MG/1
TABLET ORAL
COMMUNITY
Start: 2022-12-19 | End: 2023-09-01 | Stop reason: SDUPTHER

## 2023-01-09 RX ORDER — CHLORPHENIRAMINE MALEATE AND PHENYLEPHRINE HYDROCHLORIDE 4; 10 MG/1; MG/1
1 TABLET, COATED ORAL EVERY 6 HOURS PRN
Qty: 30 TABLET | Refills: 0 | Status: SHIPPED | OUTPATIENT
Start: 2023-01-09 | End: 2023-01-19

## 2023-01-09 RX ORDER — CEFTRIAXONE 1 G/1
1 INJECTION, POWDER, FOR SOLUTION INTRAMUSCULAR; INTRAVENOUS
Status: COMPLETED | OUTPATIENT
Start: 2023-01-09 | End: 2023-01-09

## 2023-01-09 RX ORDER — DEXAMETHASONE SODIUM PHOSPHATE 4 MG/ML
4 INJECTION, SOLUTION INTRA-ARTICULAR; INTRALESIONAL; INTRAMUSCULAR; INTRAVENOUS; SOFT TISSUE
Status: COMPLETED | OUTPATIENT
Start: 2023-01-09 | End: 2023-01-09

## 2023-01-09 RX ORDER — SULFAMETHOXAZOLE AND TRIMETHOPRIM 800; 160 MG/1; MG/1
1 TABLET ORAL 2 TIMES DAILY
Qty: 20 TABLET | Refills: 0 | Status: SHIPPED | OUTPATIENT
Start: 2023-01-09 | End: 2023-05-01

## 2023-01-09 RX ADMIN — CEFTRIAXONE 1 G: 1 INJECTION, POWDER, FOR SOLUTION INTRAMUSCULAR; INTRAVENOUS at 09:01

## 2023-01-09 RX ADMIN — DEXAMETHASONE SODIUM PHOSPHATE 4 MG: 4 INJECTION, SOLUTION INTRA-ARTICULAR; INTRALESIONAL; INTRAMUSCULAR; INTRAVENOUS; SOFT TISSUE at 09:01

## 2023-01-09 NOTE — PATIENT INSTRUCTIONS
Avoid irritants, meds as orddered, return to [facility] tomorrow for repeat rocephin 1gm and also wed for rocephin 1 gm im.  Return to [facility] next week for eval

## 2023-01-09 NOTE — PROGRESS NOTES
Izzy Olivier NP   [facility]  [address]  [place]     PATIENT NAME: Elliott Peters  : 1940  DATE: 23  MRN: [id number]      Billing Provider: Izzy Olivier NP  Level of Service:   Patient PCP Information       Provider PCP Type    Izzy Olivier NP General            Reason for Visit / Chief Complaint: Headache (Consistent headaches x 2 months. No blurred vision. Taking OTC Tylenol for pain. )       Update PCP  Update Chief Complaint         History of Present Illness / Problem Focused Workflow     Elliott Peters presents to the clinic c/o consistent x 2 months, taking otc tylenol for pain      Review of Systems     Review of Systems   HENT:  Positive for sinus pressure/congestion.    Neurological:  Positive for headaches.   All other systems reviewed and are negative.     Medical / Social / Family History     Past Medical History:   Diagnosis Date    Coronary artery disease     cardiac stents    Hyperlipidemia     Hypertension     Kidney stone     MI (myocardial infarction)     Sleep apnea     Urinary tract infection        Past Surgical History:   Procedure Laterality Date    CORONARY ANGIOPLASTY WITH STENT PLACEMENT      HERNIA REPAIR      JOINT REPLACEMENT      difficult to wake after surgery     PROSTATECTOMY         Social History    reports that he has quit smoking. He has never used smokeless tobacco. He reports that he does not currently use alcohol. He reports that he does not use drugs.    Family History  's family history includes Colon cancer in his mother; Heart attack in his father.    Medications and Allergies     Medications  Outpatient Medications Marked as Taking for the 23 encounter (Office Visit) with Izzy Olivier NP   Medication Sig Dispense Refill    ALPRAZolam (XANAX) 0.5 MG tablet Take 1 tablet (0.5 mg total) by mouth 2 (two) times daily as needed for Anxiety. 90 tablet 0    amLODIPine (NORVASC) 5 MG tablet Take 0.5 tablets (2.5 mg total) by  "mouth once daily. 180 tablet 1    aspirin (ECOTRIN) 81 MG EC tablet Take 81 mg by mouth once daily.      carvediloL (COREG) 3.125 MG tablet Take 1 tablet (3.125 mg total) by mouth 2 (two) times daily with meals. 180 tablet 1    clopidogreL (PLAVIX) 75 mg tablet Take 1 tablet (75 mg total) by mouth once daily. 90 tablet 1    docusate sodium (COLACE) 100 MG capsule Take 100 mg by mouth once daily.      fluticasone propionate (FLONASE) 50 mcg/actuation nasal spray 1 spray (50 mcg total) by Each Nostril route once daily. 15.8 mL 0    meloxicam (MOBIC) 7.5 MG tablet       nitroGLYCERIN (NITROSTAT) 0.4 MG SL tablet Place 1 tablet (0.4 mg total) under the tongue every 5 (five) minutes as needed for Chest pain. 100 tablet 0    pantoprazole (PROTONIX) 40 MG tablet Take 1 tablet (40 mg total) by mouth once daily. 90 tablet 1    polyethylene glycol (GLYCOLAX) 17 gram PwPk Take by mouth.      rosuvastatin (CRESTOR) 20 MG tablet Take 1 tablet (20 mg total) by mouth once daily. 90 tablet 1    terazosin (HYTRIN) 5 MG capsule Take 1 capsule (5 mg total) by mouth every evening. 90 capsule 1    triamcinolone acetonide 0.1% (KENALOG) 0.1 % cream SMARTSI Application Topical 2-3 Times Daily         Allergies  Review of patient's allergies indicates:   Allergen Reactions    Bronopol Dermatitis    Propylene glycol Dermatitis       Physical Examination     Vitals:    23 0819   BP: (!) 162/74   BP Location: Right arm   Patient Position: Sitting   Pulse: 68   Resp: 18   Temp: 98.2 °F (36.8 °C)   TempSrc: Oral   SpO2: 98%   Weight: 82.8 kg (182 lb 9.6 oz)   Height: 5' 5" (1.651 m)      Physical Exam  Constitutional:       Appearance: Normal appearance.   HENT:      Head: Normocephalic.      Right Ear: Tympanic membrane, ear canal and external ear normal.      Left Ear: Tympanic membrane, ear canal and external ear normal.      Nose: Congestion present.      Comments: Mod amt thick yellow nasal secretion, pressure over frontal " region     Mouth/Throat:      Mouth: Mucous membranes are moist.   Eyes:      Extraocular Movements: Extraocular movements intact.      Conjunctiva/sclera: Conjunctivae normal.      Pupils: Pupils are equal, round, and reactive to light.   Neck:      Comments: Mike ant cervical nodes  Cardiovascular:      Rate and Rhythm: Normal rate and regular rhythm.      Pulses: Normal pulses.      Heart sounds: Normal heart sounds.   Pulmonary:      Effort: Pulmonary effort is normal.      Breath sounds: Normal breath sounds.   Musculoskeletal:         General: Normal range of motion.      Cervical back: Normal range of motion.   Lymphadenopathy:      Cervical: Cervical adenopathy present.   Skin:     General: Skin is warm and dry.   Neurological:      General: No focal deficit present.      Mental Status: He is alert and oriented to person, place, and time.   Psychiatric:         Behavior: Behavior normal.        Assessment and Plan (including Health Maintenance)      Problem List  Smart Sets  Document Outside HM   :    Plan: Avoid irritants, meds as orddered, return to [facility] tomorrow for repeat rocephin 1gm and also wed for rocephin 1 gm im.  Return to [facility] next week for eval, if not better will send for ct head, also referred to ent    There are no diagnoses linked to this encounter.        Health Maintenance Due   Topic Date Due    COVID-19 Vaccine (5 - Booster for Pfizer series) 06/06/2022    PROSTATE-SPECIFIC ANTIGEN  12/17/2022       Problem List Items Addressed This Visit    None        Health Maintenance Topics with due status: Not Due       Topic Last Completion Date    TETANUS VACCINE 06/25/2021    Hemoglobin A1c (Prediabetes) 07/07/2022    Colonoscopy 07/07/2022    Lipid Panel 10/12/2022       Procedures     Future Appointments   Date Time Provider Department Center   1/9/2023  8:30 AM Izzy Olivier NP [facility]   1/18/2023  8:30 AM Chevy Epps MD [facility]   2/20/2023  9:00 AM  Noam Smart,  [facility] SLEEP [facility]   3/30/2023 10:15 AM Aurora Haskins MD [facility]   4/19/2023 10:15 AM Izzy Olivier NP [facility]   7/10/2023  9:30 AM AWV NURSE, [facility] FAMILY MEDICINE [facility]   10/4/2023  8:30 AM Chevy Epps MD [facility]        No follow-ups on file.       Signature:  Izzy Olivier NP    Date of encounter: 1/9/23

## 2023-01-10 ENCOUNTER — CLINICAL SUPPORT (OUTPATIENT)
Dept: FAMILY MEDICINE | Facility: CLINIC | Age: 83
End: 2023-01-10
Payer: MEDICARE

## 2023-01-10 DIAGNOSIS — J32.1 CHRONIC FRONTAL SINUSITIS: Primary | ICD-10-CM

## 2023-01-10 PROCEDURE — 96372 THER/PROPH/DIAG INJ SC/IM: CPT | Mod: ,,, | Performed by: NURSE PRACTITIONER

## 2023-01-10 PROCEDURE — 96372 PR INJECTION,THERAP/PROPH/DIAG2ST, IM OR SUBCUT: ICD-10-PCS | Mod: ,,, | Performed by: NURSE PRACTITIONER

## 2023-01-10 RX ORDER — DEXAMETHASONE SODIUM PHOSPHATE 4 MG/ML
4 INJECTION, SOLUTION INTRA-ARTICULAR; INTRALESIONAL; INTRAMUSCULAR; INTRAVENOUS; SOFT TISSUE
Status: COMPLETED | OUTPATIENT
Start: 2023-01-10 | End: 2023-01-10

## 2023-01-10 RX ORDER — CEFTRIAXONE 1 G/1
1 INJECTION, POWDER, FOR SOLUTION INTRAMUSCULAR; INTRAVENOUS
Status: COMPLETED | OUTPATIENT
Start: 2023-01-10 | End: 2023-01-10

## 2023-01-10 RX ADMIN — DEXAMETHASONE SODIUM PHOSPHATE 4 MG: 4 INJECTION, SOLUTION INTRA-ARTICULAR; INTRALESIONAL; INTRAMUSCULAR; INTRAVENOUS; SOFT TISSUE at 10:01

## 2023-01-10 RX ADMIN — CEFTRIAXONE 1 G: 1 INJECTION, POWDER, FOR SOLUTION INTRAMUSCULAR; INTRAVENOUS at 10:01

## 2023-01-10 NOTE — PROGRESS NOTES
Here for Rocephin 1gm/ Decadron 4mg injection.   Will RTC tomorrow for another.   Administered to left dorsogluteal. Tolerated well.   No s/s reaction/ adverse reaction.

## 2023-01-11 ENCOUNTER — CLINICAL SUPPORT (OUTPATIENT)
Dept: FAMILY MEDICINE | Facility: CLINIC | Age: 83
End: 2023-01-11
Payer: MEDICARE

## 2023-01-11 DIAGNOSIS — J32.1 CHRONIC FRONTAL SINUSITIS: Primary | ICD-10-CM

## 2023-01-11 PROCEDURE — 96372 THER/PROPH/DIAG INJ SC/IM: CPT | Mod: ,,, | Performed by: NURSE PRACTITIONER

## 2023-01-11 PROCEDURE — 96372 PR INJECTION,THERAP/PROPH/DIAG2ST, IM OR SUBCUT: ICD-10-PCS | Mod: ,,, | Performed by: NURSE PRACTITIONER

## 2023-01-11 RX ORDER — CEFTRIAXONE 1 G/1
1 INJECTION, POWDER, FOR SOLUTION INTRAMUSCULAR; INTRAVENOUS
Status: COMPLETED | OUTPATIENT
Start: 2023-01-11 | End: 2023-01-11

## 2023-01-11 RX ADMIN — CEFTRIAXONE 1 G: 1 INJECTION, POWDER, FOR SOLUTION INTRAMUSCULAR; INTRAVENOUS at 10:01

## 2023-01-17 DIAGNOSIS — M84.375D STRESS FRACTURE OF LEFT FOOT WITH ROUTINE HEALING, SUBSEQUENT ENCOUNTER: Primary | ICD-10-CM

## 2023-01-18 ENCOUNTER — HOSPITAL ENCOUNTER (OUTPATIENT)
Dept: RADIOLOGY | Facility: HOSPITAL | Age: 83
Discharge: HOME | End: 2023-01-18
Attending: ORTHOPAEDIC SURGERY
Payer: MEDICARE

## 2023-01-18 ENCOUNTER — OFFICE VISIT (OUTPATIENT)
Dept: ORTHOPEDICS | Facility: CLINIC | Age: 83
End: 2023-01-18
Payer: MEDICARE

## 2023-01-18 ENCOUNTER — OFFICE VISIT (OUTPATIENT)
Dept: FAMILY MEDICINE | Facility: CLINIC | Age: 83
End: 2023-01-18
Payer: MEDICARE

## 2023-01-18 VITALS
TEMPERATURE: 98 F | WEIGHT: 181 LBS | BODY MASS INDEX: 30.16 KG/M2 | RESPIRATION RATE: 20 BRPM | DIASTOLIC BLOOD PRESSURE: 60 MMHG | SYSTOLIC BLOOD PRESSURE: 124 MMHG | HEIGHT: 65 IN | OXYGEN SATURATION: 99 % | HEART RATE: 80 BPM

## 2023-01-18 DIAGNOSIS — R51.9 CHRONIC DAILY HEADACHE: Primary | ICD-10-CM

## 2023-01-18 DIAGNOSIS — Z47.89 ENCOUNTER FOR ORTHOPEDIC FOLLOW-UP CARE: Primary | ICD-10-CM

## 2023-01-18 DIAGNOSIS — M84.375D STRESS FRACTURE OF LEFT FOOT WITH ROUTINE HEALING, SUBSEQUENT ENCOUNTER: ICD-10-CM

## 2023-01-18 DIAGNOSIS — L29.9 ITCHING: ICD-10-CM

## 2023-01-18 PROCEDURE — 99214 PR OFFICE/OUTPT VISIT, EST, LEVL IV, 30-39 MIN: ICD-10-PCS | Mod: ,,, | Performed by: NURSE PRACTITIONER

## 2023-01-18 PROCEDURE — 73630 X-RAY EXAM OF FOOT: CPT | Mod: PBBFAC | Performed by: ORTHOPAEDIC SURGERY

## 2023-01-18 PROCEDURE — 73630 XR FOOT COMPLETE 3 VIEW LEFT: ICD-10-PCS | Mod: 26,LT,, | Performed by: ORTHOPAEDIC SURGERY

## 2023-01-18 PROCEDURE — 99213 OFFICE O/P EST LOW 20 MIN: CPT | Mod: PBBFAC | Performed by: ORTHOPAEDIC SURGERY

## 2023-01-18 PROCEDURE — 99024 POSTOP FOLLOW-UP VISIT: CPT | Mod: ,,, | Performed by: ORTHOPAEDIC SURGERY

## 2023-01-18 PROCEDURE — 99214 OFFICE O/P EST MOD 30 MIN: CPT | Mod: ,,, | Performed by: NURSE PRACTITIONER

## 2023-01-18 PROCEDURE — 73630 X-RAY EXAM OF FOOT: CPT | Mod: 26,LT,, | Performed by: ORTHOPAEDIC SURGERY

## 2023-01-18 PROCEDURE — 99024 PR POST-OP FOLLOW-UP VISIT: ICD-10-PCS | Mod: ,,, | Performed by: ORTHOPAEDIC SURGERY

## 2023-01-18 PROCEDURE — 73630 X-RAY EXAM OF FOOT: CPT | Mod: TC,LT

## 2023-01-18 RX ORDER — PREDNISONE 10 MG/1
TABLET ORAL
Qty: 30 TABLET | Refills: 2 | Status: SHIPPED | OUTPATIENT
Start: 2023-01-18 | End: 2023-04-17 | Stop reason: SDUPTHER

## 2023-01-18 NOTE — PROGRESS NOTES
Radiology Interpretation        Patient Name: Elliott Peters  Date: 1/18/2023  YOB: 1940  MRN# [id number]        ORDERING DIAGNOSIS:    Encounter Diagnosis   Name Primary?    Encounter for orthopedic follow-up care Yes           AP lateral oblique left foot skeletally mature individual there is a healing response at the proximal 1st metatarsal no obvious fracture line noted but there is periosteal reaction and increased radiodensity.  Impression healed fracture stress fracture 1st metatarsal proximally          Chevy Epps MD

## 2023-01-18 NOTE — PROGRESS NOTES
Patient is here for follow of his 1st metatarsal stress fracture..  His x-rays show he has some healing response.  He is having no tenderness.  Let him weightbear as tolerates.  I will follow up on a p.r.n. basis.

## 2023-01-18 NOTE — PROGRESS NOTES
Izzy Olivier NP   [facility]  [address]  [place]     PATIENT NAME: Elliott Peters  : 1940  DATE: 23  MRN: [id number]      Billing Provider: Izzy Olivier NP  Level of Service: PA OFFICE/OUTPT VISIT, EST, LEVL IV, 30-39 MIN  Patient PCP Information       Provider PCP Type    Izzy Olivier NP General            Reason for Visit / Chief Complaint: Follow-up (F/u sinus problems. Pt states he did get better while taking the antibiotics, however, the sinus headaches have started back.  States he has an appt with ENT on 23. )       Update PCP  Update Chief Complaint         History of Present Illness / Problem Focused Workflow     Elliott Peters presents to the clinic   Here for eval of sinu problems, stated that he got better while taking the antibiotics, however, the sinus headaches have started back, stated he has an goldie with ent on 23. Also wants to go ahead and get ct head prior to visit with ent. , had seen dr wen today and questioned him about people itching after having a knee replacement. He informed pt that this was common and needed some steroid to have on hand when this occurs.     Review of Systems     Review of Systems   Constitutional:  Negative for chills, fatigue and fever.   HENT:  Negative for nasal congestion, ear pain, facial swelling, hearing loss, mouth dryness, mouth sores, postnasal drip, rhinorrhea, sinus pressure/congestion and goiter.    Eyes:  Negative for discharge and itching.   Respiratory:  Negative for cough, shortness of breath and wheezing.    Cardiovascular:  Negative for chest pain and leg swelling.   Gastrointestinal:  Negative for abdominal pain, change in bowel habit and change in bowel habit.   Genitourinary:  Negative for difficulty urinating, dysuria, enuresis, frequency, hematuria and urgency.   Neurological:  Positive for headaches. Negative for dizziness, vertigo, syncope and weakness.   Psychiatric/Behavioral:  Negative for  decreased concentration.    All other systems reviewed and are negative.     Medical / Social / Family History     Past Medical History:   Diagnosis Date    Coronary artery disease     cardiac stents    Hyperlipidemia     Hypertension     Kidney stone     MI (myocardial infarction)     Sleep apnea     Urinary tract infection        Past Surgical History:   Procedure Laterality Date    CORONARY ANGIOPLASTY WITH STENT PLACEMENT      HERNIA REPAIR      JOINT REPLACEMENT      difficult to wake after surgery     PROSTATECTOMY  2012       Social History    reports that he has quit smoking. He has never used smokeless tobacco. He reports that he does not currently use alcohol. He reports that he does not use drugs.    Family History  's family history includes Colon cancer in his mother; Heart attack in his father.    Medications and Allergies     Medications  Outpatient Medications Marked as Taking for the 1/18/23 encounter (Office Visit) with Izzy Olivier NP   Medication Sig Dispense Refill    ALPRAZolam (XANAX) 0.5 MG tablet Take 1 tablet (0.5 mg total) by mouth 2 (two) times daily as needed for Anxiety. 90 tablet 0    amLODIPine (NORVASC) 5 MG tablet Take 0.5 tablets (2.5 mg total) by mouth once daily. 180 tablet 1    aspirin (ECOTRIN) 81 MG EC tablet Take 81 mg by mouth once daily.      carvediloL (COREG) 3.125 MG tablet Take 1 tablet (3.125 mg total) by mouth 2 (two) times daily with meals. 180 tablet 1    chlorpheniramine-phenylephrine (ED A-HIST) 4-10 mg per tablet Take 1 tablet by mouth every 6 (six) hours as needed for Congestion. 30 tablet 0    clopidogreL (PLAVIX) 75 mg tablet Take 1 tablet (75 mg total) by mouth once daily. 90 tablet 1    docusate sodium (COLACE) 100 MG capsule Take 100 mg by mouth once daily.      fluticasone propionate (FLONASE) 50 mcg/actuation nasal spray 1 spray (50 mcg total) by Each Nostril route once daily. 15.8 mL 0    meloxicam (MOBIC) 7.5 MG tablet       pantoprazole  "(PROTONIX) 40 MG tablet Take 1 tablet (40 mg total) by mouth once daily. 90 tablet 1    polyethylene glycol (GLYCOLAX) 17 gram PwPk Take by mouth.      rosuvastatin (CRESTOR) 20 MG tablet Take 1 tablet (20 mg total) by mouth once daily. 90 tablet 1    terazosin (HYTRIN) 5 MG capsule Take 1 capsule (5 mg total) by mouth every evening. 90 capsule 1    triamcinolone acetonide 0.1% (KENALOG) 0.1 % cream SMARTSI Application Topical 2-3 Times Daily         Allergies  Review of patient's allergies indicates:   Allergen Reactions    Bronopol Dermatitis    Propylene glycol Dermatitis       Physical Examination     Vitals:    23 1404   BP: 124/60   BP Location: Left arm   Patient Position: Sitting   BP Method: Medium (Manual)   Pulse: 80   Resp: 20   Temp: 97.9 °F (36.6 °C)   TempSrc: Oral   SpO2: 99%   Weight: 82.1 kg (181 lb)   Height: 5' 5" (1.651 m)      Physical Exam  Constitutional:       Appearance: Normal appearance.   HENT:      Head: Normocephalic.      Right Ear: Tympanic membrane, ear canal and external ear normal.      Left Ear: Tympanic membrane, ear canal and external ear normal.      Nose: Nose normal.      Mouth/Throat:      Mouth: Mucous membranes are moist.      Pharynx: Oropharynx is clear.   Eyes:      Extraocular Movements: Extraocular movements intact.      Conjunctiva/sclera: Conjunctivae normal.      Pupils: Pupils are equal, round, and reactive to light.   Cardiovascular:      Rate and Rhythm: Normal rate and regular rhythm.      Pulses: Normal pulses.      Heart sounds: Normal heart sounds.   Pulmonary:      Effort: Pulmonary effort is normal.      Breath sounds: Normal breath sounds.   Abdominal:      General: Bowel sounds are normal.      Palpations: Abdomen is soft.   Musculoskeletal:         General: Normal range of motion.   Skin:     General: Skin is warm and dry.      Capillary Refill: Capillary refill takes less than 2 seconds.   Neurological:      General: No focal deficit present. "      Mental Status: He is alert and oriented to person, place, and time.      Comments: Daily headache, rates pain goldie 7-8, pressure noted over forehead and top of head   Psychiatric:         Mood and Affect: Mood normal.         Behavior: Behavior normal.        Assessment and Plan (including Health Maintenance)      Problem List  Smart Sets  Document Outside HM   :    Plan:     Chronic daily headache  -     CT Head Without Contrast; Future; Expected date: 01/18/2023    Itching  -     predniSONE (DELTASONE) 10 MG tablet; 1 tablet po daily prn itching  Dispense: 30 tablet; Refill: 2            Health Maintenance Due   Topic Date Due    COVID-19 Vaccine (5 - Booster for Pfizer series) 06/06/2022    PROSTATE-SPECIFIC ANTIGEN  12/17/2022       Problem List Items Addressed This Visit          Neuro    Chronic daily headache - Primary    Relevant Orders    CT Head Without Contrast       Derm    Itching    Relevant Medications    predniSONE (DELTASONE) 10 MG tablet         Health Maintenance Topics with due status: Not Due       Topic Last Completion Date    TETANUS VACCINE 06/25/2021    Hemoglobin A1c (Prediabetes) 07/07/2022    Colonoscopy 07/07/2022    Lipid Panel 10/12/2022       Procedures     Future Appointments   Date Time Provider Department Center   1/25/2023  9:40 AM Vladislav Kerr MD [facility] ENT [facility]   2/20/2023  9:00 AM Noam Smart DO [facility] SLEEP [facility]   3/30/2023 10:15 AM Aurora Haskins MD [facility] DERM [place]   4/19/2023 10:15 AM Izzy Olivier NP [facility]   7/10/2023  9:30 AM AWV NURSE, [facility] FAMILY MEDICINE [facility]   10/4/2023  8:30 AM Chevy Epps MD [facility] ORTHO [facility]        Follow up in about 3 months (around 4/18/2023).       Signature:  Izzy Olivier NP    Date of encounter: 1/18/23

## 2023-01-20 ENCOUNTER — HOSPITAL ENCOUNTER (OUTPATIENT)
Dept: RADIOLOGY | Facility: HOSPITAL | Age: 83
Discharge: HOME | End: 2023-01-20
Attending: NURSE PRACTITIONER
Payer: MEDICARE

## 2023-01-20 DIAGNOSIS — R51.9 CHRONIC DAILY HEADACHE: ICD-10-CM

## 2023-01-20 PROCEDURE — 70450 CT HEAD/BRAIN W/O DYE: CPT | Mod: TC

## 2023-01-20 NOTE — PROGRESS NOTES
Pt does not have any signs of stroke, left sphenoid sinus disease noted, treating for sinuses. Keep goldie with ent

## 2023-01-25 ENCOUNTER — OFFICE VISIT (OUTPATIENT)
Dept: OTOLARYNGOLOGY | Facility: CLINIC | Age: 83
End: 2023-01-25
Payer: MEDICARE

## 2023-01-25 VITALS — WEIGHT: 181 LBS | BODY MASS INDEX: 30.16 KG/M2 | HEIGHT: 65 IN

## 2023-01-25 DIAGNOSIS — G89.29 CHRONIC NONINTRACTABLE HEADACHE, UNSPECIFIED HEADACHE TYPE: ICD-10-CM

## 2023-01-25 DIAGNOSIS — J32.8 OTHER CHRONIC SINUSITIS: Primary | ICD-10-CM

## 2023-01-25 DIAGNOSIS — R51.9 CHRONIC NONINTRACTABLE HEADACHE, UNSPECIFIED HEADACHE TYPE: ICD-10-CM

## 2023-01-25 PROCEDURE — 99214 OFFICE O/P EST MOD 30 MIN: CPT | Mod: PBBFAC | Performed by: OTOLARYNGOLOGY

## 2023-01-25 PROCEDURE — 99204 PR OFFICE/OUTPT VISIT, NEW, LEVL IV, 45-59 MIN: ICD-10-PCS | Mod: S$PBB,,, | Performed by: OTOLARYNGOLOGY

## 2023-01-25 PROCEDURE — 99204 OFFICE O/P NEW MOD 45 MIN: CPT | Mod: S$PBB,,, | Performed by: OTOLARYNGOLOGY

## 2023-01-25 NOTE — PROGRESS NOTES
Subjective:       Patient ID: Elliott Peters is a 82 y.o. male.    Chief Complaint: Sinusitis (Patient presents for chronic sinusitis. )    Sinusitis  Associated symptoms include headaches and sinus pressure.   Review of Systems   HENT:  Positive for sinus pressure and sinus pain.    Neurological:  Positive for headaches.   All other systems reviewed and are negative.    Objective:      Physical Exam  General: NAD  Head: Normocephalic, atraumatic, no facial asymmetry/normal strength,  Ears: Both auricules normal in appearance, w/o deformities tympanic membranes normal external auditory canals normal  Nose: External nose w/o deformities normal turbinates no drainage or inflammation  Oral Cavity: Lips, gums, floor of mouth, tongue hard palate, and buccal mucosa without mass/lesion  Oropharynx: Mucosa pink and moist, soft palate, posterior pharynx and oropharyngeal wall without mass/lesion  Neck: Supple, symmetric, trachea midline, no palpable mass/lesion, no palpable cervical lymphadenopathy  Skin: Warm and dry, no concerning lesions  Respiratory: Respirations even, unlabored   Assessment:       1. Other chronic sinusitis    2. Chronic nonintractable headache, unspecified headache type        Plan:       Reviewed CT scan sphenoid sinus infected better since taking antibiotics will watch

## 2023-02-17 ENCOUNTER — TELEPHONE (OUTPATIENT)
Dept: SLEEP MEDICINE | Facility: CLINIC | Age: 83
End: 2023-02-17
Payer: MEDICARE

## 2023-03-09 DIAGNOSIS — Z71.89 COMPLEX CARE COORDINATION: ICD-10-CM

## 2023-04-17 DIAGNOSIS — L29.9 ITCHING: ICD-10-CM

## 2023-04-17 RX ORDER — PREDNISONE 10 MG/1
TABLET ORAL
Qty: 90 TABLET | Refills: 1 | Status: SHIPPED | OUTPATIENT
Start: 2023-04-17 | End: 2023-08-01

## 2023-04-26 DIAGNOSIS — D64.9 ANEMIA, UNSPECIFIED TYPE: Primary | ICD-10-CM

## 2023-05-01 ENCOUNTER — OFFICE VISIT (OUTPATIENT)
Dept: FAMILY MEDICINE | Facility: CLINIC | Age: 83
End: 2023-05-01
Payer: MEDICARE

## 2023-05-01 VITALS
RESPIRATION RATE: 20 BRPM | HEART RATE: 82 BPM | HEIGHT: 65 IN | SYSTOLIC BLOOD PRESSURE: 138 MMHG | WEIGHT: 182.13 LBS | OXYGEN SATURATION: 99 % | BODY MASS INDEX: 30.34 KG/M2 | DIASTOLIC BLOOD PRESSURE: 82 MMHG | TEMPERATURE: 98 F

## 2023-05-01 DIAGNOSIS — J32.1 CHRONIC FRONTAL SINUSITIS: Primary | ICD-10-CM

## 2023-05-01 PROCEDURE — 96372 PR INJECTION,THERAP/PROPH/DIAG2ST, IM OR SUBCUT: ICD-10-PCS | Mod: ,,, | Performed by: NURSE PRACTITIONER

## 2023-05-01 PROCEDURE — 99214 OFFICE O/P EST MOD 30 MIN: CPT | Mod: ,,, | Performed by: NURSE PRACTITIONER

## 2023-05-01 PROCEDURE — 99214 PR OFFICE/OUTPT VISIT, EST, LEVL IV, 30-39 MIN: ICD-10-PCS | Mod: ,,, | Performed by: NURSE PRACTITIONER

## 2023-05-01 PROCEDURE — 96372 THER/PROPH/DIAG INJ SC/IM: CPT | Mod: ,,, | Performed by: NURSE PRACTITIONER

## 2023-05-01 RX ORDER — DEXAMETHASONE SODIUM PHOSPHATE 4 MG/ML
4 INJECTION, SOLUTION INTRA-ARTICULAR; INTRALESIONAL; INTRAMUSCULAR; INTRAVENOUS; SOFT TISSUE
Status: COMPLETED | OUTPATIENT
Start: 2023-05-01 | End: 2023-05-01

## 2023-05-01 RX ORDER — LORATADINE 10 MG/1
10 TABLET ORAL DAILY
Qty: 30 TABLET | Refills: 0 | Status: SHIPPED | OUTPATIENT
Start: 2023-05-01 | End: 2023-08-01

## 2023-05-01 RX ORDER — AMOXICILLIN 500 MG/1
500 CAPSULE ORAL EVERY 8 HOURS
Qty: 30 CAPSULE | Refills: 0 | Status: SHIPPED | OUTPATIENT
Start: 2023-05-01 | End: 2023-08-01 | Stop reason: ALTCHOICE

## 2023-05-01 RX ORDER — CEFTRIAXONE 1 G/1
1 INJECTION, POWDER, FOR SOLUTION INTRAMUSCULAR; INTRAVENOUS
Status: COMPLETED | OUTPATIENT
Start: 2023-05-01 | End: 2023-05-01

## 2023-05-01 RX ADMIN — CEFTRIAXONE SODIUM 1 G: 1 INJECTION, POWDER, FOR SOLUTION INTRAMUSCULAR; INTRAVENOUS at 09:05

## 2023-05-01 RX ADMIN — DEXAMETHASONE SODIUM PHOSPHATE 4 MG: 4 INJECTION, SOLUTION INTRAMUSCULAR; INTRAVENOUS at 09:05

## 2023-05-01 NOTE — PROGRESS NOTES
Izzy Olivier NP   [facility]  [address]  [place]     PATIENT NAME: Elliott Peters  : 1940  DATE: 23  MRN: [id number]      Billing Provider: Izzy Olivier NP  Level of Service:   Patient PCP Information       Provider PCP Type    Izzy Olivier NP General            Reason for Visit / Chief Complaint: Nasal Congestion (C/o symptoms x 1 week) and Headache       Update PCP  Update Chief Complaint         History of Present Illness / Problem Focused Workflow     Elliott Peters presents to the clinic for eval of nasal congestion x 1 week with headache      Review of Systems     Review of Systems   Constitutional:  Negative for chills, fatigue and fever.   HENT:  Positive for nasal congestion. Negative for ear pain, facial swelling, hearing loss, mouth dryness, mouth sores, postnasal drip, rhinorrhea, sinus pressure/congestion and goiter.    Eyes:  Negative for discharge and itching.   Respiratory:  Negative for cough, shortness of breath and wheezing.    Cardiovascular:  Negative for chest pain and leg swelling.   Gastrointestinal:  Negative for abdominal pain, change in bowel habit and change in bowel habit.   Genitourinary:  Negative for difficulty urinating, dysuria, enuresis, frequency, hematuria and urgency.   Neurological:  Positive for headaches. Negative for dizziness, vertigo, syncope and weakness.   Psychiatric/Behavioral:  Negative for decreased concentration.       Medical / Social / Family History     Past Medical History:   Diagnosis Date    Coronary artery disease     cardiac stents    Hyperlipidemia     Hypertension     Kidney stone     MI (myocardial infarction)     Sleep apnea     Urinary tract infection        Past Surgical History:   Procedure Laterality Date    CORONARY ANGIOPLASTY WITH STENT PLACEMENT      HERNIA REPAIR      JOINT REPLACEMENT      difficult to wake after surgery     PROSTATECTOMY         Social History    reports that he has quit smoking. He  "has never used smokeless tobacco. He reports that he does not currently use alcohol. He reports that he does not use drugs.    Family History  's family history includes Colon cancer in his mother; Heart attack in his father.    Medications and Allergies     Medications  Outpatient Medications Marked as Taking for the 23 encounter (Office Visit) with Izzy Olivier NP   Medication Sig Dispense Refill    ALPRAZolam (XANAX) 0.5 MG tablet Take 1 tablet (0.5 mg total) by mouth 2 (two) times daily as needed for Anxiety. 90 tablet 0    amLODIPine (NORVASC) 5 MG tablet Take 0.5 tablets (2.5 mg total) by mouth once daily. 180 tablet 1    aspirin (ECOTRIN) 81 MG EC tablet Take 81 mg by mouth once daily.      carvediloL (COREG) 3.125 MG tablet Take 1 tablet (3.125 mg total) by mouth 2 (two) times daily with meals. 180 tablet 1    clopidogreL (PLAVIX) 75 mg tablet Take 1 tablet (75 mg total) by mouth once daily. 90 tablet 1    docusate sodium (COLACE) 100 MG capsule Take 100 mg by mouth once daily.      pantoprazole (PROTONIX) 40 MG tablet Take 1 tablet (40 mg total) by mouth once daily. 90 tablet 1    predniSONE (DELTASONE) 10 MG tablet 1 tablet po daily prn itching 90 tablet 1    rosuvastatin (CRESTOR) 20 MG tablet Take 1 tablet (20 mg total) by mouth once daily. 90 tablet 1    terazosin (HYTRIN) 5 MG capsule Take 1 capsule (5 mg total) by mouth every evening. 90 capsule 1    triamcinolone acetonide 0.1% (KENALOG) 0.1 % cream SMARTSI Application Topical 2-3 Times Daily         Allergies  Review of patient's allergies indicates:   Allergen Reactions    Bronopol Dermatitis    Propylene glycol Dermatitis       Physical Examination     Vitals:    23 0844   BP: 138/82   BP Location: Left arm   Patient Position: Sitting   BP Method: Medium (Manual)   Pulse: 82   Resp: 20   Temp: 98.4 °F (36.9 °C)   TempSrc: Oral   SpO2: 99%   Weight: 82.6 kg (182 lb 2 oz)   Height: 5' 5" (1.651 m)      Physical Exam  Vitals and " nursing note reviewed.   Constitutional:       Appearance: Normal appearance.   HENT:      Head: Normocephalic.      Right Ear: Tympanic membrane, ear canal and external ear normal.      Left Ear: Tympanic membrane, ear canal and external ear normal.      Nose: Congestion present.      Comments: Mod amt thick yellow nasal secretion, pressure over max region     Mouth/Throat:      Mouth: Mucous membranes are moist.   Eyes:      Extraocular Movements: Extraocular movements intact.      Conjunctiva/sclera: Conjunctivae normal.      Pupils: Pupils are equal, round, and reactive to light.   Neck:      Comments: Mike ant cervical nodes  Cardiovascular:      Rate and Rhythm: Normal rate and regular rhythm.      Pulses: Normal pulses.      Heart sounds: Normal heart sounds.   Pulmonary:      Effort: Pulmonary effort is normal.      Breath sounds: Normal breath sounds.   Musculoskeletal:         General: Normal range of motion.      Cervical back: Normal range of motion.   Lymphadenopathy:      Cervical: Cervical adenopathy present.   Skin:     General: Skin is warm and dry.   Neurological:      General: No focal deficit present.      Mental Status: He is alert and oriented to person, place, and time.   Psychiatric:         Behavior: Behavior normal.        Assessment and Plan (including Health Maintenance)      Problem List  Smart Sets  Document Outside HM   :    Plan:     There are no diagnoses linked to this encounter.        Health Maintenance Due   Topic Date Due    Abdominal Aortic Aneurysm Screening  Never done    COVID-19 Vaccine (5 - Booster for Pfizer series) 06/06/2022       Problem List Items Addressed This Visit    None        Health Maintenance Topics with due status: Not Due       Topic Last Completion Date    TETANUS VACCINE 06/25/2021    Hemoglobin A1c (Prediabetes) 07/07/2022    Colonoscopy 07/07/2022    PROSTATE-SPECIFIC ANTIGEN 04/24/2023    Lipid Panel 04/24/2023    Aspirin/Antiplatelet Therapy 05/01/2023        Procedures     Future Appointments   Date Time Provider Department Center   5/1/2023 10:45 AM Izzy Olivier NP [facility]   5/3/2023  1:15 PM Izzy Olivier NP [facility]   5/15/2023  9:45 AM Noam Smart DO [facility] SLEEP [facility]   7/10/2023  9:30 AM AWV NURSE, [facility] FAMILY MEDICINE [facility]   10/4/2023  8:30 AM Chevy Epps MD [facility] ORTHO [facility]        No follow-ups on file.       Signature:  Izzy Olivier NP    Date of encounter: 5/1/23

## 2023-05-01 NOTE — ASSESSMENT & PLAN NOTE
Avoid irritants,such as strong perfumes, pian, cig smoke  Meds as ordered,   Increase fuids,   Return to clinic as needed and as scheudled

## 2023-05-02 ENCOUNTER — CLINICAL SUPPORT (OUTPATIENT)
Dept: FAMILY MEDICINE | Facility: CLINIC | Age: 83
End: 2023-05-02
Payer: MEDICARE

## 2023-05-02 DIAGNOSIS — J32.1 CHRONIC FRONTAL SINUSITIS: Primary | ICD-10-CM

## 2023-05-02 PROCEDURE — 96372 PR INJECTION,THERAP/PROPH/DIAG2ST, IM OR SUBCUT: ICD-10-PCS | Mod: ,,, | Performed by: NURSE PRACTITIONER

## 2023-05-02 PROCEDURE — 96372 THER/PROPH/DIAG INJ SC/IM: CPT | Mod: ,,, | Performed by: NURSE PRACTITIONER

## 2023-05-02 RX ORDER — CEFTRIAXONE 1 G/1
1 INJECTION, POWDER, FOR SOLUTION INTRAMUSCULAR; INTRAVENOUS
Status: COMPLETED | OUTPATIENT
Start: 2023-05-02 | End: 2023-05-02

## 2023-05-02 RX ADMIN — CEFTRIAXONE SODIUM 1 G: 1 INJECTION, POWDER, FOR SOLUTION INTRAMUSCULAR; INTRAVENOUS at 09:05

## 2023-05-03 ENCOUNTER — OFFICE VISIT (OUTPATIENT)
Dept: FAMILY MEDICINE | Facility: CLINIC | Age: 83
End: 2023-05-03
Payer: MEDICARE

## 2023-05-03 VITALS
OXYGEN SATURATION: 98 % | HEART RATE: 67 BPM | BODY MASS INDEX: 30.12 KG/M2 | DIASTOLIC BLOOD PRESSURE: 70 MMHG | HEIGHT: 65 IN | WEIGHT: 180.81 LBS | SYSTOLIC BLOOD PRESSURE: 142 MMHG | RESPIRATION RATE: 18 BRPM | TEMPERATURE: 98 F

## 2023-05-03 DIAGNOSIS — I10 PRIMARY HYPERTENSION: Primary | ICD-10-CM

## 2023-05-03 DIAGNOSIS — E78.5 HYPERLIPIDEMIA, UNSPECIFIED HYPERLIPIDEMIA TYPE: ICD-10-CM

## 2023-05-03 PROBLEM — L29.9 ITCHING: Status: RESOLVED | Noted: 2023-01-18 | Resolved: 2023-05-03

## 2023-05-03 PROBLEM — R51.9 CHRONIC DAILY HEADACHE: Status: RESOLVED | Noted: 2023-01-18 | Resolved: 2023-05-03

## 2023-05-03 PROBLEM — R51.9 ACUTE NONINTRACTABLE HEADACHE: Status: RESOLVED | Noted: 2023-01-09 | Resolved: 2023-05-03

## 2023-05-03 PROCEDURE — 99214 OFFICE O/P EST MOD 30 MIN: CPT | Mod: ,,, | Performed by: NURSE PRACTITIONER

## 2023-05-03 PROCEDURE — 99214 PR OFFICE/OUTPT VISIT, EST, LEVL IV, 30-39 MIN: ICD-10-PCS | Mod: ,,, | Performed by: NURSE PRACTITIONER

## 2023-05-03 RX ORDER — TRIAMCINOLONE ACETONIDE 1 MG/G
CREAM TOPICAL
Qty: 454 G | Refills: 1 | Status: SHIPPED | OUTPATIENT
Start: 2023-05-03 | End: 2023-12-01

## 2023-05-03 NOTE — PROGRESS NOTES
Izzy Olivier NP   [facility]  [address]  [place]     PATIENT NAME: Elliott Peters  : 1940  DATE: 5/3/23  MRN: [id number]      Billing Provider: Izzy Olivier NP  Level of Service:   Patient PCP Information       Provider PCP Type    Izzy Olivier NP General            Reason for Visit / Chief Complaint: Follow-up (6 month f/up- labs done 23), Hyperlipidemia, and Hypertension       Update PCP  Update Chief Complaint         History of Present Illness / Problem Focused Workflow     Elliott Peters presents to the clinic for eval of hyperlipidemioa and htn, voices n/c      Review of Systems     Review of Systems   Constitutional:  Negative for chills, fatigue and fever.   HENT:  Negative for nasal congestion, ear pain, facial swelling, hearing loss, mouth dryness, mouth sores, postnasal drip, rhinorrhea, sinus pressure/congestion and goiter.    Eyes:  Negative for discharge and itching.   Respiratory:  Negative for cough, shortness of breath and wheezing.    Cardiovascular:  Negative for chest pain and leg swelling.   Gastrointestinal:  Negative for abdominal pain, change in bowel habit and change in bowel habit.   Genitourinary:  Negative for difficulty urinating, dysuria, enuresis, frequency, hematuria and urgency.   Neurological:  Negative for dizziness, vertigo, syncope, weakness and headaches.   Psychiatric/Behavioral:  Negative for decreased concentration.       Medical / Social / Family History     Past Medical History:   Diagnosis Date    Coronary artery disease     cardiac stents    Hyperlipidemia     Hypertension     Kidney stone     MI (myocardial infarction)     Sleep apnea     Urinary tract infection        Past Surgical History:   Procedure Laterality Date    CORONARY ANGIOPLASTY WITH STENT PLACEMENT      HERNIA REPAIR      JOINT REPLACEMENT      difficult to wake after surgery     PROSTATECTOMY         Social History    reports that he has quit smoking. He has  never used smokeless tobacco. He reports that he does not currently use alcohol. He reports that he does not use drugs.    Family History  's family history includes Colon cancer in his mother; Heart attack in his father.    Medications and Allergies     Medications  Outpatient Medications Marked as Taking for the 5/3/23 encounter (Office Visit) with Izzy Olivier NP   Medication Sig Dispense Refill    ALPRAZolam (XANAX) 0.5 MG tablet Take 1 tablet (0.5 mg total) by mouth 2 (two) times daily as needed for Anxiety. 90 tablet 0    amLODIPine (NORVASC) 5 MG tablet Take 0.5 tablets (2.5 mg total) by mouth once daily. 180 tablet 1    amoxicillin (AMOXIL) 500 MG capsule Take 1 capsule (500 mg total) by mouth every 8 (eight) hours. 30 capsule 0    aspirin (ECOTRIN) 81 MG EC tablet Take 81 mg by mouth once daily.      carvediloL (COREG) 3.125 MG tablet Take 1 tablet (3.125 mg total) by mouth 2 (two) times daily with meals. 180 tablet 1    clopidogreL (PLAVIX) 75 mg tablet Take 1 tablet (75 mg total) by mouth once daily. 90 tablet 1    docusate sodium (COLACE) 100 MG capsule Take 100 mg by mouth once daily.      loratadine (CLARITIN) 10 mg tablet Take 1 tablet (10 mg total) by mouth once daily. 30 tablet 0    meloxicam (MOBIC) 7.5 MG tablet       pantoprazole (PROTONIX) 40 MG tablet Take 1 tablet (40 mg total) by mouth once daily. 90 tablet 1    polyethylene glycol (GLYCOLAX) 17 gram PwPk Take by mouth.      predniSONE (DELTASONE) 10 MG tablet 1 tablet po daily prn itching 90 tablet 1    rosuvastatin (CRESTOR) 20 MG tablet Take 1 tablet (20 mg total) by mouth once daily. 90 tablet 1    terazosin (HYTRIN) 5 MG capsule Take 1 capsule (5 mg total) by mouth every evening. 90 capsule 1    [DISCONTINUED] triamcinolone acetonide 0.1% (KENALOG) 0.1 % cream SMARTSI Application Topical 2-3 Times Daily         Allergies  Review of patient's allergies indicates:   Allergen Reactions    Bronopol Dermatitis    Propylene glycol  "Dermatitis       Physical Examination     Vitals:    23 1312 23 1400   BP: (!) 161/89 (!) 142/70   BP Location: Left arm Left arm   Patient Position: Sitting Sitting   BP Method: Large (Automatic) Medium (Manual)   Pulse: 67    Resp: 18    Temp: 97.9 °F (36.6 °C)    TempSrc: Oral    SpO2: 98%    Weight: 82 kg (180 lb 12.8 oz)    Height: 5' 5" (1.651 m)       Physical Exam  Vitals and nursing note reviewed.   Constitutional:       Appearance: Normal appearance.   HENT:      Head: Normocephalic.      Right Ear: Tympanic membrane, ear canal and external ear normal.      Left Ear: Tympanic membrane, ear canal and external ear normal.      Nose: Nose normal.      Mouth/Throat:      Mouth: Mucous membranes are moist.      Pharynx: Oropharynx is clear.   Eyes:      Extraocular Movements: Extraocular movements intact.      Conjunctiva/sclera: Conjunctivae normal.      Pupils: Pupils are equal, round, and reactive to light.   Cardiovascular:      Rate and Rhythm: Normal rate and regular rhythm.      Pulses: Normal pulses.      Heart sounds: Normal heart sounds.   Pulmonary:      Effort: Pulmonary effort is normal.      Breath sounds: Normal breath sounds.   Abdominal:      General: Bowel sounds are normal.      Palpations: Abdomen is soft.   Musculoskeletal:         General: Normal range of motion.   Skin:     General: Skin is warm and dry.      Capillary Refill: Capillary refill takes less than 2 seconds.   Neurological:      General: No focal deficit present.      Mental Status: He is alert and oriented to person, place, and time.   Psychiatric:         Mood and Affect: Mood normal.         Behavior: Behavior normal.        Assessment and Plan (including Health Maintenance)      Problem List  Smart Sets  Document Outside HM   :    Plan:     Primary hypertension    Hyperlipidemia, unspecified hyperlipidemia type    Other orders  -     triamcinolone acetonide 0.1% (KENALOG) 0.1 % cream; SMARTSI Application " Topical 2-3 Times Daily  Dispense: 454 g; Refill: 1            Health Maintenance Due   Topic Date Due    COVID-19 Vaccine (5 - Booster for Pfizer series) 06/06/2022       Problem List Items Addressed This Visit          Cardiac/Vascular    Hyperlipidemia    Current Assessment & Plan     meds a sordered, watch low fat low chol diet, return to [facility] as needed and as scheduled           Primary hypertension - Primary    Current Assessment & Plan     The current medical regimen is effective;  continue present plan and medications.                  Health Maintenance Topics with due status: Not Due       Topic Last Completion Date    TETANUS VACCINE 06/25/2021    Hemoglobin A1c (Prediabetes) 07/07/2022    Colonoscopy 07/07/2022    PROSTATE-SPECIFIC ANTIGEN 04/24/2023    Lipid Panel 04/24/2023    Aspirin/Antiplatelet Therapy 05/03/2023       Procedures     Future Appointments   Date Time Provider Department Center   5/15/2023  9:45 AM Noam Smart DO [facility] SLEEP [place]    5/30/2023  3:15 PM Abram Allen Jr., MD [facility] UROL [facility]   8/1/2023  2:00 PM AWV NURSE, [facility]ILY MEDIC[facility] [employer] [facility] [place]   10/4/2023  8:30 AM Chevy Epps MD [facility] ORTHO [facility]   11/8/2023  8:15 AM Izzy Olivier NP [facility]        Follow up in about 3 months (around 8/3/2023).       Signature:  Izzy Olivier NP    Date of encounter: 5/3/23

## 2023-05-10 ENCOUNTER — OFFICE VISIT (OUTPATIENT)
Dept: OTOLARYNGOLOGY | Facility: CLINIC | Age: 83
End: 2023-05-10
Payer: MEDICARE

## 2023-05-10 ENCOUNTER — HOSPITAL ENCOUNTER (OUTPATIENT)
Dept: RADIOLOGY | Facility: HOSPITAL | Age: 83
Discharge: HOME | End: 2023-05-10
Attending: OTOLARYNGOLOGY
Payer: MEDICARE

## 2023-05-10 VITALS — BODY MASS INDEX: 29.99 KG/M2 | WEIGHT: 180 LBS | HEIGHT: 65 IN

## 2023-05-10 DIAGNOSIS — R09.81 CHRONIC NASAL CONGESTION: Primary | ICD-10-CM

## 2023-05-10 DIAGNOSIS — J32.9 CHRONIC SINUSITIS, UNSPECIFIED LOCATION: ICD-10-CM

## 2023-05-10 PROCEDURE — 99214 OFFICE O/P EST MOD 30 MIN: CPT | Mod: S$PBB,,, | Performed by: OTOLARYNGOLOGY

## 2023-05-10 PROCEDURE — 70486 CT MAXILLOFACIAL W/O DYE: CPT | Mod: TC

## 2023-05-10 PROCEDURE — 99214 PR OFFICE/OUTPT VISIT, EST, LEVL IV, 30-39 MIN: ICD-10-PCS | Mod: S$PBB,,, | Performed by: OTOLARYNGOLOGY

## 2023-05-10 PROCEDURE — 99214 OFFICE O/P EST MOD 30 MIN: CPT | Mod: PBBFAC,25 | Performed by: OTOLARYNGOLOGY

## 2023-05-10 RX ORDER — CLINDAMYCIN HYDROCHLORIDE 300 MG/1
300 CAPSULE ORAL 2 TIMES DAILY
Qty: 28 CAPSULE | Refills: 0 | Status: SHIPPED | OUTPATIENT
Start: 2023-05-10 | End: 2023-08-01

## 2023-05-10 NOTE — PROGRESS NOTES
Subjective:       Patient ID: Elliott Peters is a 82 y.o. male.    Chief Complaint: Sinusitis (Sinus congestion. Pt states states he feels congested on left side above eye and temple area. Can not blow out mucus, denies cough. )    Sinusitis  Associated symptoms include headaches and sinus pressure.   Review of Systems   HENT:  Positive for sinus pressure and sinus pain.    Neurological:  Positive for headaches.   All other systems reviewed and are negative.    Objective:      Physical Exam  General: NAD  Head: Normocephalic, atraumatic, no facial asymmetry/normal strength,  Ears: Both auricules normal in appearance, w/o deformities tympanic membranes normal external auditory canals normal  Nose: External nose w/o deformities normal turbinates no drainage or inflammation  Oral Cavity: Lips, gums, floor of mouth, tongue hard palate, and buccal mucosa without mass/lesion  Oropharynx: Mucosa pink and moist, soft palate, posterior pharynx and oropharyngeal wall without mass/lesion  Neck: Supple, symmetric, trachea midline, no palpable mass/lesion, no palpable cervical lymphadenopathy  Skin: Warm and dry, no concerning lesions  Respiratory: Respirations even, unlabored   Assessment:       1. Chronic nasal congestion    2. Chronic sinusitis, unspecified location        Plan:       Reviewed CT had left sphenoid sinusitis Which was getting better  now worsening will do cleocin and repeat CT of sinuses

## 2023-05-12 ENCOUNTER — TELEPHONE (OUTPATIENT)
Dept: SLEEP MEDICINE | Facility: CLINIC | Age: 83
End: 2023-05-12
Payer: MEDICARE

## 2023-05-15 ENCOUNTER — OFFICE VISIT (OUTPATIENT)
Dept: SLEEP MEDICINE | Facility: CLINIC | Age: 83
End: 2023-05-15
Attending: FAMILY MEDICINE
Payer: MEDICARE

## 2023-05-15 VITALS
HEIGHT: 65 IN | WEIGHT: 181 LBS | HEART RATE: 56 BPM | SYSTOLIC BLOOD PRESSURE: 139 MMHG | DIASTOLIC BLOOD PRESSURE: 76 MMHG | BODY MASS INDEX: 30.16 KG/M2 | OXYGEN SATURATION: 97 %

## 2023-05-15 DIAGNOSIS — I10 PRIMARY HYPERTENSION: ICD-10-CM

## 2023-05-15 DIAGNOSIS — G47.33 OSA ON CPAP: Primary | ICD-10-CM

## 2023-05-15 PROCEDURE — 99999 PR STA SHADOW: CPT | Mod: PBBFAC,,, | Performed by: FAMILY MEDICINE

## 2023-05-15 PROCEDURE — 99212 OFFICE O/P EST SF 10 MIN: CPT | Mod: S$PBB | Performed by: FAMILY MEDICINE

## 2023-05-15 PROCEDURE — 99999 PR STA SHADOW: ICD-10-PCS | Mod: PBBFAC,,, | Performed by: FAMILY MEDICINE

## 2023-05-15 PROCEDURE — 99214 OFFICE O/P EST MOD 30 MIN: CPT | Mod: PBBFAC | Performed by: FAMILY MEDICINE

## 2023-05-15 NOTE — PROGRESS NOTES
Patient presents to clinic for CPAP F/U. ESS is 8. Patient gets supplies from [facility] in [place]. Patient wears a nasal mask and has a heated tubing.

## 2023-05-15 NOTE — PROGRESS NOTES
Subjective     Patient ID: Elliott Peters is a 82 y.o. male.    Chief Complaint: No chief complaint on file.    Patient follows up in sleep clinic for CPAP management denying any problems with his new CPAP machine or his mask.  Compliance is 90% with an average AHI of 2.7 at a pressure of 6 cm H2O.  Chatfield score is 8 and the patient is using and benefitting from CPAP.  The patient is sleeping close to 8 hours a night on average and uses a nasal mask.  Initial AHI was 6.1 with a low O2 saturation of 87%.    Review of Systems   Constitutional:  Negative for fatigue.        Negative EDS   Respiratory:  Negative for apnea.    Psychiatric/Behavioral:  Negative for sleep disturbance.         Objective     Physical Exam  Cardiovascular:      Rate and Rhythm: Normal rate and regular rhythm.      Heart sounds: No murmur heard.  Pulmonary:      Breath sounds: Normal breath sounds.   Skin:     General: Skin is warm and dry.   Neurological:      Mental Status: He is alert and oriented to person, place, and time.          Assessment and Plan     Problem List Items Addressed This Visit          Cardiac/Vascular    Primary hypertension       Other    ROSI on CPAP - Primary       Continue CPAP @ 6 cm H20  Caution while operating a motor vehicle  Prescription for new supplies  Follow up sleep clinic in 1 year.

## 2023-05-24 ENCOUNTER — OFFICE VISIT (OUTPATIENT)
Dept: OTOLARYNGOLOGY | Facility: CLINIC | Age: 83
End: 2023-05-24
Payer: MEDICARE

## 2023-05-24 VITALS — WEIGHT: 181 LBS | HEIGHT: 65 IN | BODY MASS INDEX: 30.16 KG/M2

## 2023-05-24 DIAGNOSIS — R51.9 CHRONIC NONINTRACTABLE HEADACHE, UNSPECIFIED HEADACHE TYPE: ICD-10-CM

## 2023-05-24 DIAGNOSIS — J32.8 OTHER CHRONIC SINUSITIS: Primary | ICD-10-CM

## 2023-05-24 DIAGNOSIS — G89.29 CHRONIC NONINTRACTABLE HEADACHE, UNSPECIFIED HEADACHE TYPE: ICD-10-CM

## 2023-05-24 PROCEDURE — 99214 PR OFFICE/OUTPT VISIT, EST, LEVL IV, 30-39 MIN: ICD-10-PCS | Mod: S$PBB,,, | Performed by: OTOLARYNGOLOGY

## 2023-05-24 PROCEDURE — 99213 OFFICE O/P EST LOW 20 MIN: CPT | Mod: PBBFAC | Performed by: OTOLARYNGOLOGY

## 2023-05-24 PROCEDURE — 99214 OFFICE O/P EST MOD 30 MIN: CPT | Mod: S$PBB,,, | Performed by: OTOLARYNGOLOGY

## 2023-05-24 NOTE — PROGRESS NOTES
Subjective:       Patient ID: Elliott Peters is a 82 y.o. male.    Chief Complaint: No chief complaint on file.  F/u left sided headache and sphenoid sinus disease   HPI  Review of Systems   HENT:  Positive for congestion.    Neurological:  Positive for headaches.   All other systems reviewed and are negative.    Objective:      Physical Exam  General: NAD  Head: Normocephalic, atraumatic, no facial asymmetry/normal strength,  Ears: Both auricules normal in appearance, w/o deformities tympanic membranes normal external auditory canals normal  Nose: External nose w/o deformities normal turbinates no drainage or inflammation  Oral Cavity: Lips, gums, floor of mouth, tongue hard palate, and buccal mucosa without mass/lesion  Oropharynx: Mucosa pink and moist, soft palate, posterior pharynx and oropharyngeal wall without mass/lesion  Neck: Supple, symmetric, trachea midline, no palpable mass/lesion, no palpable cervical lymphadenopathy  Skin: Warm and dry, no concerning lesions  Respiratory: Respirations even, unlabored   Assessment:       1. Other chronic sinusitis    2. Chronic nonintractable headache, unspecified headache type        Plan:       MRI head to evaluate head and sinus

## 2023-05-26 ENCOUNTER — HOSPITAL ENCOUNTER (OUTPATIENT)
Dept: RADIOLOGY | Facility: HOSPITAL | Age: 83
Discharge: HOME | End: 2023-05-26
Attending: OTOLARYNGOLOGY
Payer: MEDICARE

## 2023-05-26 DIAGNOSIS — R51.9 CHRONIC NONINTRACTABLE HEADACHE, UNSPECIFIED HEADACHE TYPE: ICD-10-CM

## 2023-05-26 DIAGNOSIS — J32.8 OTHER CHRONIC SINUSITIS: ICD-10-CM

## 2023-05-26 DIAGNOSIS — G89.29 CHRONIC NONINTRACTABLE HEADACHE, UNSPECIFIED HEADACHE TYPE: ICD-10-CM

## 2023-05-26 PROCEDURE — A9575 INJ GADOTERATE MEGLUMI 0.1ML: HCPCS | Performed by: OTOLARYNGOLOGY

## 2023-05-26 PROCEDURE — 25500020 PHARM REV CODE 255: Performed by: OTOLARYNGOLOGY

## 2023-05-26 PROCEDURE — 70553 MRI BRAIN STEM W/O & W/DYE: CPT | Mod: TC

## 2023-05-26 RX ADMIN — GADOTERATE MEGLUMINE 16 ML: 376.9 INJECTION INTRAVENOUS at 02:05

## 2023-05-30 ENCOUNTER — OFFICE VISIT (OUTPATIENT)
Dept: UROLOGY | Facility: CLINIC | Age: 83
End: 2023-05-30
Payer: MEDICARE

## 2023-05-30 ENCOUNTER — TELEPHONE (OUTPATIENT)
Dept: OTOLARYNGOLOGY | Facility: CLINIC | Age: 83
End: 2023-05-30
Payer: MEDICARE

## 2023-05-30 VITALS
BODY MASS INDEX: 30.66 KG/M2 | HEIGHT: 65 IN | SYSTOLIC BLOOD PRESSURE: 153 MMHG | HEART RATE: 78 BPM | DIASTOLIC BLOOD PRESSURE: 76 MMHG | WEIGHT: 184 LBS

## 2023-05-30 DIAGNOSIS — R97.20 ELEVATED PSA: Primary | ICD-10-CM

## 2023-05-30 DIAGNOSIS — R51.9 CHRONIC NONINTRACTABLE HEADACHE, UNSPECIFIED HEADACHE TYPE: Primary | ICD-10-CM

## 2023-05-30 DIAGNOSIS — N40.1 BENIGN PROSTATIC HYPERPLASIA WITH URINARY OBSTRUCTION: ICD-10-CM

## 2023-05-30 DIAGNOSIS — N41.1 CHRONIC PROSTATITIS: ICD-10-CM

## 2023-05-30 DIAGNOSIS — N32.0 BLADDER OUTLET OBSTRUCTION: ICD-10-CM

## 2023-05-30 DIAGNOSIS — N13.8 BENIGN PROSTATIC HYPERPLASIA WITH URINARY OBSTRUCTION: ICD-10-CM

## 2023-05-30 DIAGNOSIS — G89.29 CHRONIC NONINTRACTABLE HEADACHE, UNSPECIFIED HEADACHE TYPE: Primary | ICD-10-CM

## 2023-05-30 PROCEDURE — 99215 OFFICE O/P EST HI 40 MIN: CPT | Mod: PBBFAC | Performed by: UROLOGY

## 2023-05-30 PROCEDURE — 99213 OFFICE O/P EST LOW 20 MIN: CPT | Mod: S$PBB,,, | Performed by: UROLOGY

## 2023-05-30 PROCEDURE — 99213 PR OFFICE/OUTPT VISIT, EST, LEVL III, 20-29 MIN: ICD-10-PCS | Mod: S$PBB,,, | Performed by: UROLOGY

## 2023-05-30 RX ORDER — TERAZOSIN 5 MG/1
CAPSULE ORAL
Qty: 90 CAPSULE | Refills: 1 | Status: SHIPPED | OUTPATIENT
Start: 2023-05-30 | End: 2023-11-22 | Stop reason: SDUPTHER

## 2023-05-30 RX ORDER — AMOXICILLIN AND CLAVULANATE POTASSIUM 500; 125 MG/1; MG/1
1 TABLET, FILM COATED ORAL 2 TIMES DAILY
Qty: 28 TABLET | Refills: 0 | Status: SHIPPED | OUTPATIENT
Start: 2023-05-30 | End: 2023-08-01 | Stop reason: ALTCHOICE

## 2023-05-30 NOTE — PROGRESS NOTES
"Subjective     Patient ID: Elliott Peters is a 82 y.o. male.    Chief Complaint: Follow-up (Yearly check up)    Urology History  Symptoms:  No dysuria, No urethral discharge, Nocturia, Nocturia list 4 or greater times each night  Associated Symptoms:  No abdominal pain  Conditions:  Erectile dysfunction (ED), No hydronephrosis, Benign prostatic hyperplasia, No obstructive nephropathy, No polycystic kidney disease, No kidney cancer, No bladder cancer, No testicular cancer, No prostate cancer, History of recurrent urinary tract infections, No history of urolithiasis, No history of pyelonephritis, History of prostatitis  Past Medical History:  No diabetes mellitus, History of heart attack, No History of lung disease, No History of HIV/AIDS, No History of Hepatitis, No history of cancer  Medication History:  Not on NSAID, Not on aspirin  Family History:  No family history of urolithiasis, No family history of cystic kidneys, No family history of chronic renal disease, No Family history of stones, No Family history of bladder cancer, No Family history of kidney cancer  Kidney Stone:  First Stone at least 15 years ago, Last stone  05/30/2018, Total numer of stones >10, ESL 2003, PRESENT STONE STARTED 2 days ago, passed last night, Pain severity 1, Back pain not worse with movement, No gastrointestinal complaints, No fever, No adequate pain medicine, No KUB performed, No C&S performed, No urinalysis performed, Lab, Dietary counseling, CT performed  Current Conditions:  Nocturia, No UTI's, PANCHITO current  AT TIMES. HAS A LIST OF HIS MEDS."     History of Present Illness  General  UROLOGICAL HISTORY PER NOTES OF DR. SCHWARZ:  This 72-year-old white male has been under my care for years. He developed urinary retention and a lot of pain around [place] of 2012. We received his records from [facility]. He had a green light laser vaporization and enucleation of the prostate by Dr. Israel Coyne on 12/27/12. He has recovered. He " "is doing well at this time.     His recent PSA is 3.36. This is much better than his previous PSAs but I don?t have all those numbers in front of me.     I saw him as recently as 2/25/12 when he had a stone. We talked about stone prevention. I have told him at that time to come back in a year and to stop his Hytrin. He is still on the Hytrin. He is voiding well. I told him he can stop the Hytrin. He is already off the Avodart.     By rectal examination, he has a 30 to 40 gram sized prostate without obvious cancer.     I am returning him to the care of his family physician, Dr. Wiggins. He needs a PANCHITO and a PSA once a year for several years. The question of how long to continue annual screening is uncertain. He wants to be checked.     I will see him on a p.r.n. basis. "  ----------------------------------------------------------------------------------------------------------     Mr. Peters has been referred back to [facility] for his urological tx.  He currently has multiple stones in bilateral kidneys.  He was last seen 6/13/2013, and was released to the care of Dr. Wiggins at that time.  PANCHITO and PSA was last checked last a year ago by Dr. Coyne.  He requests for all his urological needs to be taken care of here.   (May 23, 2018)     Mr. Peters has been worked into clinic for c/o suspected stone lodged in his penis.  He states he feels he passed this stone last night, but voided outside without straining.  Reports pain resolved after painful urination.  Denies fever, chills or constitutional sxs.  Currently taking Bactrim DS for one month - tx of bacterial prostatitis.  Urine cultured 2,000 E. Coli last visit.  (May 31, 2018)     Mr. Peters is here for f/u stone disease (20+).  Patient states he has passed 2 smaller stones since his last visit without need for pain meds provided last visit.  He denies sensation of infection today.  Declines KUB for confirmation / evaluation of remaining stones.  He is joined today " "by his spouse.  No microhematuria or c/o flank pain since passing last stone.  June 25, 2018)     Mr. Peters is here today with c/o 3 seperate episodes of gross hematuria (lasting 3 days) before resolving without treatment.  All episodes occurring since the first of this year.  He has a hx of stone disease, but denies bleeding associate with stone pain.    -  CT which was performed 2/2019 resulted "several subcentimeter calcific densities along the dorsal aspect of the urinary bladder may reflect urinary bladder calculi or prostate calcifications;  and subcentimater bilateral nephrolithiasis".      Patient denies voiding complaints today.  (July 23, 2019)     Benign Prostatic Hyperplasia:  Frequent voiding at night, Nocturia list 4 or greater times each night, Blood in urine, No pain with urination, No incomplete emptying of bladder, No, No, Splitting or spraying of urinary stream, No, Frequency of incontinence occasional, Incontinence associated with a strong urge to void, No  Kidney Stone:  First Stone at least 15 years ago, Last stone  05/30/2018, Total numer of stones >10, ESL 2003, PRESENT STONE STARTED 2 days ago, passed last night, Pain severity 1, No fever, No, Back pain not worse with movement, No gastrointestinal complaints, No adequate pain medicine, No KUB performed, No C&S performed, No urinalysis performed, CT performed, Lab, Dietary counseling     Urology History  Conditions:  Erectile dysfunction (ED), No hydronephrosis, Benign prostatic hyperplasia, No obstructive nephropathy, No polycystic kidney disease, No kidney cancer, No bladder cancer, No testicular cancer, No prostate cancer, History of recurrent urinary tract infections, No history of urolithiasis, No history of pyelonephritis, History of prostatitis  Past Medical History:  Hypertension, No diabetes mellitus, History of heart attack, No History of stroke, No History of lung disease, No History of HIV/AIDS, No History of Hepatitis, No " history of cancer  s/p TURP  Medication History:  Not on NSAID, Not on aspirin  Plavix  Family History:  No family history of urolithiasis, No family history of cystic kidneys, No family history of chronic renal disease, Family history of prostate cancer, No Family history of stones, No Family history of bladder cancer, No Family history of kidney cancer  Current Conditions:  Nocturia, No UTI's, PANCHITO current  --------------------------------------------------------------------------------------------------------------------------------------------------------------------------     The above notes arenotes ofmrs.CynthiaSmith.isscheduledforcystoscopybymenextweekforevaluationofthebladdercalcifications.Hehascontinuedtohaveintermittenthematuria.Nkrlysbmaymvjqcisqhkopwyiksxtsuewvxlvzobfwfdsymy3PHrmnaezzfsspjaycgg a stone.Hehassome tramadolsDr.Bing hadpreviouslygiventhatheistakenissomebetternow.HedidhaveESLbyDr.iruwigtx11relfeskc. He has had gross hematuria 3 times this year. He is still somewhat uncomfortable at present.  (August 5, 2019)     Mr. Peters returned today for his cystoscopy. He was treated for a low-grade UTI after the above visit. He did see a small stone pass after the above. He has had no bleeding since the stone passed and still has all of his pain medicine left should he need additional medication. Patient is on Plavix and aspirin. Passed his stone just a few days after being seen. Feeling okay today and returned for his workup. (August 15, 2019)     Mr. Peters and his wife returned today because of concern about the amount of blood in his urine. He did not remove his Brennan this morning because of continued bleeding. Came in for check on his catheter. (August 16, 2019)     Mr. Peters is in for one month follow-up. He took his catheter out morning after he was here as his urine was completely clear at that time. He's had no further problem with bleeding. He is back to baseline with  voiding with nocturia 3 or 4 times nightly. He feels he is doing well at present. Nothing to suggest stone problems etc. (September 17, 2019)     Mr. Peters is in a swing bed now at [facility]. He is receiving IV antibiotics for his UTI. He went to emergency room Saturday afternoon 9 days ago because of fever which reached as high as 104 apparently. Treated for UTI, but it was felt he needed stronger antibiotics. I had talked to Dr. Juan Kirkpatrick about him the other day and suggested that he be switched from Rocephin to gentamicin. He is receiving 400 mg gentamycin every 36 hours now. He's had a little bit of diarrhea the last 2 days and no energy. Never really feels well. His BUN/creatinine was 19/1.2 today. Patient not feeling well but he is on the right medication based on recent culture results. (September 30, 2019)     Mr. Peters returns for follow-up after treatment of UTI. Feeling much better and has no sensation of infection now. We treated him with gentamicin and feels he is doing okay. Still has nocturia 4 or 5 times nightly but that is stable and has been that way for the last few years. He feels like he is back to baseline now. (October 14, 2019)     Mr. Peters is in for six-month follow-up. He has had no further urinary tract infections since he was last seen. He feels he is emptying his bladder satisfactorily although he does have nocturia about 3 times nightly. Patient is feeling better in general since he's taking Mobic on a regular basis for foot pain. Overall he is improved. (June 11, 2020)     PSA Results:  Past PSA Results   5.430 ON 11/6/2016   3.360 ON 06/13/2013  5.350 ON 11/14/2012  5.06 ON 02/06/2012  4.42 ON  08/17/2011  --------------------------------------------------------------------------------------------------------------------------------------------------------------------------------------------------------------------------------------------------------------------------------------------------------------------------------  The above notes are notes from the old electronic medical record system.    PSA was 5.800 on April 24, 2023   PSA was 5.500 on December 17, 2021  PSA was 5.170 on October 12, 2021           Mr. Peters comes in ahead of his yearly checkup.  Former patient of Dr. Hernando James.  He had laser prostatectomy by Dr. Israel Coyne in 2012. Followed more recently by Ms. Andria Queen for a couple years and I started seeing him around 2019. He had had urinary retention again +he had trouble with difficult urinary tract infection about that time.  He was doing well when he was seen last June and had another appointment but in ahead of that scheduled appointment because of irritation and apparent infection of the glans penis.  He continues to void reasonably well with nocturia 2-3 times nightly.  He has not had any recent stone problems.  Using antibiotic ointment which has not helped the lesion on his penis.  (04/13/2021)        Mr. Peters was in on the above date because of a problem with balanoposthitis.  His problem has essentially resolved and he has had no further irritation since treating as a yeast infection.  He is in for his regular 1 year follow-up today.  Voiding reasonably well but does have nocturia 2-3 times nightly.  He does not feel he is having any major issue with that.  Overall satisfied with the way he is doing.  We are not doing PSAs routinely on him now because of age.  He did have his KUB checking for nephrolithiasis as he has known stones in his kidneys.  He has not had any pain from stones in recent past.  (June 14, 2021)     Mr. Peters is in for the 1st visit in nearly 1  year.  He has not had any active stone problems or any other major  problems.  He has nocturia 2-3 times nightly.  He has had no further problem with his foreskin like he did few years ago.  No visible blood in the urine and has not had any new issues.  He did have a PSA done by Ms. Olivier in December that was still elevated at 5.5. (May 18, 2022)    Mr. Peters is in for his 1 year follow-up.  He had another PSA done April 24th that was 5.800.  That is the highest he has ever had but it has been above 5 for several years and clinically he feels he is doing okay.  Voiding okay without any worsening bladder outlet obstructive symptoms.  He does not feel he needs any extra help with voiding and is doing very well just on the terazosin.  Does need renewal of medication.  No recent hospitalizations or new health issues. [May 30, 2023]       Review of Systems       Objective     Physical Exam  Constitutional:       Appearance: Normal appearance. He is normal weight.   Genitourinary:     Prostate: Normal.      Rectum: Normal.      Comments: Prostate 40 g smooth symmetrical and benign feeling  Neurological:      Mental Status: He is alert.   Psychiatric:         Mood and Affect: Mood normal.         Behavior: Behavior normal.      Urinalysis revealed 1-2 pus cells per high-powered field.  Dipstick had a trace of hemolyzed blood, trace of protein, pH 5.0 and urine specific gravity 1.025  Assessment and Plan     1. Elevated PSA    2. Chronic prostatitis    3. Bladder outlet obstruction    4. Benign prostatic hyperplasia with urinary obstruction        Renewed terazosin.  Reported PSA of 5.800 to patient.  1 year appointment or sooner if needed.        Follow up in 1 year (on 5/30/2024) for One year visit with Dr. Abram Allen.

## 2023-06-13 ENCOUNTER — OFFICE VISIT (OUTPATIENT)
Dept: OTOLARYNGOLOGY | Facility: CLINIC | Age: 83
End: 2023-06-13
Payer: MEDICARE

## 2023-06-13 VITALS — WEIGHT: 184 LBS | BODY MASS INDEX: 30.66 KG/M2 | HEIGHT: 65 IN

## 2023-06-13 DIAGNOSIS — J32.9 CHRONIC SINUSITIS, UNSPECIFIED LOCATION: Primary | ICD-10-CM

## 2023-06-13 PROCEDURE — 99214 PR OFFICE/OUTPT VISIT, EST, LEVL IV, 30-39 MIN: ICD-10-PCS | Mod: S$PBB,,, | Performed by: OTOLARYNGOLOGY

## 2023-06-13 PROCEDURE — 99214 OFFICE O/P EST MOD 30 MIN: CPT | Mod: PBBFAC | Performed by: OTOLARYNGOLOGY

## 2023-06-13 PROCEDURE — 99214 OFFICE O/P EST MOD 30 MIN: CPT | Mod: S$PBB,,, | Performed by: OTOLARYNGOLOGY

## 2023-06-13 RX ORDER — AMOXICILLIN AND CLAVULANATE POTASSIUM 500; 125 MG/1; MG/1
1 TABLET, FILM COATED ORAL 2 TIMES DAILY
Qty: 28 TABLET | Refills: 0 | Status: SHIPPED | OUTPATIENT
Start: 2023-06-13 | End: 2023-08-01

## 2023-06-13 NOTE — PROGRESS NOTES
Subjective:       Patient ID: Elliott Peters is a 82 y.o. male.    Chief Complaint: Sinusitis (Pt states he still has a headache on the left side of his head and it feels full. States he finished his po abx this am.)    Sinusitis  Associated symptoms include congestion, headaches and sinus pressure.   Review of Systems   HENT:  Positive for congestion, sinus pressure and sinus pain.    Neurological:  Positive for headaches.   All other systems reviewed and are negative.    Objective:      Physical Exam  General: NAD  Head: Normocephalic, atraumatic, no facial asymmetry/normal strength,  Ears: Both auricules normal in appearance, w/o deformities tympanic membranes normal external auditory canals normal  Nose: External nose w/o deformities normal turbinates no drainage or inflammation  Oral Cavity: Lips, gums, floor of mouth, tongue hard palate, and buccal mucosa without mass/lesion  Oropharynx: Mucosa pink and moist, soft palate, posterior pharynx and oropharyngeal wall without mass/lesion  Neck: Supple, symmetric, trachea midline, no palpable mass/lesion, no palpable cervical lymphadenopathy  Skin: Warm and dry, no concerning lesions  Respiratory: Respirations even, unlabored   Assessment:       1. Chronic sinusitis, unspecified location        Plan:       Explained sinus disease and reviewed actual scans with pt since better will repeat augmentin   F/u 1 month

## 2023-07-13 ENCOUNTER — OFFICE VISIT (OUTPATIENT)
Dept: OTOLARYNGOLOGY | Facility: CLINIC | Age: 83
End: 2023-07-13
Payer: MEDICARE

## 2023-07-13 VITALS — HEIGHT: 65 IN | WEIGHT: 184 LBS | BODY MASS INDEX: 30.66 KG/M2

## 2023-07-13 DIAGNOSIS — R51.9 CHRONIC NONINTRACTABLE HEADACHE, UNSPECIFIED HEADACHE TYPE: Primary | ICD-10-CM

## 2023-07-13 DIAGNOSIS — G89.29 CHRONIC NONINTRACTABLE HEADACHE, UNSPECIFIED HEADACHE TYPE: Primary | ICD-10-CM

## 2023-07-13 DIAGNOSIS — J32.9 CHRONIC SINUSITIS, UNSPECIFIED LOCATION: ICD-10-CM

## 2023-07-13 PROCEDURE — 99214 OFFICE O/P EST MOD 30 MIN: CPT | Mod: PBBFAC | Performed by: OTOLARYNGOLOGY

## 2023-07-13 PROCEDURE — 99213 OFFICE O/P EST LOW 20 MIN: CPT | Mod: S$PBB,,, | Performed by: OTOLARYNGOLOGY

## 2023-07-13 PROCEDURE — 99213 PR OFFICE/OUTPT VISIT, EST, LEVL III, 20-29 MIN: ICD-10-PCS | Mod: S$PBB,,, | Performed by: OTOLARYNGOLOGY

## 2023-07-13 NOTE — PROGRESS NOTES
Subjective:       Patient ID: Elliott Peters is a 82 y.o. male.    Chief Complaint: Follow-up (Patient states no more complaints.)  Sinus better  Follow-up  Pertinent negatives include no congestion.   Review of Systems   HENT:  Negative for congestion, sinus pressure and sinus pain.      Objective:      Physical Exam  General: NAD  Head: Normocephalic, atraumatic, no facial asymmetry/normal strength,  Ears: Both auricules normal in appearance, w/o deformities tympanic membranes normal external auditory canals normal  Nose: External nose w/o deformities normal turbinates no drainage or inflammation  Oral Cavity: Lips, gums, floor of mouth, tongue hard palate, and buccal mucosa without mass/lesion  Oropharynx: Mucosa pink and moist, soft palate, posterior pharynx and oropharyngeal wall without mass/lesion  Neck: Supple, symmetric, trachea midline, no palpable mass/lesion, no palpable cervical lymphadenopathy  Skin: Warm and dry, no concerning lesions  Respiratory: Respirations even, unlabored   Assessment:       1. Chronic nonintractable headache, unspecified headache type    2. Chronic sinusitis, unspecified location        Plan:       Better completely normal now will follow up prn

## 2023-07-31 NOTE — PROGRESS NOTES
[facility] AWV   [facility]     PATIENT NAME: Elliott Peters   : 1940    AGE: 82 y.o. DATE: 2023   MRN: [id number]        Reason for Visit / Chief Complaint: Medicare AWV (Medicare SUBS AWV )        Elliott Peters presents for a Subsequent Medicare AWV today.     The following components were reviewed and updated:    Medical/Social/Family History:  Past Medical History:   Diagnosis Date    Coronary artery disease     cardiac stents    Hyperlipidemia     Hypertension     Kidney stone     MI (myocardial infarction)     Sleep apnea     Urinary tract infection         Family History   Problem Relation Age of Onset    Colon cancer Mother     Heart attack Father         Social History     Tobacco Use   Smoking Status Former    Current packs/day: 0.00    Types: Cigarettes   Smokeless Tobacco Never   Tobacco Comments    QUIT 50 +YEARS AGO      Social History     Substance and Sexual Activity   Alcohol Use Not Currently    Comment: QUIT 50 +YEARS AGO        Family History   Problem Relation Age of Onset    Colon cancer Mother     Heart attack Father        Past Surgical History:   Procedure Laterality Date    CORONARY ANGIOPLASTY WITH STENT PLACEMENT      HERNIA REPAIR      JOINT REPLACEMENT      difficult to wake after surgery     PROSTATECTOMY           Allergies and Current Medications     Review of patient's allergies indicates:   Allergen Reactions    Bronopol Dermatitis    Propylene glycol Dermatitis       Current Outpatient Medications:     ALPRAZolam (XANAX) 0.5 MG tablet, Take 1 tablet (0.5 mg total) by mouth 2 (two) times daily as needed for Anxiety., Disp: 90 tablet, Rfl: 0    amLODIPine (NORVASC) 5 MG tablet, Take 0.5 tablets (2.5 mg total) by mouth once daily., Disp: 180 tablet, Rfl: 1    aspirin (ECOTRIN) 81 MG EC tablet, Take 81 mg by mouth once daily., Disp: , Rfl:     carvediloL (COREG) 3.125 MG tablet, Take 1 tablet (3.125 mg total) by mouth 2 (two) times daily with  meals., Disp: 180 tablet, Rfl: 1    clopidogreL (PLAVIX) 75 mg tablet, Take 1 tablet (75 mg total) by mouth once daily., Disp: 90 tablet, Rfl: 1    docusate sodium (COLACE) 100 MG capsule, Take 100 mg by mouth once daily., Disp: , Rfl:     nitroGLYCERIN (NITROSTAT) 0.4 MG SL tablet, Place 1 tablet (0.4 mg total) under the tongue every 5 (five) minutes as needed for Chest pain., Disp: 100 tablet, Rfl: 0    pantoprazole (PROTONIX) 40 MG tablet, Take 1 tablet (40 mg total) by mouth once daily., Disp: 90 tablet, Rfl: 1    polyethylene glycol (GLYCOLAX) 17 gram PwPk, Take by mouth., Disp: , Rfl:     rosuvastatin (CRESTOR) 20 MG tablet, Take 1 tablet (20 mg total) by mouth once daily., Disp: 90 tablet, Rfl: 1    terazosin (HYTRIN) 5 MG capsule, TAKE 1 CAPSULE EVERY       EVENING, Disp: 90 capsule, Rfl: 1    triamcinolone acetonide 0.1% (KENALOG) 0.1 % cream, SMARTSI Application Topical 2-3 Times Daily, Disp: 454 g, Rfl: 1    meloxicam (MOBIC) 7.5 MG tablet, , Disp: , Rfl:     Health Risk Assessment   Fall Risk: No   Obesity: BMI 30.62     Advance Directive:  Does not have an advanced directive. Verbal education and written education included in today's AVS.   Depression: PHQ9  0   HTN:  yes, DASH diet, exercise, weight management, med compliance, home BP monitoring, and follow-up discussed.   T2DM: NA   Tobacco use: Former Smoker. Quit 60 years ago  STI: NA    Alcohol misuse: Denies   Statin Use: Yes      Health Risk Assessment  What is your age?: 80 or older  Are you male or female?: Male  During the past four weeks, how much have you been bothered by emotional problems such as feeling anxious, depressed, irritable, sad, or downhearted and blue?: Not at all  During the past five weeks, has your physical and/or emotional health limited your social activities with family, friends, neighbors, or groups?: Not at all  During the past four weeks, how much bodily pain have you generally had?: Moderate pain  During the past  four weeks, was someone available to help if you needed and wanted help?: Yes, as much as I wanted  During the past four weeks, what was the hardest physical activity you could do for at least two minutes?: Heavy  Can you get to places out of walking distance without help?  (For example, can you travel alone on buses or taxis, or drive your own car?): Yes  Can you go shopping for groceries or clothes without someone's help?: Yes  Can you prepare your own meals?: Yes  Can you do your own housework without help?: No  Because of any health problems, do you need the help of another person with your personal care needs such as eating, bathing, dressing, or getting around the house?: Yes  Can you handle your own money without help?: Yes  During the past four weeks, how would you rate your health in general?: Good  How have things been going for you during the past four weeks?: Pretty well  Are you having difficulties driving your car?: No  Do you always fasten your seat belt when you are in a car?: Yes, usually  How often in the past four weeks have you been bothered by falling or dizzy when standing up?: Never  How often in the past four weeks have you been bothered by sexual problems?: Never  How often in the past four weeks have you been bothered by trouble eating well?: Never  How often in the past four weeks have you been bothered by teeth or denture problems?: Never  How often in the past four weeks have you been bothered with problems using the telephone?: Never  How often in the past four weeks have you been bothered by tiredness or fatigue?: Never  Have you fallen two or more times in the past year?: No  Are you afraid of falling?: No  Are you a smoker?: No  During the past four weeks, how many drinks of wine, beer, or other alcoholic beverages did you have?: No alcohol at all  Do you exercise for about 20 minutes three or more days a week?: Yes, most of the time  Have you been given any information to help you  with hazards in your house that might hurt you?: No  Have you been given any information to help you with keeping track of your medications?: No  How often do you have trouble taking medicines the way you've been told to take them?: I always take them as prescribed  How confident are you that you can control and manage most of your health problems?: Very confident  What is your race? (Check all that apply.):     Health Maintenance   Last eye exam: 2023   Last CV screen with lipids: 04/24/2023   Diabetes screening with fasting glucose or A1c: 04/24/2023   Colonoscopy: 05/13/2019 repeat in 3 years   Flu Vaccine: 10/19/2022   Pneumonia vaccines: 13-10/23/2013 23-09/26/2017   COVID vaccine: Pfizer 02/22/2021 03/15/2021 09/27/2021, declined booster   Hep B vaccine: NA   DEXA: NA   Last pap/pelvic: NA   Last Mammogram: NA   Last PSA screen: 04/24/2023   AAA screening: NA (once in lifetime for males 65-75 who have smoked > 100 cigarettes in lifetime)  HIV Screeing: NA  Hepatitis C Screen: 01/26/2022  Low Dose CT Scan: NA    Health Maintenance Topics with due status: Not Due       Topic Last Completion Date    TETANUS VACCINE 06/25/2021    Colonoscopy 07/07/2022    Influenza Vaccine 10/19/2022    PROSTATE-SPECIFIC ANTIGEN 04/24/2023    Lipid Panel 04/24/2023     Health Maintenance Due   Topic Date Due    Shingles Vaccine (1 of 2) Never done    COVID-19 Vaccine (5 - Pfizer series) 06/06/2022    Hemoglobin A1c (Prediabetes)  07/07/2023       Incontinence  Bowel: No  Bladder: No    Lab results available in Epic or see dates from [facility] above:   Lab Results   Component Value Date    CHOL 78 04/24/2023    CHOL 75 10/12/2022    CHOL 84 04/11/2022     Lab Results   Component Value Date    HDL 36 (L) 04/24/2023    HDL 30 (L) 10/12/2022    HDL 43 04/11/2022     Lab Results   Component Value Date    LDLCALC 32 04/24/2023    LDLCALC 30 10/12/2022    LDLCALC 33 04/11/2022     Lab Results   Component Value Date    TRIG 50  04/24/2023    TRIG 77 10/12/2022    TRIG 41 04/11/2022     Lab Results   Component Value Date    CHOLHDL 2.2 04/24/2023    CHOLHDL 2.5 10/12/2022    CHOLHDL 2.0 04/11/2022       Lab Results   Component Value Date    HGBA1C 5.2 07/07/2022       Sodium   Date Value Ref Range Status   04/24/2023 138 136 - 145 mmol/L Final     Potassium   Date Value Ref Range Status   04/24/2023 4.7 3.5 - 5.1 mmol/L Final     Chloride   Date Value Ref Range Status   04/24/2023 108 (H) 98 - 107 mmol/L Final     CO2   Date Value Ref Range Status   04/24/2023 28 21 - 32 mmol/L Final     Glucose   Date Value Ref Range Status   04/24/2023 89 74 - 106 mg/dL Final     BUN   Date Value Ref Range Status   04/24/2023 12 7 - 18 mg/dL Final     Creatinine   Date Value Ref Range Status   05/26/2023 0.98 0.70 - 1.30 mg/dL Final     Calcium   Date Value Ref Range Status   04/24/2023 9.2 8.5 - 10.1 mg/dL Final     Total Protein   Date Value Ref Range Status   04/24/2023 7.1 6.4 - 8.2 g/dL Final     Albumin   Date Value Ref Range Status   04/24/2023 3.9 3.5 - 5.0 g/dL Final     Bilirubin, Total   Date Value Ref Range Status   04/24/2023 0.6 >0.0 - 1.2 mg/dL Final     Alk Phos   Date Value Ref Range Status   04/24/2023 81 45 - 115 U/L Final     AST   Date Value Ref Range Status   04/24/2023 19 15 - 37 U/L Final     ALT   Date Value Ref Range Status   04/24/2023 21 16 - 61 U/L Final     Anion Gap   Date Value Ref Range Status   04/24/2023 7 7 - 16 mmol/L Final     eGFR   Date Value Ref Range Status   04/11/2022 86 >=60 mL/min/1.73m² Final         Lab Results   Component Value Date    PSA 5.800 (H) 04/24/2023    PSA 5.500 (H) 12/17/2021    PSA 5.170 (H) 10/12/2021    (Delete this line if female pt)        Care Team   PCP: Izzy Olivier FNJOHN    Eye specialist: Dr. Cerda  Oncology: Dr. Rowe   Cardiologist: Dr. Ojeda          **See Completed Assessments for Annual Wellness visit within the encounter summary    The following assessments were completed &  "reviewed:  Depression Screening  Cognitive function Screening  Timed Get Up Test  Whisper Test  Vision Screen  Health Risk Assessment  Checklist of ADLs and IADLs      Objective  Vitals:    08/01/23 1323   BP: 126/73   Pulse: 65   Resp: 20   Temp: 98.4 °F (36.9 °C)   SpO2: 95%   Weight: 83.5 kg (184 lb)   Height: 5' 5" (1.651 m)   PainSc: 0-No pain      Body mass index is 30.62 kg/m².  Ideal body weight: 61.5 kg (135 lb 9.3 oz)       Physical Exam  Vitals and nursing note reviewed. Exam conducted with a chaperone present.   Constitutional:       General: He is not in acute distress.     Appearance: Normal appearance. He is not ill-appearing.   HENT:      Head: Normocephalic.      Right Ear: Tympanic membrane, ear canal and external ear normal.      Left Ear: Tympanic membrane, ear canal and external ear normal.      Nose: Nose normal.      Mouth/Throat:      Mouth: Mucous membranes are moist.      Pharynx: Oropharynx is clear.   Eyes:      Extraocular Movements: Extraocular movements intact.      Conjunctiva/sclera: Conjunctivae normal.      Pupils: Pupils are equal, round, and reactive to light.   Neck:      Vascular: No carotid bruit.   Cardiovascular:      Rate and Rhythm: Normal rate and regular rhythm.      Pulses: Normal pulses.      Heart sounds: Normal heart sounds.   Pulmonary:      Effort: Pulmonary effort is normal. No respiratory distress.   Abdominal:      General: Bowel sounds are normal.      Palpations: Abdomen is soft.      Tenderness: There is no abdominal tenderness.   Musculoskeletal:         General: No swelling or tenderness. Normal range of motion.      Cervical back: Normal range of motion and neck supple. No tenderness.   Skin:     General: Skin is warm and dry.      Capillary Refill: Capillary refill takes less than 2 seconds.      Findings: Bruising present.      Comments: Pt on plavix and bruises easily   Neurological:      General: No focal deficit present.      Mental Status: He is " alert and oriented to person, place, and time.   Psychiatric:         Mood and Affect: Mood normal.         Behavior: Behavior normal.         Thought Content: Thought content normal.         Judgment: Judgment normal.           Assessment:     1. Encounter for subsequent annual wellness visit (AWV) in Medicare patient    2. BMI 30.0-30.9,adult    3. Primary hypertension    4. Hyperlipidemia, unspecified hyperlipidemia type    5. Atherosclerotic heart disease of native coronary artery with other forms of angina pectoris       Problem List Items Addressed This Visit          Cardiac/Vascular    Hyperlipidemia    Primary hypertension    Atherosclerotic heart disease of native coronary artery with other forms of angina pectoris       Endocrine    BMI 30.0-30.9,adult       Other    Encounter for subsequent annual wellness visit (AWV) in Medicare patient - Primary    Current Assessment & Plan     Continue medication as prescribed.                 Plan:  Encounter for subsequent annual wellness visit (AWV) in Medicare patient    BMI 30.0-30.9,adult    Primary hypertension    Hyperlipidemia, unspecified hyperlipidemia type    Atherosclerotic heart disease of native coronary artery with other forms of angina pectoris          Referrals:       Advised to call office if does not hear from anyone with referral appt within 2-3 weeks to check on status of referral. Voiced understanding.      Discussed and provided with a screening schedule and personal prevention plan in accordance with USPSTF age appropriate recommendations and Medicare screening guidelines.   Education, counseling, and referrals were provided as needed.  After Visit Summary printed and given to patient which includes written education and a list of any referrals if indicated.     F/u plan for yearly AWV.    Signature:

## 2023-08-01 ENCOUNTER — OFFICE VISIT (OUTPATIENT)
Dept: FAMILY MEDICINE | Facility: CLINIC | Age: 83
End: 2023-08-01
Payer: MEDICARE

## 2023-08-01 VITALS
TEMPERATURE: 98 F | HEIGHT: 65 IN | RESPIRATION RATE: 20 BRPM | WEIGHT: 184 LBS | SYSTOLIC BLOOD PRESSURE: 126 MMHG | BODY MASS INDEX: 30.66 KG/M2 | HEART RATE: 65 BPM | OXYGEN SATURATION: 95 % | DIASTOLIC BLOOD PRESSURE: 73 MMHG

## 2023-08-01 DIAGNOSIS — I25.118 ATHEROSCLEROTIC HEART DISEASE OF NATIVE CORONARY ARTERY WITH OTHER FORMS OF ANGINA PECTORIS: ICD-10-CM

## 2023-08-01 DIAGNOSIS — E78.5 HYPERLIPIDEMIA, UNSPECIFIED HYPERLIPIDEMIA TYPE: ICD-10-CM

## 2023-08-01 DIAGNOSIS — I10 PRIMARY HYPERTENSION: ICD-10-CM

## 2023-08-01 DIAGNOSIS — Z00.00 ENCOUNTER FOR SUBSEQUENT ANNUAL WELLNESS VISIT (AWV) IN MEDICARE PATIENT: Primary | ICD-10-CM

## 2023-08-01 PROCEDURE — G0439 PR MEDICARE ANNUAL WELLNESS SUBSEQUENT VISIT: ICD-10-PCS | Mod: ,,,

## 2023-08-01 PROCEDURE — G0439 PPPS, SUBSEQ VISIT: HCPCS | Mod: ,,,

## 2023-08-01 NOTE — PATIENT INSTRUCTIONS
Prev  Counseling and Referral of Other Preventative  (Italic type indicates deductible and co-insurance are waived)    Patient Name: Elliott Peters  Today's Date: 8/1/2023    Health Maintenance         Date Due Completion Date    Shingles Vaccine (1 of 2) Never done ---    COVID-19 Vaccine (5 - Pfizer series) 06/06/2022 4/11/2022    Hemoglobin A1c (Prediabetes) 07/07/2023 7/7/2022    Influenza Vaccine (1) 09/01/2023 10/19/2022    PROSTATE-SPECIFIC ANTIGEN 04/24/2024 4/24/2023    Lipid Panel 04/24/2024 4/24/2023    Override on 3/4/2021: Done (CHOL 94 TRIG 69 HDL 44 LDL 36)    Colonoscopy 07/07/2025 7/7/2022    Override on 5/13/2019: Done (repeat in 3 years- Dr. Forrest)    TETANUS VACCINE 06/25/2031 6/25/2021          No orders of the defined types were placed in this encounter.

## 2023-08-17 ENCOUNTER — HOSPITAL ENCOUNTER (EMERGENCY)
Facility: HOSPITAL | Age: 83
Discharge: HOME | End: 2023-08-17
Payer: MEDICARE

## 2023-08-17 VITALS
BODY MASS INDEX: 29.16 KG/M2 | DIASTOLIC BLOOD PRESSURE: 68 MMHG | RESPIRATION RATE: 16 BRPM | WEIGHT: 175 LBS | TEMPERATURE: 98 F | HEIGHT: 65 IN | HEART RATE: 79 BPM | OXYGEN SATURATION: 98 % | SYSTOLIC BLOOD PRESSURE: 122 MMHG

## 2023-08-17 DIAGNOSIS — R19.5 DARK STOOLS: Primary | ICD-10-CM

## 2023-08-17 LAB
ALBUMIN SERPL BCP-MCNC: 4.2 G/DL (ref 3.5–5)
ALBUMIN/GLOB SERPL: 1.4 {RATIO}
ALP SERPL-CCNC: 82 U/L (ref 45–115)
ALT SERPL W P-5'-P-CCNC: 36 U/L (ref 16–61)
ANION GAP SERPL CALCULATED.3IONS-SCNC: 8 MMOL/L (ref 7–16)
AST SERPL W P-5'-P-CCNC: 26 U/L (ref 15–37)
ATYPICAL LYMPHOCYTES: ABNORMAL
BASOPHILS # BLD AUTO: 0.09 K/UL (ref 0–0.2)
BASOPHILS NFR BLD AUTO: 1.3 % (ref 0–1)
BILIRUB SERPL-MCNC: 0.7 MG/DL (ref ?–1.2)
BUN SERPL-MCNC: 13 MG/DL (ref 7–18)
BUN/CREAT SERPL: 13 (ref 6–20)
CALCIUM SERPL-MCNC: 9.5 MG/DL (ref 8.5–10.1)
CHLORIDE SERPL-SCNC: 108 MMOL/L (ref 98–107)
CO2 SERPL-SCNC: 25 MMOL/L (ref 21–32)
CREAT SERPL-MCNC: 1.02 MG/DL (ref 0.7–1.3)
DIFFERENTIAL METHOD BLD: ABNORMAL
EGFR (NO RACE VARIABLE) (RUSH/TITUS): 72 ML/MIN/1.73M2
EOSINOPHIL # BLD AUTO: 0.06 K/UL (ref 0–0.5)
EOSINOPHIL NFR BLD AUTO: 0.9 % (ref 1–4)
ERYTHROCYTE [DISTWIDTH] IN BLOOD BY AUTOMATED COUNT: 16.4 % (ref 11.5–14.5)
GLOBULIN SER-MCNC: 3 G/DL (ref 2–4)
GLUCOSE SERPL-MCNC: 105 MG/DL (ref 74–106)
HCT VFR BLD AUTO: 48.2 % (ref 40–54)
HGB BLD-MCNC: 15.8 G/DL (ref 13.5–18)
LYMPHOCYTES # BLD AUTO: 2.24 K/UL (ref 1–4.8)
LYMPHOCYTES NFR BLD AUTO: 32.1 % (ref 27–41)
LYMPHOCYTES NFR BLD MANUAL: 29 % (ref 27–41)
MCH RBC QN AUTO: 29.2 PG (ref 27–31)
MCHC RBC AUTO-ENTMCNC: 32.8 G/DL (ref 32–36)
MCV RBC AUTO: 88.9 FL (ref 80–96)
MONOCYTES # BLD AUTO: 2.32 K/UL (ref 0–0.8)
MONOCYTES NFR BLD AUTO: 33.2 % (ref 2–6)
MONOCYTES NFR BLD MANUAL: 29 % (ref 2–6)
MPC BLD CALC-MCNC: 10.9 FL (ref 9.4–12.4)
NEUTROPHILS # BLD AUTO: 2.27 K/UL (ref 1.8–7.7)
NEUTROPHILS NFR BLD AUTO: 32.5 % (ref 53–65)
NEUTS BAND NFR BLD MANUAL: 1 % (ref 1–5)
NEUTS SEG NFR BLD MANUAL: 40 % (ref 50–62)
NRBC BLD MANUAL-RTO: ABNORMAL %
OCCULT BLOOD: NEGATIVE
PLATELET # BLD AUTO: 88 K/UL (ref 150–400)
PLATELET MORPHOLOGY: ABNORMAL
POTASSIUM SERPL-SCNC: 4 MMOL/L (ref 3.5–5.1)
PROT SERPL-MCNC: 7.2 G/DL (ref 6.4–8.2)
RBC # BLD AUTO: 5.42 M/UL (ref 4.6–6.2)
RBC MORPH BLD: NORMAL
SODIUM SERPL-SCNC: 137 MMOL/L (ref 136–145)
WBC # BLD AUTO: 6.98 K/UL (ref 4.5–11)

## 2023-08-17 PROCEDURE — 80053 COMPREHEN METABOLIC PANEL: CPT | Performed by: NURSE PRACTITIONER

## 2023-08-17 PROCEDURE — 99283 EMERGENCY DEPT VISIT LOW MDM: CPT

## 2023-08-17 PROCEDURE — 99284 EMERGENCY DEPT VISIT MOD MDM: CPT | Mod: GF | Performed by: NURSE PRACTITIONER

## 2023-08-17 PROCEDURE — 82272 OCCULT BLD FECES 1-3 TESTS: CPT | Performed by: NURSE PRACTITIONER

## 2023-08-17 PROCEDURE — 85025 COMPLETE CBC W/AUTO DIFF WBC: CPT | Performed by: NURSE PRACTITIONER

## 2023-08-17 NOTE — ED PROVIDER NOTES
Encounter Date: 8/17/2023       History     Chief Complaint   Patient presents with    Melena     83 y/o WM with PMHx CAD(cardiac stent-Plavix/ASA), HTN, HLD, Sleep Apnea and renal stone was sent to the ED from clinic for complaint of dark stools x 4 days. Denies bright red blood with bm. Denies abdominal or rectal pain. No nausea, vomiting or diarrhea. Takes MiraLax daily to prevent constipation, but had to take MOM yesterday. Had good results. Had iron infusion last by mouth by Dr. Rowe. Next visit 9/6/2023.  Last colonoscopy was 5+ years ago at [facility].      The history is provided by the patient and the spouse.   Is br  Review of patient's allergies indicates:   Allergen Reactions    Bronopol Dermatitis    Propylene glycol Dermatitis     Past Medical History:   Diagnosis Date    Coronary artery disease     cardiac stents    Hyperlipidemia     Hypertension     Kidney stone     MI (myocardial infarction)     Sleep apnea     Urinary tract infection      Past Surgical History:   Procedure Laterality Date    CORONARY ANGIOPLASTY WITH STENT PLACEMENT      HERNIA REPAIR      JOINT REPLACEMENT      difficult to wake after surgery     PROSTATECTOMY  2012     Family History   Problem Relation Age of Onset    Colon cancer Mother     Heart attack Father      Social History     Tobacco Use    Smoking status: Former     Current packs/day: 0.00     Types: Cigarettes    Smokeless tobacco: Never    Tobacco comments:     QUIT 50 +YEARS AGO   Substance Use Topics    Alcohol use: Not Currently     Comment: QUIT 50 +YEARS AGO     Drug use: Never     Review of Systems   Constitutional: Negative.  Negative for activity change, appetite change, fatigue and fever.   HENT: Negative.  Negative for congestion, ear pain, sinus pain, sore throat and trouble swallowing.    Eyes: Negative.  Negative for pain and visual disturbance.   Respiratory:  Negative for cough and shortness of breath.    Cardiovascular: Negative.  Negative for  chest pain.   Gastrointestinal:  Negative for abdominal pain, constipation, diarrhea, nausea and vomiting.        Dark stool   Genitourinary: Negative.  Negative for dysuria and frequency.   Musculoskeletal: Negative.  Negative for arthralgias, back pain and myalgias.   Skin: Negative.  Negative for rash.   Neurological: Negative.  Negative for dizziness, weakness and headaches.   Hematological: Negative.  Does not bruise/bleed easily.   Psychiatric/Behavioral: Negative.  Negative for behavioral problems. The patient is not nervous/anxious.        Physical Exam     Initial Vitals [08/17/23 0855]   BP Pulse Resp Temp SpO2   122/68 79 16 97.8 °F (36.6 °C) 98 %      MAP       --         Physical Exam    Nursing note and vitals reviewed.  Constitutional: Vital signs are normal. He appears well-developed and well-nourished. He is cooperative. He does not have a sickly appearance. He does not appear ill. No distress.   HENT:   Head: Normocephalic and atraumatic.   Right Ear: External ear normal.   Left Ear: External ear normal.   Mouth/Throat: Mucous membranes are normal.   Eyes: Conjunctivae and lids are normal. Pupils are equal, round, and reactive to light.   Cardiovascular:  Normal rate, regular rhythm and normal pulses.           Pulmonary/Chest: Effort normal and breath sounds normal.   Abdominal: Abdomen is soft. Bowel sounds are normal. There is no abdominal tenderness.   Musculoskeletal:         General: Normal range of motion.     Lymphadenopathy:     He has no cervical adenopathy.   Neurological: He is alert and oriented to person, place, and time.   Skin: Skin is warm and dry. Capillary refill takes less than 2 seconds. No pallor.   Psychiatric: He has a normal mood and affect. His speech is normal and behavior is normal. Thought content normal.         Medical Screening Exam   See Full Note    ED Course   Procedures  Labs Reviewed   COMPREHENSIVE METABOLIC PANEL - Abnormal; Notable for the following  components:       Result Value    Sodium 135 (*)     All other components within normal limits   CBC WITH DIFFERENTIAL - Abnormal; Notable for the following components:    RDW 16.4 (*)     Platelet Count 88 (*)     Neutrophils % 32.5 (*)     Monocytes % 33.2 (*)     Eosinophils % 0.9 (*)     Basophils % 1.3 (*)     Monocytes, Absolute 2.32 (*)     All other components within normal limits   MANUAL DIFFERENTIAL - Abnormal; Notable for the following components:    Segmented Neutrophils, Man % 40 (*)     Monocytes, Man % 29 (*)     Platelet Morphology Decreased (*)     All other components within normal limits   OCCULT BLOOD X 1, STOOL - Normal   CBC W/ AUTO DIFFERENTIAL    Narrative:     The following orders were created for panel order CBC auto differential.  Procedure                               Abnormality         Status                     ---------                               -----------         ------                     CBC with Differential[[id number]]        Abnormal            Final result               Manual Differential[[id number]]          Abnormal            Final result                 Please view results for these tests on the individual orders.          Imaging Results    None          Medications - No data to display  Medical Decision Making:   Initial Assessment:   VS wnl. BS+ x 4 quads, Abd soft and non-tender. Appears in NAD.  Differential Diagnosis:   -Melena  -Iron infusion  -Food/medication  Clinical Tests:   Lab Tests: Ordered and Reviewed  The following lab test(s) were unremarkable: CBC and CMP       <> Summary of Lab: H&H 15.8/48.2, PLT 88, previous lab 4/24 was H&H 13/42.7 with . LFTs wnl. Hemoccult negative.  ED Management:    Discharge MDM  I discussed lab results with patient. Patient's dark stools may be related to recent iron infusion. His H&H is down from previous lab noted on our records. He is scheduled for follow up with Dr. Rowe on 9/6. Encouraged patient to keep  that appointment and follow up with Dr. Cr.  Patient was discharged in stable condition.  Detailed return precautions discussed. Patient and his wife agreed to treatment plan and verbalized understanding.                         Clinical Impression:   Final diagnoses:  [R19.5] Dark stools (Primary)        ED Disposition Condition    Discharge Stable          ED Prescriptions    None       Follow-up Information       Follow up With Specialties Details Why Contact Info    Izzy Olivier NP Family Medicine Call today schedule follow up [id number]  [facility] [place] [id number]  [phone]               Deborah Baum, [facility]  08/17/23 1008

## 2023-08-18 ENCOUNTER — TELEPHONE (OUTPATIENT)
Dept: EMERGENCY MEDICINE | Facility: HOSPITAL | Age: 83
End: 2023-08-18
Payer: MEDICARE

## 2023-08-18 NOTE — TELEPHONE ENCOUNTER
Spoke with patient stated he is going better today and will follow up with his doctor about his labs

## 2023-09-01 RX ORDER — MELOXICAM 7.5 MG/1
7.5 TABLET ORAL DAILY
Qty: 30 TABLET | Refills: 5 | Status: SHIPPED | OUTPATIENT
Start: 2023-09-01 | End: 2023-12-01

## 2023-09-05 DIAGNOSIS — M25.552 LEFT HIP PAIN: Primary | ICD-10-CM

## 2023-09-06 ENCOUNTER — OFFICE VISIT (OUTPATIENT)
Dept: ORTHOPEDICS | Facility: CLINIC | Age: 83
End: 2023-09-06
Payer: MEDICARE

## 2023-09-06 ENCOUNTER — HOSPITAL ENCOUNTER (OUTPATIENT)
Dept: RADIOLOGY | Facility: HOSPITAL | Age: 83
Discharge: HOME | End: 2023-09-06
Attending: NURSE PRACTITIONER
Payer: MEDICARE

## 2023-09-06 DIAGNOSIS — M25.552 LEFT HIP PAIN: ICD-10-CM

## 2023-09-06 DIAGNOSIS — M54.9 DORSALGIA, UNSPECIFIED: ICD-10-CM

## 2023-09-06 DIAGNOSIS — M54.32 SCIATICA OF LEFT SIDE: Primary | ICD-10-CM

## 2023-09-06 DIAGNOSIS — M25.552 LEFT HIP PAIN: Primary | ICD-10-CM

## 2023-09-06 PROCEDURE — 73502 XR HIP WITH PELVIS WHEN PERFORMED, 2 OR 3 VIEWS LEFT: ICD-10-PCS | Mod: 26,LT,, | Performed by: RADIOLOGY

## 2023-09-06 PROCEDURE — 99214 PR OFFICE/OUTPT VISIT, EST, LEVL IV, 30-39 MIN: ICD-10-PCS | Mod: S$PBB,,, | Performed by: NURSE PRACTITIONER

## 2023-09-06 PROCEDURE — 72110 X-RAY EXAM L-2 SPINE 4/>VWS: CPT | Mod: TC

## 2023-09-06 PROCEDURE — 72110 XR LUMBAR SPINE COMPLETE 5 VIEW: ICD-10-PCS | Mod: 26,,, | Performed by: RADIOLOGY

## 2023-09-06 PROCEDURE — 73502 X-RAY EXAM HIP UNI 2-3 VIEWS: CPT | Mod: TC,LT

## 2023-09-06 PROCEDURE — 99213 OFFICE O/P EST LOW 20 MIN: CPT | Mod: PBBFAC | Performed by: NURSE PRACTITIONER

## 2023-09-06 PROCEDURE — 99214 OFFICE O/P EST MOD 30 MIN: CPT | Mod: S$PBB,,, | Performed by: NURSE PRACTITIONER

## 2023-09-06 PROCEDURE — 73502 X-RAY EXAM HIP UNI 2-3 VIEWS: CPT | Mod: 26,LT,, | Performed by: RADIOLOGY

## 2023-09-06 PROCEDURE — 72110 X-RAY EXAM L-2 SPINE 4/>VWS: CPT | Mod: 26,,, | Performed by: RADIOLOGY

## 2023-09-06 RX ORDER — METHYLPREDNISOLONE 4 MG/1
TABLET ORAL
Qty: 21 EACH | Refills: 0 | Status: SHIPPED | OUTPATIENT
Start: 2023-09-06 | End: 2023-09-27

## 2023-09-06 RX ORDER — TRAMADOL HYDROCHLORIDE 50 MG/1
50 TABLET ORAL EVERY 6 HOURS PRN
Qty: 12 EACH | Refills: 0 | Status: SHIPPED | OUTPATIENT
Start: 2023-09-06 | End: 2023-12-01

## 2023-09-06 NOTE — PROGRESS NOTES
83-year-old male patient presents am to orthopedic clinic with a rolling walker.  He reports he has pain in his right hip.  He points from the right lower buttocks region and goes down the side of his leg to his left ankle.  Denies any injury or trauma.  States he has used Norco had at the house but has not been able to get any relief with that medication.  He is known to Dr. 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    X-ray:  X-rays today of the left hip with single AP view AP and lateral shows mild DJD changes.  No evidence acute fracture, dislocation pathologic bone noted.    PE:  Physical exam is noted be ambulating later rolling walker.  No perceptible is noted.  Physical exam of his left hip leg lengths appear equal.  He has good motion left hip without elicitation pain.  Straight leg raise is negative.  Denies loss of bowel bladder    Impression: Lumbar radiculopathy    Plan:  Safety guidelines activity restrictions discussed patient.  Verbalized understanding.  We will obtain x-rays of the lumbar spine on the way out.  Pin results is x-rays we will obtain MRI examination lumbar spine.  [facility].  [place]  reviewed.  Patient found.  Prescription for Ultram 50 mg 1 p.o. q.6 hours as needed for pain number 12 with no refills sent to [facility].  Recommend he use acetaminophen with his Ultram.  He is unable to use NSAIDs due to chronic anti-platelet therapy secondary to assist in placement 2017.  He has no point with Dr. Epps on 911.  He wants to keep that appointment review results with him.

## 2023-09-06 NOTE — PATIENT INSTRUCTIONS
Safety guidelines activity restrictions discussed patient.  Verbalized understanding.  We will obtain x-rays of the lumbar spine on the way out.  Pin results is x-rays we will obtain MRI examination lumbar spine.  [facility].  [facility] reviewed.  Patient found.  Prescription for Ultram 50 mg 1 p.o. q.6 hours as needed for pain number 12 with no refills sent to [facility].  Recommend he use acetaminophen with his Ultram.  He is unable to use NSAIDs due to chronic anti-platelet therapy secondary to assist in placement 2017.  He has no point with Dr. Epps on 911.  He wants to keep that appointment review results with him.

## 2023-09-14 ENCOUNTER — HOSPITAL ENCOUNTER (OUTPATIENT)
Dept: RADIOLOGY | Facility: HOSPITAL | Age: 83
Discharge: HOME | End: 2023-09-14
Attending: NURSE PRACTITIONER
Payer: MEDICARE

## 2023-09-14 DIAGNOSIS — M54.32 SCIATICA OF LEFT SIDE: ICD-10-CM

## 2023-09-14 PROCEDURE — 72148 MRI LUMBAR SPINE W/O DYE: CPT | Mod: 26,,, | Performed by: RADIOLOGY

## 2023-09-14 PROCEDURE — 72148 MRI LUMBAR SPINE W/O DYE: CPT | Mod: TC

## 2023-09-14 PROCEDURE — 72148 MRI LUMBAR SPINE WITHOUT CONTRAST: ICD-10-PCS | Mod: 26,,, | Performed by: RADIOLOGY

## 2023-09-15 DIAGNOSIS — Z96.651 STATUS POST TOTAL KNEE REPLACEMENT USING CEMENT, RIGHT: Primary | ICD-10-CM

## 2023-09-18 ENCOUNTER — OFFICE VISIT (OUTPATIENT)
Dept: ORTHOPEDICS | Facility: CLINIC | Age: 83
End: 2023-09-18
Payer: MEDICARE

## 2023-09-18 ENCOUNTER — HOSPITAL ENCOUNTER (OUTPATIENT)
Dept: RADIOLOGY | Facility: HOSPITAL | Age: 83
Discharge: HOME | End: 2023-09-18
Attending: ORTHOPAEDIC SURGERY
Payer: MEDICARE

## 2023-09-18 ENCOUNTER — TELEPHONE (OUTPATIENT)
Dept: ORTHOPEDICS | Facility: CLINIC | Age: 83
End: 2023-09-18
Payer: MEDICARE

## 2023-09-18 VITALS — HEIGHT: 65 IN | BODY MASS INDEX: 29.16 KG/M2 | WEIGHT: 175 LBS

## 2023-09-18 DIAGNOSIS — Z96.651 HISTORY OF TOTAL KNEE ARTHROPLASTY, RIGHT: Primary | ICD-10-CM

## 2023-09-18 DIAGNOSIS — Z96.651 STATUS POST TOTAL KNEE REPLACEMENT USING CEMENT, RIGHT: ICD-10-CM

## 2023-09-18 DIAGNOSIS — M54.16 LUMBAR RADICULOPATHY, CHRONIC: ICD-10-CM

## 2023-09-18 PROCEDURE — 99213 OFFICE O/P EST LOW 20 MIN: CPT | Mod: S$PBB,,, | Performed by: ORTHOPAEDIC SURGERY

## 2023-09-18 PROCEDURE — 99213 PR OFFICE/OUTPT VISIT, EST, LEVL III, 20-29 MIN: ICD-10-PCS | Mod: S$PBB,,, | Performed by: ORTHOPAEDIC SURGERY

## 2023-09-18 PROCEDURE — 99213 OFFICE O/P EST LOW 20 MIN: CPT | Mod: PBBFAC | Performed by: ORTHOPAEDIC SURGERY

## 2023-09-18 PROCEDURE — 73560 X-RAY EXAM OF KNEE 1 OR 2: CPT | Mod: TC,LT

## 2023-09-18 RX ORDER — HYDROCODONE BITARTRATE AND ACETAMINOPHEN 10; 325 MG/1; MG/1
1 TABLET ORAL EVERY 6 HOURS PRN
Qty: 28 TABLET | Refills: 0 | Status: SHIPPED | OUTPATIENT
Start: 2023-09-18 | End: 2023-12-01

## 2023-09-18 NOTE — PROGRESS NOTES
Patient is here for follow-up of his total knee arthroplasty she is doing well from his knees.  He is having pain down his leg on the right he has L5-S1 radiculopathy he has some foraminal stenosis at L5-S1 I will refer him to pain treatment for possible lumbar injections.  I wrote him for some Norco he is having some pain at night that is burning down his leg patient is 83 he is not a surgical candidate I will check him back for his knee on a yearly basis.

## 2023-09-18 NOTE — TELEPHONE ENCOUNTER
----- Message from NICOLÁS Diaz sent at 9/15/2023  6:11 AM CDT -----  Please schedule with Dr Epps and inform the patient.  Thanks  ----- Message -----  From: Interface, Rad Results In  Sent: 9/14/2023   2:17 PM CDT  To: NICOLÁS Diaz

## 2023-09-18 NOTE — PROGRESS NOTES
Radiology Interpretation        Patient Name: Elliott Peters  Date: 9/18/2023  YOB: 1940  MRN# [id number]        ORDERING DIAGNOSIS:    Encounter Diagnosis   Name Primary?    History of total knee arthroplasty, right Yes    Two views right knee AP lateral skeletally mature individual total knee arthroplasty in place no loosening fractures or subluxations impression total knee arthroplasty in place right knee no loosening                   Chevy Epps MD

## 2023-09-19 ENCOUNTER — HOSPITAL ENCOUNTER (OUTPATIENT)
Dept: RADIOLOGY | Facility: HOSPITAL | Age: 83
Discharge: HOME | End: 2023-09-19
Attending: STUDENT IN AN ORGANIZED HEALTH CARE EDUCATION/TRAINING PROGRAM
Payer: MEDICARE

## 2023-09-19 DIAGNOSIS — M43.16 SPONDYLOLISTHESIS OF LUMBAR REGION: ICD-10-CM

## 2023-09-19 PROCEDURE — 72114 XR LUMBAR SPINE 5 VIEW WITH FLEX AND EXT: ICD-10-PCS | Mod: 26,,, | Performed by: RADIOLOGY

## 2023-09-19 PROCEDURE — 72114 X-RAY EXAM L-S SPINE BENDING: CPT | Mod: TC

## 2023-09-19 PROCEDURE — 72114 X-RAY EXAM L-S SPINE BENDING: CPT | Mod: 26,,, | Performed by: RADIOLOGY

## 2023-10-09 DIAGNOSIS — Z71.89 COMPLEX CARE COORDINATION: ICD-10-CM

## 2023-11-01 ENCOUNTER — LAB VISIT (OUTPATIENT)
Dept: LAB | Facility: CLINIC | Age: 83
End: 2023-11-01
Payer: MEDICARE

## 2023-11-01 DIAGNOSIS — E78.5 HYPERLIPIDEMIA, UNSPECIFIED HYPERLIPIDEMIA TYPE: Primary | ICD-10-CM

## 2023-11-01 DIAGNOSIS — R74.8 ELEVATED LIVER ENZYMES: ICD-10-CM

## 2023-11-01 DIAGNOSIS — D64.9 ANEMIA, UNSPECIFIED TYPE: ICD-10-CM

## 2023-11-01 DIAGNOSIS — I10 PRIMARY HYPERTENSION: ICD-10-CM

## 2023-11-01 LAB
ALBUMIN SERPL BCP-MCNC: 4.2 G/DL (ref 3.5–5)
ALBUMIN/GLOB SERPL: 1.3 {RATIO}
ALP SERPL-CCNC: 81 U/L (ref 45–115)
ALT SERPL W P-5'-P-CCNC: 27 U/L (ref 16–61)
ANION GAP SERPL CALCULATED.3IONS-SCNC: 9 MMOL/L (ref 7–16)
AST SERPL W P-5'-P-CCNC: 17 U/L (ref 15–37)
BASOPHILS # BLD AUTO: 0.04 K/UL (ref 0–0.2)
BASOPHILS NFR BLD AUTO: 0.5 % (ref 0–1)
BILIRUB SERPL-MCNC: 1 MG/DL (ref ?–1.2)
BUN SERPL-MCNC: 17 MG/DL (ref 7–18)
BUN/CREAT SERPL: 18 (ref 6–20)
CALCIUM SERPL-MCNC: 9.6 MG/DL (ref 8.5–10.1)
CHLORIDE SERPL-SCNC: 108 MMOL/L (ref 98–107)
CHOLEST SERPL-MCNC: 89 MG/DL (ref 0–200)
CHOLEST/HDLC SERPL: 3 {RATIO}
CO2 SERPL-SCNC: 28 MMOL/L (ref 21–32)
CREAT SERPL-MCNC: 0.96 MG/DL (ref 0.7–1.3)
CREAT UR-MCNC: 151 MG/DL (ref 39–259)
DIFFERENTIAL METHOD BLD: ABNORMAL
EGFR (NO RACE VARIABLE) (RUSH/TITUS): 77 ML/MIN/1.73M2
EOSINOPHIL # BLD AUTO: 0 K/UL (ref 0–0.5)
EOSINOPHIL NFR BLD AUTO: 0 % (ref 1–4)
ERYTHROCYTE [DISTWIDTH] IN BLOOD BY AUTOMATED COUNT: 14.8 % (ref 11.5–14.5)
GLOBULIN SER-MCNC: 3.3 G/DL (ref 2–4)
GLUCOSE SERPL-MCNC: 83 MG/DL (ref 74–106)
HCT VFR BLD AUTO: 48.4 % (ref 40–54)
HDLC SERPL-MCNC: 30 MG/DL (ref 40–60)
HGB BLD-MCNC: 16.1 G/DL (ref 13.5–18)
IMM GRANULOCYTES # BLD AUTO: 0.15 K/UL (ref 0–0.04)
IMM GRANULOCYTES NFR BLD: 2.1 % (ref 0–0.4)
LDLC SERPL CALC-MCNC: 36 MG/DL
LDLC/HDLC SERPL: 1.2 {RATIO}
LYMPHOCYTES # BLD AUTO: 2.3 K/UL (ref 1–4.8)
LYMPHOCYTES NFR BLD AUTO: 31.5 % (ref 27–41)
MCH RBC QN AUTO: 29.5 PG (ref 27–31)
MCHC RBC AUTO-ENTMCNC: 33.3 G/DL (ref 32–36)
MCV RBC AUTO: 88.6 FL (ref 80–96)
MICROALBUMIN UR-MCNC: 1.5 MG/DL (ref 0–2.8)
MICROALBUMIN/CREAT RATIO PNL UR: 9.9 MG/G (ref 0–30)
MONOCYTES # BLD AUTO: 1.52 K/UL (ref 0–0.8)
MONOCYTES NFR BLD AUTO: 20.8 % (ref 2–6)
MPC BLD CALC-MCNC: 10.8 FL (ref 9.4–12.4)
NEUTROPHILS # BLD AUTO: 3.3 K/UL (ref 1.8–7.7)
NEUTROPHILS NFR BLD AUTO: 45.1 % (ref 53–65)
NONHDLC SERPL-MCNC: 59 MG/DL
NRBC # BLD AUTO: 0 X10E3/UL
NRBC, AUTO (.00): 0 %
PLATELET # BLD AUTO: 103 K/UL (ref 150–400)
POTASSIUM SERPL-SCNC: 4 MMOL/L (ref 3.5–5.1)
PROT SERPL-MCNC: 7.5 G/DL (ref 6.4–8.2)
RBC # BLD AUTO: 5.46 M/UL (ref 4.6–6.2)
SODIUM SERPL-SCNC: 141 MMOL/L (ref 136–145)
TRIGL SERPL-MCNC: 113 MG/DL (ref 35–150)
VLDLC SERPL-MCNC: 23 MG/DL
WBC # BLD AUTO: 7.31 K/UL (ref 4.5–11)

## 2023-11-01 PROCEDURE — 80061 LIPID PANEL: ICD-10-PCS | Mod: ,,, | Performed by: CLINICAL MEDICAL LABORATORY

## 2023-11-01 PROCEDURE — 85025 COMPLETE CBC W/AUTO DIFF WBC: CPT | Mod: ,,, | Performed by: CLINICAL MEDICAL LABORATORY

## 2023-11-01 PROCEDURE — 82570 ASSAY OF URINE CREATININE: CPT | Mod: ,,, | Performed by: CLINICAL MEDICAL LABORATORY

## 2023-11-01 PROCEDURE — 80053 COMPREHENSIVE METABOLIC PANEL: ICD-10-PCS | Mod: ,,, | Performed by: CLINICAL MEDICAL LABORATORY

## 2023-11-01 PROCEDURE — 82570 MICROALBUMIN / CREATININE RATIO URINE: ICD-10-PCS | Mod: ,,, | Performed by: CLINICAL MEDICAL LABORATORY

## 2023-11-01 PROCEDURE — 80061 LIPID PANEL: CPT | Mod: ,,, | Performed by: CLINICAL MEDICAL LABORATORY

## 2023-11-01 PROCEDURE — 82043 UR ALBUMIN QUANTITATIVE: CPT | Mod: ,,, | Performed by: CLINICAL MEDICAL LABORATORY

## 2023-11-01 PROCEDURE — 85025 CBC WITH DIFFERENTIAL: ICD-10-PCS | Mod: ,,, | Performed by: CLINICAL MEDICAL LABORATORY

## 2023-11-01 PROCEDURE — 82043 MICROALBUMIN / CREATININE RATIO URINE: ICD-10-PCS | Mod: ,,, | Performed by: CLINICAL MEDICAL LABORATORY

## 2023-11-01 PROCEDURE — 80053 COMPREHEN METABOLIC PANEL: CPT | Mod: ,,, | Performed by: CLINICAL MEDICAL LABORATORY

## 2023-11-06 PROBLEM — Z00.00 ENCOUNTER FOR SUBSEQUENT ANNUAL WELLNESS VISIT (AWV) IN MEDICARE PATIENT: Status: RESOLVED | Noted: 2022-07-07 | Resolved: 2023-11-06

## 2023-11-22 RX ORDER — TERAZOSIN 5 MG/1
5 CAPSULE ORAL NIGHTLY
Qty: 90 CAPSULE | Refills: 1 | Status: SHIPPED | OUTPATIENT
Start: 2023-11-22

## 2023-11-22 RX ORDER — PANTOPRAZOLE SODIUM 40 MG/1
40 TABLET, DELAYED RELEASE ORAL DAILY
Qty: 90 TABLET | Refills: 1 | Status: SHIPPED | OUTPATIENT
Start: 2023-11-22 | End: 2024-02-02

## 2023-11-22 RX ORDER — CARVEDILOL 3.12 MG/1
3.12 TABLET ORAL 2 TIMES DAILY WITH MEALS
Qty: 180 TABLET | Refills: 1 | Status: SHIPPED | OUTPATIENT
Start: 2023-11-22 | End: 2023-12-01 | Stop reason: SDUPTHER

## 2023-12-01 ENCOUNTER — OFFICE VISIT (OUTPATIENT)
Dept: FAMILY MEDICINE | Facility: CLINIC | Age: 83
End: 2023-12-01
Payer: MEDICARE

## 2023-12-01 VITALS
DIASTOLIC BLOOD PRESSURE: 69 MMHG | BODY MASS INDEX: 29.66 KG/M2 | TEMPERATURE: 98 F | RESPIRATION RATE: 18 BRPM | HEART RATE: 58 BPM | OXYGEN SATURATION: 97 % | HEIGHT: 65 IN | WEIGHT: 178 LBS | SYSTOLIC BLOOD PRESSURE: 129 MMHG

## 2023-12-01 DIAGNOSIS — I10 HYPERTENSION, UNSPECIFIED TYPE: Primary | ICD-10-CM

## 2023-12-01 DIAGNOSIS — F41.9 ANXIETY: ICD-10-CM

## 2023-12-01 DIAGNOSIS — K21.9 GASTROESOPHAGEAL REFLUX DISEASE, UNSPECIFIED WHETHER ESOPHAGITIS PRESENT: ICD-10-CM

## 2023-12-01 DIAGNOSIS — Z79.899 HIGH RISK MEDICATION USE: ICD-10-CM

## 2023-12-01 PROBLEM — Z13.1 SCREENING FOR DIABETES MELLITUS: Status: ACTIVE | Noted: 2022-10-19

## 2023-12-01 LAB
CTP QC/QA: YES
POC (AMP) AMPHETAMINE: NEGATIVE
POC (BAR) BARBITURATES: NEGATIVE
POC (BUP) BUPRENORPHINE: NEGATIVE
POC (BZO) BENZODIAZEPINES: ABNORMAL
POC (COC) COCAINE: NEGATIVE
POC (MDMA) METHYLENEDIOXYMETHAMPHETAMINE 3,4: NEGATIVE
POC (MET) METHAMPHETAMINE: NEGATIVE
POC (MOP) OPIATES: NEGATIVE
POC (MTD) METHADONE: NEGATIVE
POC (OXY) OXYCODONE: NEGATIVE
POC (PCP) PHENCYCLIDINE: NEGATIVE
POC (TCA) TRICYCLIC ANTIDEPRESSANTS: ABNORMAL
POC TEMPERATURE (URINE): 94
POC THC: NEGATIVE

## 2023-12-01 PROCEDURE — 80305 DRUG TEST PRSMV DIR OPT OBS: CPT | Mod: RHCUB

## 2023-12-01 PROCEDURE — 99213 PR OFFICE/OUTPT VISIT, EST, LEVL III, 20-29 MIN: ICD-10-PCS | Mod: ,,,

## 2023-12-01 PROCEDURE — 99213 OFFICE O/P EST LOW 20 MIN: CPT | Mod: ,,,

## 2023-12-01 RX ORDER — OMEPRAZOLE 20 MG/1
20 CAPSULE, DELAYED RELEASE ORAL DAILY
Qty: 90 CAPSULE | Refills: 1 | Status: SHIPPED | OUTPATIENT
Start: 2023-12-01 | End: 2023-12-26

## 2023-12-01 RX ORDER — ZOSTER VACCINE RECOMBINANT, ADJUVANTED 50 MCG/0.5
KIT INTRAMUSCULAR
COMMUNITY
Start: 2023-08-03 | End: 2023-12-01

## 2023-12-01 RX ORDER — CARVEDILOL 3.12 MG/1
3.12 TABLET ORAL 2 TIMES DAILY WITH MEALS
Qty: 180 TABLET | Refills: 1 | Status: SHIPPED | OUTPATIENT
Start: 2023-12-01

## 2023-12-01 RX ORDER — ALPRAZOLAM 0.5 MG/1
0.5 TABLET ORAL 2 TIMES DAILY PRN
Qty: 90 TABLET | Refills: 0 | Status: SHIPPED | OUTPATIENT
Start: 2023-12-01

## 2023-12-01 NOTE — PROGRESS NOTES
Subjective     Patient ID: Elliott Peters is a 83 y.o. male.    Chief Complaint: Follow-up, Hypertension (6 month follow up), and Health Maintenance (Shingles Vaccine(1 of 2) Never done-Administered at [facility], [place]/RSV Vaccine (Age 60+ and Pregnant patients)(1 - 1-dose 60+ series) Never done- Administered at [facility], [place]/Hemoglobin A1c (Prediabetes) due on 07/07/2023-Ordered/Influenza Vaccine(1) due on 09/01/2023-Administered at [facility]/COVID-19 Vaccine(5 - 2023-24 season) due on 09/01/2023-Declined/)      Pt is here for med refills    Follow-up  This is a chronic problem. The current episode started more than 1 year ago. The problem occurs constantly. The problem has been gradually improving. Pertinent negatives include no change in bowel habit, chest pain, fatigue, headaches, nausea, rash or vomiting. Nothing aggravates the symptoms. Treatments tried: Take his meds as prescribed. The treatment provided significant relief.   Hypertension  This is a chronic problem. The current episode started more than 1 year ago. The problem is unchanged. The problem is controlled. Pertinent negatives include no chest pain, headaches, palpitations or shortness of breath. There are no associated agents to hypertension. Risk factors for coronary artery disease include dyslipidemia, male gender, obesity and sedentary lifestyle. Past treatments include calcium channel blockers. The current treatment provides significant improvement. There are no compliance problems.        Review of Systems   Constitutional:  Negative for activity change, appetite change and fatigue.   HENT:  Negative for trouble swallowing.    Eyes:  Negative for visual disturbance.   Respiratory:  Negative for chest tightness and shortness of breath.    Cardiovascular:  Negative for chest pain and palpitations.   Gastrointestinal:  Negative for change in bowel habit, nausea and vomiting.   Genitourinary:   Negative for difficulty urinating.   Integumentary:  Negative for rash.   Neurological:  Negative for headaches.   Psychiatric/Behavioral:  The patient is not nervous/anxious.             Objective     Physical Exam  Vitals and nursing note reviewed.   Constitutional:       Appearance: Normal appearance. He is not ill-appearing.   HENT:      Head: Normocephalic.      Nose: Nose normal.      Mouth/Throat:      Mouth: Mucous membranes are moist.      Pharynx: Oropharynx is clear.   Eyes:      Extraocular Movements: Extraocular movements intact.      Conjunctiva/sclera: Conjunctivae normal.      Pupils: Pupils are equal, round, and reactive to light.   Cardiovascular:      Rate and Rhythm: Normal rate and regular rhythm.      Pulses: Normal pulses.      Heart sounds: Normal heart sounds.   Pulmonary:      Effort: Pulmonary effort is normal. No respiratory distress.      Breath sounds: Normal breath sounds.   Abdominal:      General: Bowel sounds are normal.      Palpations: Abdomen is soft.      Tenderness: There is no abdominal tenderness.   Musculoskeletal:         General: Normal range of motion.      Cervical back: Normal range of motion and neck supple.   Skin:     General: Skin is warm and dry.      Capillary Refill: Capillary refill takes less than 2 seconds.   Neurological:      General: No focal deficit present.      Mental Status: He is alert and oriented to person, place, and time.   Psychiatric:         Mood and Affect: Mood normal.         Behavior: Behavior normal.         Thought Content: Thought content normal.         Judgment: Judgment normal.              Assessment and Plan     1. Hypertension, unspecified type  Assessment & Plan:  Blood pressure is controlled. Current medical regimen is effective;   Will adjust according to results and monitor.     Orders:  -     carvediloL (COREG) 3.125 MG tablet; Take 1 tablet (3.125 mg total) by mouth 2 (two) times daily with meals.  Dispense: 180 tablet;  Refill: 1    2. Anxiety  Assessment & Plan:   reviewed and presumptive done and reviewed     Orders:  -     ALPRAZolam (XANAX) 0.5 MG tablet; Take 1 tablet (0.5 mg total) by mouth 2 (two) times daily as needed for Anxiety.  Dispense: 90 tablet; Refill: 0  -     POCT Urine Drug Screen Presump    3. High risk medication use  Assessment & Plan:   reviewed and presumptive done and reviewed     Orders:  -     POCT Urine Drug Screen Presump    4. Gastroesophageal reflux disease, unspecified whether esophagitis present  Assessment & Plan:  Will continue current medication as prescribed. Symptoms under control     Orders:  -     omeprazole (PRILOSEC) 20 MG capsule; Take 1 capsule (20 mg total) by mouth once daily.  Dispense: 90 capsule; Refill: 1      Needs iron level at next 6 mos appt    I, Fermin Cr MD, have reviewed the chart and agree with the assessment and plan presented above.           Follow up in about 6 months (around 6/1/2024).

## 2023-12-26 ENCOUNTER — OFFICE VISIT (OUTPATIENT)
Dept: FAMILY MEDICINE | Facility: CLINIC | Age: 83
End: 2023-12-26
Payer: MEDICARE

## 2023-12-26 VITALS
TEMPERATURE: 98 F | RESPIRATION RATE: 17 BRPM | DIASTOLIC BLOOD PRESSURE: 71 MMHG | BODY MASS INDEX: 29.82 KG/M2 | WEIGHT: 179 LBS | HEART RATE: 68 BPM | SYSTOLIC BLOOD PRESSURE: 117 MMHG | HEIGHT: 65 IN | OXYGEN SATURATION: 97 %

## 2023-12-26 DIAGNOSIS — J02.9 SORE THROAT: ICD-10-CM

## 2023-12-26 DIAGNOSIS — J06.9 UPPER RESPIRATORY TRACT INFECTION, UNSPECIFIED TYPE: Primary | ICD-10-CM

## 2023-12-26 LAB
CTP QC/QA: YES
FLUAV AG NPH QL: NEGATIVE
FLUBV AG NPH QL: NEGATIVE
S PYO RRNA THROAT QL PROBE: NEGATIVE
SARS-COV-2 AG RESP QL IA.RAPID: NEGATIVE

## 2023-12-26 PROCEDURE — 99214 PR OFFICE/OUTPT VISIT, EST, LEVL IV, 30-39 MIN: ICD-10-PCS | Mod: ,,,

## 2023-12-26 PROCEDURE — 99214 OFFICE O/P EST MOD 30 MIN: CPT | Mod: ,,,

## 2023-12-26 PROCEDURE — 87880 STREP A ASSAY W/OPTIC: CPT | Mod: RHCUB

## 2023-12-26 PROCEDURE — 87804 INFLUENZA ASSAY W/OPTIC: CPT | Mod: 59,RHCUB

## 2023-12-26 PROCEDURE — 87426 SARSCOV CORONAVIRUS AG IA: CPT | Mod: RHCUB

## 2023-12-26 RX ORDER — FLUTICASONE PROPIONATE 50 MCG
1 SPRAY, SUSPENSION (ML) NASAL DAILY
Qty: 9.9 ML | Refills: 0 | Status: SHIPPED | OUTPATIENT
Start: 2023-12-26

## 2023-12-26 RX ORDER — AZITHROMYCIN 250 MG/1
TABLET, FILM COATED ORAL
Qty: 6 TABLET | Refills: 0 | Status: SHIPPED | OUTPATIENT
Start: 2023-12-26 | End: 2023-12-31

## 2023-12-26 RX ORDER — CHLORPHENIRAMINE MALEATE AND PHENYLEPHRINE HYDROCHLORIDE 4; 10 MG/1; MG/1
1 TABLET, COATED ORAL EVERY 6 HOURS PRN
Qty: 20 TABLET | Refills: 0 | Status: SHIPPED | OUTPATIENT
Start: 2023-12-26 | End: 2024-01-05

## 2023-12-26 NOTE — PROGRESS NOTES
HPI:   Elliott Peters is a pleasant 83 y.o. patient who reports to clinic with complaints of Fever, congestion and runny nose, sorethroat and a cough since last Thursday. He states he is more tired than usual and achy. He is with his wife who has tested positive for COIVD-19. He denies any shortness of breath or chest pain at this time.     Cough  This is a new problem. Episode onset: thursday. The problem has been unchanged. The problem occurs constantly. The cough is Non-productive. Associated symptoms include a fever, headaches, nasal congestion, postnasal drip and a sore throat. Pertinent negatives include no chest pain, chills or shortness of breath.   Sore Throat   This is a new problem. Episode onset: thursday. The problem has been unchanged. Neither side of throat is experiencing more pain than the other. The maximum temperature recorded prior to his arrival was 100.4 - 100.9 F. The fever has been present for 5 days or more. The pain is at a severity of 2/10. The pain is mild. Associated symptoms include coughing and headaches. Pertinent negatives include no drooling or shortness of breath. He has tried acetaminophen for the symptoms. The treatment provided mild relief.   Fever   This is a new problem. The current episode started yesterday. The problem occurs intermittently. The problem has been unchanged. The maximum temperature noted was 100 to 100.9 F. Associated symptoms include coughing, headaches and a sore throat. Pertinent negatives include no chest pain.                Past Medical History:   Diagnosis Date    Coronary artery disease     cardiac stents    Hyperlipidemia     Hypertension     Kidney stone     MI (myocardial infarction)     Sleep apnea     Urinary tract infection        PAST SURGICAL HISTORY:   Past Surgical History:   Procedure Laterality Date    CORONARY ANGIOPLASTY WITH STENT PLACEMENT      HERNIA REPAIR      JOINT REPLACEMENT      difficult to wake after surgery     PROSTATECTOMY   2012       MEDICATIONS:    Current Outpatient Medications:     ALPRAZolam (XANAX) 0.5 MG tablet, Take 1 tablet (0.5 mg total) by mouth 2 (two) times daily as needed for Anxiety., Disp: 90 tablet, Rfl: 0    amLODIPine (NORVASC) 5 MG tablet, Take 0.5 tablets (2.5 mg total) by mouth once daily., Disp: 180 tablet, Rfl: 1    aspirin (ECOTRIN) 81 MG EC tablet, Take 81 mg by mouth once daily., Disp: , Rfl:     carvediloL (COREG) 3.125 MG tablet, Take 1 tablet (3.125 mg total) by mouth 2 (two) times daily with meals., Disp: 180 tablet, Rfl: 1    clopidogreL (PLAVIX) 75 mg tablet, Take 1 tablet (75 mg total) by mouth once daily., Disp: 90 tablet, Rfl: 1    docusate sodium (COLACE) 100 MG capsule, Take 100 mg by mouth once daily., Disp: , Rfl:     pantoprazole (PROTONIX) 40 MG tablet, Take 1 tablet (40 mg total) by mouth once daily., Disp: 90 tablet, Rfl: 1    polyethylene glycol (GLYCOLAX) 17 gram PwPk, Take by mouth., Disp: , Rfl:     rosuvastatin (CRESTOR) 20 MG tablet, Take 1 tablet (20 mg total) by mouth once daily., Disp: 90 tablet, Rfl: 1    terazosin (HYTRIN) 5 MG capsule, Take 1 capsule (5 mg total) by mouth every evening., Disp: 90 capsule, Rfl: 1    azithromycin (Z-HIREN) 250 MG tablet, Take 2 tablets by mouth on day 1; Take 1 tablet by mouth on days 2-5, Disp: 6 tablet, Rfl: 0    chlorpheniramine-phenylephrine (ED A-HIST) 4-10 mg per tablet, Take 1 tablet by mouth every 6 (six) hours as needed for Congestion., Disp: 20 tablet, Rfl: 0    fluticasone propionate (FLONASE) 50 mcg/actuation nasal spray, 1 spray (50 mcg total) by Each Nostril route once daily., Disp: 9.9 mL, Rfl: 0    nitroGLYCERIN (NITROSTAT) 0.4 MG SL tablet, Place 1 tablet (0.4 mg total) under the tongue every 5 (five) minutes as needed for Chest pain., Disp: 100 tablet, Rfl: 0    ALLERGIES:   Review of patient's allergies indicates:   Allergen Reactions    Bronopol Dermatitis    Propylene glycol Dermatitis         Review of Systems   Constitutional:   Positive for fever. Negative for activity change and chills.   HENT:  Positive for postnasal drip and sore throat. Negative for drooling and nosebleeds.    Eyes: Negative.    Respiratory:  Positive for cough. Negative for chest tightness and shortness of breath.    Cardiovascular: Negative.  Negative for chest pain and palpitations.   Gastrointestinal: Negative.    Endocrine: Negative.    Genitourinary: Negative.  Negative for difficulty urinating.   Musculoskeletal: Negative.    Integumentary:  Negative.   Allergic/Immunologic: Negative.    Neurological:  Positive for headaches. Negative for dizziness.   Hematological: Negative.    Psychiatric/Behavioral: Negative.     All other systems reviewed and are negative.         Physical Exam  Vitals reviewed.   Constitutional:       General: He is not in acute distress.     Appearance: Normal appearance. He is well-developed and normal weight. He is not ill-appearing.   HENT:      Head: Normocephalic and atraumatic.      Right Ear: Tympanic membrane normal.      Left Ear: Tympanic membrane normal.      Nose: Nose normal.      Mouth/Throat:      Mouth: Mucous membranes are moist.      Pharynx: Oropharynx is clear. No posterior oropharyngeal erythema.   Cardiovascular:      Rate and Rhythm: Normal rate and regular rhythm.      Pulses: Normal pulses.      Heart sounds: Normal heart sounds.   Pulmonary:      Effort: Pulmonary effort is normal. No accessory muscle usage or respiratory distress.      Breath sounds: Normal breath sounds.   Abdominal:      General: Abdomen is flat. Bowel sounds are normal. There is no distension.      Palpations: Abdomen is soft.      Tenderness: There is no abdominal tenderness.   Musculoskeletal:         General: Normal range of motion.      Cervical back: Normal range of motion.   Skin:     General: Skin is warm and dry.      Capillary Refill: Capillary refill takes less than 2 seconds.   Neurological:      Mental Status: He is alert and  "oriented to person, place, and time. Mental status is at baseline.   Psychiatric:         Mood and Affect: Mood normal.         Speech: Speech normal.         Behavior: Behavior normal. Behavior is cooperative.         Thought Content: Thought content normal.          VITAL SIGNS:   /71 (BP Location: Left arm, Patient Position: Sitting, BP Method: Large (Automatic))   Pulse 68   Temp 98.1 °F (36.7 °C) (Oral)   Resp 17   Ht 5' 5" (1.651 m)   Wt 81.2 kg (179 lb)   SpO2 97%   BMI 29.79 kg/m²       ASSESSMENT/PLAN  1. Sore throat  -     SARS Coronavirus 2 Antigen, POCT  -     POCT Influenza A/B Rapid Antigen  -     POCT rapid strep A    2. Upper respiratory tract infection, unspecified type  Assessment & Plan:  -COVID negative-His wife has tested positive for COVID today. I believe he is at the tail end of it, as he has had s/s since Thursday.   -strep negative, flu negative.   -Z-Pack until gone.   -Increase fluid intake  -Ed a hist as needed for congestion  -Flonase daily      Orders:  -     azithromycin (Z-HIREN) 250 MG tablet; Take 2 tablets by mouth on day 1; Take 1 tablet by mouth on days 2-5  Dispense: 6 tablet; Refill: 0  -     chlorpheniramine-phenylephrine (ED A-HIST) 4-10 mg per tablet; Take 1 tablet by mouth every 6 (six) hours as needed for Congestion.  Dispense: 20 tablet; Refill: 0  -     fluticasone propionate (FLONASE) 50 mcg/actuation nasal spray; 1 spray (50 mcg total) by Each Nostril route once daily.  Dispense: 9.9 mL; Refill: 0             Patient Instructions   Comeplete Zpack until it is gone even if starting to feel better.  Use flonase daily   Use ed a hist as needed for congestion.   Come back to the clinic if needed  Go to local er for any shortness of breath or chest pain      Patient tested positive for Covid 19 with only mild symptoms at this point.    -Advised to take Vitamin D 5000 units daily, Zinc 50 mg daily, Vitamin C 1000mg daily, Zyrtec 10 mg daily, Pepcid 20mg daily, " Aspirin 81mg daily - all for 14 days.  -May treat cough with OTC anti-tussive meds, body aches or fever with Tylenol or Motrin OTC.    -Instructed based on the most recent CDC guidelines to quarantine for 5 days from date of positive and  then wear a well fitting mask for 5 additional days after leaving quarantine.      If you are still experiencing symptoms or fever 5 days after testing positive, then continue to quarantine for 5 additional days.     - Instructed that all household contacts or those you've been in very close contact with should also quarantine for 5 days followed by 5 days of wearing a well fitting mask.  If household contacts develop symptoms they should seek testing or start their quarantine over from the day that symptoms develop.      -Quarantine means stay home, wear a mask when you can not avoid being around someone else in your home, try to maintain at least 6 feet from others in your home, and ideally confine yourself to only one room and one bathroom away from other household member.    Use frequent hand washing and clean touched surfaces frequently.    Remain active while at home in quarantine - get up and walk around outside or in your home.    -RTC for re-evaluation for worsening symptoms or go to ED.     Orders Placed This Encounter   Procedures    SARS Coronavirus 2 Antigen, POCT    POCT Influenza A/B Rapid Antigen    POCT rapid strep A

## 2023-12-26 NOTE — ASSESSMENT & PLAN NOTE
-COVID negative-His wife has tested positive for COVID today. I believe he is at the tail end of it, as he has had s/s since Thursday.   -strep negative, flu negative.   -Z-Pack until gone.   -Increase fluid intake  -Ed a hist as needed for congestion  -Flonase daily

## 2023-12-26 NOTE — PATIENT INSTRUCTIONS
Comeplete Zpack until it is gone even if starting to feel better.  Use flonase daily   Use ed a hist as needed for congestion.   Come back to the clinic if needed  Go to local er for any shortness of breath or chest pain      Patient tested positive for Covid 19 with only mild symptoms at this point.    -Advised to take Vitamin D 5000 units daily, Zinc 50 mg daily, Vitamin C 1000mg daily, Zyrtec 10 mg daily, Pepcid 20mg daily, Aspirin 81mg daily - all for 14 days.  -May treat cough with OTC anti-tussive meds, body aches or fever with Tylenol or Motrin OTC.    -Instructed based on the most recent CDC guidelines to quarantine for 5 days from date of positive and  then wear a well fitting mask for 5 additional days after leaving quarantine.      If you are still experiencing symptoms or fever 5 days after testing positive, then continue to quarantine for 5 additional days.     - Instructed that all household contacts or those you've been in very close contact with should also quarantine for 5 days followed by 5 days of wearing a well fitting mask.  If household contacts develop symptoms they should seek testing or start their quarantine over from the day that symptoms develop.      -Quarantine means stay home, wear a mask when you can not avoid being around someone else in your home, try to maintain at least 6 feet from others in your home, and ideally confine yourself to only one room and one bathroom away from other household member.    Use frequent hand washing and clean touched surfaces frequently.    Remain active while at home in quarantine - get up and walk around outside or in your home.    -RTC for re-evaluation for worsening symptoms or go to ED.

## 2024-01-09 ENCOUNTER — TELEPHONE (OUTPATIENT)
Dept: SLEEP MEDICINE | Facility: CLINIC | Age: 84
End: 2024-01-09
Payer: MEDICARE

## 2024-02-02 ENCOUNTER — OFFICE VISIT (OUTPATIENT)
Dept: FAMILY MEDICINE | Facility: CLINIC | Age: 84
End: 2024-02-02
Payer: MEDICARE

## 2024-02-02 VITALS
BODY MASS INDEX: 29.46 KG/M2 | OXYGEN SATURATION: 95 % | SYSTOLIC BLOOD PRESSURE: 116 MMHG | HEIGHT: 65 IN | TEMPERATURE: 99 F | WEIGHT: 176.81 LBS | DIASTOLIC BLOOD PRESSURE: 67 MMHG | RESPIRATION RATE: 17 BRPM | HEART RATE: 76 BPM

## 2024-02-02 DIAGNOSIS — R35.1 BENIGN PROSTATIC HYPERPLASIA WITH NOCTURIA: ICD-10-CM

## 2024-02-02 DIAGNOSIS — F51.01 PRIMARY INSOMNIA: ICD-10-CM

## 2024-02-02 DIAGNOSIS — N40.1 BENIGN PROSTATIC HYPERPLASIA WITH NOCTURIA: ICD-10-CM

## 2024-02-02 DIAGNOSIS — I10 HYPERTENSION, UNSPECIFIED TYPE: ICD-10-CM

## 2024-02-02 DIAGNOSIS — L23.9 ALLERGIC CONTACT DERMATITIS, UNSPECIFIED TRIGGER: ICD-10-CM

## 2024-02-02 DIAGNOSIS — I10 PRIMARY HYPERTENSION: Primary | ICD-10-CM

## 2024-02-02 DIAGNOSIS — E78.2 MIXED HYPERLIPIDEMIA: ICD-10-CM

## 2024-02-02 DIAGNOSIS — I25.118 ATHEROSCLEROTIC HEART DISEASE OF NATIVE CORONARY ARTERY WITH OTHER FORMS OF ANGINA PECTORIS: ICD-10-CM

## 2024-02-02 DIAGNOSIS — G47.33 OSA ON CPAP: ICD-10-CM

## 2024-02-02 PROCEDURE — 99214 OFFICE O/P EST MOD 30 MIN: CPT | Mod: ,,, | Performed by: FAMILY MEDICINE

## 2024-02-02 RX ORDER — TRIAMCINOLONE ACETONIDE 1 MG/G
CREAM TOPICAL 2 TIMES DAILY
Qty: 454 G | Refills: 5 | Status: SHIPPED | OUTPATIENT
Start: 2024-02-02

## 2024-02-02 RX ORDER — ACETAMINOPHEN, DIPHENHYDRAMINE HCL, PHENYLEPHRINE HCL 325; 25; 5 MG/1; MG/1; MG/1
1 TABLET ORAL NIGHTLY
Qty: 100 TABLET | Refills: 3 | Status: SHIPPED | OUTPATIENT
Start: 2024-02-02 | End: 2025-03-08

## 2024-02-02 RX ORDER — DOXEPIN HYDROCHLORIDE 25 MG/1
25 CAPSULE ORAL NIGHTLY
Qty: 30 CAPSULE | Refills: 11 | Status: SHIPPED | OUTPATIENT
Start: 2024-02-02 | End: 2025-02-01

## 2024-02-02 NOTE — PROGRESS NOTES
Shingles Vaccine (1 of 2)  records sent for [facility]   RSV Vaccine (Age 60+ and Pregnant patients) (1 - 1-dose 60+ series)  Hemoglobin A1c (Prediabetes)   Patient has eaten today  COVID-19 Vaccine  Completed records sent for     1

## 2024-02-07 PROBLEM — F51.01 PRIMARY INSOMNIA: Status: ACTIVE | Noted: 2024-02-07

## 2024-02-07 NOTE — ASSESSMENT & PLAN NOTE
No recent history of atherosclerotic disease.  Would recommend he continue his Plavix as well as aspirin and will control his blood pressure in his lipids are well controlled.  Follow-up with cardiology every 6 months.

## 2024-02-07 NOTE — PROGRESS NOTES
Chevy Malhotra DO   [address] [place]  [address]      PATIENT NAME: Elliott Peters  : 1940  DATE: 24  MRN: [id number]      Billing Provider: Chevy Malhotra DO  Level of Service:   Patient PCP Information       Provider PCP Type    Chevy Malhotra DO General            Reason for Visit / Chief Complaint: Establish Care (Elliott Peters 83 year old male presents to establish care with new primary care provider.)       Update PCP  Update Chief Complaint         History of Present Illness / Problem Focused Workflow     Elliott Peters presents to the clinic with Establish Care (lEliott Peters 83 year old male presents to establish care with new primary care provider.)     Patient is in today as a new patient follow-up.  He does have atherosclerotic disease but no chest pain.  He is currently on dual antiplatelet therapy to include aspirin and Plavix.  His cholesterol is have been well controlled.  He denies any chest pain shortness on breath palpitations PND orthopnea.  He does have BPH but has no increased nocturia.  Does report some increase in difficulty with sleeping at night.  He states he will sleep for about 2-3 hours and then wake up.  He is tried over-the-counter medicines which have not been effective.  He is also tried trazodone.        Review of Systems     Review of Systems   Constitutional:  Negative for activity change, appetite change, chills, fatigue and fever.   HENT:  Negative for nasal congestion, ear discharge, ear pain, mouth dryness, mouth sores, postnasal drip, sinus pressure/congestion, sore throat and voice change.    Eyes:  Negative for pain, discharge, redness, itching and visual disturbance.   Respiratory:  Positive for shortness of breath. Negative for apnea, cough, chest tightness and wheezing.    Cardiovascular:  Negative for chest pain, palpitations and leg swelling.   Gastrointestinal:  Negative for abdominal distention, abdominal  pain, anal bleeding, blood in stool, change in bowel habit, constipation, diarrhea, nausea, vomiting and reflux.   Endocrine: Negative for cold intolerance, heat intolerance, polydipsia, polyphagia and polyuria.   Genitourinary:  Positive for difficulty urinating. Negative for enuresis, erectile dysfunction, frequency, genital sores, hematuria and urgency.   Musculoskeletal:  Negative for arthralgias, back pain, gait problem, leg pain, myalgias and neck pain.   Integumentary:  Negative for rash, mole/lesion, breast mass and breast discharge.   Allergic/Immunologic: Negative for environmental allergies and food allergies.   Neurological:  Negative for dizziness, vertigo, tremors, seizures, syncope, facial asymmetry, speech difficulty, weakness, light-headedness, numbness, headaches, memory loss and coordination difficulties.   Hematological:  Negative for adenopathy. Does not bruise/bleed easily.   Psychiatric/Behavioral:  Positive for sleep disturbance. Negative for agitation, behavioral problems, confusion, decreased concentration, dysphoric mood, hallucinations, self-injury and suicidal ideas. The patient is not nervous/anxious and is not hyperactive.    Breast: Negative for mass      Medical / Social / Family History     Past Medical History:   Diagnosis Date    Coronary artery disease     cardiac stents    Hyperlipidemia     Hypertension     Kidney stone     MI (myocardial infarction)     Sleep apnea     Urinary tract infection        Past Surgical History:   Procedure Laterality Date    CORONARY ANGIOPLASTY WITH STENT PLACEMENT      HERNIA REPAIR      JOINT REPLACEMENT Bilateral     difficult to wake after surgery     PROSTATECTOMY  2012       Social History    reports that he quit smoking about 66 years ago. His smoking use included cigarettes. He has been exposed to tobacco smoke. He has never used smokeless tobacco. He reports that he does not currently use alcohol. He reports that he does not use  drugs.    Family History  's family history includes Colon cancer in his mother; Heart attack in his father.    Medications and Allergies     Medications  Outpatient Medications Marked as Taking for the 2/2/24 encounter (Office Visit) with Chevy Malhotra, DO   Medication Sig Dispense Refill    ALPRAZolam (XANAX) 0.5 MG tablet Take 1 tablet (0.5 mg total) by mouth 2 (two) times daily as needed for Anxiety. 90 tablet 0    amLODIPine (NORVASC) 5 MG tablet Take 0.5 tablets (2.5 mg total) by mouth once daily. 180 tablet 1    aspirin (ECOTRIN) 81 MG EC tablet Take 81 mg by mouth once daily.      carvediloL (COREG) 3.125 MG tablet Take 1 tablet (3.125 mg total) by mouth 2 (two) times daily with meals. 180 tablet 1    clopidogreL (PLAVIX) 75 mg tablet Take 1 tablet (75 mg total) by mouth once daily. 90 tablet 1    docusate sodium (COLACE) 100 MG capsule Take 100 mg by mouth once daily.      polyethylene glycol (GLYCOLAX) 17 gram PwPk Take by mouth.      rosuvastatin (CRESTOR) 20 MG tablet Take 1 tablet (20 mg total) by mouth once daily. 90 tablet 1    terazosin (HYTRIN) 5 MG capsule Take 1 capsule (5 mg total) by mouth every evening. 90 capsule 1       Allergies  Review of patient's allergies indicates:   Allergen Reactions    Bronopol Dermatitis    Propylene glycol Dermatitis       Physical Examination     Vitals:    02/02/24 1417   BP: 116/67   Pulse: 76   Resp: 17   Temp: 98.8 °F (37.1 °C)     Physical Exam  Constitutional:       General: He is not in acute distress.     Appearance: Normal appearance.   HENT:      Head: Normocephalic.      Nose: Nose normal.      Mouth/Throat:      Mouth: Mucous membranes are moist.      Pharynx: Oropharynx is clear. No oropharyngeal exudate or posterior oropharyngeal erythema.   Eyes:      General: No scleral icterus.        Right eye: No discharge.         Left eye: No discharge.      Conjunctiva/sclera: Conjunctivae normal.      Pupils: Pupils are equal, round, and reactive to  light.   Cardiovascular:      Rate and Rhythm: Normal rate and regular rhythm.      Pulses: Normal pulses.      Heart sounds: Murmur heard.   Pulmonary:      Effort: Pulmonary effort is normal.      Breath sounds: Normal breath sounds. No wheezing.   Abdominal:      General: Abdomen is flat. Bowel sounds are normal.      Tenderness: There is no abdominal tenderness.   Musculoskeletal:         General: Normal range of motion.      Cervical back: Normal range of motion.      Right lower leg: No edema.      Left lower leg: No edema.   Skin:     General: Skin is warm.      Capillary Refill: Capillary refill takes less than 2 seconds.      Findings: No rash.   Neurological:      General: No focal deficit present.      Mental Status: He is alert and oriented to person, place, and time.      Cranial Nerves: No cranial nerve deficit.      Sensory: No sensory deficit.      Motor: No weakness.      Coordination: Coordination normal.      Gait: Gait normal.      Deep Tendon Reflexes: Reflexes normal.   Psychiatric:         Mood and Affect: Mood normal.         Behavior: Behavior normal.         Thought Content: Thought content normal.         Judgment: Judgment normal.               Lab Results   Component Value Date    WBC 7.31 11/01/2023    HGB 16.1 11/01/2023    HCT 48.4 11/01/2023    MCV 88.6 11/01/2023     (L) 11/01/2023          Sodium   Date Value Ref Range Status   11/01/2023 141 136 - 145 mmol/L Final     Potassium   Date Value Ref Range Status   11/01/2023 4.0 3.5 - 5.1 mmol/L Final     Chloride   Date Value Ref Range Status   11/01/2023 108 (H) 98 - 107 mmol/L Final     CO2   Date Value Ref Range Status   11/01/2023 28 21 - 32 mmol/L Final     Glucose   Date Value Ref Range Status   11/01/2023 83 74 - 106 mg/dL Final     BUN   Date Value Ref Range Status   11/01/2023 17 7 - 18 mg/dL Final     Creatinine   Date Value Ref Range Status   11/01/2023 0.96 0.70 - 1.30 mg/dL Final     Calcium   Date Value Ref Range  Status   11/01/2023 9.6 8.5 - 10.1 mg/dL Final     Total Protein   Date Value Ref Range Status   11/01/2023 7.5 6.4 - 8.2 g/dL Final     Albumin   Date Value Ref Range Status   11/01/2023 4.2 3.5 - 5.0 g/dL Final     Bilirubin, Total   Date Value Ref Range Status   11/01/2023 1.0 >0.0 - 1.2 mg/dL Final     Alk Phos   Date Value Ref Range Status   11/01/2023 81 45 - 115 U/L Final     AST   Date Value Ref Range Status   11/01/2023 17 15 - 37 U/L Final     ALT   Date Value Ref Range Status   11/01/2023 27 16 - 61 U/L Final     Anion Gap   Date Value Ref Range Status   11/01/2023 9 7 - 16 mmol/L Final     eGFR   Date Value Ref Range Status   04/11/2022 86 >=60 mL/min/1.73m² Final      X-Ray Lumbar Complete Including Flex And Ext  Narrative: EXAMINATION:  XR LUMBAR SPINE 5 VIEW WITH FLEX AND EXT    CLINICAL HISTORY:  .  Spondylolisthesis, lumbar region    COMPARISON:  September 6, 2023    TECHNIQUE:  AP, lateral, and lateral flexion and extension views lumbar spine    FINDINGS:  There is grade 1 anterolisthesis L4 on L5 which is grossly similar in flexion and extension.    There is scattered moderate degenerative disc disease throughout the lumbar spine.  There is scattered moderate lumbar spondylosis.  There is moderate facet arthropathy in the mid to lower lumbar spine.  There is no focal lytic or blastic lesion.    There is bilateral nephrolithiasis as before.  Impression: Grade 1 spondylolisthesis without significant change in alignment with flexion or extension    Degenerative disc disease as before    Bilateral nephrolithiasis as before    Electronically signed by: Miah Jauregui  Date:    09/19/2023  Time:    12:59     Procedures   Lab Results   Component Value Date    CHOL 89 11/01/2023    CHOL 78 04/24/2023    CHOL 75 10/12/2022     Lab Results   Component Value Date    HDL 30 (L) 11/01/2023    HDL 36 (L) 04/24/2023    HDL 30 (L) 10/12/2022     Lab Results   Component Value Date    LDLCALC 36 11/01/2023     LDLCALC 32 04/24/2023    LDLCALC 30 10/12/2022     Lab Results   Component Value Date    TRIG 113 11/01/2023    TRIG 50 04/24/2023    TRIG 77 10/12/2022     Lab Results   Component Value Date    CHOLHDL 3.0 11/01/2023    CHOLHDL 2.2 04/24/2023    CHOLHDL 2.5 10/12/2022      Lab Results   Component Value Date    HGBA1C 5.2 07/07/2022      Lab Results   Component Value Date    TSH 1.230 01/26/2022    B7LWLOC 3.3 (L) 01/02/2022    FREET4 0.94 01/26/2022      Assessment and Plan (including Health Maintenance)      Problem List  Smart Sets  Document Outside HM   :    Plan:         Health Maintenance Due   Topic Date Due    Shingles Vaccine (1 of 2) Never done    RSV Vaccine (Age 60+ and Pregnant patients) (1 - 1-dose 60+ series) Never done    Hemoglobin A1c (Prediabetes)  07/07/2023    COVID-19 Vaccine (5 - 2023-24 season) 09/01/2023       Problem List Items Addressed This Visit          Derm    Contact dermatitis    Current Assessment & Plan     Contact dermatitis and will treat with triamcinolone cream to be applied twice daily.  Refills given.  Follow-up every 3 months or p.r.n.            Cardiac/Vascular    Hypertension    Current Assessment & Plan     Hypertension well controlled no change in medication.  Follow-up in 3 months.  Will check CBC and CMP yearly.  Goal is blood pressure 120/70.  Continue his amlodipine and carvedilol.           Relevant Orders    Lipid Panel    Comprehensive Metabolic Panel    CBC Auto Differential    Lipid Panel    Comprehensive Metabolic Panel    CBC Auto Differential    Hyperlipidemia    Current Assessment & Plan     Lab Results   Component Value Date    LDLCALC 36 11/01/2023    Patient's target for his LDL was 55 or less and he is at 36.  Continue rosuvastatin 20 mg daily.  Recheck in 6 months.         Primary hypertension - Primary    Current Assessment & Plan     Hypertension well controlled no change in medication.  Follow-up in 3 months.  Will check CBC and CMP yearly.  Goal is  blood pressure 120/70          Relevant Orders    Lipid Panel    Comprehensive Metabolic Panel    CBC Auto Differential    Atherosclerotic heart disease of native coronary artery with other forms of angina pectoris    Current Assessment & Plan     No recent history of atherosclerotic disease.  Would recommend he continue his Plavix as well as aspirin and will control his blood pressure in his lipids are well controlled.  Follow-up with cardiology every 6 months.            Renal/    Benign prostatic hyperplasia with nocturia    Current Assessment & Plan     BPH  is well controlled with his Hytrin.  No change in medication.  Follow-up 6 months            Other    ROSI on CPAP    Current Assessment & Plan     CPAP if he can use it; follow-up with sleep Medicine every 6 months         Primary insomnia    Current Assessment & Plan     Will try the patient on doxepin 25 mg at HS.  Also recommend he take melatonin 10 mg at the same time each evening.  Sleep hygiene discussed with the patient.  Follow-up in 1 month.  Would continue treating his sleep apnea.            Health Maintenance Topics with due status: Not Due       Topic Last Completion Date    TETANUS VACCINE 06/25/2021    Colonoscopy 07/07/2022    PROSTATE-SPECIFIC ANTIGEN 04/24/2023    Lipid Panel 11/01/2023       Future Appointments   Date Time Provider Department Center   5/2/2024  1:40 PM Chevy Malhotra DO [facility]   5/30/2024  1:45 PM Abram Allen Jr., MD [facility] UROL [facility]   8/28/2024  9:00 AM AWV NURSE, [facility] FAMILY MEDICINE [facility]        Follow up in about 3 months (around 5/2/2024).     Signature:  Chevy Malhotra DO  [facility]  [address] [place] [address], [place]  [address]    Date of encounter: 2/2/24

## 2024-02-07 NOTE — ASSESSMENT & PLAN NOTE
Hypertension well controlled no change in medication.  Follow-up in 3 months.  Will check CBC and CMP yearly.  Goal is blood pressure 120/70.  Continue his amlodipine and carvedilol.

## 2024-02-07 NOTE — ASSESSMENT & PLAN NOTE
Hypertension well controlled no change in medication.  Follow-up in 3 months.  Will check CBC and CMP yearly.  Goal is blood pressure 120/70

## 2024-02-07 NOTE — ASSESSMENT & PLAN NOTE
Will try the patient on doxepin 25 mg at HS.  Also recommend he take melatonin 10 mg at the same time each evening.  Sleep hygiene discussed with the patient.  Follow-up in 1 month.  Would continue treating his sleep apnea.

## 2024-02-07 NOTE — ASSESSMENT & PLAN NOTE
Contact dermatitis and will treat with triamcinolone cream to be applied twice daily.  Refills given.  Follow-up every 3 months or p.r.n.

## 2024-02-07 NOTE — ASSESSMENT & PLAN NOTE
Lab Results   Component Value Date    LDLCALC 36 11/01/2023    Patient's target for his LDL was 55 or less and he is at 36.  Continue rosuvastatin 20 mg daily.  Recheck in 6 months.

## 2024-02-19 ENCOUNTER — TELEPHONE (OUTPATIENT)
Dept: FAMILY MEDICINE | Facility: CLINIC | Age: 84
End: 2024-02-19

## 2024-02-19 NOTE — TELEPHONE ENCOUNTER
Pt's wife notified that Dr. Malhotra would not be able to prescribe Ambien while pt is taking Xanax. Per Dr. Malhotra pt instructed to take the Xanax at night for sleep. This nurse instructed pt wife to call if the clinic if pt is unable to tolerate the xanax at HS. No other concerns voiced at this time.

## 2024-02-19 NOTE — TELEPHONE ENCOUNTER
Pt states he frequently wakes up feeling hung over. Pt called clinic and states Doxepin 25 MG and Melatonin 10 MG are not working. Patient Doxepin and just the melatonin separately and has been unsuccessful with both. Please advise.

## 2024-03-04 PROBLEM — Z13.1 SCREENING FOR DIABETES MELLITUS: Status: RESOLVED | Noted: 2022-10-19 | Resolved: 2024-03-04

## 2024-05-02 ENCOUNTER — OFFICE VISIT (OUTPATIENT)
Dept: FAMILY MEDICINE | Facility: CLINIC | Age: 84
End: 2024-05-02
Payer: MEDICARE

## 2024-05-02 VITALS
HEART RATE: 57 BPM | OXYGEN SATURATION: 98 % | SYSTOLIC BLOOD PRESSURE: 136 MMHG | HEIGHT: 65 IN | TEMPERATURE: 98 F | DIASTOLIC BLOOD PRESSURE: 68 MMHG | RESPIRATION RATE: 18 BRPM | BODY MASS INDEX: 26.79 KG/M2 | WEIGHT: 160.81 LBS

## 2024-05-02 DIAGNOSIS — F41.9 ANXIETY: Primary | ICD-10-CM

## 2024-05-02 DIAGNOSIS — K21.9 GASTROESOPHAGEAL REFLUX DISEASE, UNSPECIFIED WHETHER ESOPHAGITIS PRESENT: ICD-10-CM

## 2024-05-02 DIAGNOSIS — D69.6 THROMBOCYTOPENIA: ICD-10-CM

## 2024-05-02 DIAGNOSIS — M17.11 ARTHRITIS OF RIGHT KNEE: ICD-10-CM

## 2024-05-02 PROCEDURE — 99214 OFFICE O/P EST MOD 30 MIN: CPT | Mod: ,,, | Performed by: NURSE PRACTITIONER

## 2024-05-02 RX ORDER — PANTOPRAZOLE SODIUM 40 MG/1
40 TABLET, DELAYED RELEASE ORAL DAILY
Qty: 90 TABLET | Refills: 1 | Status: SHIPPED | OUTPATIENT
Start: 2024-05-02

## 2024-05-02 RX ORDER — PANTOPRAZOLE SODIUM 40 MG/1
40 TABLET, DELAYED RELEASE ORAL DAILY
COMMUNITY
End: 2024-05-02 | Stop reason: SDUPTHER

## 2024-05-02 RX ORDER — ALPRAZOLAM 0.5 MG/1
0.5 TABLET ORAL 2 TIMES DAILY PRN
Qty: 90 TABLET | Refills: 0 | Status: SHIPPED | OUTPATIENT
Start: 2024-05-02

## 2024-05-02 RX ORDER — MELOXICAM 7.5 MG/1
7.5 TABLET ORAL DAILY
Qty: 90 TABLET | Refills: 1 | Status: SHIPPED | OUTPATIENT
Start: 2024-05-02

## 2024-05-02 RX ORDER — MELOXICAM 7.5 MG/1
7.5 TABLET ORAL DAILY
COMMUNITY
End: 2024-05-02 | Stop reason: SDUPTHER

## 2024-05-02 NOTE — PROGRESS NOTES
NICOLÁS Bunn   [facility]  [address]  [place] [address]     PATIENT NAME: Elliott Peters  : 1940  DATE: 24  MRN: [id number]      Billing Provider: NICOLÁS Bunn  Level of Service:   Patient PCP Information       Provider PCP Type    DO Steffanie Briseno            Reason for Visit / Chief Complaint: Establish Care (6 month check up  Med refills and Lab results )   Health Maintenance Due   Topic Date Due    Shingles Vaccine (1 of 2) Never done    RSV Vaccine (Age 60+ and Pregnant patients) (1 - 1-dose 60+ series) Never done    COVID-19 Vaccine (2023- season) 2023          Update PCP  Update Chief Complaint         History of Present Illness / Problem Focused Workflow     Elliott Peters presents to the clinic with wife for med refills. He finney see Dr. Ojeda for Cardiology and has appt coming up in July. Pt had came previously to get fasting labs, but I have not seen results. I have asked lab about these and apparently labs were lost. Informed pt and he vu. Informed we can get these on next visit or he can come by for fasting labs only one day. Pt vu. He does not have any concerns today. He denies any other needs at this time. NAD noted.     Hemoglobin A1C   Date Value Ref Range Status   2024 5.2 4.5 - 6.6 % Final     Comment:       Normal:               <5.7%  Pre-Diabetic:       5.7% to 6.4%  Diabetic:             >6.4%  Diabetic Goal:     <7%   2022 5.2 4.5 - 6.6 % Final     Comment:       Normal:               <5.7%  Pre-Diabetic:       5.7% to 6.4%  Diabetic:             >6.4%  Diabetic Goal:     <7%        CMP  Sodium   Date Value Ref Range Status   2024 141 136 - 145 mmol/L Final     Potassium   Date Value Ref Range Status   2024 3.7 3.5 - 5.1 mmol/L Final     Chloride   Date Value Ref Range Status   2024 111 (H) 98 - 107 mmol/L Final     CO2   Date Value Ref Range Status   2024 22 21 - 32 mmol/L Final     Glucose    Date Value Ref Range Status   05/09/2024 139 (H) 74 - 106 mg/dL Final     BUN   Date Value Ref Range Status   05/09/2024 21 (H) 7 - 18 mg/dL Final     Creatinine   Date Value Ref Range Status   05/09/2024 0.96 0.70 - 1.30 mg/dL Final     Calcium   Date Value Ref Range Status   05/09/2024 9.1 8.5 - 10.1 mg/dL Final     Total Protein   Date Value Ref Range Status   05/09/2024 7.0 6.4 - 8.2 g/dL Final     Albumin   Date Value Ref Range Status   05/09/2024 4.2 3.5 - 5.0 g/dL Final     Bilirubin, Total   Date Value Ref Range Status   05/09/2024 1.0 >0.0 - 1.2 mg/dL Final     Alk Phos   Date Value Ref Range Status   05/09/2024 84 45 - 115 U/L Final     AST   Date Value Ref Range Status   05/09/2024 38 (H) 15 - 37 U/L Final     ALT   Date Value Ref Range Status   05/09/2024 35 16 - 61 U/L Final     Anion Gap   Date Value Ref Range Status   05/09/2024 12 7 - 16 mmol/L Final     eGFR   Date Value Ref Range Status   05/09/2024 77 >=60 mL/min/1.73m2 Final        Lab Results   Component Value Date    WBC 4.80 05/09/2024    RBC 4.71 05/09/2024    HGB 13.7 05/09/2024    HCT 43.6 05/09/2024    MCV 92.6 05/09/2024    MCH 29.1 05/09/2024    MCHC 31.4 (L) 05/09/2024    RDW 15.3 (H) 05/09/2024     (L) 05/09/2024    MPV 11.8 05/09/2024    LYMPH 39.4 05/09/2024    LYMPH 1.89 05/09/2024    MONO 28.3 (H) 05/09/2024    EOS 0.13 05/09/2024    BASO 0.04 05/09/2024    EOSINOPHIL 2.7 05/09/2024    BASOPHIL 0.8 05/09/2024        Lab Results   Component Value Date    CHOL 75 05/09/2024    CHOL 89 11/01/2023    CHOL 78 04/24/2023     Lab Results   Component Value Date    HDL 22 (L) 05/09/2024    HDL 30 (L) 11/01/2023    HDL 36 (L) 04/24/2023     Lab Results   Component Value Date    LDLCALC 20 05/09/2024    LDLCALC 36 11/01/2023    LDLCALC 32 04/24/2023     Lab Results   Component Value Date    TRIG 163 (H) 05/09/2024    TRIG 113 11/01/2023    TRIG 50 04/24/2023     Lab Results   Component Value Date    CHOLHDL 3.4 05/09/2024    CHOLHDL  3.0 11/01/2023    CHOLHDL 2.2 04/24/2023        Wt Readings from Last 3 Encounters:   05/09/24 1427 79.5 kg (175 lb 3.2 oz)   05/02/24 0929 72.9 kg (160 lb 12.8 oz)   02/02/24 1417 80.2 kg (176 lb 12.8 oz)        BP Readings from Last 3 Encounters:   05/09/24 130/70   05/02/24 136/68   02/02/24 116/67        Review of Systems     Review of Systems   Constitutional: Negative.    Eyes: Negative.    Respiratory: Negative.     Cardiovascular: Negative.    Gastrointestinal: Negative.    Endocrine: Negative.    Genitourinary: Negative.    Musculoskeletal:  Positive for arthralgias.   Integumentary:  Negative.   Allergic/Immunologic: Negative.    Neurological: Negative.    Hematological: Negative.    Psychiatric/Behavioral: Negative.          Medical / Social / Family History     Past Medical History:   Diagnosis Date    Acute pain of right knee 12/30/2021    Allergic reaction 05/17/2021    Contact dermatitis 08/11/2021    Coronary artery disease     cardiac stents    Enlarged lymph node 10/19/2022    Hematoma 10/19/2022    Hyperlipidemia     Hypertension     Kidney stone     MI (myocardial infarction)     Sleep apnea     Staphylococcal infection of skin 05/17/2021    Upper respiratory tract infection 12/26/2023    Urinary tract infection        Past Surgical History:   Procedure Laterality Date    CORONARY ANGIOPLASTY WITH STENT PLACEMENT      HERNIA REPAIR      JOINT REPLACEMENT Bilateral     difficult to wake after surgery     PROSTATECTOMY  2012       Social History    reports that he quit smoking about 66 years ago. His smoking use included cigarettes. He has been exposed to tobacco smoke. He has never used smokeless tobacco. He reports that he does not currently use alcohol. He reports that he does not use drugs.    Family History  's family history includes Colon cancer in his mother; Heart attack in his father.    Medications and Allergies     Medications  Outpatient Medications Marked as Taking for the  "5/2/24 encounter (Office Visit) with Mai Howell FNP   Medication Sig Dispense Refill    amLODIPine (NORVASC) 5 MG tablet Take 0.5 tablets (2.5 mg total) by mouth once daily. 180 tablet 1    aspirin (ECOTRIN) 81 MG EC tablet Take 81 mg by mouth once daily.      carvediloL (COREG) 3.125 MG tablet Take 1 tablet (3.125 mg total) by mouth 2 (two) times daily with meals. 180 tablet 1    clopidogreL (PLAVIX) 75 mg tablet Take 1 tablet (75 mg total) by mouth once daily. 90 tablet 1    docusate sodium (COLACE) 100 MG capsule Take 100 mg by mouth once daily.      melatonin 10 mg Tab Take 1 tablet (10 mg total) by mouth every evening. (Patient not taking: Reported on 5/9/2024) 100 tablet 3    polyethylene glycol (GLYCOLAX) 17 gram PwPk Take by mouth.      terazosin (HYTRIN) 5 MG capsule Take 1 capsule (5 mg total) by mouth every evening. 90 capsule 1    triamcinolone acetonide 0.1% (KENALOG) 0.1 % cream Apply topically 2 (two) times daily. 454 g 5    [DISCONTINUED] ALPRAZolam (XANAX) 0.5 MG tablet Take 1 tablet (0.5 mg total) by mouth 2 (two) times daily as needed for Anxiety. 90 tablet 0    [DISCONTINUED] meloxicam (MOBIC) 7.5 MG tablet Take 7.5 mg by mouth once daily.      [DISCONTINUED] pantoprazole (PROTONIX) 40 MG tablet Take 40 mg by mouth once daily.      [DISCONTINUED] rosuvastatin (CRESTOR) 20 MG tablet Take 1 tablet (20 mg total) by mouth once daily. 90 tablet 1       Allergies  Review of patient's allergies indicates:   Allergen Reactions    Bronopol Dermatitis    Propylene glycol Dermatitis       Physical Examination     Vitals:    05/02/24 0929   BP: 136/68   BP Location: Left arm   Patient Position: Sitting   BP Method: Large (Automatic)   Pulse: (!) 57   Resp: 18   Temp: 97.7 °F (36.5 °C)   TempSrc: Oral   SpO2: 98%   Weight: 72.9 kg (160 lb 12.8 oz)   Height: 5' 5" (1.651 m)      Physical Exam  Constitutional:       Appearance: Normal appearance.   HENT:      Head: Normocephalic.   Eyes:      Extraocular " Movements: Extraocular movements intact.   Cardiovascular:      Rate and Rhythm: Normal rate and regular rhythm.      Pulses: Normal pulses.      Heart sounds: Normal heart sounds.   Pulmonary:      Effort: Pulmonary effort is normal.      Breath sounds: Normal breath sounds.   Skin:     General: Skin is warm and dry.      Capillary Refill: Capillary refill takes less than 2 seconds.   Neurological:      General: No focal deficit present.      Mental Status: He is alert and oriented to person, place, and time.   Psychiatric:         Mood and Affect: Mood normal.         Behavior: Behavior normal.          Assessment and Plan (including Health Maintenance)      Problem List  Smart Sets  Document Outside HM   :    Plan:     There are no Patient Instructions on file for this visit.       Health Maintenance Due   Topic Date Due    Shingles Vaccine (1 of 2) Never done    RSV Vaccine (Age 60+ and Pregnant patients) (1 - 1-dose 60+ series) Never done    COVID-19 Vaccine (5 - 2023-24 season) 09/01/2023       Problem List Items Addressed This Visit       Anxiety - Primary    Current Assessment & Plan      reviewed with no aberrant behavior noted.  Pt stable on current medication. Med refilled.            Relevant Medications    ALPRAZolam (XANAX) 0.5 MG tablet    Arthritis of right knee    Current Assessment & Plan     OA is controlled. Current medical regimen is effective;   Will adjust according to results and monitor.          Relevant Medications    meloxicam (MOBIC) 7.5 MG tablet    GI PPX    Current Assessment & Plan     GERD  is controlled. Current medical regimen is effective;   Will adjust according to results and monitor.          Relevant Medications    pantoprazole (PROTONIX) 40 MG tablet    Thrombocytopenia    Current Assessment & Plan     Pt will RTC for repeat fasting labs at a later date. Will inform of results when available.           Anxiety  -     ALPRAZolam (XANAX) 0.5 MG tablet; Take 1 tablet (0.5 mg  total) by mouth 2 (two) times daily as needed for Anxiety.  Dispense: 90 tablet; Refill: 0    Gastroesophageal reflux disease, unspecified whether esophagitis present  -     pantoprazole (PROTONIX) 40 MG tablet; Take 1 tablet (40 mg total) by mouth once daily. (Patient not taking: Reported on 5/9/2024)  Dispense: 90 tablet; Refill: 1    Arthritis of right knee  -     meloxicam (MOBIC) 7.5 MG tablet; Take 1 tablet (7.5 mg total) by mouth once daily.  Dispense: 90 tablet; Refill: 1    Thrombocytopenia       Health Maintenance Topics with due status: Not Due       Topic Last Completion Date    TETANUS VACCINE 06/25/2021    Colonoscopy 07/07/2022    PROSTATE-SPECIFIC ANTIGEN 05/09/2024    Aspirin/Antiplatelet Therapy 05/09/2024    Hemoglobin A1c (Prediabetes) 05/09/2024    Lipid Panel 05/09/2024         Future Appointments   Date Time Provider Department Center   5/30/2024  1:45 PM Abram Allen Jr., MD [facility] UROL [facility]   8/5/2024  2:00 PM AWV NURSE, [facility] FAMILY MEDICINE [facility] FAM[facility] [place]   11/8/2024  9:00 AM [facility] [place]        Follow up in about 6 months (around 11/2/2024), or if symptoms worsen or fail to improve.    Health Maintenance Due   Topic Date Due    Shingles Vaccine (1 of 2) Never done    RSV Vaccine (Age 60+ and Pregnant patients) (1 - 1-dose 60+ series) Never done    COVID-19 Vaccine (5 - 2023-24 season) 09/01/2023        Signature:  NICOLÁS Bunn    Date of encounter: 5/2/24

## 2024-05-09 ENCOUNTER — OFFICE VISIT (OUTPATIENT)
Dept: FAMILY MEDICINE | Facility: CLINIC | Age: 84
End: 2024-05-09
Payer: MEDICARE

## 2024-05-09 VITALS
DIASTOLIC BLOOD PRESSURE: 70 MMHG | HEART RATE: 57 BPM | RESPIRATION RATE: 20 BRPM | TEMPERATURE: 98 F | OXYGEN SATURATION: 97 % | SYSTOLIC BLOOD PRESSURE: 130 MMHG | HEIGHT: 65 IN | BODY MASS INDEX: 29.19 KG/M2 | WEIGHT: 175.19 LBS

## 2024-05-09 DIAGNOSIS — E78.2 MIXED HYPERLIPIDEMIA: ICD-10-CM

## 2024-05-09 DIAGNOSIS — L08.9 SKIN INFECTION: ICD-10-CM

## 2024-05-09 DIAGNOSIS — Z71.89 COMPLEX CARE COORDINATION: ICD-10-CM

## 2024-05-09 DIAGNOSIS — B02.9 HERPES ZOSTER WITHOUT COMPLICATION: Primary | ICD-10-CM

## 2024-05-09 DIAGNOSIS — R79.9 ABNORMAL FINDING OF BLOOD CHEMISTRY, UNSPECIFIED: ICD-10-CM

## 2024-05-09 DIAGNOSIS — I10 PRIMARY HYPERTENSION: ICD-10-CM

## 2024-05-09 DIAGNOSIS — Z12.5 SCREENING FOR PROSTATE CANCER: ICD-10-CM

## 2024-05-09 LAB
ALBUMIN SERPL BCP-MCNC: 4.2 G/DL (ref 3.5–5)
ALBUMIN/GLOB SERPL: 1.5 {RATIO}
ALP SERPL-CCNC: 84 U/L (ref 45–115)
ALT SERPL W P-5'-P-CCNC: 35 U/L (ref 16–61)
ANION GAP SERPL CALCULATED.3IONS-SCNC: 12 MMOL/L (ref 7–16)
AST SERPL W P-5'-P-CCNC: 38 U/L (ref 15–37)
BASOPHILS # BLD AUTO: 0.04 K/UL (ref 0–0.2)
BASOPHILS NFR BLD AUTO: 0.8 % (ref 0–1)
BILIRUB SERPL-MCNC: 1 MG/DL (ref ?–1.2)
BUN SERPL-MCNC: 21 MG/DL (ref 7–18)
BUN/CREAT SERPL: 22 (ref 6–20)
CALCIUM SERPL-MCNC: 9.1 MG/DL (ref 8.5–10.1)
CHLORIDE SERPL-SCNC: 111 MMOL/L (ref 98–107)
CHOLEST SERPL-MCNC: 75 MG/DL (ref 0–200)
CHOLEST/HDLC SERPL: 3.4 {RATIO}
CO2 SERPL-SCNC: 22 MMOL/L (ref 21–32)
CREAT SERPL-MCNC: 0.96 MG/DL (ref 0.7–1.3)
DIFFERENTIAL METHOD BLD: ABNORMAL
EGFR (NO RACE VARIABLE) (RUSH/TITUS): 77 ML/MIN/1.73M2
EOSINOPHIL # BLD AUTO: 0.13 K/UL (ref 0–0.5)
EOSINOPHIL NFR BLD AUTO: 2.7 % (ref 1–4)
ERYTHROCYTE [DISTWIDTH] IN BLOOD BY AUTOMATED COUNT: 15.3 % (ref 11.5–14.5)
GLOBULIN SER-MCNC: 2.8 G/DL (ref 2–4)
GLUCOSE SERPL-MCNC: 139 MG/DL (ref 74–106)
HCT VFR BLD AUTO: 43.6 % (ref 40–54)
HDLC SERPL-MCNC: 22 MG/DL (ref 40–60)
HGB BLD-MCNC: 13.7 G/DL (ref 13.5–18)
IMM GRANULOCYTES # BLD AUTO: 0.05 K/UL (ref 0–0.04)
IMM GRANULOCYTES NFR BLD: 1 % (ref 0–0.4)
LDLC SERPL CALC-MCNC: 20 MG/DL
LYMPHOCYTES # BLD AUTO: 1.89 K/UL (ref 1–4.8)
LYMPHOCYTES NFR BLD AUTO: 39.4 % (ref 27–41)
MCH RBC QN AUTO: 29.1 PG (ref 27–31)
MCHC RBC AUTO-ENTMCNC: 31.4 G/DL (ref 32–36)
MCV RBC AUTO: 92.6 FL (ref 80–96)
MONOCYTES # BLD AUTO: 1.36 K/UL (ref 0–0.8)
MONOCYTES NFR BLD AUTO: 28.3 % (ref 2–6)
MPC BLD CALC-MCNC: 11.8 FL (ref 9.4–12.4)
NEUTROPHILS # BLD AUTO: 1.33 K/UL (ref 1.8–7.7)
NEUTROPHILS NFR BLD AUTO: 27.8 % (ref 53–65)
NONHDLC SERPL-MCNC: 53 MG/DL
NRBC # BLD AUTO: 0 X10E3/UL
NRBC, AUTO (.00): 0 %
PLATELET # BLD AUTO: 104 K/UL (ref 150–400)
PLATELET MORPHOLOGY: ABNORMAL
POTASSIUM SERPL-SCNC: 3.7 MMOL/L (ref 3.5–5.1)
PROT SERPL-MCNC: 7 G/DL (ref 6.4–8.2)
PSA SERPL-MCNC: 4.88 NG/ML
RBC # BLD AUTO: 4.71 M/UL (ref 4.6–6.2)
RBC MORPH BLD: NORMAL
SODIUM SERPL-SCNC: 141 MMOL/L (ref 136–145)
TRIGL SERPL-MCNC: 163 MG/DL (ref 35–150)
VLDLC SERPL-MCNC: 33 MG/DL
WBC # BLD AUTO: 4.8 K/UL (ref 4.5–11)

## 2024-05-09 PROCEDURE — 99214 OFFICE O/P EST MOD 30 MIN: CPT | Mod: ,,, | Performed by: NURSE PRACTITIONER

## 2024-05-09 PROCEDURE — 80061 LIPID PANEL: CPT | Mod: ,,, | Performed by: CLINICAL MEDICAL LABORATORY

## 2024-05-09 PROCEDURE — 80053 COMPREHEN METABOLIC PANEL: CPT | Mod: ,,, | Performed by: CLINICAL MEDICAL LABORATORY

## 2024-05-09 PROCEDURE — 86787 VARICELLA-ZOSTER ANTIBODY: CPT | Mod: ,,, | Performed by: CLINICAL MEDICAL LABORATORY

## 2024-05-09 PROCEDURE — 85025 COMPLETE CBC W/AUTO DIFF WBC: CPT | Mod: ,,, | Performed by: CLINICAL MEDICAL LABORATORY

## 2024-05-09 PROCEDURE — 83036 HEMOGLOBIN GLYCOSYLATED A1C: CPT | Mod: ,,, | Performed by: CLINICAL MEDICAL LABORATORY

## 2024-05-09 PROCEDURE — G0103 PSA SCREENING: HCPCS | Mod: ,,, | Performed by: CLINICAL MEDICAL LABORATORY

## 2024-05-09 RX ORDER — VALACYCLOVIR HYDROCHLORIDE 1 G/1
1000 TABLET, FILM COATED ORAL 2 TIMES DAILY
Qty: 14 TABLET | Refills: 0 | Status: SHIPPED | OUTPATIENT
Start: 2024-05-09 | End: 2024-05-16

## 2024-05-09 RX ORDER — DOXYCYCLINE 100 MG/1
100 CAPSULE ORAL EVERY 12 HOURS
Qty: 20 CAPSULE | Refills: 0 | Status: SHIPPED | OUTPATIENT
Start: 2024-05-09 | End: 2024-05-19

## 2024-05-10 DIAGNOSIS — E78.2 MIXED HYPERLIPIDEMIA: Primary | ICD-10-CM

## 2024-05-10 LAB
EST. AVERAGE GLUCOSE BLD GHB EST-MCNC: 103 MG/DL
HBA1C MFR BLD HPLC: 5.2 % (ref 4.5–6.6)

## 2024-05-10 PROCEDURE — 86617 LYME DISEASE ANTIBODY: CPT | Mod: 90,,, | Performed by: CLINICAL MEDICAL LABORATORY

## 2024-05-10 RX ORDER — ROSUVASTATIN CALCIUM 40 MG/1
40 TABLET, COATED ORAL NIGHTLY
Qty: 90 TABLET | Refills: 3 | Status: SHIPPED | OUTPATIENT
Start: 2024-05-10 | End: 2025-05-10

## 2024-05-10 NOTE — PROGRESS NOTES
"Please let pt know so far of the labs I have back.   1) His sodium, potassium, kidney/liver function are good. His sugar was elevated a little, which I know it was later in the day so he had ate, but I'm going to try and see about adding an A1C to check diabetes. We will have that back Monday if we can add it.   2) His "good cholesterol" (HDL) is decreased at 22, we like this above 40. I see he is taking Crestor 20mg. I will send in an increased dose of this os 40mg to take nightly. He can take 2 of the 20mg for now and I will send in the 40mg. Also needs to watch diet in fried, fatty foods.   3) His prostates screening level was slightly elevated, but looks like it is down from what it has been in the past. We can just monitor this as I don't think this is anything to worry about right now.   4) Blood counts were good, I'm leaning more toward that spot being shingles on his back just back his blood count did show more of a viral infection than bacterial, but I don't have the herpes zoster lab to know for sure yet.   Thanks!"

## 2024-05-15 LAB
B BURGDOR AB PATRN SER IB-IMP: NORMAL
B BURGDOR IGG PATRN SER IB-IMP: NORMAL KDA
B BURGDOR IGG SER QL IB: NEGATIVE
B BURGDOR IGM PATRN SER IB-IMP: NORMAL KDA
B BURGDOR IGM SER QL IB: NEGATIVE
VARICELLA ZOSTER, BLOOD: POSITIVE
VZV IGG INDEX: >3.2 (ref 0–0.9)

## 2024-05-15 NOTE — PROGRESS NOTES
Please check to see how pt is doing. He was supposed to come in Monday but I didn't see him. His varicella (shingles) lab is back. It is positive which could be because he has had the vaccine, but also the igg is elevated which could indicate a recent infection. I would just see how he is doing and how that spot is on his back. Thanks!

## 2024-05-29 PROBLEM — L08.9 SKIN INFECTION: Status: ACTIVE | Noted: 2024-05-29

## 2024-05-29 PROBLEM — J06.9 UPPER RESPIRATORY TRACT INFECTION: Status: RESOLVED | Noted: 2023-12-26 | Resolved: 2024-05-29

## 2024-05-29 PROBLEM — T14.8XXA HEMATOMA: Status: RESOLVED | Noted: 2022-10-19 | Resolved: 2024-05-29

## 2024-05-29 PROBLEM — T78.40XA ALLERGIC REACTION: Status: RESOLVED | Noted: 2021-05-17 | Resolved: 2024-05-29

## 2024-05-29 PROBLEM — Z12.5 SCREENING FOR PROSTATE CANCER: Status: ACTIVE | Noted: 2024-05-29

## 2024-05-29 PROBLEM — R79.9 ABNORMAL FINDING OF BLOOD CHEMISTRY, UNSPECIFIED: Status: ACTIVE | Noted: 2024-05-29

## 2024-05-29 PROBLEM — L25.9 CONTACT DERMATITIS: Status: RESOLVED | Noted: 2021-08-11 | Resolved: 2024-05-29

## 2024-05-29 PROBLEM — R59.9 ENLARGED LYMPH NODE: Status: RESOLVED | Noted: 2022-10-19 | Resolved: 2024-05-29

## 2024-05-29 PROBLEM — B02.9 HERPES ZOSTER WITHOUT COMPLICATION: Status: ACTIVE | Noted: 2024-05-29

## 2024-05-29 PROBLEM — M25.561 ACUTE PAIN OF RIGHT KNEE: Status: RESOLVED | Noted: 2021-12-30 | Resolved: 2024-05-29

## 2024-05-29 PROBLEM — M62.81 MUSCLE WEAKNESS (GENERALIZED): Status: RESOLVED | Noted: 2021-12-30 | Resolved: 2024-05-29

## 2024-05-29 PROBLEM — L08.9 STAPHYLOCOCCAL INFECTION OF SKIN: Status: RESOLVED | Noted: 2021-05-17 | Resolved: 2024-05-29

## 2024-05-29 PROBLEM — B95.8 STAPHYLOCOCCAL INFECTION OF SKIN: Status: RESOLVED | Noted: 2021-05-17 | Resolved: 2024-05-29

## 2024-05-29 NOTE — ASSESSMENT & PLAN NOTE
Labs pending. Will inform of results when available.   Valtrex prescribed to take as directed.   Instructed to RTC for any new/worsening/persisting ssx.

## 2024-05-29 NOTE — PROGRESS NOTES
"   NICOLÁS Bunn   [facility]  [address]  [place] [address]     PATIENT NAME: Elliott Peters  : 1940  DATE: 24  MRN: [id number]      Billing Provider: NICOLÁS Bunn  Level of Service:   Patient PCP Information       Provider PCP Type    Chevy Malhotra DO General            Reason for Visit / Chief Complaint: Tick Removal (On back bone lower back )   Health Maintenance Due   Topic Date Due    Shingles Vaccine (1 of 2) Never done    RSV Vaccine (Age 60+ and Pregnant patients) (1 - 1-dose 60+ series) Never done    COVID-19 Vaccine (2023- season) 2023          Update PCP  Update Chief Complaint         History of Present Illness / Problem Focused Workflow     Elliott Peters presents to the clinic due to area to lower , right side of back that is irritated. Pt states he thought it was  tick as he has been outside. He has tried to get his wife to look at it to see if anything is in it but unable to. Area is red, swollen with blisters surrounding. He states it has been itching and has felt a "tingling sensation to area". Pt is also in need of getting routine lab work since labs were lost last time. He denies any other needs at this time. NAD noted.     Hemoglobin A1C   Date Value Ref Range Status   2024 5.2 4.5 - 6.6 % Final     Comment:       Normal:               <5.7%  Pre-Diabetic:       5.7% to 6.4%  Diabetic:             >6.4%  Diabetic Goal:     <7%   2022 5.2 4.5 - 6.6 % Final     Comment:       Normal:               <5.7%  Pre-Diabetic:       5.7% to 6.4%  Diabetic:             >6.4%  Diabetic Goal:     <7%        CMP  Sodium   Date Value Ref Range Status   2024 141 136 - 145 mmol/L Final     Potassium   Date Value Ref Range Status   2024 3.7 3.5 - 5.1 mmol/L Final     Chloride   Date Value Ref Range Status   2024 111 (H) 98 - 107 mmol/L Final     CO2   Date Value Ref Range Status   2024 22 21 - 32 mmol/L Final     Glucose "   Date Value Ref Range Status   05/09/2024 139 (H) 74 - 106 mg/dL Final     BUN   Date Value Ref Range Status   05/09/2024 21 (H) 7 - 18 mg/dL Final     Creatinine   Date Value Ref Range Status   05/09/2024 0.96 0.70 - 1.30 mg/dL Final     Calcium   Date Value Ref Range Status   05/09/2024 9.1 8.5 - 10.1 mg/dL Final     Total Protein   Date Value Ref Range Status   05/09/2024 7.0 6.4 - 8.2 g/dL Final     Albumin   Date Value Ref Range Status   05/09/2024 4.2 3.5 - 5.0 g/dL Final     Bilirubin, Total   Date Value Ref Range Status   05/09/2024 1.0 >0.0 - 1.2 mg/dL Final     Alk Phos   Date Value Ref Range Status   05/09/2024 84 45 - 115 U/L Final     AST   Date Value Ref Range Status   05/09/2024 38 (H) 15 - 37 U/L Final     ALT   Date Value Ref Range Status   05/09/2024 35 16 - 61 U/L Final     Anion Gap   Date Value Ref Range Status   05/09/2024 12 7 - 16 mmol/L Final     eGFR   Date Value Ref Range Status   05/09/2024 77 >=60 mL/min/1.73m2 Final        Lab Results   Component Value Date    WBC 4.80 05/09/2024    RBC 4.71 05/09/2024    HGB 13.7 05/09/2024    HCT 43.6 05/09/2024    MCV 92.6 05/09/2024    MCH 29.1 05/09/2024    MCHC 31.4 (L) 05/09/2024    RDW 15.3 (H) 05/09/2024     (L) 05/09/2024    MPV 11.8 05/09/2024    LYMPH 39.4 05/09/2024    LYMPH 1.89 05/09/2024    MONO 28.3 (H) 05/09/2024    EOS 0.13 05/09/2024    BASO 0.04 05/09/2024    EOSINOPHIL 2.7 05/09/2024    BASOPHIL 0.8 05/09/2024        Lab Results   Component Value Date    CHOL 75 05/09/2024    CHOL 89 11/01/2023    CHOL 78 04/24/2023     Lab Results   Component Value Date    HDL 22 (L) 05/09/2024    HDL 30 (L) 11/01/2023    HDL 36 (L) 04/24/2023     Lab Results   Component Value Date    LDLCALC 20 05/09/2024    LDLCALC 36 11/01/2023    LDLCALC 32 04/24/2023     Lab Results   Component Value Date    TRIG 163 (H) 05/09/2024    TRIG 113 11/01/2023    TRIG 50 04/24/2023     Lab Results   Component Value Date    CHOLHDL 3.4 05/09/2024    CHOLHDL  3.0 11/01/2023    CHOLHDL 2.2 04/24/2023        Wt Readings from Last 3 Encounters:   05/09/24 1427 79.5 kg (175 lb 3.2 oz)   05/02/24 0929 72.9 kg (160 lb 12.8 oz)   02/02/24 1417 80.2 kg (176 lb 12.8 oz)        BP Readings from Last 3 Encounters:   05/09/24 130/70   05/02/24 136/68   02/02/24 116/67        Review of Systems     Review of Systems   Constitutional: Negative.    Eyes: Negative.    Respiratory: Negative.     Cardiovascular: Negative.    Gastrointestinal: Negative.    Endocrine: Negative.    Integumentary:  Positive for rash.   Allergic/Immunologic: Negative.    Neurological: Negative.    Hematological: Negative.    Psychiatric/Behavioral: Negative.          Medical / Social / Family History     Past Medical History:   Diagnosis Date    Acute pain of right knee 12/30/2021    Allergic reaction 05/17/2021    Contact dermatitis 08/11/2021    Coronary artery disease     cardiac stents    Enlarged lymph node 10/19/2022    Hematoma 10/19/2022    Hyperlipidemia     Hypertension     Kidney stone     MI (myocardial infarction)     Sleep apnea     Staphylococcal infection of skin 05/17/2021    Upper respiratory tract infection 12/26/2023    Urinary tract infection        Past Surgical History:   Procedure Laterality Date    CORONARY ANGIOPLASTY WITH STENT PLACEMENT      HERNIA REPAIR      JOINT REPLACEMENT Bilateral     difficult to wake after surgery     PROSTATECTOMY  2012       Social History    reports that he quit smoking about 66 years ago. His smoking use included cigarettes. He has been exposed to tobacco smoke. He has never used smokeless tobacco. He reports that he does not currently use alcohol. He reports that he does not use drugs.    Family History  's family history includes Colon cancer in his mother; Heart attack in his father.    Medications and Allergies     Medications  Outpatient Medications Marked as Taking for the 5/9/24 encounter (Office Visit) with Mai Howell FNP   Medication  "Sig Dispense Refill    ALPRAZolam (XANAX) 0.5 MG tablet Take 1 tablet (0.5 mg total) by mouth 2 (two) times daily as needed for Anxiety. 90 tablet 0    amLODIPine (NORVASC) 5 MG tablet Take 0.5 tablets (2.5 mg total) by mouth once daily. 180 tablet 1    aspirin (ECOTRIN) 81 MG EC tablet Take 81 mg by mouth once daily.      carvediloL (COREG) 3.125 MG tablet Take 1 tablet (3.125 mg total) by mouth 2 (two) times daily with meals. 180 tablet 1    clopidogreL (PLAVIX) 75 mg tablet Take 1 tablet (75 mg total) by mouth once daily. 90 tablet 1    docusate sodium (COLACE) 100 MG capsule Take 100 mg by mouth once daily.      meloxicam (MOBIC) 7.5 MG tablet Take 1 tablet (7.5 mg total) by mouth once daily. 90 tablet 1    polyethylene glycol (GLYCOLAX) 17 gram PwPk Take by mouth.      terazosin (HYTRIN) 5 MG capsule Take 1 capsule (5 mg total) by mouth every evening. 90 capsule 1    triamcinolone acetonide 0.1% (KENALOG) 0.1 % cream Apply topically 2 (two) times daily. 454 g 5    [DISCONTINUED] rosuvastatin (CRESTOR) 20 MG tablet Take 1 tablet (20 mg total) by mouth once daily. 90 tablet 1       Allergies  Review of patient's allergies indicates:   Allergen Reactions    Bronopol Dermatitis    Propylene glycol Dermatitis       Physical Examination     Vitals:    05/09/24 1427   BP: 130/70   BP Location: Left arm   Patient Position: Sitting   BP Method: Large (Automatic)   Pulse: (!) 57   Resp: 20   Temp: 97.5 °F (36.4 °C)   TempSrc: Oral   SpO2: 97%   Weight: 79.5 kg (175 lb 3.2 oz)   Height: 5' 5" (1.651 m)      Physical Exam  Constitutional:       Appearance: Normal appearance.   HENT:      Head: Normocephalic.   Eyes:      Extraocular Movements: Extraocular movements intact.   Cardiovascular:      Rate and Rhythm: Normal rate and regular rhythm.      Pulses: Normal pulses.      Heart sounds: Normal heart sounds.   Pulmonary:      Effort: Pulmonary effort is normal.      Breath sounds: Normal breath sounds.   Skin:     " General: Skin is warm and dry.      Capillary Refill: Capillary refill takes less than 2 seconds.      Findings: Erythema and rash present. Rash is vesicular.      Comments: Pt with quarter size area to right, lower back irritated with erythema, slight edema, with a vesicular rash surrounding area; no open areas noted; bruising noted where trying to remove possible tick; I am unable to see a foreign object to area; see pic   Neurological:      General: No focal deficit present.      Mental Status: He is alert and oriented to person, place, and time.   Psychiatric:         Mood and Affect: Mood normal.         Behavior: Behavior normal.          Assessment and Plan (including Health Maintenance)      Problem List  Smart Sets  Document Outside HM   :    Plan:     There are no Patient Instructions on file for this visit.       Health Maintenance Due   Topic Date Due    Shingles Vaccine (1 of 2) Never done    RSV Vaccine (Age 60+ and Pregnant patients) (1 - 1-dose 60+ series) Never done    COVID-19 Vaccine (5 - 2023-24 season) 09/01/2023       Problem List Items Addressed This Visit       Abnormal finding of blood chemistry, unspecified    Current Assessment & Plan     Labs pending. Will inform of results when available.          Relevant Orders    Hemoglobin A1C (Completed)    Herpes zoster without complication - Primary    Current Assessment & Plan     Labs pending. Will inform of results when available.   Valtrex prescribed to take as directed.   Instructed to RTC for any new/worsening/persisting ssx.           Relevant Orders    Varicella Zoster Antibody, IgG (Completed)    Varicella-Zoster Virus, PCR    Hemoglobin A1C (Completed)    Hyperlipidemia    Current Assessment & Plan     Labs pending. Will inform of results when available.          Relevant Orders    Lipid Panel (Completed)    Varicella Zoster Antibody, IgG (Completed)    Varicella-Zoster Virus, PCR    Hemoglobin A1C (Completed)    Hypertension    Current  Assessment & Plan     Blood pressure is controlled. Current medical regimen is effective;   Will adjust according to results and monitor.          Relevant Orders    Comprehensive Metabolic Panel (Completed)    Varicella Zoster Antibody, IgG (Completed)    Varicella-Zoster Virus, PCR    Hemoglobin A1C (Completed)    Screening for prostate cancer    Current Assessment & Plan     Labs pending. Will inform of results when available.          Relevant Orders    PSA, Screening (Completed)    Varicella Zoster Antibody, IgG (Completed)    Varicella-Zoster Virus, PCR    Hemoglobin A1C (Completed)    Skin infection    Current Assessment & Plan     Labs pending. Will inform of results when available.   Doxycycline prescribed to take as directed. Instructed to take all abx until gone even if start to feel better.    Instructed to keep area as clean and dry as possible and keep covered. Pt vu.   Instructed to RTC for any new/worsening/persisting ssx.           Relevant Orders    CBC Auto Differential (Completed)    Varicella Zoster Antibody, IgG (Completed)    Varicella-Zoster Virus, PCR    Lyme Disease Ab, Immunoblot (Completed)    CBC Morphology (Completed)    Hemoglobin A1C (Completed)     Herpes zoster without complication  -     valACYclovir (VALTREX) 1000 MG tablet; Take 1 tablet (1,000 mg total) by mouth 2 (two) times daily. for 7 days  Dispense: 14 tablet; Refill: 0  -     Varicella Zoster Antibody, IgG; Future; Expected date: 05/09/2024  -     Cancel: Varicella Zoster Antibody, IgM; Future; Expected date: 05/09/2024  -     Varicella-Zoster Virus, PCR; Future; Expected date: 05/09/2024  -     Hemoglobin A1C; Future; Expected date: 05/10/2024    Skin infection  -     doxycycline (VIBRAMYCIN) 100 MG Cap; Take 1 capsule (100 mg total) by mouth every 12 (twelve) hours. for 10 days  Dispense: 20 capsule; Refill: 0  -     CBC Auto Differential; Future; Expected date: 05/09/2024  -     Varicella Zoster Antibody, IgG; Future;  Expected date: 05/09/2024  -     Cancel: Varicella Zoster Antibody, IgM; Future; Expected date: 05/09/2024  -     Varicella-Zoster Virus, PCR; Future; Expected date: 05/09/2024  -     Lyme Disease Ab, Immunoblot; Future; Expected date: 05/09/2024  -     CBC Morphology  -     Hemoglobin A1C; Future; Expected date: 05/10/2024    Mixed hyperlipidemia  -     Lipid Panel; Future; Expected date: 05/09/2024  -     Varicella Zoster Antibody, IgG; Future; Expected date: 05/09/2024  -     Cancel: Varicella Zoster Antibody, IgM; Future; Expected date: 05/09/2024  -     Varicella-Zoster Virus, PCR; Future; Expected date: 05/09/2024  -     Hemoglobin A1C; Future; Expected date: 05/10/2024    Primary hypertension  -     Comprehensive Metabolic Panel; Future; Expected date: 05/09/2024  -     Varicella Zoster Antibody, IgG; Future; Expected date: 05/09/2024  -     Cancel: Varicella Zoster Antibody, IgM; Future; Expected date: 05/09/2024  -     Varicella-Zoster Virus, PCR; Future; Expected date: 05/09/2024  -     Hemoglobin A1C; Future; Expected date: 05/10/2024    Screening for prostate cancer  -     PSA, Screening; Future; Expected date: 05/09/2024  -     Varicella Zoster Antibody, IgG; Future; Expected date: 05/09/2024  -     Cancel: Varicella Zoster Antibody, IgM; Future; Expected date: 05/09/2024  -     Varicella-Zoster Virus, PCR; Future; Expected date: 05/09/2024  -     Hemoglobin A1C; Future; Expected date: 05/10/2024    Abnormal finding of blood chemistry, unspecified  -     Hemoglobin A1C; Future; Expected date: 05/10/2024       Health Maintenance Topics with due status: Not Due       Topic Last Completion Date    TETANUS VACCINE 06/25/2021    Colonoscopy 07/07/2022    PROSTATE-SPECIFIC ANTIGEN 05/09/2024    Hemoglobin A1c (Prediabetes) 05/09/2024    Lipid Panel 05/09/2024    Aspirin/Antiplatelet Therapy 05/29/2024         Future Appointments   Date Time Provider Department Center   5/30/2024  1:45 PM Abram Allen Jr.,  MD [facility]   8/5/2024  2:00 PM AWV NURSE, [facility] [place]   11/8/2024  9:00 AM Chevy Malhotra, DO [facility]        Follow up in about 4 days (around 5/13/2024), or if symptoms worsen or fail to improve.    Health Maintenance Due   Topic Date Due    Shingles Vaccine (1 of 2) Never done    RSV Vaccine (Age 60+ and Pregnant patients) (1 - 1-dose 60+ series) Never done    COVID-19 Vaccine (5 - 2023-24 season) 09/01/2023        Signature:  NICOLÁS Bunn    Date of encounter: 5/9/24

## 2024-05-29 NOTE — ASSESSMENT & PLAN NOTE
Labs pending. Will inform of results when available.   Doxycycline prescribed to take as directed. Instructed to take all abx until gone even if start to feel better.    Instructed to keep area as clean and dry as possible and keep covered. Pt vu.   Instructed to RTC for any new/worsening/persisting ssx.

## 2024-05-30 ENCOUNTER — OFFICE VISIT (OUTPATIENT)
Dept: UROLOGY | Facility: CLINIC | Age: 84
End: 2024-05-30
Payer: MEDICARE

## 2024-05-30 VITALS
SYSTOLIC BLOOD PRESSURE: 147 MMHG | BODY MASS INDEX: 29.82 KG/M2 | WEIGHT: 179 LBS | HEART RATE: 68 BPM | DIASTOLIC BLOOD PRESSURE: 67 MMHG | HEIGHT: 65 IN

## 2024-05-30 DIAGNOSIS — N40.1 BENIGN PROSTATIC HYPERPLASIA WITH URINARY OBSTRUCTION: ICD-10-CM

## 2024-05-30 DIAGNOSIS — N13.8 BENIGN PROSTATIC HYPERPLASIA WITH URINARY OBSTRUCTION: ICD-10-CM

## 2024-05-30 DIAGNOSIS — N32.0 BLADDER OUTLET OBSTRUCTION: Primary | ICD-10-CM

## 2024-05-30 DIAGNOSIS — N41.1 CHRONIC PROSTATITIS: ICD-10-CM

## 2024-05-30 DIAGNOSIS — R97.20 ELEVATED PSA: ICD-10-CM

## 2024-05-30 PROCEDURE — 99213 OFFICE O/P EST LOW 20 MIN: CPT | Mod: S$PBB,,, | Performed by: UROLOGY

## 2024-05-30 PROCEDURE — 99213 OFFICE O/P EST LOW 20 MIN: CPT | Mod: PBBFAC | Performed by: UROLOGY

## 2024-05-30 RX ORDER — TERAZOSIN 5 MG/1
5 CAPSULE ORAL NIGHTLY
Qty: 90 CAPSULE | Refills: 3 | Status: SHIPPED | OUTPATIENT
Start: 2024-05-30

## 2024-05-30 NOTE — PATIENT INSTRUCTIONS
Mr. Peters feels he is doing well.  Terazosin renewed as that is all he feels he needs to help with voiding.  He had PSA done by family practice recently and it is slightly elevated at 4.880.  Patient understands I plan to retire this year.  He really does not need to see Urology on routine basis.  His terazosin can be renewed by Dr. Malhotra and return for urology appointment as needed.

## 2024-05-30 NOTE — PROGRESS NOTES
"Subjective     Patient ID: Elliott Peters is a 83 y.o. male.    Chief Complaint: Follow-up (One year visit. )    Urology History  Symptoms:  No dysuria, No urethral discharge, Nocturia, Nocturia list 4 or greater times each night  Associated Symptoms:  No abdominal pain  Conditions:  Erectile dysfunction (ED), No hydronephrosis, Benign prostatic hyperplasia, No obstructive nephropathy, No polycystic kidney disease, No kidney cancer, No bladder cancer, No testicular cancer, No prostate cancer, History of recurrent urinary tract infections, No history of urolithiasis, No history of pyelonephritis, History of prostatitis  Past Medical History:  No diabetes mellitus, History of heart attack, No History of lung disease, No History of HIV/AIDS, No History of Hepatitis, No history of cancer  Medication History:  Not on NSAID, Not on aspirin  Family History:  No family history of urolithiasis, No family history of cystic kidneys, No family history of chronic renal disease, No Family history of stones, No Family history of bladder cancer, No Family history of kidney cancer  Kidney Stone:  First Stone at least 15 years ago, Last stone  05/30/2018, Total numer of stones >10, ESL 2003, PRESENT STONE STARTED 2 days ago, passed last night, Pain severity 1, Back pain not worse with movement, No gastrointestinal complaints, No fever, No adequate pain medicine, No KUB performed, No C&S performed, No urinalysis performed, Lab, Dietary counseling, CT performed  Current Conditions:  Nocturia, No UTI's, PANCHITO current  AT TIMES. HAS A LIST OF HIS MEDS."     History of Present Illness  General  UROLOGICAL HISTORY PER NOTES OF DR. SCHWARZ:  This 72-year-old white male has been under my care for years. He developed urinary retention and a lot of pain around Sonu of 2012. We received his records from [facility] [place]. He had a green light laser vaporization and enucleation of the prostate by Dr. Israel Coyne on 12/27/12. He has recovered. He " "is doing well at this time.     His recent PSA is 3.36. This is much better than his previous PSAs but I don?t have all those numbers in front of me.     I saw him as recently as 2/25/12 when he had a stone. We talked about stone prevention. I have told him at that time to come back in a year and to stop his Hytrin. He is still on the Hytrin. He is voiding well. I told him he can stop the Hytrin. He is already off the Avodart.     By rectal examination, he has a 30 to 40 gram sized prostate without obvious cancer.     I am returning him to the care of his family physician, Dr. Wiggins. He needs a PANCHITO and a PSA once a year for several years. The question of how long to continue annual screening is uncertain. He wants to be checked.     I will see him on a p.r.n. basis. "  ----------------------------------------------------------------------------------------------------------     Mr. Peters has been referred back to [place] for his urological tx.  He currently has multiple stones in bilateral kidneys.  He was last seen 6/13/2013, and was released to the care of Dr. Wiggins at that time.  PANCHITO and PSA was last checked last a year ago by Dr. Coyne.  He requests for all his urological needs to be taken care of here.   (May 23, 2018)     Mr. Peters has been worked into clinic for c/o suspected stone lodged in his penis.  He states he feels he passed this stone last night, but voided outside without straining.  Reports pain resolved after painful urination.  Denies fever, chills or constitutional sxs.  Currently taking Bactrim DS for one month - tx of bacterial prostatitis.  Urine cultured 2,000 E. Coli last visit.  (May 31, 2018)     Mr. Peters is here for f/u stone disease (20+).  Patient states he has passed 2 smaller stones since his last visit without need for pain meds provided last visit.  He denies sensation of infection today.  Declines KUB for confirmation / evaluation of remaining stones.  He is joined today " "by his spouse.  No microhematuria or c/o flank pain since passing last stone.  June 25, 2018)     Mr. Peters is here today with c/o 3 seperate episodes of gross hematuria (lasting 3 days) before resolving without treatment.  All episodes occurring since the first of this year.  He has a hx of stone disease, but denies bleeding associate with stone pain.    -  CT which was performed 2/2019 resulted "several subcentimeter calcific densities along the dorsal aspect of the urinary bladder may reflect urinary bladder calculi or prostate calcifications;  and subcentimater bilateral nephrolithiasis".      Patient denies voiding complaints today.  (July 23, 2019)     Benign Prostatic Hyperplasia:  Frequent voiding at night, Nocturia list 4 or greater times each night, Blood in urine, No pain with urination, No incomplete emptying of bladder, No, No, Splitting or spraying of urinary stream, No, Frequency of incontinence occasional, Incontinence associated with a strong urge to void, No  Kidney Stone:  First Stone at least 15 years ago, Last stone  05/30/2018, Total numer of stones >10, ESL 2003, PRESENT STONE STARTED 2 days ago, passed last night, Pain severity 1, No fever, No, Back pain not worse with movement, No gastrointestinal complaints, No adequate pain medicine, No KUB performed, No C&S performed, No urinalysis performed, CT performed, Lab, Dietary counseling     Urology History  Conditions:  Erectile dysfunction (ED), No hydronephrosis, Benign prostatic hyperplasia, No obstructive nephropathy, No polycystic kidney disease, No kidney cancer, No bladder cancer, No testicular cancer, No prostate cancer, History of recurrent urinary tract infections, No history of urolithiasis, No history of pyelonephritis, History of prostatitis  Past Medical History:  Hypertension, No diabetes mellitus, History of heart attack, No History of stroke, No History of lung disease, No History of HIV/AIDS, No History of Hepatitis, No " history of cancer  s/p TURP  Medication History:  Not on NSAID, Not on aspirin  Plavix  Family History:  No family history of urolithiasis, No family history of cystic kidneys, No family history of chronic renal disease, Family history of prostate cancer, No Family history of stones, No Family history of bladder cancer, No Family history of kidney cancer  Current Conditions:  Nocturia, No UTI's, PANCHITO current  --------------------------------------------------------------------------------------------------------------------------------------------------------------------------     The above notes arenotes ofmrs.CynthiaSmith.isscheduledforcystoscopybymenextweekforevaluationofthebladdercalcifications.Hehascontinuedtohaveintermittenthematuria.Cggeclsyraxyioapxmimqsuketooqzuanyfxeboxswbfpstd5OPhakurhpgtjuukwpey a stone.Hehassome tramadolsDr.Bing hadpreviouslygiventhatheistakenissomebetternow.HedidhaveESLbyDr.irtzyupv86skbntpcs. He has had gross hematuria 3 times this year. He is still somewhat uncomfortable at present.  (August 5, 2019)     Mr. Peters returned today for his cystoscopy. He was treated for a low-grade UTI after the above visit. He did see a small stone pass after the above. He has had no bleeding since the stone passed and still has all of his pain medicine left should he need additional medication. Patient is on Plavix and aspirin. Passed his stone just a few days after being seen. Feeling okay today and returned for his workup. (August 15, 2019)     Mr. Peters and his wife returned today because of concern about the amount of blood in his urine. He did not remove his Brennan this morning because of continued bleeding. Came in for check on his catheter. (August 16, 2019)     Mr. Peters is in for one month follow-up. He took his catheter out morning after he was here as his urine was completely clear at that time. He's had no further problem with bleeding. He is back to baseline with  voiding with nocturia 3 or 4 times nightly. He feels he is doing well at present. Nothing to suggest stone problems etc. (September 17, 2019)     Mr. Peters is in a swing bed now at [facility]. He is receiving IV antibiotics for his UTI. He went to emergency room Saturday afternoon 9 days ago because of fever which reached as high as 104 apparently. Treated for UTI, but it was felt he needed stronger antibiotics. I had talked to Dr. Juan Kirkpatrick about him the other day and suggested that he be switched from Rocephin to gentamicin. He is receiving 400 mg gentamycin every 36 hours now. He's had a little bit of diarrhea the last 2 days and no energy. Never really feels well. His BUN/creatinine was 19/1.2 today. Patient not feeling well but he is on the right medication based on recent culture results. (September 30, 2019)     Mr. Peters returns for follow-up after treatment of UTI. Feeling much better and has no sensation of infection now. We treated him with gentamicin and feels he is doing okay. Still has nocturia 4 or 5 times nightly but that is stable and has been that way for the last few years. He feels like he is back to baseline now. (October 14, 2019)     Mr. Peters is in for six-month follow-up. He has had no further urinary tract infections since he was last seen. He feels he is emptying his bladder satisfactorily although he does have nocturia about 3 times nightly. Patient is feeling better in general since he's taking Mobic on a regular basis for foot pain. Overall he is improved. (June 11, 2020)     PSA Results:  Past PSA Results   5.430 ON 11/6/2016   3.360 ON 06/13/2013  5.350 ON 11/14/2012  5.06 ON 02/06/2012  4.42 ON  08/17/2011  --------------------------------------------------------------------------------------------------------------------------------------------------------------------------------------------------------------------------------------------------------------------------------------------------------------------------------  The above notes are notes from the old electronic medical record system.     PSA was 5.800 on April 24, 2023   PSA was 5.500 on December 17, 2021  PSA was 5.170 on October 12, 2021  PSA was 4.880 on May 9, 2024           Mr. Peters comes in ahead of his yearly checkup.  Former patient of Dr. Hernando James.  He had laser prostatectomy by Dr. Israel Coyne in 2012. Followed more recently by Ms. Andria Queen for a couple years and I started seeing him around 2019. He had had urinary retention again +he had trouble with difficult urinary tract infection about that time.  He was doing well when he was seen last June and had another appointment but in ahead of that scheduled appointment because of irritation and apparent infection of the glans penis.  He continues to void reasonably well with nocturia 2-3 times nightly.  He has not had any recent stone problems.  Using antibiotic ointment which has not helped the lesion on his penis.  (04/13/2021)        Mr. Peters was in on the above date because of a problem with balanoposthitis.  His problem has essentially resolved and he has had no further irritation since treating as a yeast infection.  He is in for his regular 1 year follow-up today.  Voiding reasonably well but does have nocturia 2-3 times nightly.  He does not feel he is having any major issue with that.  Overall satisfied with the way he is doing.  We are not doing PSAs routinely on him now because of age.  He did have his KUB checking for nephrolithiasis as he has known stones in his kidneys.  He has not had any pain from stones in recent past.  (June 14, 2021)     Mr. Peters is  in for the 1st visit in nearly 1 year.  He has not had any active stone problems or any other major  problems.  He has nocturia 2-3 times nightly.  He has had no further problem with his foreskin like he did few years ago.  No visible blood in the urine and has not had any new issues.  He did have a PSA done by Ms. Olivier in December that was still elevated at 5.5. (May 18, 2022)     Mr. Peters is in for his 1 year follow-up.  He had another PSA done April 24th that was 5.800.  That is the highest he has ever had but it has been above 5 for several years and clinically he feels he is doing okay.  Voiding okay without any worsening bladder outlet obstructive symptoms.  He does not feel he needs any extra help with voiding and is doing very well just on the terazosin.  Does need renewal of medication.  No recent hospitalizations or new health issues. [May 30, 2023]     Mr. Peters is in for his yearly checkup.  He had another PSA done earlier this month that is perfectly acceptable at 4.880 which is the best it has been in several years.  Stable with voiding.  No worsening bladder outlet obstructive symptoms and continues to do well just on terazosin.  Overall very good year. [May 30, 2024]         Review of Systems       Objective     Physical Exam  Constitutional:       Appearance: Normal appearance. He is normal weight.   Neurological:      Mental Status: He is alert.   Psychiatric:         Mood and Affect: Mood normal.         Behavior: Behavior normal.     Urinalysis had occasional pus cells and rare red cells with dipstick showing trace of blood with pH 5.0 and specific gravity 1.030     Assessment and Plan     1. Bladder outlet obstruction  -     terazosin (HYTRIN) 5 MG capsule; Take 1 capsule (5 mg total) by mouth every evening.  Dispense: 90 capsule; Refill: 3    2. Chronic prostatitis    3. Benign prostatic hyperplasia with urinary obstruction    4. Elevated PSA      Mr. Peters feels he is doing  well.  Terazosin renewed as that is all he feels he needs to help with voiding.  He had PSA done by family [facility] recently and it is slightly elevated at 4.880.  Patient understands I plan to retire this year.  He really does not need to see Urology on routine basis.  His terazosin can be renewed by Dr. Malhotra and return for urology appointment as needed.          No follow-ups on file.

## 2024-08-23 NOTE — PATIENT INSTRUCTIONS
Counseling and Referral of Other Preventative  (Italic type indicates deductible and co-insurance are waived)    Patient Name: Elliott Peters  Today's Date: 8/27/2024    Health Maintenance       Date Due Completion Date    Shingles Vaccine (1 of 2) Never done ---    RSV Vaccine (Age 60+ and Pregnant patients) (1 - 1-dose 60+ series) Never done ---    COVID-19 Vaccine (5 - 2023-24 season) 09/01/2023 4/11/2022    Influenza Vaccine (1) 09/01/2024 11/2/2023    PROSTATE-SPECIFIC ANTIGEN 05/09/2025 5/9/2024    Hemoglobin A1c (Prediabetes) 05/09/2025 5/9/2024    Lipid Panel 05/09/2025 5/9/2024    Override on 3/4/2021: Done (CHOL 94 TRIG 69 HDL 44 LDL 36)    Aspirin/Antiplatelet Therapy 05/29/2025 5/29/2024    Colonoscopy 07/07/2025 7/7/2022    Override on 5/13/2019: Done (repeat in 3 years- Dr. Forrest)    TETANUS VACCINE 06/25/2031 6/25/2021        No orders of the defined types were placed in this encounter.      Counseling and Referral of Other Preventative  (Italic type indicates deductible and co-insurance are waived)    Patient Name: Elliott Peters  Today's Date: 8/27/2024    Health Maintenance       Date Due Completion Date    Shingles Vaccine (1 of 2) Never done ---    RSV Vaccine (Age 60+ and Pregnant patients) (1 - 1-dose 60+ series) Never done ---    COVID-19 Vaccine (5 - 2023-24 season) 09/01/2023 4/11/2022    Influenza Vaccine (1) 09/01/2024 11/2/2023    PROSTATE-SPECIFIC ANTIGEN 05/09/2025 5/9/2024    Hemoglobin A1c (Prediabetes) 05/09/2025 5/9/2024    Lipid Panel 05/09/2025 5/9/2024    Override on 3/4/2021: Done (CHOL 94 TRIG 69 HDL 44 LDL 36)    Aspirin/Antiplatelet Therapy 05/29/2025 5/29/2024    Colonoscopy 07/07/2025 7/7/2022    Override on 5/13/2019: Done (repeat in 3 years- Dr. Forrest)    TETANUS VACCINE 06/25/2031 6/25/2021        No orders of the defined types were placed in this encounter.      The following information is provided to all patients.  This information is to help you find resources for  any of the problems found today that may be affecting your health:                  Living healthy guide: ms.gov    Understanding Diabetes: www.diabetes.org      Eating healthy: www.cdc.gov/healthyweight      CDC home safety checklist: www.cdc.gov/steadi/patient.html      Agency on Aging: ms.gov    Alcoholics anonymous (AA): www.aa.XVionics      Physical Activity: www.[employer].nih.gov/zv2lbjo      Tobacco use: ms.gov      Counseling and Referral of Other Preventative  (Italic type indicates deductible and co-insurance are waived)    Patient Name: Elliott Peters  Today's Date: 8/27/2024    Health Maintenance       Date Due Completion Date    Shingles Vaccine (1 of 2) Never done ---    RSV Vaccine (Age 60+ and Pregnant patients) (1 - 1-dose 60+ series) Never done ---    COVID-19 Vaccine (5 - 2023-24 season) 09/01/2023 4/11/2022    Influenza Vaccine (1) 09/01/2024 11/2/2023    PROSTATE-SPECIFIC ANTIGEN 05/09/2025 5/9/2024    Hemoglobin A1c (Prediabetes) 05/09/2025 5/9/2024    Lipid Panel 05/09/2025 5/9/2024    Override on 3/4/2021: Done (CHOL 94 TRIG 69 HDL 44 LDL 36)    Aspirin/Antiplatelet Therapy 05/29/2025 5/29/2024    Colonoscopy 07/07/2025 7/7/2022    Override on 5/13/2019: Done (repeat in 3 years- Dr. Forrest)    TETANUS VACCINE 06/25/2031 6/25/2021        No orders of the defined types were placed in this encounter.      The following information is provided to all patients.  This information is to help you find resources for any of the problems found today that may be affecting your health:                  Living healthy guide: ms.gov    Understanding Diabetes: www.diabetes.org      Eating healthy: www.cdc.gov/healthyweight      CDC home safety checklist: www.cdc.gov/steadi/patient.html      Agency on Aging: ms.gov    Alcoholics anonymous (AA): www.[employer].org      Physical Activity: www.farrah.nih.gov/il8phcr      Tobacco use: ms.gov

## 2024-08-23 NOTE — PROGRESS NOTES
"  Elliott Peters presented for a  Medicare AWV and comprehensive Health Risk Assessment today. The following components were reviewed and updated:    Medical history  Family History  Social history  Allergies and Current Medications  Health Risk Assessment  Health Maintenance  Care Team         ** See Completed Assessments for Annual Wellness Visit within the encounter summary.**         The following assessments were completed:  Living Situation  CAGE  Depression Screening  Timed Get Up and Go  Whisper Test  Cognitive Function Screening  Nutrition Screening  ADL Screening  PAQ Screening        Opioid documentationdoes not have a current opioid prescription.    :[id number]}                Vitals:    08/27/24 1351   Height: 5' 5" (1.651 m)     Body mass index is 29.79 kg/m².  Physical Exam  Constitutional:       General: He is not in acute distress.     Appearance: Normal appearance. He is well-developed. He is not ill-appearing.   HENT:      Head: Normocephalic and atraumatic.      Right Ear: Tympanic membrane normal.      Left Ear: Tympanic membrane normal.      Nose: Nose normal.      Mouth/Throat:      Mouth: Mucous membranes are moist.      Pharynx: Oropharynx is clear. No posterior oropharyngeal erythema.   Cardiovascular:      Rate and Rhythm: Normal rate and regular rhythm.      Pulses: Normal pulses.      Heart sounds: Normal heart sounds.   Pulmonary:      Effort: Pulmonary effort is normal. No accessory muscle usage or respiratory distress.      Breath sounds: Normal breath sounds.   Abdominal:      General: Abdomen is flat. Bowel sounds are normal. There is no distension.      Palpations: Abdomen is soft.      Tenderness: There is no abdominal tenderness.   Musculoskeletal:         General: Normal range of motion.      Cervical back: Normal range of motion.   Skin:     General: Skin is warm and dry.      Capillary Refill: Capillary refill takes less than 2 seconds.   Neurological:      Mental Status: He is " alert and oriented to person, place, and time. Mental status is at baseline.   Psychiatric:         Mood and Affect: Mood normal.         Speech: Speech normal.         Behavior: Behavior normal. Behavior is cooperative.         Thought Content: Thought content normal.           Diagnoses and health risks identified today and associated recommendations/orders:    Problem List Items Addressed This Visit       ROSI on CPAP     CPAP if he can use it; follow-up with sleep Medicine every 6 months         Hyperlipidemia     Hyperlipidemia is controlled. Continue current medication, is effective at this time. Return to the clinic as needed.   They increased his Lipitor to 40 mg in May. He has been taking that.   Low fat, low chol diet, less fried foods, more baked foods, more vegetables.   Exercise daily  Take medications as ordered for hyperlipidemia  Return to the clinic as needed   Follow up in 6 months              Thrombocytopenia     Platelets in May were 104,000. He will have them repeated at his next visit with PCP.          Primary hypertension     Hypertension well controlled no change in medication.  Follow-up in 3 months.  Will check CBC and CMP yearly.  Goal is blood pressure 120/70          Encounter for subsequent annual wellness visit (AWV) in Medicare patient - Primary     Follow up in one year for AWV.   Keep appointments with PCP.          Atherosclerotic heart disease of native coronary artery with other forms of angina pectoris     No recent history of atherosclerotic disease.  Would recommend he continue his Plavix as well as aspirin and will control his blood pressure in his lipids are well controlled.  Follow-up with cardiology every 6 months.            Provided Elliott with a 5-10 year written screening schedule and personal prevention plan. Recommendations were developed using the USPSTF age appropriate recommendations. Education, counseling, and referrals were provided as needed. After Visit Summary  printed and given to patient which includes a list of additional screenings\tests needed.    Follow up for yearly annual wellness visit

## 2024-08-27 ENCOUNTER — OFFICE VISIT (OUTPATIENT)
Dept: FAMILY MEDICINE | Facility: CLINIC | Age: 84
End: 2024-08-27
Payer: MEDICARE

## 2024-08-27 VITALS
HEIGHT: 65 IN | OXYGEN SATURATION: 97 % | RESPIRATION RATE: 20 BRPM | DIASTOLIC BLOOD PRESSURE: 74 MMHG | WEIGHT: 179.06 LBS | HEART RATE: 68 BPM | BODY MASS INDEX: 29.83 KG/M2 | SYSTOLIC BLOOD PRESSURE: 140 MMHG | TEMPERATURE: 98 F

## 2024-08-27 DIAGNOSIS — I10 PRIMARY HYPERTENSION: Primary | ICD-10-CM

## 2024-08-27 DIAGNOSIS — G47.33 OSA ON CPAP: ICD-10-CM

## 2024-08-27 DIAGNOSIS — D69.6 THROMBOCYTOPENIA: ICD-10-CM

## 2024-08-27 DIAGNOSIS — E78.2 MIXED HYPERLIPIDEMIA: ICD-10-CM

## 2024-08-27 DIAGNOSIS — I25.118 ATHEROSCLEROTIC HEART DISEASE OF NATIVE CORONARY ARTERY WITH OTHER FORMS OF ANGINA PECTORIS: ICD-10-CM

## 2024-08-27 DIAGNOSIS — Z00.00 ENCOUNTER FOR SUBSEQUENT ANNUAL WELLNESS VISIT (AWV) IN MEDICARE PATIENT: Primary | ICD-10-CM

## 2024-08-27 DIAGNOSIS — I10 PRIMARY HYPERTENSION: ICD-10-CM

## 2024-08-27 PROCEDURE — G0439 PPPS, SUBSEQ VISIT: HCPCS | Mod: ,,,

## 2024-08-27 NOTE — ASSESSMENT & PLAN NOTE
Hyperlipidemia is controlled. Continue current medication, is effective at this time. Return to the clinic as needed.   They increased his Lipitor to 40 mg in May. He has been taking that.   Low fat, low chol diet, less fried foods, more baked foods, more vegetables.   Exercise daily  Take medications as ordered for hyperlipidemia  Return to the clinic as needed   Follow up in 6 months

## 2024-08-27 NOTE — LETTER
AUTHORIZATION FOR RELEASE OF   CONFIDENTIAL INFORMATION    Dear [facility],    We are seeing Elliott Peters, date of birth 1940, in the clinic at [facility]. Chevy Malhotra DO is the patient's PCP. Elliott Peters has an outstanding lab/procedure at the time we reviewed his chart. In order to help keep his health information updated, he has authorized us to request the following medical record(s):        (  )  MAMMOGRAM                                      (  )  COLONOSCOPY      (  )  PAP SMEAR                                          (  )  OUTSIDE LAB RESULTS     (  )  DEXA SCAN                                          (  )  EYE EXAM            (  )  FOOT EXAM                                          (  )  ENTIRE RECORD     (X  )  OUTSIDE IMMUNIZATIONS                 ( X )shingles vaccine _______________         Please fax records to Chevy Malhotra DO, [phone]     If you have any questions, please contact office at [phone].           Patient Name: Elliott Peters  : 1940  Patient Phone #: [phone]

## 2024-08-27 NOTE — PROGRESS NOTES
"  Elliott Peters presented for a  Medicare AWV and comprehensive Health Risk Assessment today. The following components were reviewed and updated:    Medical history  Family History  Social history  Allergies and Current Medications  Health Risk Assessment  Health Maintenance  Care Team         ** See Completed Assessments for Annual Wellness Visit within the encounter summary.**         The following assessments were completed:  Living Situation  CAGE  Depression Screening  Timed Get Up and Go  Whisper Test  Cognitive Function Screening  Nutrition Screening  ADL Screening  PAQ Screening        Opioid documentationdoes not have a current opioid prescription.                   Vitals:    08/27/24 1351   BP: (!) 140/74   BP Location: Left arm   Patient Position: Sitting   Pulse: 68   Resp: 20   Temp: 98.2 °F (36.8 °C)   SpO2: 97%   Weight: 81.2 kg (179 lb 0.6 oz)   Height: 5' 5" (1.651 m)     Body mass index is 29.79 kg/m².  Physical Exam  Constitutional:       General: He is not in acute distress.     Appearance: Normal appearance. He is well-developed. He is not ill-appearing.   HENT:      Head: Normocephalic and atraumatic.      Right Ear: Tympanic membrane normal.      Left Ear: Tympanic membrane normal.      Nose: Nose normal.      Mouth/Throat:      Mouth: Mucous membranes are moist.      Pharynx: Oropharynx is clear. No posterior oropharyngeal erythema.   Cardiovascular:      Rate and Rhythm: Normal rate and regular rhythm.      Pulses: Normal pulses.      Heart sounds: Normal heart sounds.   Pulmonary:      Effort: Pulmonary effort is normal. No accessory muscle usage or respiratory distress.      Breath sounds: Normal breath sounds.   Abdominal:      General: Abdomen is flat. Bowel sounds are normal. There is no distension.      Palpations: Abdomen is soft.      Tenderness: There is no abdominal tenderness.   Musculoskeletal:         General: Normal range of motion.      Cervical back: Normal range of motion. "   Skin:     General: Skin is warm and dry.      Capillary Refill: Capillary refill takes less than 2 seconds.   Neurological:      Mental Status: He is alert and oriented to person, place, and time. Mental status is at baseline.   Psychiatric:         Mood and Affect: Mood normal.         Speech: Speech normal.         Behavior: Behavior normal. Behavior is cooperative.         Thought Content: Thought content normal.           Diagnoses and health risks identified today and associated recommendations/orders:    Problem List Items Addressed This Visit          Cardiac/Vascular    Hyperlipidemia     Hyperlipidemia is controlled. Continue current medication, is effective at this time. Return to the clinic as needed.   They increased his Lipitor to 40 mg in May. He has been taking that.   Low fat, low chol diet, less fried foods, more baked foods, more vegetables.   Exercise daily  Take medications as ordered for hyperlipidemia  Return to the clinic as needed   Follow up in 6 months              Primary hypertension     Hypertension well controlled no change in medication.  Follow-up in 3 months.  Will check CBC and CMP yearly.  Goal is blood pressure 120/70          Atherosclerotic heart disease of native coronary artery with other forms of angina pectoris     No recent history of atherosclerotic disease.  Would recommend he continue his Plavix as well as aspirin and will control his blood pressure in his lipids are well controlled.  Follow-up with cardiology every 6 months.            Hematology    Thrombocytopenia     Platelets in May were 104,000. He will have them repeated at his next visit with PCP.             Other    ROSI on CPAP     CPAP if he can use it; follow-up with sleep Medicine every 6 months         Encounter for subsequent annual wellness visit (AWV) in Medicare patient - Primary     Follow up in one year for AWV.   Keep appointments with PCP.             Elvis Gallagher with a 5-10 year written  screening schedule and personal prevention plan. Recommendations were developed using the USPSTF age appropriate recommendations. Education, counseling, and referrals were provided as needed. After Visit Summary printed and given to patient which includes a list of additional screenings\tests needed.    Follow up for yearly annual wellness visit. Pt reports he had shingles vaccine at [facility] in [place]. Will request records.    I offered to discuss advanced care planning, including how to pick a person who would make decisions for you if you were unable to make them for yourself, called a health care power of , and what kind of decisions you might make such as use of life sustaining treatments such as ventilators and tube feeding when faced with a life limiting illness recorded on a living will that they will need to know. (How you want to be cared for as you near the end of your natural life)     X Patient is interested in learning more about how to make advanced directives.  I provided them paperwork and offered to discuss this with them.

## 2024-08-27 NOTE — ASSESSMENT & PLAN NOTE
No recent history of atherosclerotic disease.  Would recommend he continue his Plavix as well as aspirin and will control his blood pressure in his lipids are well controlled.  Follow-up with cardiology every 6 months.He sees Dr. Ojeda and had his stents checked this year.

## 2024-08-28 ENCOUNTER — OFFICE VISIT (OUTPATIENT)
Dept: UROLOGY | Facility: CLINIC | Age: 84
End: 2024-08-28
Payer: MEDICARE

## 2024-08-28 VITALS
WEIGHT: 179 LBS | DIASTOLIC BLOOD PRESSURE: 76 MMHG | BODY MASS INDEX: 29.82 KG/M2 | SYSTOLIC BLOOD PRESSURE: 132 MMHG | HEART RATE: 69 BPM | HEIGHT: 65 IN

## 2024-08-28 DIAGNOSIS — N13.8 BENIGN PROSTATIC HYPERPLASIA WITH URINARY OBSTRUCTION: Primary | ICD-10-CM

## 2024-08-28 DIAGNOSIS — N32.0 BLADDER OUTLET OBSTRUCTION: ICD-10-CM

## 2024-08-28 DIAGNOSIS — R35.1 NOCTURIA: ICD-10-CM

## 2024-08-28 DIAGNOSIS — R97.20 ELEVATED PSA: ICD-10-CM

## 2024-08-28 DIAGNOSIS — R35.0 URINARY FREQUENCY: ICD-10-CM

## 2024-08-28 DIAGNOSIS — N40.1 BENIGN PROSTATIC HYPERPLASIA WITH URINARY OBSTRUCTION: Primary | ICD-10-CM

## 2024-08-28 PROCEDURE — 99999 PR PBB SHADOW E&M-EST. PATIENT-LVL V: CPT | Mod: PBBFAC,,, | Performed by: UROLOGY

## 2024-08-28 PROCEDURE — 99215 OFFICE O/P EST HI 40 MIN: CPT | Mod: PBBFAC | Performed by: UROLOGY

## 2024-08-28 PROCEDURE — 99213 OFFICE O/P EST LOW 20 MIN: CPT | Mod: S$PBB,,, | Performed by: UROLOGY

## 2024-08-28 RX ORDER — FINASTERIDE 5 MG/1
5 TABLET, FILM COATED ORAL DAILY
Qty: 90 TABLET | Refills: 3 | Status: SHIPPED | OUTPATIENT
Start: 2024-08-28 | End: 2025-08-28

## 2024-08-28 NOTE — PROGRESS NOTES
"Subjective     Patient ID: Elliott Peters is a 84 y.o. male.    Chief Complaint: Urinary Frequency    Urology History  Symptoms:  No dysuria, No urethral discharge, Nocturia, Nocturia list 4 or greater times each night  Associated Symptoms:  No abdominal pain  Conditions:  Erectile dysfunction (ED), No hydronephrosis, Benign prostatic hyperplasia, No obstructive nephropathy, No polycystic kidney disease, No kidney cancer, No bladder cancer, No testicular cancer, No prostate cancer, History of recurrent urinary tract infections, No history of urolithiasis, No history of pyelonephritis, History of prostatitis  Past Medical History:  No diabetes mellitus, History of heart attack, No History of lung disease, No History of HIV/AIDS, No History of Hepatitis, No history of cancer  Medication History:  Not on NSAID, Not on aspirin  Family History:  No family history of urolithiasis, No family history of cystic kidneys, No family history of chronic renal disease, No Family history of stones, No Family history of bladder cancer, No Family history of kidney cancer  Kidney Stone:  First Stone at least 15 years ago, Last stone  05/30/2018, Total numer of stones >10, ESL 2003, PRESENT STONE STARTED 2 days ago, passed last night, Pain severity 1, Back pain not worse with movement, No gastrointestinal complaints, No fever, No adequate pain medicine, No KUB performed, No C&S performed, No urinalysis performed, Lab, Dietary counseling, CT performed  Current Conditions:  Nocturia, No UTI's, PANCHITO current  AT TIMES. HAS A LIST OF HIS MEDS."     History of Present Illness  General  UROLOGICAL HISTORY PER NOTES OF DR. SCHWARZ:  This 72-year-old white male has been under my care for years. He developed urinary retention and a lot of pain around [place] of 2012. We received his records from [facility]. He had a green light laser vaporization and enucleation of the prostate by Dr. Israel Coyne on 12/27/12. He has recovered. He is doing " "well at this time.     His recent PSA is 3.36. This is much better than his previous PSAs but I don?t have all those numbers in front of me.     I saw him as recently as 2/25/12 when he had a stone. We talked about stone prevention. I have told him at that time to come back in a year and to stop his Hytrin. He is still on the Hytrin. He is voiding well. I told him he can stop the Hytrin. He is already off the Avodart.     By rectal examination, he has a 30 to 40 gram sized prostate without obvious cancer.     I am returning him to the care of his family physician, Dr. Wiggins. He needs a PANCHITO and a PSA once a year for several years. The question of how long to continue annual screening is uncertain. He wants to be checked.     I will see him on a p.r.n. basis. "  ----------------------------------------------------------------------------------------------------------     Mr. Peters has been referred back to [facility] for his urological tx.  He currently has multiple stones in bilateral kidneys.  He was last seen 6/13/2013, and was released to the care of Dr. Wiggins at that time.  PANCHITO and PSA was last checked last a year ago by Dr. Coyne.  He requests for all his urological needs to be taken care of here.   (May 23, 2018)     Mr. Peters has been worked into clinic for c/o suspected stone lodged in his penis.  He states he feels he passed this stone last night, but voided outside without straining.  Reports pain resolved after painful urination.  Denies fever, chills or constitutional sxs.  Currently taking Bactrim DS for one month - tx of bacterial prostatitis.  Urine cultured 2,000 E. Coli last visit.  (May 31, 2018)     Mr. Peters is here for f/u stone disease (20+).  Patient states he has passed 2 smaller stones since his last visit without need for pain meds provided last visit.  He denies sensation of infection today.  Declines KUB for confirmation / evaluation of remaining stones.  He is joined today by his " "spouse.  No microhematuria or c/o flank pain since passing last stone.  June 25, 2018)     Mr. Peters is here today with c/o 3 seperate episodes of gross hematuria (lasting 3 days) before resolving without treatment.  All episodes occurring since the first of this year.  He has a hx of stone disease, but denies bleeding associate with stone pain.    -  CT which was performed 2/2019 resulted "several subcentimeter calcific densities along the dorsal aspect of the urinary bladder may reflect urinary bladder calculi or prostate calcifications;  and subcentimater bilateral nephrolithiasis".      Patient denies voiding complaints today.  (July 23, 2019)     Benign Prostatic Hyperplasia:  Frequent voiding at night, Nocturia list 4 or greater times each night, Blood in urine, No pain with urination, No incomplete emptying of bladder, No, No, Splitting or spraying of urinary stream, No, Frequency of incontinence occasional, Incontinence associated with a strong urge to void, No  Kidney Stone:  First Stone at least 15 years ago, Last stone  05/30/2018, Total numer of stones >10, ESL 2003, PRESENT STONE STARTED 2 days ago, passed last night, Pain severity 1, No fever, No, Back pain not worse with movement, No gastrointestinal complaints, No adequate pain medicine, No KUB performed, No C&S performed, No urinalysis performed, CT performed, Lab, Dietary counseling     Urology History  Conditions:  Erectile dysfunction (ED), No hydronephrosis, Benign prostatic hyperplasia, No obstructive nephropathy, No polycystic kidney disease, No kidney cancer, No bladder cancer, No testicular cancer, No prostate cancer, History of recurrent urinary tract infections, No history of urolithiasis, No history of pyelonephritis, History of prostatitis  Past Medical History:  Hypertension, No diabetes mellitus, History of heart attack, No History of stroke, No History of lung disease, No History of HIV/AIDS, No History of Hepatitis, No history of " cancer  s/p TURP  Medication History:  Not on NSAID, Not on aspirin  Plavix  Family History:  No family history of urolithiasis, No family history of cystic kidneys, No family history of chronic renal disease, Family history of prostate cancer, No Family history of stones, No Family history of bladder cancer, No Family history of kidney cancer  Current Conditions:  Nocturia, No UTI's, PANCHITO current  --------------------------------------------------------------------------------------------------------------------------------------------------------------------------     The above notes arenotes ofmrs.CynthiaSmith.isscheduledforcystoscopybymenextweekforevaluationofthebladdercalcifications.Hehascontinuedtohaveintermittenthematuria.Prtkqiwaadtidyntjkqdccgewhwpuepptwkumtncseziawye7RSwtobugojmgpqlxgwd a stone.Hehassome tramadolsDr.Bing hadpreviouslygiventhatheistakenissomebetternow.HedidhaveESLbyDr.gzlabnwv20suxokrxv. He has had gross hematuria 3 times this year. He is still somewhat uncomfortable at present.  (August 5, 2019)     Mr. Peters returned today for his cystoscopy. He was treated for a low-grade UTI after the above visit. He did see a small stone pass after the above. He has had no bleeding since the stone passed and still has all of his pain medicine left should he need additional medication. Patient is on Plavix and aspirin. Passed his stone just a few days after being seen. Feeling okay today and returned for his workup. (August 15, 2019)     Mr. Peters and his wife returned today because of concern about the amount of blood in his urine. He did not remove his Brennan this morning because of continued bleeding. Came in for check on his catheter. (August 16, 2019)     Mr. Peters is in for one month follow-up. He took his catheter out morning after he was here as his urine was completely clear at that time. He's had no further problem with bleeding. He is back to baseline with voiding with  nocturia 3 or 4 times nightly. He feels he is doing well at present. Nothing to suggest stone problems etc. (September 17, 2019)     Mr. Peters is in a swing bed now at [facility]. He is receiving IV antibiotics for his UTI. He went to emergency room Saturday afternoon 9 days ago because of fever which reached as high as 104 apparently. Treated for UTI, but it was felt he needed stronger antibiotics. I had talked to Dr. Juan Kirkpatrick about him the other day and suggested that he be switched from Rocephin to gentamicin. He is receiving 400 mg gentamycin every 36 hours now. He's had a little bit of diarrhea the last 2 days and no energy. Never really feels well. His BUN/creatinine was 19/1.2 today. Patient not feeling well but he is on the right medication based on recent culture results. (September 30, 2019)     Mr. Peters returns for follow-up after treatment of UTI. Feeling much better and has no sensation of infection now. We treated him with gentamicin and feels he is doing okay. Still has nocturia 4 or 5 times nightly but that is stable and has been that way for the last few years. He feels like he is back to baseline now. (October 14, 2019)     Mr. Peters is in for six-month follow-up. He has had no further urinary tract infections since he was last seen. He feels he is emptying his bladder satisfactorily although he does have nocturia about 3 times nightly. Patient is feeling better in general since he's taking Mobic on a regular basis for foot pain. Overall he is improved. (June 11, 2020)     PSA Results:  Past PSA Results   5.430 ON 11/6/2016   3.360 ON 06/13/2013  5.350 ON 11/14/2012  5.06 ON 02/06/2012  4.42 ON  08/17/2011  --------------------------------------------------------------------------------------------------------------------------------------------------------------------------------------------------------------------------------------------------------------------------------------------------------------------------------  The above notes are notes from the old electronic medical record system.     PSA was 5.800 on April 24, 2023   PSA was 5.500 on December 17, 2021  PSA was 5.170 on October 12, 2021  PSA was 4.880 on May 9, 2024        Mr. Peters comes in ahead of his yearly checkup.  Former patient of Dr. Hernando James.  He had laser prostatectomy by Dr. Israel Coyne in 2012. Followed more recently by Ms. Andria Queen for a couple years and I started seeing him around 2019. He had had urinary retention again +he had trouble with difficult urinary tract infection about that time.  He was doing well when he was seen last June and had another appointment but in ahead of that scheduled appointment because of irritation and apparent infection of the glans penis.  He continues to void reasonably well with nocturia 2-3 times nightly.  He has not had any recent stone problems.  Using antibiotic ointment which has not helped the lesion on his penis.  (04/13/2021)        Mr. Peters was in on the above date because of a problem with balanoposthitis.  His problem has essentially resolved and he has had no further irritation since treating as a yeast infection.  He is in for his regular 1 year follow-up today.  Voiding reasonably well but does have nocturia 2-3 times nightly.  He does not feel he is having any major issue with that.  Overall satisfied with the way he is doing.  We are not doing PSAs routinely on him now because of age.  He did have his KUB checking for nephrolithiasis as he has known stones in his kidneys.  He has not had any pain from stones in recent past.  (June 14, 2021)     Mr. Peters is in  for the 1st visit in nearly 1 year.  He has not had any active stone problems or any other major  problems.  He has nocturia 2-3 times nightly.  He has had no further problem with his foreskin like he did few years ago.  No visible blood in the urine and has not had any new issues.  He did have a PSA done by Ms. Olivier in December that was still elevated at 5.5. (May 18, 2022)     Mr. Peters is in for his 1 year follow-up.  He had another PSA done April 24th that was 5.800.  That is the highest he has ever had but it has been above 5 for several years and clinically he feels he is doing okay.  Voiding okay without any worsening bladder outlet obstructive symptoms.  He does not feel he needs any extra help with voiding and is doing very well just on the terazosin.  Does need renewal of medication.  No recent hospitalizations or new health issues. [May 30, 2023]     Mr. Peters is in for his yearly checkup.  He had another PSA done earlier this month that is perfectly acceptable at 4.880 which is the best it has been in several years.  Stable with voiding.  No worsening bladder outlet obstructive symptoms and continues to do well just on terazosin.  Overall very good year. [May 30, 2024]    Mr. Peters was seen 3 months ago.  He asked to be seen again because was having nocturia 3 or 4 times nightly.  He thinks his stream may be a little weaker than it was.  He has been on terazosin 5 mg some time.  I am reluctant to increase medication but he would like to try something else to see if he can have improvement in his frequency especially with his nocturia.  Patient in with his wife who was also seen for follow-up today.  [August 28, 2024]            Review of Systems       Objective     Physical Exam  Constitutional:       Appearance: Normal appearance. He is normal weight.   Neurological:      Mental Status: He is alert.   Psychiatric:         Mood and Affect: Mood normal.         Behavior: Behavior normal.      Urinalysis had only occasional pus cells.  Dipstick did have a trace of blood with a pH 5.0 and specific gravity 1.015.     Assessment and Plan     1. Benign prostatic hyperplasia with urinary obstruction  -     finasteride (PROSCAR) 5 mg tablet; Take 1 tablet (5 mg total) by mouth once daily.  Dispense: 90 tablet; Refill: 3    2. Bladder outlet obstruction    3. Elevated PSA    4. Urinary frequency    5. Nocturia        Patient's frequency and nocturia seem to be somewhat worse than they were before.  He has had previous procedure for BPH over 10 years ago.  I do not want to increase terazosin dosage because of concern about postural hypotension.  Patient agreeable to trying finasteride and see if that will help him with his problems.  He understands it will take a few months for that to actually shrink his prostate size.  Finasteride submitted.  He will have follow-up with Mr. Robles in 1 year or be seen sooner if needed.        No follow-ups on file.

## 2024-08-28 NOTE — PATIENT INSTRUCTIONS
Patient's frequency and nocturia seem to be somewhat worse than they were before.  He has had previous procedure for BPH over 10 years ago.  I do not want to increase terazosin dosage because of concern about postural hypotension.  Patient agreeable to trying finasteride and see if that will help him with his problems.  He understands it will take a few months for that to actually shrink his prostate size.  Finasteride submitted.  He will have follow-up with Mr. Robles in 1 year or be seen sooner if needed.

## 2024-09-16 ENCOUNTER — OFFICE VISIT (OUTPATIENT)
Dept: FAMILY MEDICINE | Facility: CLINIC | Age: 84
End: 2024-09-16
Payer: MEDICARE

## 2024-09-16 VITALS
HEART RATE: 71 BPM | RESPIRATION RATE: 16 BRPM | SYSTOLIC BLOOD PRESSURE: 128 MMHG | DIASTOLIC BLOOD PRESSURE: 66 MMHG | TEMPERATURE: 98 F | WEIGHT: 179.38 LBS | HEIGHT: 65 IN | BODY MASS INDEX: 29.89 KG/M2 | OXYGEN SATURATION: 95 %

## 2024-09-16 DIAGNOSIS — R31.9 HEMATURIA, UNSPECIFIED TYPE: ICD-10-CM

## 2024-09-16 DIAGNOSIS — N30.01 ACUTE CYSTITIS WITH HEMATURIA: Primary | ICD-10-CM

## 2024-09-16 DIAGNOSIS — R35.0 URINARY FREQUENCY: ICD-10-CM

## 2024-09-16 LAB
BILIRUB SERPL-MCNC: NORMAL MG/DL
BLOOD URINE, POC: NORMAL
CLARITY, UA: CLEAR
COLOR, UA: YELLOW
GLUCOSE UR QL STRIP: NORMAL
KETONES UR QL STRIP: NORMAL
LEUKOCYTE ESTERASE URINE, POC: NORMAL
NITRITE, POC UA: NORMAL
PH, POC UA: 6.5
PROTEIN, POC: NORMAL
SPECIFIC GRAVITY, POC UA: 1.01
UROBILINOGEN, POC UA: 1

## 2024-09-16 PROCEDURE — 96372 THER/PROPH/DIAG INJ SC/IM: CPT | Mod: ,,, | Performed by: NURSE PRACTITIONER

## 2024-09-16 PROCEDURE — 99213 OFFICE O/P EST LOW 20 MIN: CPT | Mod: ,,, | Performed by: NURSE PRACTITIONER

## 2024-09-16 PROCEDURE — 87086 URINE CULTURE/COLONY COUNT: CPT | Mod: ,,, | Performed by: CLINICAL MEDICAL LABORATORY

## 2024-09-16 PROCEDURE — 81003 URINALYSIS AUTO W/O SCOPE: CPT | Mod: RHCUB | Performed by: NURSE PRACTITIONER

## 2024-09-16 RX ORDER — EPINEPHRINE 0.3 MG/.3ML
INJECTION SUBCUTANEOUS
COMMUNITY
Start: 2024-09-03

## 2024-09-16 RX ORDER — SULFAMETHOXAZOLE AND TRIMETHOPRIM 800; 160 MG/1; MG/1
1 TABLET ORAL 2 TIMES DAILY
Qty: 14 TABLET | Refills: 0 | Status: SHIPPED | OUTPATIENT
Start: 2024-09-16 | End: 2024-09-23

## 2024-09-16 RX ORDER — CEFTRIAXONE 1 G/1
1 INJECTION, POWDER, FOR SOLUTION INTRAMUSCULAR; INTRAVENOUS
Status: COMPLETED | OUTPATIENT
Start: 2024-09-16 | End: 2024-09-16

## 2024-09-16 RX ADMIN — CEFTRIAXONE 1 G: 1 INJECTION, POWDER, FOR SOLUTION INTRAMUSCULAR; INTRAVENOUS at 01:09

## 2024-09-16 NOTE — PROGRESS NOTES
"   NICOLÁS Bunn   [facility]  [address]  [place] [address]     PATIENT NAME: Elliott Peters  : 1940  DATE: 24  MRN: [id number]      Billing Provider: NICOLÁS Bunn  Level of Service:   Patient PCP Information       Provider PCP Type    Chevy Malhotra DO General            Reason for Visit / Chief Complaint: Urinary Frequency (Pt c/o urinary frequency and pressure x 3-4 days. Pt reports he has been taking AZO. ) and Dysuria (X 3-4 days. Denies hematuria. )   Health Maintenance Due   Topic Date Due    Shingles Vaccine (1 of 2) Never done    RSV Vaccine (Age 60+ and Pregnant patients) (1 - 1-dose 60+ series) Never done    Influenza Vaccine (1) 2024    COVID-19 Vaccine ( - - season) 2024          Update PCP  Update Chief Complaint         History of Present Illness / Problem Focused Workflow     Elliott Peters presents to the clinic as a walk in with urinary complaints of urinary frequency, "pressure", dysuria, urgency. Pt states it has been a long time since he has had a kidney infection. He denies fever, abd pain, low back pain, n/v/d. He states he has been taking AZO that did not seem to help much and has been drinking plenty of water. He denies any other needs at this time. NAD noted.     Hemoglobin A1C   Date Value Ref Range Status   2024 5.2 4.5 - 6.6 % Final     Comment:       Normal:               <5.7%  Pre-Diabetic:       5.7% to 6.4%  Diabetic:             >6.4%  Diabetic Goal:     <7%   2022 5.2 4.5 - 6.6 % Final     Comment:       Normal:               <5.7%  Pre-Diabetic:       5.7% to 6.4%  Diabetic:             >6.4%  Diabetic Goal:     <7%        CMP  Sodium   Date Value Ref Range Status   2024 141 136 - 145 mmol/L Final     Potassium   Date Value Ref Range Status   2024 3.7 3.5 - 5.1 mmol/L Final     Chloride   Date Value Ref Range Status   2024 111 (H) 98 - 107 mmol/L Final     CO2   Date Value Ref Range Status "   05/09/2024 22 21 - 32 mmol/L Final     Glucose   Date Value Ref Range Status   05/09/2024 139 (H) 74 - 106 mg/dL Final     BUN   Date Value Ref Range Status   05/09/2024 21 (H) 7 - 18 mg/dL Final     Creatinine   Date Value Ref Range Status   05/09/2024 0.96 0.70 - 1.30 mg/dL Final     Calcium   Date Value Ref Range Status   05/09/2024 9.1 8.5 - 10.1 mg/dL Final     Total Protein   Date Value Ref Range Status   05/09/2024 7.0 6.4 - 8.2 g/dL Final     Albumin   Date Value Ref Range Status   05/09/2024 4.2 3.5 - 5.0 g/dL Final     Bilirubin, Total   Date Value Ref Range Status   05/09/2024 1.0 >0.0 - 1.2 mg/dL Final     Alk Phos   Date Value Ref Range Status   05/09/2024 84 45 - 115 U/L Final     AST   Date Value Ref Range Status   05/09/2024 38 (H) 15 - 37 U/L Final     ALT   Date Value Ref Range Status   05/09/2024 35 16 - 61 U/L Final     Anion Gap   Date Value Ref Range Status   05/09/2024 12 7 - 16 mmol/L Final     eGFR   Date Value Ref Range Status   05/09/2024 77 >=60 mL/min/1.73m2 Final        Lab Results   Component Value Date    WBC 4.80 05/09/2024    RBC 4.71 05/09/2024    HGB 13.7 05/09/2024    HCT 43.6 05/09/2024    MCV 92.6 05/09/2024    MCH 29.1 05/09/2024    MCHC 31.4 (L) 05/09/2024    RDW 15.3 (H) 05/09/2024     (L) 05/09/2024    MPV 11.8 05/09/2024    LYMPH 39.4 05/09/2024    LYMPH 1.89 05/09/2024    MONO 28.3 (H) 05/09/2024    EOS 0.13 05/09/2024    BASO 0.04 05/09/2024    EOSINOPHIL 2.7 05/09/2024    BASOPHIL 0.8 05/09/2024        Lab Results   Component Value Date    CHOL 75 05/09/2024    CHOL 89 11/01/2023    CHOL 78 04/24/2023     Lab Results   Component Value Date    HDL 22 (L) 05/09/2024    HDL 30 (L) 11/01/2023    HDL 36 (L) 04/24/2023     Lab Results   Component Value Date    LDLCALC 20 05/09/2024    LDLCALC 36 11/01/2023    LDLCALC 32 04/24/2023     Lab Results   Component Value Date    TRIG 163 (H) 05/09/2024    TRIG 113 11/01/2023    TRIG 50 04/24/2023     Lab Results   Component  Value Date    CHOLHDL 3.4 05/09/2024    CHOLHDL 3.0 11/01/2023    CHOLHDL 2.2 04/24/2023        Wt Readings from Last 3 Encounters:   09/16/24 1257 81.4 kg (179 lb 6.4 oz)   08/28/24 1018 81.2 kg (179 lb)   08/27/24 1351 81.2 kg (179 lb 0.6 oz)        BP Readings from Last 3 Encounters:   09/16/24 128/66   08/28/24 132/76   08/27/24 (!) 140/74        Review of Systems     Review of Systems   Constitutional: Negative.    Eyes: Negative.    Respiratory: Negative.     Cardiovascular: Negative.    Gastrointestinal: Negative.    Endocrine: Negative.    Genitourinary:  Positive for dysuria, frequency and urgency.   Musculoskeletal:  Positive for arthralgias.   Integumentary:  Negative.   Neurological: Negative.    Hematological: Negative.    Psychiatric/Behavioral: Negative.          Medical / Social / Family History     Past Medical History:   Diagnosis Date    Acute pain of right knee 12/30/2021    Allergic reaction 05/17/2021    Anxiety     Arthritis     BPH (benign prostatic hyperplasia)     Contact dermatitis 08/11/2021    Coronary artery disease     cardiac stents    Enlarged lymph node 10/19/2022    Hematoma 10/19/2022    Hyperlipidemia     Hypertension     Kidney stone     MI (myocardial infarction)     Primary insomnia     Sleep apnea     Staphylococcal infection of skin 05/17/2021    Upper respiratory tract infection 12/26/2023    Urinary tract infection        Past Surgical History:   Procedure Laterality Date    CORONARY ANGIOPLASTY WITH STENT PLACEMENT      HERNIA REPAIR      Inguinal hernia    JOINT REPLACEMENT Bilateral     difficult to wake after surgery  Knee replacements    PROSTATECTOMY  2012       Social History    reports that he quit smoking about 66 years ago. His smoking use included cigarettes. He has been exposed to tobacco smoke. He has never used smokeless tobacco. He reports that he does not currently use alcohol. He reports that he does not use drugs.    Family History  Mr.'s family  history includes Colon cancer in his mother; Cystic fibrosis in his daughter; Heart attack in his father; No Known Problems in his maternal grandfather, maternal grandmother, paternal grandfather, paternal grandmother, and son.    Medications and Allergies     Medications  Outpatient Medications Marked as Taking for the 24 encounter (Office Visit) with Mai Howell FNP   Medication Sig Dispense Refill    ALPRAZolam (XANAX) 0.5 MG tablet Take 1 tablet (0.5 mg total) by mouth 2 (two) times daily as needed for Anxiety. 90 tablet 0    amLODIPine (NORVASC) 5 MG tablet Take 0.5 tablets (2.5 mg total) by mouth once daily. 180 tablet 1    aspirin (ECOTRIN) 81 MG EC tablet Take 81 mg by mouth once daily.      carvediloL (COREG) 3.125 MG tablet Take 1 tablet (3.125 mg total) by mouth 2 (two) times daily with meals. 180 tablet 1    clopidogreL (PLAVIX) 75 mg tablet Take 1 tablet (75 mg total) by mouth once daily. 90 tablet 1    EPINEPHrine (EPIPEN) 0.3 mg/0.3 mL AtIn SMARTSI Syringe(s) IM      finasteride (PROSCAR) 5 mg tablet Take 1 tablet (5 mg total) by mouth once daily. 90 tablet 3    nitroGLYCERIN (NITROSTAT) 0.4 MG SL tablet Place 1 tablet (0.4 mg total) under the tongue every 5 (five) minutes as needed for Chest pain. 100 tablet 0    pantoprazole (PROTONIX) 40 MG tablet Take 1 tablet (40 mg total) by mouth once daily. 90 tablet 1    rosuvastatin (CRESTOR) 40 MG Tab Take 1 tablet (40 mg total) by mouth every evening. 90 tablet 3    terazosin (HYTRIN) 5 MG capsule Take 1 capsule (5 mg total) by mouth every evening. 90 capsule 3     Current Facility-Administered Medications for the 24 encounter (Office Visit) with Mai Howell FNP   Medication Dose Route Frequency Provider Last Rate Last Admin    [COMPLETED] cefTRIAXone injection 1 g  1 g Intramuscular 1 time in Clinic/HOD Mai Howell FNP   1 g at 24 1338       Allergies  Review of patient's allergies indicates:   Allergen Reactions    Bronopol  "Dermatitis    Propylene glycol Dermatitis       Physical Examination     Vitals:    09/16/24 1257   BP: 128/66   BP Location: Right arm   Patient Position: Sitting   BP Method: Medium (Automatic)   Pulse: 71   Resp: 16   Temp: 98 °F (36.7 °C)   TempSrc: Oral   SpO2: 95%   Weight: 81.4 kg (179 lb 6.4 oz)   Height: 5' 5" (1.651 m)      Physical Exam  Constitutional:       Appearance: Normal appearance.   HENT:      Head: Normocephalic.   Eyes:      Extraocular Movements: Extraocular movements intact.   Cardiovascular:      Rate and Rhythm: Normal rate and regular rhythm.      Pulses: Normal pulses.      Heart sounds: Normal heart sounds.   Pulmonary:      Effort: Pulmonary effort is normal.      Breath sounds: Normal breath sounds.   Abdominal:      General: Abdomen is flat. Bowel sounds are normal.      Palpations: Abdomen is soft.   Skin:     General: Skin is warm and dry.      Capillary Refill: Capillary refill takes less than 2 seconds.   Neurological:      General: No focal deficit present.      Mental Status: He is alert and oriented to person, place, and time.   Psychiatric:         Mood and Affect: Mood normal.         Behavior: Behavior normal.          Assessment and Plan (including Health Maintenance)      Problem List  Smart Sets  Document Outside HM   :    Plan:     There are no Patient Instructions on file for this visit.       Health Maintenance Due   Topic Date Due    Shingles Vaccine (1 of 2) Never done    RSV Vaccine (Age 60+ and Pregnant patients) (1 - 1-dose 60+ series) Never done    Influenza Vaccine (1) 09/01/2024    COVID-19 Vaccine (5 - 2023-24 season) 09/01/2024       Problem List Items Addressed This Visit       Acute cystitis with hematuria - Primary    Current Assessment & Plan     UA with trace amount of blood in urine, but pt has been taking AZO.   Rocephin 1G injection given. No adverse reaction noted.   Bactrim prescribed to take as directed. Instructed to take all abx until gone even " if start to feel better.    Increase water intake.   Urine cx pending. Will inform of results when available.   Instructed to RTC for any new/worsening/persisting ssx.      Color, UA POC yellow   Clarity, UA, POC clear   Spec Grav UA 1.015   pH, UA 6.5   WBC, UA neg   Nitrite, UA neg   Protein, POC neg   Glucose, UA neg   Ketones, UA neg   Bilirubin, POC neg   Urobilinogen, UA 1.0   Blood, UA trace lysed            Relevant Medications    cefTRIAXone injection 1 g (Completed) (Start on 9/16/2024  1:45 PM)    sulfamethoxazole-trimethoprim 800-160mg (BACTRIM DS) 800-160 mg Tab    Hematuria    Current Assessment & Plan     See cystitis plan.          Relevant Orders    Urine culture    Urinary frequency    Current Assessment & Plan     See cystitis plan.          Relevant Orders    POCT URINALYSIS W/O SCOPE (Completed)     Acute cystitis with hematuria  -     cefTRIAXone injection 1 g  -     sulfamethoxazole-trimethoprim 800-160mg (BACTRIM DS) 800-160 mg Tab; Take 1 tablet by mouth 2 (two) times daily. for 7 days  Dispense: 14 tablet; Refill: 0    Urinary frequency  -     POCT URINALYSIS W/O SCOPE    Hematuria, unspecified type  -     Urine culture       Health Maintenance Topics with due status: Not Due       Topic Last Completion Date    TETANUS VACCINE 06/25/2021    Colonoscopy 07/07/2022    PROSTATE-SPECIFIC ANTIGEN 05/09/2024    Hemoglobin A1c (Prediabetes) 05/09/2024    Lipid Panel 05/09/2024    Aspirin/Antiplatelet Therapy 08/28/2024         Future Appointments   Date Time Provider Department Center   11/8/2024  9:00 AM Chevy Malhotra DO [facility] [place]   8/27/2025  3:00 PM AWV NURSE, [facility] [place]   9/2/2025  9:40 AM Juan R Robles, NP [facility]        Follow up if symptoms worsen or fail to improve.    Health Maintenance Due   Topic Date Due    Shingles Vaccine (1 of 2) Never done    RSV Vaccine (Age 60+ and Pregnant patients) (1 - 1-dose 60+  series) Never done    Influenza Vaccine (1) 09/01/2024    COVID-19 Vaccine (5 - 2023-24 season) 09/01/2024        Signature:  NICOLÁS Bunn    Date of encounter: 9/16/24

## 2024-09-16 NOTE — ASSESSMENT & PLAN NOTE
UA with trace amount of blood in urine, but pt has been taking AZO.   Rocephin 1G injection given. No adverse reaction noted.   Bactrim prescribed to take as directed. Instructed to take all abx until gone even if start to feel better.    Increase water intake.   Urine cx pending. Will inform of results when available.   Instructed to RTC for any new/worsening/persisting ssx.      Color, UA POC yellow   Clarity, UA, POC clear   Spec Grav UA 1.015   pH, UA 6.5   WBC, UA neg   Nitrite, UA neg   Protein, POC neg   Glucose, UA neg   Ketones, UA neg   Bilirubin, POC neg   Urobilinogen, UA 1.0   Blood, UA trace lysed

## 2024-09-18 LAB — UA COMPLETE W REFLEX CULTURE PNL UR: NO GROWTH

## 2024-09-18 NOTE — PROGRESS NOTES
Please see how pt is doing. His urine culture is normal, so if he continues to have the symptoms he was having to let us know and we may need to do other tests. Thanks!

## 2024-09-19 NOTE — PROGRESS NOTES
"Subjective     Patient ID: Elliott Peters is a 84 y.o. male.    Chief Complaint: No chief complaint on file.    Urology History  Symptoms:  No dysuria, No urethral discharge, Nocturia, Nocturia list 4 or greater times each night  Associated Symptoms:  No abdominal pain  Conditions:  Erectile dysfunction (ED), No hydronephrosis, Benign prostatic hyperplasia, No obstructive nephropathy, No polycystic kidney disease, No kidney cancer, No bladder cancer, No testicular cancer, No prostate cancer, History of recurrent urinary tract infections, No history of urolithiasis, No history of pyelonephritis, History of prostatitis  Past Medical History:  No diabetes mellitus, History of heart attack, No History of lung disease, No History of HIV/AIDS, No History of Hepatitis, No history of cancer  Medication History:  Not on NSAID, Not on aspirin  Family History:  No family history of urolithiasis, No family history of cystic kidneys, No family history of chronic renal disease, No Family history of stones, No Family history of bladder cancer, No Family history of kidney cancer  Kidney Stone:  First Stone at least 15 years ago, Last stone  05/30/2018, Total numer of stones >10, ESL 2003, PRESENT STONE STARTED 2 days ago, passed last night, Pain severity 1, Back pain not worse with movement, No gastrointestinal complaints, No fever, No adequate pain medicine, No KUB performed, No C&S performed, No urinalysis performed, Lab, Dietary counseling, CT performed  Current Conditions:  Nocturia, No UTI's, PANCHITO current  AT TIMES. HAS A LIST OF HIS MEDS."     History of Present Illness  General  UROLOGICAL HISTORY PER NOTES OF DR. SCHWARZ:  This 72-year-old white male has been under my care for years. He developed urinary retention and a lot of pain around Sonu of 2012. We received his records from [facility]. He had a green light laser vaporization and enucleation of the prostate by Dr. Israel Coyne on 12/27/12. He has recovered. He " "is doing well at this time.     His recent PSA is 3.36. This is much better than his previous PSAs but I don?t have all those numbers in front of me.     I saw him as recently as 2/25/12 when he had a stone. We talked about stone prevention. I have told him at that time to come back in a year and to stop his Hytrin. He is still on the Hytrin. He is voiding well. I told him he can stop the Hytrin. He is already off the Avodart.     By rectal examination, he has a 30 to 40 gram sized prostate without obvious cancer.     I am returning him to the care of his family physician, Dr. Wiggins. He needs a PANCHITO and a PSA once a year for several years. The question of how long to continue annual screening is uncertain. He wants to be checked.     I will see him on a p.r.n. basis. "  ----------------------------------------------------------------------------------------------------------     Mr. Peters has been referred back to [facility] for his urological tx.  He currently has multiple stones in bilateral kidneys.  He was last seen 6/13/2013, and was released to the care of Dr. Wiggins at that time.  PANCHITO and PSA was last checked last a year ago by Dr. Coyne.  He requests for all his urological needs to be taken care of here.   (May 23, 2018)     Mr. Peters has been worked into clinic for c/o suspected stone lodged in his penis.  He states he feels he passed this stone last night, but voided outside without straining.  Reports pain resolved after painful urination.  Denies fever, chills or constitutional sxs.  Currently taking Bactrim DS for one month - tx of bacterial prostatitis.  Urine cultured 2,000 E. Coli last visit.  (May 31, 2018)     Mr. Peters is here for f/u stone disease (20+).  Patient states he has passed 2 smaller stones since his last visit without need for pain meds provided last visit.  He denies sensation of infection today.  Declines KUB for confirmation / evaluation of remaining stones.  He is joined today " "by his spouse.  No microhematuria or c/o flank pain since passing last stone.  June 25, 2018)     Mr. Peters is here today with c/o 3 seperate episodes of gross hematuria (lasting 3 days) before resolving without treatment.  All episodes occurring since the first of this year.  He has a hx of stone disease, but denies bleeding associate with stone pain.    -  CT which was performed 2/2019 resulted "several subcentimeter calcific densities along the dorsal aspect of the urinary bladder may reflect urinary bladder calculi or prostate calcifications;  and subcentimater bilateral nephrolithiasis".      Patient denies voiding complaints today.  (July 23, 2019)     Benign Prostatic Hyperplasia:  Frequent voiding at night, Nocturia list 4 or greater times each night, Blood in urine, No pain with urination, No incomplete emptying of bladder, No, No, Splitting or spraying of urinary stream, No, Frequency of incontinence occasional, Incontinence associated with a strong urge to void, No  Kidney Stone:  First Stone at least 15 years ago, Last stone  05/30/2018, Total numer of stones >10, ESL 2003, PRESENT STONE STARTED 2 days ago, passed last night, Pain severity 1, No fever, No, Back pain not worse with movement, No gastrointestinal complaints, No adequate pain medicine, No KUB performed, No C&S performed, No urinalysis performed, CT performed, Lab, Dietary counseling     Urology History  Conditions:  Erectile dysfunction (ED), No hydronephrosis, Benign prostatic hyperplasia, No obstructive nephropathy, No polycystic kidney disease, No kidney cancer, No bladder cancer, No testicular cancer, No prostate cancer, History of recurrent urinary tract infections, No history of urolithiasis, No history of pyelonephritis, History of prostatitis  Past Medical History:  Hypertension, No diabetes mellitus, History of heart attack, No History of stroke, No History of lung disease, No History of HIV/AIDS, No History of Hepatitis, No " history of cancer  s/p TURP  Medication History:  Not on NSAID, Not on aspirin  Plavix  Family History:  No family history of urolithiasis, No family history of cystic kidneys, No family history of chronic renal disease, Family history of prostate cancer, No Family history of stones, No Family history of bladder cancer, No Family history of kidney cancer  Current Conditions:  Nocturia, No UTI's, PANCHITO current  --------------------------------------------------------------------------------------------------------------------------------------------------------------------------     The above notes arenotes ofmrs.CynthiaSmith.isscheduledforcystoscopybymenextweekforevaluationofthebladdercalcifications.Hehascontinuedtohaveintermittenthematuria.Srycnpvobuetvjiagwxntbdokokfvsuwjizbajaauscafnfo9JFaicgygpsinfwnepxh a stone.Hehassome tramadolsDr.Bing hadpreviouslygiventhatheistakenissomebetternow.HedidhaveESLbyDr.yigtgmkz23yjtecgof. He has had gross hematuria 3 times this year. He is still somewhat uncomfortable at present.  (August 5, 2019)     Mr. Peters returned today for his cystoscopy. He was treated for a low-grade UTI after the above visit. He did see a small stone pass after the above. He has had no bleeding since the stone passed and still has all of his pain medicine left should he need additional medication. Patient is on Plavix and aspirin. Passed his stone just a few days after being seen. Feeling okay today and returned for his workup. (August 15, 2019)     Mr. Peters and his wife returned today because of concern about the amount of blood in his urine. He did not remove his Brennan this morning because of continued bleeding. Came in for check on his catheter. (August 16, 2019)     Mr. Peters is in for one month follow-up. He took his catheter out morning after he was here as his urine was completely clear at that time. He's had no further problem with bleeding. He is back to baseline with  voiding with nocturia 3 or 4 times nightly. He feels he is doing well at present. Nothing to suggest stone problems etc. (September 17, 2019)     Mr. Peters is in a swing bed now at [facility]. He is receiving IV antibiotics for his UTI. He went to emergency room Saturday afternoon 9 days ago because of fever which reached as high as 104 apparently. Treated for UTI, but it was felt he needed stronger antibiotics. I had talked to Dr. Juan Kirkpatrick about him the other day and suggested that he be switched from Rocephin to gentamicin. He is receiving 400 mg gentamycin every 36 hours now. He's had a little bit of diarrhea the last 2 days and no energy. Never really feels well. His BUN/creatinine was 19/1.2 today. Patient not feeling well but he is on the right medication based on recent culture results. (September 30, 2019)     Mr. Peters returns for follow-up after treatment of UTI. Feeling much better and has no sensation of infection now. We treated him with gentamicin and feels he is doing okay. Still has nocturia 4 or 5 times nightly but that is stable and has been that way for the last few years. He feels like he is back to baseline now. (October 14, 2019)     Mr. Peters is in for six-month follow-up. He has had no further urinary tract infections since he was last seen. He feels he is emptying his bladder satisfactorily although he does have nocturia about 3 times nightly. Patient is feeling better in general since he's taking Mobic on a regular basis for foot pain. Overall he is improved. (June 11, 2020)     PSA Results:  Past PSA Results   5.430 ON 11/6/2016   3.360 ON 06/13/2013  5.350 ON 11/14/2012  5.06 ON 02/06/2012  4.42 ON  08/17/2011  --------------------------------------------------------------------------------------------------------------------------------------------------------------------------------------------------------------------------------------------------------------------------------------------------------------------------------  The above notes are notes from the old electronic medical record system.     PSA was 5.800 on April 24, 2023   PSA was 5.500 on December 17, 2021  PSA was 5.170 on October 12, 2021  PSA was 4.880 on May 9, 2024        Mr. Peters comes in ahead of his yearly checkup.  Former patient of Dr. Hernando James.  He had laser prostatectomy by Dr. Israel Coyne in 2012. Followed more recently by Ms. Andria Queen for a couple years and I started seeing him around 2019. He had had urinary retention again +he had trouble with difficult urinary tract infection about that time.  He was doing well when he was seen last June and had another appointment but in ahead of that scheduled appointment because of irritation and apparent infection of the glans penis.  He continues to void reasonably well with nocturia 2-3 times nightly.  He has not had any recent stone problems.  Using antibiotic ointment which has not helped the lesion on his penis.  (04/13/2021)        Mr. Peters was in on the above date because of a problem with balanoposthitis.  His problem has essentially resolved and he has had no further irritation since treating as a yeast infection.  He is in for his regular 1 year follow-up today.  Voiding reasonably well but does have nocturia 2-3 times nightly.  He does not feel he is having any major issue with that.  Overall satisfied with the way he is doing.  We are not doing PSAs routinely on him now because of age.  He did have his KUB checking for nephrolithiasis as he has known stones in his kidneys.  He has not had any pain from stones in recent past.  (June 14, 2021)     Mr. Peters is in  for the 1st visit in nearly 1 year.  He has not had any active stone problems or any other major  problems.  He has nocturia 2-3 times nightly.  He has had no further problem with his foreskin like he did few years ago.  No visible blood in the urine and has not had any new issues.  He did have a PSA done by Ms. Olivier in December that was still elevated at 5.5. (May 18, 2022)     Mr. Peters is in for his 1 year follow-up.  He had another PSA done April 24th that was 5.800.  That is the highest he has ever had but it has been above 5 for several years and clinically he feels he is doing okay.  Voiding okay without any worsening bladder outlet obstructive symptoms.  He does not feel he needs any extra help with voiding and is doing very well just on the terazosin.  Does need renewal of medication.  No recent hospitalizations or new health issues. [May 30, 2023]     Mr. Peters is in for his yearly checkup.  He had another PSA done earlier this month that is perfectly acceptable at 4.880 which is the best it has been in several years.  Stable with voiding.  No worsening bladder outlet obstructive symptoms and continues to do well just on terazosin.  Overall very good year. [May 30, 2024]     Mr. Peters was seen 3 months ago.  He asked to be seen again because was having nocturia 3 or 4 times nightly.  He thinks his stream may be a little weaker than it was.  He has been on terazosin 5 mg some time.  I am reluctant to increase medication but he would like to try something else to see if he can have improvement in his frequency especially with his nocturia.  Patient in with his wife who was also seen for follow-up today.  [August 28,  2024]  -----------------------------------------------------------------------------------------------------------------------------------------------------------------------------------------------------------------------------------------------------------------------------------------------------------------------  The above notes are from Dr. ADRIANE Allen in the EMR system.     This pleasant 84 year old male presents to the clinic with wife as a work in for prostatitis.  He is a previous patient of Dr. Allen for elevated PSA, BPH with LUTS and prostatitis.  Patient states symptoms started about two weeks ago.  He reports dysuria, incomplete bladder emptying, frequency, urgency, weak stream and nocturia 5 times a night.  His PVR was 050 mls.  His urine culture on September 16, 2024 was negative. He reports being given a shot of Rocephin and started on bactrim ds at the time of the culture. He denies hematuria, intermittency, or straining. He denies fever, chills, nausea or vomiting.  He denies any bladder, back, abdominal or testicular pain.  He was recently started on Finasteride 5 mg by Dr. Alejandro and is tolerating this medication without side effects. He is also on Terazosin 5 mg and is tolerating this medication without side effects.  I discussed treating with a month of Doxycycline 100 mg and the side effects as outlined in the plan. They were encouraged to read up on the side effects.  He will continue the Finasteride and Terazosin as prescribed.  He will continue the AZO over the counter per package instructions.  I will see him back in the clinic in 3 months or sooner if needed.  I discussed the plan in detail with the patient and wife and they are in agreement with the plan.  All her questions were answered at today's visit.   -------------------------------------------------------------------------------  [September 20, 2024].             Review of Systems   Constitutional:  Negative for activity change and  fever.   HENT:  Negative for hearing loss and trouble swallowing.    Eyes:  Negative for visual disturbance.   Respiratory:  Negative for cough, shortness of breath and wheezing.    Cardiovascular:  Negative for chest pain.   Gastrointestinal:  Negative for abdominal pain, diarrhea, nausea and vomiting.   Endocrine: Negative for polyuria.   Genitourinary:  Positive for dysuria and frequency. Negative for bladder incontinence, decreased urine volume, difficulty urinating, discharge, enuresis, erectile dysfunction, flank pain, genital sores, hematuria, penile pain, penile swelling, scrotal swelling, testicular pain and urgency.        BPH with LUTS        Chronic Prostatitis        Elevated PSA    Musculoskeletal:  Negative for back pain and gait problem.   Integumentary:  Negative for rash.   Neurological:  Negative for speech difficulty and weakness.   Psychiatric/Behavioral:  Negative for behavioral problems and confusion.           Objective     Physical Exam  Vitals and nursing note reviewed.   Constitutional:       General: He is not in acute distress.     Appearance: Normal appearance. He is not ill-appearing, toxic-appearing or diaphoretic.   HENT:      Head: Normocephalic.   Eyes:      Extraocular Movements: Extraocular movements intact.   Cardiovascular:      Rate and Rhythm: Normal rate and regular rhythm.      Heart sounds: Normal heart sounds.   Pulmonary:      Effort: Pulmonary effort is normal. No respiratory distress.      Breath sounds: Normal breath sounds. No wheezing, rhonchi or rales.   Abdominal:      General: Bowel sounds are normal.      Palpations: Abdomen is soft.      Tenderness: There is no abdominal tenderness. There is no right CVA tenderness, left CVA tenderness, guarding or rebound.   Musculoskeletal:         General: Normal range of motion.      Cervical back: Normal range of motion. No rigidity.   Skin:     General: Skin is warm and dry.   Neurological:      General: No focal deficit  present.      Mental Status: He is alert and oriented to person, place, and time.      Motor: No weakness.      Coordination: Coordination normal.      Gait: Gait normal.   Psychiatric:         Mood and Affect: Mood normal.         Behavior: Behavior normal.         Thought Content: Thought content normal.          Assessment and Plan     Problem List Items Addressed This Visit          Renal/    Benign prostatic hyperplasia with nocturia - Primary    Chronic prostatitis    Relevant Medications    doxycycline (VIBRAMYCIN) 100 MG Cap    Elevated PSA    Urinary frequency     Other Visit Diagnoses       Dysuria        Relevant Medications    doxycycline (VIBRAMYCIN) 100 MG Cap    Frequency of urination                     Rx Doxycycline 100 mg take one capsule twice a day x 10 days then reduce to one daily -- avoid direct sunlight, do not take this medication within two hours of milk, milk products or antacids -- discussed side effects to include a rash -- read up on side effects   Continue Terazosin 5 mg as prescribed   Continue Finasteride 5 mg one daily   Continue Azo over the counter per package instructions   Follow up with Urology BOB Harp in 3 months

## 2024-09-20 ENCOUNTER — OFFICE VISIT (OUTPATIENT)
Dept: UROLOGY | Facility: CLINIC | Age: 84
End: 2024-09-20
Payer: MEDICARE

## 2024-09-20 VITALS
OXYGEN SATURATION: 96 % | HEART RATE: 59 BPM | DIASTOLIC BLOOD PRESSURE: 55 MMHG | SYSTOLIC BLOOD PRESSURE: 117 MMHG | BODY MASS INDEX: 29.85 KG/M2 | HEIGHT: 65 IN | TEMPERATURE: 97 F

## 2024-09-20 DIAGNOSIS — R97.20 ELEVATED PSA: ICD-10-CM

## 2024-09-20 DIAGNOSIS — N40.1 BENIGN PROSTATIC HYPERPLASIA WITH NOCTURIA: Primary | ICD-10-CM

## 2024-09-20 DIAGNOSIS — N41.1 CHRONIC PROSTATITIS: ICD-10-CM

## 2024-09-20 DIAGNOSIS — R35.1 BENIGN PROSTATIC HYPERPLASIA WITH NOCTURIA: Primary | ICD-10-CM

## 2024-09-20 DIAGNOSIS — R30.0 DYSURIA: ICD-10-CM

## 2024-09-20 DIAGNOSIS — R35.0 URINARY FREQUENCY: ICD-10-CM

## 2024-09-20 PROBLEM — M54.16 LUMBAR RADICULITIS: Status: ACTIVE | Noted: 2024-09-20

## 2024-09-20 PROCEDURE — 99999 PR PBB SHADOW E&M-EST. PATIENT-LVL V: CPT | Mod: PBBFAC,,, | Performed by: NURSE PRACTITIONER

## 2024-09-20 PROCEDURE — 99213 OFFICE O/P EST LOW 20 MIN: CPT | Mod: S$PBB,,, | Performed by: NURSE PRACTITIONER

## 2024-09-20 PROCEDURE — 99215 OFFICE O/P EST HI 40 MIN: CPT | Mod: PBBFAC | Performed by: NURSE PRACTITIONER

## 2024-09-20 RX ORDER — DOXYCYCLINE 100 MG/1
CAPSULE ORAL
Qty: 40 CAPSULE | Refills: 0 | Status: SHIPPED | OUTPATIENT
Start: 2024-09-20

## 2024-09-24 ENCOUNTER — TELEPHONE (OUTPATIENT)
Dept: UROLOGY | Facility: CLINIC | Age: 84
End: 2024-09-24
Payer: MEDICARE

## 2024-09-24 NOTE — TELEPHONE ENCOUNTER
----- Message from Deborah Diaz sent at 9/24/2024 11:39 AM CDT -----  Pt was in on Fri. He is getting worse. In pain. [phone].  Who Called: Elliott Peters    Caller is requesting assistance/information from provider's office.    Symptoms (please be specific): Pain   How long has patient had these symptoms:    List of preferred pharmacies on file (remove unneeded): [unfilled]  If different, enter pharmacy into here including location and phone number:         Patient's Preferred Phone Number on File: [phone]   Best Call Back Number, if different:  Additional Information:    Spoke with patient and he said that his prostate still hurts. Pt is taking Doxycycline BID until this friday and will reduce to once daily. Patient said he hasn't taking anything for pain and I encouraged him to take some tylenol or the mobic that he has. Pt denies any fever, rigors or feeling bad. Pt understands that he is to go to ED with these symptoms.

## 2024-09-25 ENCOUNTER — TELEPHONE (OUTPATIENT)
Dept: UROLOGY | Facility: CLINIC | Age: 84
End: 2024-09-25
Payer: MEDICARE

## 2024-09-25 NOTE — TELEPHONE ENCOUNTER
I called pt back this morning in regards to phone conversation from yesterday with problems.  I told pt that BOB Robles has spoken with Dr Allen and his recommendations are for pt to continue the Doxycycline until it is finished, may get Prelief OTC and take per package instructions to help relieve pain and discomfort, can be bought at [facility], may take a couple of weeks for the s/s he has to go away, stop taking AZO if it is not helping, and urine culture was negative.  Pt voiced understanding.  Wife said he is using Preparation H, and I told them that is ok to use if he thinks hemorrhoids are causing some of his problems.    Pt says he is feeling much better this morning.

## 2024-09-27 ENCOUNTER — HOSPITAL ENCOUNTER (EMERGENCY)
Facility: HOSPITAL | Age: 84
Discharge: HOME | End: 2024-09-27
Payer: MEDICARE

## 2024-09-27 VITALS
WEIGHT: 175 LBS | OXYGEN SATURATION: 97 % | DIASTOLIC BLOOD PRESSURE: 68 MMHG | HEIGHT: 65 IN | TEMPERATURE: 98 F | BODY MASS INDEX: 29.16 KG/M2 | RESPIRATION RATE: 17 BRPM | SYSTOLIC BLOOD PRESSURE: 148 MMHG | HEART RATE: 71 BPM

## 2024-09-27 DIAGNOSIS — R31.9 HEMATURIA, UNSPECIFIED TYPE: Primary | ICD-10-CM

## 2024-09-27 LAB
BACTERIA #/AREA URNS HPF: ABNORMAL /HPF
BILIRUB UR QL STRIP: NEGATIVE
CLARITY UR: ABNORMAL
COLOR UR: ABNORMAL
GLUCOSE UR STRIP-MCNC: NORMAL MG/DL
KETONES UR STRIP-SCNC: NEGATIVE MG/DL
LEUKOCYTE ESTERASE UR QL STRIP: NEGATIVE
NITRITE UR QL STRIP: NEGATIVE
PH UR STRIP: 6.5 PH UNITS
PROT UR QL STRIP: 30
RBC # UR STRIP: ABNORMAL /UL
RBC #/AREA URNS HPF: >182 /HPF
SP GR UR STRIP: 1.01
SQUAMOUS #/AREA URNS LPF: ABNORMAL /HPF
UROBILINOGEN UR STRIP-ACNC: NORMAL MG/DL
WBC #/AREA URNS HPF: 4 /HPF

## 2024-09-27 PROCEDURE — 99282 EMERGENCY DEPT VISIT SF MDM: CPT

## 2024-09-27 PROCEDURE — 81003 URINALYSIS AUTO W/O SCOPE: CPT

## 2024-09-27 NOTE — ED PROVIDER NOTES
Encounter Date: 9/27/2024       History     Chief Complaint   Patient presents with    Hematuria     Has been seeing urologist for 3 weeks.       84-year old male presents to the emergency department for evaluation of hematuria. Patient reports that he has been seeing Dr. Allen and Juan R Basurto for the past three weeks and was told to come to the emergency department if he began having bleeding from urine. Patient states that bleeding began yesterday evening and has progressed through the day today, with the patient passing several large blood clots. Denies abdominal pain, dysuria, back pain, chest pain, shortness of breath, fever, chills. Patient states that he has been on antibiotic therapy for the last two weeks and takes plavix daily.    The history is provided by the patient. No  was used.     Review of patient's allergies indicates:   Allergen Reactions    Bronopol Dermatitis    Propylene glycol Dermatitis     Past Medical History:   Diagnosis Date    Acute pain of right knee 12/30/2021    Allergic reaction 05/17/2021    Anxiety     Arthritis     BPH (benign prostatic hyperplasia)     Contact dermatitis 08/11/2021    Coronary artery disease     cardiac stents    Enlarged lymph node 10/19/2022    Hematoma 10/19/2022    Hyperlipidemia     Hypertension     Kidney stone     MI (myocardial infarction)     Primary insomnia     Sleep apnea     Staphylococcal infection of skin 05/17/2021    Upper respiratory tract infection 12/26/2023    Urinary tract infection      Past Surgical History:   Procedure Laterality Date    CORONARY ANGIOPLASTY WITH STENT PLACEMENT      HERNIA REPAIR      Inguinal hernia    JOINT REPLACEMENT Bilateral     difficult to wake after surgery  Knee replacements    PROSTATECTOMY  2012     Family History   Problem Relation Name Age of Onset    Colon cancer Mother      Heart attack Father      Cystic fibrosis Daughter      No Known Problems Son      No Known Problems Maternal  Grandmother      No Known Problems Maternal Grandfather      No Known Problems Paternal Grandmother      No Known Problems Paternal Grandfather       Social History     Tobacco Use    Smoking status: Former     Current packs/day: 0.00     Types: Cigarettes     Quit date:      Years since quittin.7     Passive exposure: Past    Smokeless tobacco: Never    Tobacco comments:     QUIT 50 +YEARS AGO   Substance Use Topics    Alcohol use: Not Currently     Comment: QUIT 50 +YEARS AGO     Drug use: Never     Review of Systems   Constitutional:  Negative for chills and fever.   Eyes:  Negative for photophobia and pain.   Respiratory:  Negative for chest tightness and shortness of breath.    Cardiovascular:  Negative for palpitations and leg swelling.   Gastrointestinal:  Negative for abdominal pain, diarrhea, nausea and vomiting.   Genitourinary:  Positive for hematuria and penile pain. Negative for decreased urine volume, difficulty urinating and dysuria.   Musculoskeletal:  Negative for neck pain and neck stiffness.   Neurological:  Negative for dizziness, tremors, weakness and headaches.   Psychiatric/Behavioral:  Negative for confusion.    All other systems reviewed and are negative.      Physical Exam     Initial Vitals [24 1711]   BP Pulse Resp Temp SpO2   (!) 148/68 71 17 98 °F (36.7 °C) 97 %      MAP       --         Physical Exam    Vitals reviewed.  Constitutional: He appears well-nourished. No distress.   HENT:   Head: Normocephalic.   Eyes: Conjunctivae are normal. Pupils are equal, round, and reactive to light. Right eye exhibits no discharge. Left eye exhibits no discharge.   Neck: Neck supple.   Normal range of motion.  Cardiovascular:  Normal rate, regular rhythm and normal heart sounds.           Pulmonary/Chest: Breath sounds normal. No respiratory distress. He has no wheezes. He has no rhonchi.   Abdominal: Abdomen is soft. Bowel sounds are normal. He exhibits no distension. There is no  abdominal tenderness. There is no guarding.   Musculoskeletal:         General: Normal range of motion.      Cervical back: Normal range of motion and neck supple.     Lymphadenopathy:     He has no cervical adenopathy.   Neurological: He is alert and oriented to person, place, and time. No sensory deficit. GCS score is 15. GCS eye subscore is 4. GCS verbal subscore is 5. GCS motor subscore is 6.   Skin: Skin is warm and dry. Capillary refill takes less than 2 seconds.   Psychiatric: He has a normal mood and affect. His behavior is normal.         Medical Screening Exam   See Full Note    ED Course   Procedures  Labs Reviewed   URINALYSIS, REFLEX TO URINE CULTURE - Abnormal       Result Value    Color, UA Light Red (*)     Clarity, UA Turbid      pH, UA 6.5      Leukocytes, UA Negative      Nitrites, UA Negative      Protein, UA 30 (*)     Glucose, UA Normal      Ketones, UA Negative      Urobilinogen, UA Normal      Bilirubin, UA Negative      Blood, UA Large (*)     Specific [place], UA 1.008     URINALYSIS, MICROSCOPIC - Abnormal    WBC, UA 4      RBC, UA >182 (*)     Bacteria, UA Occasional (*)     Squamous Epithelial Cells, UA Occasional (*)           Imaging Results    None          Medications - No data to display  Medical Decision Making  84-year old male presents to the emergency department for evaluation of hematuria. Patient reports that he has been seeing Dr. Allen and Juan R Basurto for the past three weeks and was told to come to the emergency department if he began having bleeding from urine. Patient states that bleeding began yesterday evening and has progressed through the day today, with the patient passing several large blood clots. Denies abdominal pain, dysuria, back pain, chest pain, shortness of breath, fever, chills. Patient states that he has been on antibiotic therapy for the last two weeks and takes plavix daily.  Ordered and reviewed bladder scan with results significant for 84ml of  urine post void  Ordered and reviewed urinalysis  Follow-up and return precautions discussed with patient, with patient verbalizing understanding  Diagnosis: Hematuria                                      Clinical Impression:   Final diagnoses:  [R31.9] Hematuria, unspecified type (Primary)        ED Disposition Condition    Discharge Stable          ED Prescriptions    None       Follow-up Information    None          Harshad Verma, NP  09/27/24 [id number]

## 2024-09-27 NOTE — DISCHARGE INSTRUCTIONS
Follow up with urology on Monday. Stop taking plavix until then. Continue antibiotic therapy as previously prescribed. Return to the emergency department for new or worsening symptoms.

## 2024-09-30 NOTE — PROGRESS NOTES
"Subjective     Patient ID: Elliott Peters is a 84 y.o. male.    Chief Complaint: No chief complaint on file.    Urology History  Symptoms:  No dysuria, No urethral discharge, Nocturia, Nocturia list 4 or greater times each night  Associated Symptoms:  No abdominal pain  Conditions:  Erectile dysfunction (ED), No hydronephrosis, Benign prostatic hyperplasia, No obstructive nephropathy, No polycystic kidney disease, No kidney cancer, No bladder cancer, No testicular cancer, No prostate cancer, History of recurrent urinary tract infections, No history of urolithiasis, No history of pyelonephritis, History of prostatitis  Past Medical History:  No diabetes mellitus, History of heart attack, No History of lung disease, No History of HIV/AIDS, No History of Hepatitis, No history of cancer  Medication History:  Not on NSAID, Not on aspirin  Family History:  No family history of urolithiasis, No family history of cystic kidneys, No family history of chronic renal disease, No Family history of stones, No Family history of bladder cancer, No Family history of kidney cancer  Kidney Stone:  First Stone at least 15 years ago, Last stone  05/30/2018, Total numer of stones >10, ESL 2003, PRESENT STONE STARTED 2 days ago, passed last night, Pain severity 1, Back pain not worse with movement, No gastrointestinal complaints, No fever, No adequate pain medicine, No KUB performed, No C&S performed, No urinalysis performed, Lab, Dietary counseling, CT performed  Current Conditions:  Nocturia, No UTI's, PANCHITO current  AT TIMES. HAS A LIST OF HIS MEDS."     History of Present Illness  General  UROLOGICAL HISTORY PER NOTES OF DR. SCHWARZ:  This 72-year-old white male has been under my care for years. He developed urinary retention and a lot of pain around Son[place] of 2012. We received his records from [facility]. He had a green light laser vaporization and enucleation of the prostate by Dr. Israel Coyne on 12/27/12. He has recovered. He " "is doing well at this time.     His recent PSA is 3.36. This is much better than his previous PSAs but I don?t have all those numbers in front of me.     I saw him as recently as 2/25/12 when he had a stone. We talked about stone prevention. I have told him at that time to come back in a year and to stop his Hytrin. He is still on the Hytrin. He is voiding well. I told him he can stop the Hytrin. He is already off the Avodart.     By rectal examination, he has a 30 to 40 gram sized prostate without obvious cancer.     I am returning him to the care of his family physician, Dr. Wiggins. He needs a PANCHITO and a PSA once a year for several years. The question of how long to continue annual screening is uncertain. He wants to be checked.     I will see him on a p.r.n. basis. "  ----------------------------------------------------------------------------------------------------------     Mr. Peters has been referred back to [facility] for his urological tx.  He currently has multiple stones in bilateral kidneys.  He was last seen 6/13/2013, and was released to the care of Dr. Wiggins at that time.  PANCHITO and PSA was last checked last a year ago by Dr. Coyne.  He requests for all his urological needs to be taken care of here.   (May 23, 2018)     Mr. Peters has been worked into clinic for c/o suspected stone lodged in his penis.  He states he feels he passed this stone last night, but voided outside without straining.  Reports pain resolved after painful urination.  Denies fever, chills or constitutional sxs.  Currently taking Bactrim DS for one month - tx of bacterial prostatitis.  Urine cultured 2,000 E. Coli last visit.  (May 31, 2018)     Mr. Peters is here for f/u stone disease (20+).  Patient states he has passed 2 smaller stones since his last visit without need for pain meds provided last visit.  He denies sensation of infection today.  Declines KUB for confirmation / evaluation of remaining stones.  He is joined today " "by his spouse.  No microhematuria or c/o flank pain since passing last stone.  June 25, 2018)     Mr. Peters is here today with c/o 3 seperate episodes of gross hematuria (lasting 3 days) before resolving without treatment.  All episodes occurring since the first of this year.  He has a hx of stone disease, but denies bleeding associate with stone pain.    -  CT which was performed 2/2019 resulted "several subcentimeter calcific densities along the dorsal aspect of the urinary bladder may reflect urinary bladder calculi or prostate calcifications;  and subcentimater bilateral nephrolithiasis".      Patient denies voiding complaints today.  (July 23, 2019)     Benign Prostatic Hyperplasia:  Frequent voiding at night, Nocturia list 4 or greater times each night, Blood in urine, No pain with urination, No incomplete emptying of bladder, No, No, Splitting or spraying of urinary stream, No, Frequency of incontinence occasional, Incontinence associated with a strong urge to void, No  Kidney Stone:  First Stone at least 15 years ago, Last stone  05/30/2018, Total numer of stones >10, ESL 2003, PRESENT STONE STARTED 2 days ago, passed last night, Pain severity 1, No fever, No, Back pain not worse with movement, No gastrointestinal complaints, No adequate pain medicine, No KUB performed, No C&S performed, No urinalysis performed, CT performed, Lab, Dietary counseling     Urology History  Conditions:  Erectile dysfunction (ED), No hydronephrosis, Benign prostatic hyperplasia, No obstructive nephropathy, No polycystic kidney disease, No kidney cancer, No bladder cancer, No testicular cancer, No prostate cancer, History of recurrent urinary tract infections, No history of urolithiasis, No history of pyelonephritis, History of prostatitis  Past Medical History:  Hypertension, No diabetes mellitus, History of heart attack, No History of stroke, No History of lung disease, No History of HIV/AIDS, No History of Hepatitis, No " history of cancer  s/p TURP  Medication History:  Not on NSAID, Not on aspirin  Plavix  Family History:  No family history of urolithiasis, No family history of cystic kidneys, No family history of chronic renal disease, Family history of prostate cancer, No Family history of stones, No Family history of bladder cancer, No Family history of kidney cancer  Current Conditions:  Nocturia, No UTI's, PANCHITO current  --------------------------------------------------------------------------------------------------------------------------------------------------------------------------     The above notes arenotes ofmrs.CynthiaSmith.isscheduledforcystoscopybymenextweekforevaluationofthebladdercalcifications.Hehascontinuedtohaveintermittenthematuria.Lupmdepxshyhzxqwbqxrzfghfjmxejtookdvqkxmbtlclsek9RJwbnkxohkrkrheslrn a stone.Hehassome tramadolsDr.Bing hadpreviouslygiventhatheistakenissomebetternow.HedidhaveESLbyDr.piapanzg25owhmurmp. He has had gross hematuria 3 times this year. He is still somewhat uncomfortable at present.  (August 5, 2019)     Mr. Peters returned today for his cystoscopy. He was treated for a low-grade UTI after the above visit. He did see a small stone pass after the above. He has had no bleeding since the stone passed and still has all of his pain medicine left should he need additional medication. Patient is on Plavix and aspirin. Passed his stone just a few days after being seen. Feeling okay today and returned for his workup. (August 15, 2019)     Mr. Peters and his wife returned today because of concern about the amount of blood in his urine. He did not remove his Brennan this morning because of continued bleeding. Came in for check on his catheter. (August 16, 2019)     Mr. Peters is in for one month follow-up. He took his catheter out morning after he was here as his urine was completely clear at that time. He's had no further problem with bleeding. He is back to baseline with  voiding with nocturia 3 or 4 times nightly. He feels he is doing well at present. Nothing to suggest stone problems etc. (September 17, 2019)     Mr. Peters is in a swing bed now at [facility]. He is receiving IV antibiotics for his UTI. He went to emergency room Saturday afternoon 9 days ago because of fever which reached as high as 104 apparently. Treated for UTI, but it was felt he needed stronger antibiotics. I had talked to Dr. Juan Kirkpatrick about him the other day and suggested that he be switched from Rocephin to gentamicin. He is receiving 400 mg gentamycin every 36 hours now. He's had a little bit of diarrhea the last 2 days and no energy. Never really feels well. His BUN/creatinine was 19/1.2 today. Patient not feeling well but he is on the right medication based on recent culture results. (September 30, 2019)     Mr. Peters returns for follow-up after treatment of UTI. Feeling much better and has no sensation of infection now. We treated him with gentamicin and feels he is doing okay. Still has nocturia 4 or 5 times nightly but that is stable and has been that way for the last few years. He feels like he is back to baseline now. (October 14, 2019)     Mr. Peters is in for six-month follow-up. He has had no further urinary tract infections since he was last seen. He feels he is emptying his bladder satisfactorily although he does have nocturia about 3 times nightly. Patient is feeling better in general since he's taking Mobic on a regular basis for foot pain. Overall he is improved. (June 11, 2020)     PSA Results:  Past PSA Results   5.430 ON 11/6/2016   3.360 ON 06/13/2013  5.350 ON 11/14/2012  5.06 ON 02/06/2012  4.42 ON  08/17/2011  --------------------------------------------------------------------------------------------------------------------------------------------------------------------------------------------------------------------------------------------------------------------------------------------------------------------------------  The above notes are notes from the old electronic medical record system.     PSA was 5.800 on April 24, 2023   PSA was 5.500 on December 17, 2021  PSA was 5.170 on October 12, 2021  PSA was 4.880 on May 9, 2024        Mr. Peters comes in ahead of his yearly checkup.  Former patient of Dr. Hernando James.  He had laser prostatectomy by Dr. Israel Coyne in 2012. Followed more recently by Ms. Andria Queen for a couple years and I started seeing him around 2019. He had had urinary retention again +he had trouble with difficult urinary tract infection about that time.  He was doing well when he was seen last June and had another appointment but in ahead of that scheduled appointment because of irritation and apparent infection of the glans penis.  He continues to void reasonably well with nocturia 2-3 times nightly.  He has not had any recent stone problems.  Using antibiotic ointment which has not helped the lesion on his penis.  (04/13/2021)        Mr. Peters was in on the above date because of a problem with balanoposthitis.  His problem has essentially resolved and he has had no further irritation since treating as a yeast infection.  He is in for his regular 1 year follow-up today.  Voiding reasonably well but does have nocturia 2-3 times nightly.  He does not feel he is having any major issue with that.  Overall satisfied with the way he is doing.  We are not doing PSAs routinely on him now because of age.  He did have his KUB checking for nephrolithiasis as he has known stones in his kidneys.  He has not had any pain from stones in recent past.  (June 14, 2021)     Mr. Peters is in  for the 1st visit in nearly 1 year.  He has not had any active stone problems or any other major  problems.  He has nocturia 2-3 times nightly.  He has had no further problem with his foreskin like he did few years ago.  No visible blood in the urine and has not had any new issues.  He did have a PSA done by Ms. Olivier in December that was still elevated at 5.5. (May 18, 2022)     Mr. Peters is in for his 1 year follow-up.  He had another PSA done April 24th that was 5.800.  That is the highest he has ever had but it has been above 5 for several years and clinically he feels he is doing okay.  Voiding okay without any worsening bladder outlet obstructive symptoms.  He does not feel he needs any extra help with voiding and is doing very well just on the terazosin.  Does need renewal of medication.  No recent hospitalizations or new health issues. [May 30, 2023]     Mr. Peters is in for his yearly checkup.  He had another PSA done earlier this month that is perfectly acceptable at 4.880 which is the best it has been in several years.  Stable with voiding.  No worsening bladder outlet obstructive symptoms and continues to do well just on terazosin.  Overall very good year. [May 30, 2024]     Mr. Peters was seen 3 months ago.  He asked to be seen again because was having nocturia 3 or 4 times nightly.  He thinks his stream may be a little weaker than it was.  He has been on terazosin 5 mg some time.  I am reluctant to increase medication but he would like to try something else to see if he can have improvement in his frequency especially with his nocturia.  Patient in with his wife who was also seen for follow-up today.  [August 28,  2024]  -----------------------------------------------------------------------------------------------------------------------------------------------------------------------------------------------------------------------------------------------------------------------------------------------------------------------  The above notes are from Dr. ADRIANE Allen in the EMR system.      This pleasant 84 year old male presents to the clinic with wife as a work in for prostatitis.  He is a previous patient of Dr. Allen for elevated PSA, BPH with LUTS and prostatitis.  Patient states symptoms started about two weeks ago.  He reports dysuria, incomplete bladder emptying, frequency, urgency, weak stream and nocturia 5 times a night.  His PVR was 050 mls.  His urine culture on September 16, 2024 was negative. He reports being given a shot of Rocephin and started on bactrim ds at the time of the culture. He denies hematuria, intermittency, or straining. He denies fever, chills, nausea or vomiting.  He denies any bladder, back, abdominal or testicular pain.  He was recently started on Finasteride 5 mg by Dr. Alejandro and is tolerating this medication without side effects. He is also on Terazosin 5 mg and is tolerating this medication without side effects.  I discussed treating with a month of Doxycycline 100 mg and the side effects as outlined in the plan. They were encouraged to read up on the side effects.  He will continue the Finasteride and Terazosin as prescribed.  He will continue the AZO over the counter per package instructions.  I will see him back in the clinic in 3 months or sooner if needed.  I discussed the plan in detail with the patient and wife and they are in agreement with the plan.  All her questions were answered at today's visit.   -------------------------------------------------------------------------------  [September 20, 2024].        This pleasant 84 year old male presents to the clinic with wife as a work in  for gross hematuria.  He was recently seen in September 2024 as a work in  for prostatitis. He is currently taking Doxycycline 100 mg as prescribed for the prostatitis.   He is a previous patient of Dr. Allen for elevated PSA, BPH with LUTS and prostatitis.  Patient reports gross hematuria on September 27, 2024 that lasted for about one day.  He went to the ER the same day. They report patient had a couple of clots. He denies any hematuria since.  He does report dysuria.   He reports incomplete bladder emptying, frequency, weak stream and nocturia 5 times a night. His urine culture on September 16, 2024 was negative. He denies intermittency, urgency, or straining to urinate. He denies fever, chills, nausea or vomiting.  He denies any bladder, back, or abdominal pain.  He was recently started on Finasteride 5 mg by Dr. Allen and is tolerating this medication without side effects. He is also on Terazosin 5 mg and is tolerating this medication without side effects.  I will culture his urine and treat if indicated.  He will continue the Doxycycline,  Finasteride and Terazosin as prescribed.  He will continue the AZO over the counter per package instructions.  We will schedule him for the hematuria work up with CT and Cystoscopy as outlined in the plan. He was encouraged to rest and stay hydrated.  I discussed the plan in detail with Dr. Allen, the patient and wife and they are in agreement with the plan.  All their  questions were answered at today's visit.   ----------------------------------------------------------------------------------------------------------  [October 1, 2024].                 Review of Systems   Constitutional:  Negative for activity change and fever.   HENT:  Negative for hearing loss and trouble swallowing.    Eyes:  Negative for visual disturbance.   Respiratory:  Negative for cough, shortness of breath and wheezing.    Cardiovascular:  Negative for chest pain.   Gastrointestinal:  Negative for  abdominal pain, diarrhea, nausea and vomiting.   Endocrine: Negative for polyuria.   Genitourinary:  Positive for frequency and hematuria. Negative for bladder incontinence, decreased urine volume, difficulty urinating, discharge, dysuria, enuresis, erectile dysfunction, flank pain, genital sores, penile pain, penile swelling, scrotal swelling, testicular pain and urgency.        Benign prostatic hyperplasia with nocturia              Chronic prostatitis   Elevated PSA   Nocturia     Musculoskeletal:  Negative for back pain and gait problem.   Integumentary:  Negative for rash.   Neurological:  Negative for speech difficulty and weakness.   Psychiatric/Behavioral:  Negative for behavioral problems and confusion.           Objective     Physical Exam  Vitals and nursing note reviewed.   Constitutional:       General: He is not in acute distress.     Appearance: Normal appearance. He is not ill-appearing, toxic-appearing or diaphoretic.   HENT:      Head: Normocephalic.   Eyes:      Extraocular Movements: Extraocular movements intact.   Cardiovascular:      Rate and Rhythm: Normal rate and regular rhythm.      Heart sounds: Normal heart sounds.   Pulmonary:      Effort: Pulmonary effort is normal. No respiratory distress.      Breath sounds: Normal breath sounds. No wheezing, rhonchi or rales.   Abdominal:      General: Bowel sounds are normal.      Palpations: Abdomen is soft.      Tenderness: There is no abdominal tenderness. There is no right CVA tenderness, left CVA tenderness, guarding or rebound.   Musculoskeletal:         General: Normal range of motion.      Cervical back: Normal range of motion. No rigidity.   Skin:     General: Skin is warm and dry.   Neurological:      General: No focal deficit present.      Mental Status: He is alert and oriented to person, place, and time.      Motor: No weakness.      Coordination: Coordination normal.      Gait: Gait normal.   Psychiatric:         Mood and Affect: Mood  normal.         Behavior: Behavior normal.         Thought Content: Thought content normal.          Assessment and Plan     Problem List Items Addressed This Visit          Renal/    Benign prostatic hyperplasia with nocturia    Relevant Orders    Cystoscopy    Prior authorization Order    Chronic prostatitis    Elevated PSA    Hematuria - Primary    Relevant Orders    CT Abdomen Pelvis W Wo Contrast    Creatinine, serum    Urine culture    Cystoscopy    Prior authorization Order    Urinary frequency    Relevant Orders    Urine culture    Cystoscopy    Prior authorization Order     Other Visit Diagnoses       Nocturia        Relevant Orders    Urine culture    Cystoscopy    Prior authorization Order                 Urine culture   Creatinine   Complete Doxycycline 100 mg as prescribed   Stay hydrated and rest   Continue Terazosin 5 mg as prescribed   Continue Finasteride 5 mg as prescribed   CT abdomen and pelvis with and without IV contrast to evaluate hematuria on Tuesday October 8, 2024 at 1:00 pm  Must be NPO 4 hours before CT  (do not go home after CT, come to Dr. Allen's office for cystoscopy)   Cystoscopy with Dr. ADRIANE Allen on Tuesday October 8, 2024 at 2:00 pm  to evaluate hematuria   Keep follow up with BOB Harp in 3 months

## 2024-10-01 ENCOUNTER — OFFICE VISIT (OUTPATIENT)
Dept: UROLOGY | Facility: CLINIC | Age: 84
End: 2024-10-01
Payer: MEDICARE

## 2024-10-01 VITALS
SYSTOLIC BLOOD PRESSURE: 120 MMHG | HEIGHT: 65 IN | DIASTOLIC BLOOD PRESSURE: 56 MMHG | HEART RATE: 72 BPM | WEIGHT: 180 LBS | BODY MASS INDEX: 29.99 KG/M2 | OXYGEN SATURATION: 94 %

## 2024-10-01 DIAGNOSIS — R97.20 ELEVATED PSA: ICD-10-CM

## 2024-10-01 DIAGNOSIS — R35.1 NOCTURIA: ICD-10-CM

## 2024-10-01 DIAGNOSIS — N41.1 CHRONIC PROSTATITIS: ICD-10-CM

## 2024-10-01 DIAGNOSIS — R35.1 BENIGN PROSTATIC HYPERPLASIA WITH NOCTURIA: ICD-10-CM

## 2024-10-01 DIAGNOSIS — N40.1 BENIGN PROSTATIC HYPERPLASIA WITH NOCTURIA: ICD-10-CM

## 2024-10-01 DIAGNOSIS — R35.0 URINARY FREQUENCY: ICD-10-CM

## 2024-10-01 DIAGNOSIS — R31.0 GROSS HEMATURIA: Primary | ICD-10-CM

## 2024-10-01 PROCEDURE — 99215 OFFICE O/P EST HI 40 MIN: CPT | Mod: PBBFAC | Performed by: NURSE PRACTITIONER

## 2024-10-01 PROCEDURE — 87086 URINE CULTURE/COLONY COUNT: CPT | Mod: ,,, | Performed by: CLINICAL MEDICAL LABORATORY

## 2024-10-01 PROCEDURE — 99213 OFFICE O/P EST LOW 20 MIN: CPT | Mod: S$PBB,,, | Performed by: NURSE PRACTITIONER

## 2024-10-01 PROCEDURE — 99999 PR PBB SHADOW E&M-EST. PATIENT-LVL V: CPT | Mod: PBBFAC,,, | Performed by: NURSE PRACTITIONER

## 2024-10-01 NOTE — PATIENT INSTRUCTIONS
Urine culture   Creatinine   Complete Doxycycline 100 mg as prescribed   Stay hydrated and rest   Continue Terazosin 5 mg as prescribed   Continue Finasteride 5 mg as prescribed   CT abdomen and pelvis with and without IV contrast to evaluate hematuria on Tuesday October 8, 2024 at 1:00 pm  Must be NPO 4 hours before CT  (do not go home after CT, come to Dr. Allen's office for cystoscopy)   Cystoscopy with Dr. ADRIANE Allen on Tuesday October 8, 2024 at 2:00 pm  to evaluate hematuria   Keep follow up with BOB Harp in 3 months

## 2024-10-04 ENCOUNTER — TELEPHONE (OUTPATIENT)
Dept: UROLOGY | Facility: CLINIC | Age: 84
End: 2024-10-04
Payer: MEDICARE

## 2024-10-04 LAB — UA COMPLETE W REFLEX CULTURE PNL UR: NO GROWTH

## 2024-10-04 NOTE — TELEPHONE ENCOUNTER
----- Message from Juan R Robles NP sent at 10/4/2024 10:21 AM CDT -----  Please let patient know his urine culture was negative, thanks     Reported negative urine culture to patient

## 2024-10-08 ENCOUNTER — HOSPITAL ENCOUNTER (OUTPATIENT)
Dept: RADIOLOGY | Facility: HOSPITAL | Age: 84
Discharge: HOME | End: 2024-10-08
Attending: NURSE PRACTITIONER
Payer: MEDICARE

## 2024-10-08 ENCOUNTER — PROCEDURE VISIT (OUTPATIENT)
Dept: UROLOGY | Facility: CLINIC | Age: 84
End: 2024-10-08
Payer: MEDICARE

## 2024-10-08 DIAGNOSIS — R31.0 GROSS HEMATURIA: Primary | ICD-10-CM

## 2024-10-08 DIAGNOSIS — R35.0 URINARY FREQUENCY: ICD-10-CM

## 2024-10-08 DIAGNOSIS — R35.1 BENIGN PROSTATIC HYPERPLASIA WITH NOCTURIA: ICD-10-CM

## 2024-10-08 DIAGNOSIS — R97.20 ELEVATED PSA: ICD-10-CM

## 2024-10-08 DIAGNOSIS — R35.1 NOCTURIA: ICD-10-CM

## 2024-10-08 DIAGNOSIS — R19.4 CHANGE IN BOWEL HABITS: ICD-10-CM

## 2024-10-08 DIAGNOSIS — R31.0 GROSS HEMATURIA: ICD-10-CM

## 2024-10-08 DIAGNOSIS — N32.0 BLADDER OUTLET OBSTRUCTION: ICD-10-CM

## 2024-10-08 DIAGNOSIS — N41.1 CHRONIC PROSTATITIS: ICD-10-CM

## 2024-10-08 DIAGNOSIS — N20.0 NEPHROLITHIASIS: ICD-10-CM

## 2024-10-08 DIAGNOSIS — N40.1 BENIGN PROSTATIC HYPERPLASIA WITH NOCTURIA: ICD-10-CM

## 2024-10-08 PROCEDURE — 74178 CT ABD&PLV WO CNTR FLWD CNTR: CPT | Mod: TC

## 2024-10-08 PROCEDURE — 25500020 PHARM REV CODE 255: Performed by: NURSE PRACTITIONER

## 2024-10-08 PROCEDURE — 52000 CYSTOURETHROSCOPY: CPT | Mod: PBBFAC | Performed by: UROLOGY

## 2024-10-08 PROCEDURE — 74178 CT ABD&PLV WO CNTR FLWD CNTR: CPT | Mod: 26,,, | Performed by: RADIOLOGY

## 2024-10-08 RX ORDER — LIDOCAINE HYDROCHLORIDE 20 MG/ML
JELLY TOPICAL
Status: COMPLETED | OUTPATIENT
Start: 2024-10-08 | End: 2024-10-08

## 2024-10-08 RX ORDER — IOPAMIDOL 755 MG/ML
100 INJECTION, SOLUTION INTRAVASCULAR
Status: COMPLETED | OUTPATIENT
Start: 2024-10-08 | End: 2024-10-08

## 2024-10-08 RX ADMIN — LIDOCAINE HYDROCHLORIDE 10 ML: 20 JELLY TOPICAL at 01:10

## 2024-10-08 RX ADMIN — IOPAMIDOL 100 ML: 755 INJECTION, SOLUTION INTRAVENOUS at 12:10

## 2024-10-08 NOTE — PROCEDURES
Flexible cystoscopy    Preoperative diagnosis:  Recent gross hematuria with history of BPH and calculus prostatitis    Postoperative diagnosis:  Same with partial obstruction from prostate regrowth and bladder trabeculations; no definite cause for hematuria found    Description:  The patient was placed in the supine position in the clinic cystoscopy room and prepared for flexible cystoscopy.  Urethra was anesthetized with lidocaine jelly.  No sedation was given.  The 16.5 [place] Olympus flexible digital cystoscope was passed transurethrally into the bladder.  Anterior urethra was clear.  Posterior urethra revealed significant partial obstruction from prostate growth and what appeared to be defect from previous  prostatectomy on the bladder neck and the right side primarily.  There was a lot of middle lobe re-growth.  Prostatic urethra was about 4-5 cm in length.  Heavy bladder trabeculations were noted.  I did not see any evidence of bladder tumors, stones, diverticula or other significant bladder findings.  Retrograde view of the bladder neck confirmed the middle lobe enlargement with some intravesical extension.  Only 1 ureteral orifice was identified and I could see there was some efflux coming into the bladder but I could not identify the efflux coming from the visible orifice.  I think the orifice we saw was the right orifice with some distortion of the bladder.  I did not see any point of active bleeding and did not see the area where the bleeding seemed to be coming from.  I feel he has benign hematuria.  Scope was removed with similar findings.  Tolerated well without complications.    ---------------------------------------------------------------------------------------------  --------------------------------------------------  EXAMINATION:  CT ABDOMEN PELVIS W WO CONTRAST     CLINICAL HISTORY:  gross hematuria;Gross hematuria     TECHNIQUE:  Low dose axial images, sagittal and coronal reformations were  obtained from the lung bases to the pubic symphysis before and following the IV administration of 100 mL of Isovue 370.  No p.o. contrast noncontrast, portal venous and delayed images obtained     COMPARISON:  02/19/2019     FINDINGS:  Visualized lung bases essentially clear.     Liver; 2.2 cm cyst in the left lobe slightly larger today than on the prior exam but CT numbers consistent with a simple cyst.     Gallbladder contracted with no calcified stones or CT findings of acute cholecystitis or biliary duct dilatation     Spleen is not enlarged     Adrenal glands are unremarkable appearance     Pancreas is mildly atrophied     Abdominal aorta tapers without aneurysmal dilatation     Stomach, bowel, mesentery; small hiatal hernia.  Diverticulosis without CT findings of acute diverticulitis.  Normal appearance of the appendix.     Kidneys, ureters, bladder;     Right kidney multiple, approximately at 9 stones with largest measuring 9 mm and smallest punctate.  No hydronephrosis, ureteral dilatation, opaque ureteral stone or ureteral obstruction.     Left kidney; multiple stones approximately 7 stones largest, 8 mm and the smallest punctate.  No hydronephrosis.  No ureteral dilatation, opaque ureteral stone or ureteral obstruction.     There is symmetrical renal enhancement.  There is exophytic 12 mm cyst lower pole of the left kidney.     The urinary bladder is just mildly distended at time of the exam.  Even allowing for the incomplete degree of distention the wall appears diffusely thick suggesting changes of chronic bladder outlet obstruction.     The prostate gland is enlarged and measures 6.2 cm transversely.  There is thin linear calcification that projects in the posterior aspect of the bladder or adjacent anterior margin the prostate gland more likely within the prostate gland for example axial series 2 images 67 through 69 and coronal series 87271 image 36 and is not significantly changed compared to prior  studies of 09/21/2019     Mild retroperitoneal fat stranding on the left the lower abdomen/upper pelvis appearing similar to the prior exam suggesting panniculitis and scarring..     Osseous structures show degenerative changes without obvious aggressive appearing osseous lesion.  There is mild spondylolisthesis L4-5 with L4 anteriorly positioned relative to L5 by 4-5 mm     Impression:     Multiple nonobstructing bilateral renal stones.  No hydronephrosis, opaque ureteral stone or ureteral obstruction     Enlarged prostate gland.  Diffuse bladder wall thickening suggesting changes from chronic bladder outlet obstruction     Small left renal cyst     Additional findings as detailed above including hepatic cyst.  Small hiatal hernia.  Diverticulosis without CT findings of acute diverticulitis.  Retroperitoneal fat stranding not significantly changed compared to the prior exam suggesting panniculitis.        Electronically signed by:Lexus Nixon MD  Date:                                            10/08/2024  Time:                                           15:28          Urinalysis done by me revealed only rare red blood cells with occasional pus.  The dipstick had a trace of blood with pH of 7.0 and specific gravity 1.005

## 2024-10-08 NOTE — PROCEDURES
"Urology History  Symptoms:  No dysuria, No urethral discharge, Nocturia, Nocturia list 4 or greater times each night  Associated Symptoms:  No abdominal pain  Conditions:  Erectile dysfunction (ED), No hydronephrosis, Benign prostatic hyperplasia, No obstructive nephropathy, No polycystic kidney disease, No kidney cancer, No bladder cancer, No testicular cancer, No prostate cancer, History of recurrent urinary tract infections, No history of urolithiasis, No history of pyelonephritis, History of prostatitis  Past Medical History:  No diabetes mellitus, History of heart attack, No History of lung disease, No History of HIV/AIDS, No History of Hepatitis, No history of cancer  Medication History:  Not on NSAID, Not on aspirin  Family History:  No family history of urolithiasis, No family history of cystic kidneys, No family history of chronic renal disease, No Family history of stones, No Family history of bladder cancer, No Family history of kidney cancer  Kidney Stone:  First Stone at least 15 years ago, Last stone  05/30/2018, Total numer of stones >10, ESL 2003, PRESENT STONE STARTED 2 days ago, passed last night, Pain severity 1, Back pain not worse with movement, No gastrointestinal complaints, No fever, No adequate pain medicine, No KUB performed, No C&S performed, No urinalysis performed, Lab, Dietary counseling, CT performed  Current Conditions:  Nocturia, No UTI's, PANCHITO current  AT TIMES. HAS A LIST OF HIS MEDS."     History of Present Illness  General  UROLOGICAL HISTORY PER NOTES OF DR. SCHWARZ:  This 72-year-old white male has been under my care for years. He developed urinary retention and a lot of pain around Son[place] of 2012. We received his records from [facility]. He had a green light laser vaporization and enucleation of the prostate by Dr. Israel Coyne on 12/27/12. He has recovered. He is doing well at this time.     His recent PSA is 3.36. This is much better than his previous PSAs but I don?t " "have all those numbers in front of me.     I saw him as recently as 2/25/12 when he had a stone. We talked about stone prevention. I have told him at that time to come back in a year and to stop his Hytrin. He is still on the Hytrin. He is voiding well. I told him he can stop the Hytrin. He is already off the Avodart.     By rectal examination, he has a 30 to 40 gram sized prostate without obvious cancer.     I am returning him to the care of his family physician, Dr. Wiggins. He needs a PANCHITO and a PSA once a year for several years. The question of how long to continue annual screening is uncertain. He wants to be checked.     I will see him on a p.r.n. basis. "  ----------------------------------------------------------------------------------------------------------     Mr. Peters has been referred back to [place] for his urological tx.  He currently has multiple stones in bilateral kidneys.  He was last seen 6/13/2013, and was released to the care of Dr. Wiggins at that time.  PANCHITO and PSA was last checked last a year ago by Dr. Coyne.  He requests for all his urological needs to be taken care of here.   (May 23, 2018)     Mr. Peters has been worked into clinic for c/o suspected stone lodged in his penis.  He states he feels he passed this stone last night, but voided outside without straining.  Reports pain resolved after painful urination.  Denies fever, chills or constitutional sxs.  Currently taking Bactrim DS for one month - tx of bacterial prostatitis.  Urine cultured 2,000 E. Coli last visit.  (May 31, 2018)     Mr. Peters is here for f/u stone disease (20+).  Patient states he has passed 2 smaller stones since his last visit without need for pain meds provided last visit.  He denies sensation of infection today.  Declines KUB for confirmation / evaluation of remaining stones.  He is joined today by his spouse.  No microhematuria or c/o flank pain since passing last stone.  June 25, 2018)     Mr. Peters " "is here today with c/o 3 seperate episodes of gross hematuria (lasting 3 days) before resolving without treatment.  All episodes occurring since the first of this year.  He has a hx of stone disease, but denies bleeding associate with stone pain.    -  CT which was performed 2/2019 resulted "several subcentimeter calcific densities along the dorsal aspect of the urinary bladder may reflect urinary bladder calculi or prostate calcifications;  and subcentimater bilateral nephrolithiasis".      Patient denies voiding complaints today.  (July 23, 2019)     Benign Prostatic Hyperplasia:  Frequent voiding at night, Nocturia list 4 or greater times each night, Blood in urine, No pain with urination, No incomplete emptying of bladder, No, No, Splitting or spraying of urinary stream, No, Frequency of incontinence occasional, Incontinence associated with a strong urge to void, No  Kidney Stone:  First Stone at least 15 years ago, Last stone  05/30/2018, Total numer of stones >10, ESL 2003, PRESENT STONE STARTED 2 days ago, passed last night, Pain severity 1, No fever, No, Back pain not worse with movement, No gastrointestinal complaints, No adequate pain medicine, No KUB performed, No C&S performed, No urinalysis performed, CT performed, Lab, Dietary counseling     Urology History  Conditions:  Erectile dysfunction (ED), No hydronephrosis, Benign prostatic hyperplasia, No obstructive nephropathy, No polycystic kidney disease, No kidney cancer, No bladder cancer, No testicular cancer, No prostate cancer, History of recurrent urinary tract infections, No history of urolithiasis, No history of pyelonephritis, History of prostatitis  Past Medical History:  Hypertension, No diabetes mellitus, History of heart attack, No History of stroke, No History of lung disease, No History of HIV/AIDS, No History of Hepatitis, No history of cancer  s/p TURP  Medication History:  Not on NSAID, Not on aspirin  Plavix  Family History:  No family " history of urolithiasis, No family history of cystic kidneys, No family history of chronic renal disease, Family history of prostate cancer, No Family history of stones, No Family history of bladder cancer, No Family history of kidney cancer  Current Conditions:  Nocturia, No UTI's, PANCHITO current  --------------------------------------------------------------------------------------------------------------------------------------------------------------------------     The above notes arenotes ofmrs.CynthiaSmith.isscheduledforcystoscopybymenextweekforevaluationofthebladdercalcifications.Hehascontinuedtohaveintermittenthematuria.Cszbqiurckoiiqaddiulwlxwkqgsylrmbeqvqbapbxmlqpdq6MNinkcetgurwldnbzyn a stone.Hehassome tramadolsDr.Belenchia hadpreviouslygiventhatheistakenissomebetternow.HedidhaveESLbyDr.ktbdjqhk01fuzftewd. He has had gross hematuria 3 times this year. He is still somewhat uncomfortable at present.  (August 5, 2019)     Mr. Peters returned today for his cystoscopy. He was treated for a low-grade UTI after the above visit. He did see a small stone pass after the above. He has had no bleeding since the stone passed and still has all of his pain medicine left should he need additional medication. Patient is on Plavix and aspirin. Passed his stone just a few days after being seen. Feeling okay today and returned for his workup. (August 15, 2019)     Mr. Peters and his wife returned today because of concern about the amount of blood in his urine. He did not remove his Brennan this morning because of continued bleeding. Came in for check on his catheter. (August 16, 2019)     Mr. Peters is in for one month follow-up. He took his catheter out morning after he was here as his urine was completely clear at that time. He's had no further problem with bleeding. He is back to baseline with voiding with nocturia 3 or 4 times nightly. He feels he is doing well at present. Nothing to suggest stone problems  etc. (September 17, 2019)     Mr. Peters is in a swing bed now at [facility]. He is receiving IV antibiotics for his UTI. He went to emergency room Saturday afternoon 9 days ago because of fever which reached as high as 104 apparently. Treated for UTI, but it was felt he needed stronger antibiotics. I had talked to Dr. Juan Kirkpatrick about him the other day and suggested that he be switched from Rocephin to gentamicin. He is receiving 400 mg gentamycin every 36 hours now. He's had a little bit of diarrhea the last 2 days and no energy. Never really feels well. His BUN/creatinine was 19/1.2 today. Patient not feeling well but he is on the right medication based on recent culture results. (September 30, 2019)     Mr. Peters returns for follow-up after treatment of UTI. Feeling much better and has no sensation of infection now. We treated him with gentamicin and feels he is doing okay. Still has nocturia 4 or 5 times nightly but that is stable and has been that way for the last few years. He feels like he is back to baseline now. (October 14, 2019)     Mr. Peters is in for six-month follow-up. He has had no further urinary tract infections since he was last seen. He feels he is emptying his bladder satisfactorily although he does have nocturia about 3 times nightly. Patient is feeling better in general since he's taking Mobic on a regular basis for foot pain. Overall he is improved. (June 11, 2020)     PSA Results:  Past PSA Results   5.430 ON 11/6/2016   3.360 ON 06/13/2013  5.350 ON 11/14/2012  5.06 ON 02/06/2012  4.42 ON 08/17/2011  --------------------------------------------------------------------------------------------------------------------------------------------------------------------------------------------------------------------------------------------------------------------------------------------------------------------------------  The above notes are notes from the old electronic medical  record system.     PSA was 5.800 on April 24, 2023   PSA was 5.500 on December 17, 2021  PSA was 5.170 on October 12, 2021  PSA was 4.880 on May 9, 2024        Mr. Peters comes in ahead of his yearly checkup.  Former patient of Dr. Hernando James.  He had laser prostatectomy by Dr. Israel Coyne in 2012. Followed more recently by Ms. Andria Queen for a couple years and I started seeing him around 2019. He had had urinary retention again +he had trouble with difficult urinary tract infection about that time.  He was doing well when he was seen last June and had another appointment but in ahead of that scheduled appointment because of irritation and apparent infection of the glans penis.  He continues to void reasonably well with nocturia 2-3 times nightly.  He has not had any recent stone problems.  Using antibiotic ointment which has not helped the lesion on his penis.  (04/13/2021)        Mr. Peters was in on the above date because of a problem with balanoposthitis.  His problem has essentially resolved and he has had no further irritation since treating as a yeast infection.  He is in for his regular 1 year follow-up today.  Voiding reasonably well but does have nocturia 2-3 times nightly.  He does not feel he is having any major issue with that.  Overall satisfied with the way he is doing.  We are not doing PSAs routinely on him now because of age.  He did have his KUB checking for nephrolithiasis as he has known stones in his kidneys.  He has not had any pain from stones in recent past.  (June 14, 2021)     Mr. Peters is in for the 1st visit in nearly 1 year.  He has not had any active stone problems or any other major  problems.  He has nocturia 2-3 times nightly.  He has had no further problem with his foreskin like he did few years ago.  No visible blood in the urine and has not had any new issues.  He did have a PSA done by Ms. Olivier in December that was still elevated at 5.5. (May 18, 2022)       Fran is in for his 1 year follow-up.  He had another PSA done April 24th that was 5.800.  That is the highest he has ever had but it has been above 5 for several years and clinically he feels he is doing okay.  Voiding okay without any worsening bladder outlet obstructive symptoms.  He does not feel he needs any extra help with voiding and is doing very well just on the terazosin.  Does need renewal of medication.  No recent hospitalizations or new health issues. [May 30, 2023]     Mr. Peters is in for his yearly checkup.  He had another PSA done earlier this month that is perfectly acceptable at 4.880 which is the best it has been in several years.  Stable with voiding.  No worsening bladder outlet obstructive symptoms and continues to do well just on terazosin.  Overall very good year. [May 30, 2024]     Mr. Peters was seen 3 months ago.  He asked to be seen again because was having nocturia 3 or 4 times nightly.  He thinks his stream may be a little weaker than it was.  He has been on terazosin 5 mg some time.  I am reluctant to increase medication but he would like to try something else to see if he can have improvement in his frequency especially with his nocturia.  Patient in with his wife who was also seen for follow-up today.  [August 28, 2024]  -----------------------------------------------------------------------------------------------------------------------------------------------------------------------------------------------------------------------------------------------------------------------------------------------------------------------  The above notes are from Dr. ADRIANE Allen in the EMR system.      This pleasant 84 year old male presents to the clinic with wife as a work in for prostatitis.  He is a previous patient of Dr. Allen for elevated PSA, BPH with LUTS and prostatitis.  Patient states symptoms started about two weeks ago.  He reports dysuria, incomplete bladder emptying,  frequency, urgency, weak stream and nocturia 5 times a night.  His PVR was 050 mls.  His urine culture on September 16, 2024 was negative. He reports being given a shot of Rocephin and started on bactrim ds at the time of the culture. He denies hematuria, intermittency, or straining. He denies fever, chills, nausea or vomiting.  He denies any bladder, back, abdominal or testicular pain.  He was recently started on Finasteride 5 mg by Dr. Allen and is tolerating this medication without side effects. He is also on Terazosin 5 mg and is tolerating this medication without side effects.  I discussed treating with a month of Doxycycline 100 mg and the side effects as outlined in the plan. They were encouraged to read up on the side effects.  He will continue the Finasteride and Terazosin as prescribed.  He will continue the AZO over the counter per package instructions.  I will see him back in the clinic in 3 months or sooner if needed.  I discussed the plan in detail with the patient and wife and they are in agreement with the plan.  All her questions were answered at today's visit.   -------------------------------------------------------------------------------  [September 20, 2024].         This pleasant 84 year old male presents to the clinic with wife as a work in for gross hematuria.  He was recently seen in September 2024 as a work in  for prostatitis. He is currently taking Doxycycline 100 mg as prescribed for the prostatitis.   He is a previous patient of Dr. Allen for elevated PSA, BPH with LUTS and prostatitis.  Patient reports gross hematuria on September 27, 2024 that lasted for about one day.  He went to the ER the same day. They report patient had a couple of clots. He denies any hematuria since.  He does report dysuria.   He reports incomplete bladder emptying, frequency, weak stream and nocturia 5 times a night. His urine culture on September 16, 2024 was negative. He denies intermittency, urgency, or  straining to urinate. He denies fever, chills, nausea or vomiting.  He denies any bladder, back, or abdominal pain.  He was recently started on Finasteride 5 mg by Dr. Allen and is tolerating this medication without side effects. He is also on Terazosin 5 mg and is tolerating this medication without side effects.  I will culture his urine and treat if indicated.  He will continue the Doxycycline,  Finasteride and Terazosin as prescribed.  He will continue the AZO over the counter per package instructions.  We will schedule him for the hematuria work up with CT and Cystoscopy as outlined in the plan. He was encouraged to rest and stay hydrated.  I discussed the plan in detail with Dr. Allen, the patient and wife and they are in agreement with the plan.  All their  questions were answered at today's visit.   ----------------------------------------------------------------------------------------------------------  [October 1, 2024].         Review of Systems   Constitutional:  Negative for activity change and fever.   HENT:  Negative for hearing loss and trouble swallowing.    Eyes:  Negative for visual disturbance.   Respiratory:  Negative for cough, shortness of breath and wheezing.    Cardiovascular:  Negative for chest pain.   Gastrointestinal:  Negative for abdominal pain, diarrhea, nausea and vomiting.   Endocrine: Negative for polyuria.   Genitourinary:  Positive for frequency and hematuria. Negative for bladder incontinence, decreased urine volume, difficulty urinating, discharge, dysuria, enuresis, erectile dysfunction, flank pain, genital sores, penile pain, penile swelling, scrotal swelling, testicular pain and urgency.        Benign prostatic hyperplasia with nocturia              Chronic prostatitis              Elevated PSA              Nocturia     Musculoskeletal:  Negative for back pain and gait problem.   Integumentary:  Negative for rash.   Neurological:  Negative for speech difficulty and weakness.    Psychiatric/Behavioral:  Negative for behavioral problems and confusion.    ------------------------------------------------------------------------------------------------------------------------------------------------------------------------------------------------------------------------------------------------------------------------------------------------------------------------------------------------------------------------------------------------------------------------------------------------  Immediate note above is note of Mr. Priyank Robles.  Mr. Peters is here for his hematuria workup because of fairly recent gross hematuria.  He has had no recent blood in his urine since the episode last month.  Only significant issue now is with increasing constipation which has been somewhat of a problem all long but has been worse recently.  History of previous GI bleeding.  Probably needs to have repeat colonoscopy. [October 8, 2024]

## 2024-10-09 NOTE — PATIENT INSTRUCTIONS
No definite cause for gross hematuria found  but suspect it is most likely bleeding from the prostate.  No evidence that any of the renal stones are causing any acute problems.  Patient given 3 days of Cipro 500 mg b.i.d. to cover instrumentation.  Patient has had increasing constipation recently and has a history of colon polyps and GI bleeding.  Probably needs to have repeat colonoscopy which will be requested.  Keep appointment with  Travis scheduled for January 7th unless recurrent gross hematuria develops prior to that time.

## 2024-10-22 ENCOUNTER — OFFICE VISIT (OUTPATIENT)
Dept: FAMILY MEDICINE | Facility: CLINIC | Age: 84
End: 2024-10-22
Payer: MEDICARE

## 2024-10-22 VITALS
RESPIRATION RATE: 18 BRPM | HEART RATE: 70 BPM | DIASTOLIC BLOOD PRESSURE: 65 MMHG | SYSTOLIC BLOOD PRESSURE: 113 MMHG | BODY MASS INDEX: 28.82 KG/M2 | WEIGHT: 173 LBS | HEIGHT: 65 IN | TEMPERATURE: 98 F | OXYGEN SATURATION: 95 %

## 2024-10-22 DIAGNOSIS — I10 PRIMARY HYPERTENSION: ICD-10-CM

## 2024-10-22 DIAGNOSIS — F51.01 PRIMARY INSOMNIA: ICD-10-CM

## 2024-10-22 DIAGNOSIS — E78.2 MIXED HYPERLIPIDEMIA: ICD-10-CM

## 2024-10-22 DIAGNOSIS — K59.01 SLOW TRANSIT CONSTIPATION: Primary | ICD-10-CM

## 2024-10-22 DIAGNOSIS — Z13.1 SCREENING FOR DIABETES MELLITUS: ICD-10-CM

## 2024-10-22 DIAGNOSIS — R73.9 HYPERGLYCEMIA: ICD-10-CM

## 2024-10-22 DIAGNOSIS — I25.118 ATHEROSCLEROTIC HEART DISEASE OF NATIVE CORONARY ARTERY WITH OTHER FORMS OF ANGINA PECTORIS: ICD-10-CM

## 2024-10-22 LAB
EST. AVERAGE GLUCOSE BLD GHB EST-MCNC: 108 MG/DL
HBA1C MFR BLD HPLC: 5.4 % (ref 4.5–6.6)

## 2024-10-22 PROCEDURE — 99214 OFFICE O/P EST MOD 30 MIN: CPT | Mod: ,,, | Performed by: FAMILY MEDICINE

## 2024-10-22 PROCEDURE — 83036 HEMOGLOBIN GLYCOSYLATED A1C: CPT | Mod: ,,, | Performed by: CLINICAL MEDICAL LABORATORY

## 2024-10-22 RX ORDER — BISACODYL 5 MG
5 TABLET, DELAYED RELEASE (ENTERIC COATED) ORAL DAILY PRN
COMMUNITY

## 2024-10-22 NOTE — PROGRESS NOTES
Health Maintenance Due   Topic Date Due    Shingles Vaccine (1 of 2) [facility] in [place]    RSV Vaccine (Age 60+ and Pregnant patients) (1 - 1-dose 75+ series) Recommended to get at local pharmacy    Influenza Vaccine (1) [facility] in [place]    COVID-19 Vaccine (5 - 2024-25 season) [facility] in [place]

## 2024-10-24 PROBLEM — E78.2 MIXED HYPERLIPIDEMIA: Status: ACTIVE | Noted: 2021-12-28

## 2024-10-24 PROBLEM — I10 HYPERTENSION: Status: RESOLVED | Noted: 2021-10-18 | Resolved: 2024-10-24

## 2024-10-24 NOTE — PROGRESS NOTES
Chevy Malhotra DO   [address] [place]  [address]      PATIENT NAME: Elliott Peters  : 1940  DATE: 10/22/24  MRN: [id number]      Billing Provider: Chevy Malhotra DO  Level of Service:   Patient PCP Information       Provider PCP Type    Chevy Malhotra DO General            Reason for Visit / Chief Complaint: Follow-up (Patient is here for a 6 month follow-up on HTN, HLD, Anxiety), Hypertension (Blood pressure 113/65. Denies any issues. Has refills on medication. ), Hyperlipidemia (Denies any issues. Has refills on medication. ), and Anxiety (Denies any issues. Has refills on medication. )       Update PCP  Update Chief Complaint         History of Present Illness / Problem Focused Workflow     Elliott Peters presents to the clinic with Follow-up (Patient is here for a 6 month follow-up on HTN, HLD, Anxiety), Hypertension (Blood pressure 113/65. Denies any issues. Has refills on medication. ), Hyperlipidemia (Denies any issues. Has refills on medication. ), and Anxiety (Denies any issues. Has refills on medication. )     Patient is in today for routine follow-up.  He had lab drawn which shows normal electrolytes except his chloride was 109 but his creatinine was 0.97 with a EGFR of 76 and calcium 9.1.  Lipid panel was drawn in his HDL was 27 but his LDL was 22.  WBC BC count was 5600 hemoglobin 14 4 hematocrit 46.2.  Patient has no complaints today.  He is tolerating his medicines well.  He denies any chest pain shortness in breath palpitations.  He does have pain in his knees as well as occasionally in his back.  He denies any numbness weakness or tingling currently he does have a history of prostate disease in his on finasteride.    Follow-up  Associated symptoms include arthralgias. Pertinent negatives include no abdominal pain, change in bowel habit, chest pain, chills, congestion, coughing, fatigue, fever, headaches, myalgias, nausea, neck pain, numbness,  rash, sore throat, vertigo, vomiting or weakness.   Hypertension  Associated symptoms include anxiety. Pertinent negatives include no chest pain, headaches, neck pain, palpitations or shortness of breath.   Hyperlipidemia  Pertinent negatives include no chest pain, leg pain, myalgias or shortness of breath.   Anxiety  Patient reports no chest pain, confusion, decreased concentration, dizziness, nausea, nervous/anxious behavior, palpitations, shortness of breath or suicidal ideas.           Review of Systems     Review of Systems   Constitutional:  Negative for activity change, appetite change, chills, fatigue and fever.   HENT:  Negative for nasal congestion, ear discharge, ear pain, mouth dryness, mouth sores, postnasal drip, sinus pressure/congestion, sore throat and voice change.    Eyes:  Negative for pain, discharge, redness, itching and visual disturbance.   Respiratory:  Negative for apnea, cough, chest tightness, shortness of breath and wheezing.    Cardiovascular:  Negative for chest pain, palpitations and leg swelling.   Gastrointestinal:  Negative for abdominal distention, abdominal pain, anal bleeding, blood in stool, change in bowel habit, constipation, diarrhea, nausea, vomiting and reflux.   Endocrine: Negative for cold intolerance, heat intolerance, polydipsia, polyphagia and polyuria.   Genitourinary:  Positive for difficulty urinating. Negative for enuresis, erectile dysfunction, frequency, genital sores, hematuria and urgency.   Musculoskeletal:  Positive for arthralgias. Negative for back pain, gait problem, leg pain, myalgias and neck pain.   Integumentary:  Negative for rash, mole/lesion, breast mass and breast discharge.   Allergic/Immunologic: Negative for environmental allergies and food allergies.   Neurological:  Negative for dizziness, vertigo, tremors, seizures, syncope, facial asymmetry, speech difficulty, weakness, light-headedness, numbness, headaches, memory loss and coordination  difficulties.   Hematological:  Negative for adenopathy. Does not bruise/bleed easily.   Psychiatric/Behavioral:  Negative for agitation, behavioral problems, confusion, decreased concentration, dysphoric mood, hallucinations, self-injury, sleep disturbance and suicidal ideas. The patient is not nervous/anxious and is not hyperactive.    Breast: Negative for mass      Medical / Social / Family History     Past Medical History:   Diagnosis Date    Acute pain of right knee 12/30/2021    Allergic reaction 05/17/2021    Anxiety     Arthritis     BPH (benign prostatic hyperplasia)     Contact dermatitis 08/11/2021    Coronary artery disease     cardiac stents    Enlarged lymph node 10/19/2022    Hematoma 10/19/2022    Hyperlipidemia     Hypertension     Kidney stone     MI (myocardial infarction)     Primary insomnia     Sleep apnea     Staphylococcal infection of skin 05/17/2021    Upper respiratory tract infection 12/26/2023    Urinary tract infection        Past Surgical History:   Procedure Laterality Date    CORONARY ANGIOPLASTY WITH STENT PLACEMENT      HERNIA REPAIR      Inguinal hernia    JOINT REPLACEMENT Bilateral     difficult to wake after surgery  Knee replacements    PROSTATECTOMY  2012       Social History    reports that he quit smoking about 66 years ago. His smoking use included cigarettes. He has been exposed to tobacco smoke. He has never used smokeless tobacco. He reports that he does not currently use alcohol. He reports that he does not use drugs.    Family History  's family history includes Colon cancer in his mother; Cystic fibrosis in his daughter; Heart attack in his father; No Known Problems in his maternal grandfather, maternal grandmother, paternal grandfather, paternal grandmother, and son.    Medications and Allergies     Medications  Outpatient Medications Marked as Taking for the 10/22/24 encounter (Office Visit) with Chevy Malhotra, DO   Medication Sig Dispense Refill     ALPRAZolam (XANAX) 0.5 MG tablet Take 1 tablet (0.5 mg total) by mouth 2 (two) times daily as needed for Anxiety. 90 tablet 0    amLODIPine (NORVASC) 5 MG tablet Take 0.5 tablets (2.5 mg total) by mouth once daily. 180 tablet 1    aspirin (ECOTRIN) 81 MG EC tablet Take 81 mg by mouth once daily.      bisacodyL (DULCOLAX) 5 mg EC tablet Take 5 mg by mouth daily as needed for Constipation.      carvediloL (COREG) 3.125 MG tablet Take 1 tablet (3.125 mg total) by mouth 2 (two) times daily with meals. 180 tablet 1    clopidogreL (PLAVIX) 75 mg tablet Take 1 tablet (75 mg total) by mouth once daily. 90 tablet 1    docusate sodium (COLACE) 100 MG capsule Take 100 mg by mouth once daily.      finasteride (PROSCAR) 5 mg tablet Take 1 tablet (5 mg total) by mouth once daily. 90 tablet 3    nitroGLYCERIN (NITROSTAT) 0.4 MG SL tablet Place 1 tablet (0.4 mg total) under the tongue every 5 (five) minutes as needed for Chest pain. 100 tablet 0    polyethylene glycol (GLYCOLAX) 17 gram PwPk Take 17 g by mouth once daily.      rosuvastatin (CRESTOR) 40 MG Tab Take 1 tablet (40 mg total) by mouth every evening. 90 tablet 3       Allergies  Review of patient's allergies indicates:   Allergen Reactions    Bronopol Dermatitis    Propylene glycol Dermatitis       Physical Examination     Vitals:    10/22/24 1332   BP: 113/65   Pulse: 70   Resp: 18   Temp: 97.7 °F (36.5 °C)     Physical Exam  Constitutional:       General: He is not in acute distress.     Appearance: Normal appearance. He is normal weight.   HENT:      Head: Normocephalic.      Nose: Nose normal.      Mouth/Throat:      Mouth: Mucous membranes are moist.      Pharynx: Oropharynx is clear. No oropharyngeal exudate or posterior oropharyngeal erythema.   Eyes:      General: No scleral icterus.        Right eye: No discharge.         Left eye: No discharge.      Conjunctiva/sclera: Conjunctivae normal.      Pupils: Pupils are equal, round, and reactive to light.    Cardiovascular:      Rate and Rhythm: Normal rate and regular rhythm.      Pulses: Normal pulses.      Heart sounds: Murmur heard.      No friction rub. No gallop.   Pulmonary:      Effort: Pulmonary effort is normal.      Breath sounds: Normal breath sounds. No wheezing.   Abdominal:      General: Abdomen is flat. Bowel sounds are normal.      Tenderness: There is no abdominal tenderness.   Musculoskeletal:         General: Normal range of motion.      Cervical back: Normal range of motion. No rigidity.      Right lower leg: No edema.      Left lower leg: No edema.   Skin:     General: Skin is warm.      Capillary Refill: Capillary refill takes less than 2 seconds.      Findings: No bruising or lesion.   Neurological:      General: No focal deficit present.      Mental Status: He is alert and oriented to person, place, and time. Mental status is at baseline.      Cranial Nerves: No cranial nerve deficit.      Sensory: No sensory deficit.      Motor: No weakness.      Coordination: Coordination normal.      Gait: Gait normal.      Deep Tendon Reflexes: Reflexes normal.   Psychiatric:         Mood and Affect: Mood normal.         Behavior: Behavior normal.         Thought Content: Thought content normal.         Judgment: Judgment normal.               Lab Results   Component Value Date    WBC 5.62 10/17/2024    HGB 14.4 10/17/2024    HCT 46.2 10/17/2024    MCV 89.0 10/17/2024     (L) 10/17/2024          Sodium   Date Value Ref Range Status   10/17/2024 139 136 - 145 mmol/L Final     Potassium   Date Value Ref Range Status   10/17/2024 4.5 3.5 - 5.1 mmol/L Final     Chloride   Date Value Ref Range Status   10/17/2024 109 (H) 98 - 107 mmol/L Final     CO2   Date Value Ref Range Status   10/17/2024 27 21 - 32 mmol/L Final     Glucose   Date Value Ref Range Status   10/17/2024 85 74 - 106 mg/dL Final     BUN   Date Value Ref Range Status   10/17/2024 12 7 - 18 mg/dL Final     Creatinine   Date Value Ref Range  Status   10/17/2024 0.97 0.70 - 1.30 mg/dL Final     Calcium   Date Value Ref Range Status   10/17/2024 9.1 8.5 - 10.1 mg/dL Final     Total Protein   Date Value Ref Range Status   10/17/2024 7.4 6.4 - 8.2 g/dL Final     Albumin   Date Value Ref Range Status   10/17/2024 3.7 3.5 - 5.0 g/dL Final     Bilirubin, Total   Date Value Ref Range Status   10/17/2024 0.5 >0.0 - 1.2 mg/dL Final     Alk Phos   Date Value Ref Range Status   10/17/2024 91 45 - 115 U/L Final     AST   Date Value Ref Range Status   10/17/2024 21 15 - 37 U/L Final     ALT   Date Value Ref Range Status   10/17/2024 23 16 - 61 U/L Final     Anion Gap   Date Value Ref Range Status   10/17/2024 8 7 - 16 mmol/L Final     eGFR   Date Value Ref Range Status   04/11/2022 86 >=60 mL/min/1.73m² Final      CT Abdomen Pelvis W Wo Contrast  Narrative: EXAMINATION:  CT ABDOMEN PELVIS W WO CONTRAST    CLINICAL HISTORY:  gross hematuria;Gross hematuria    TECHNIQUE:  Low dose axial images, sagittal and coronal reformations were obtained from the lung bases to the pubic symphysis before and following the IV administration of 100 mL of Isovue 370.  No p.o. contrast noncontrast, portal venous and delayed images obtained    COMPARISON:  02/19/2019    FINDINGS:  Visualized lung bases essentially clear.    Liver; 2.2 cm cyst in the left lobe slightly larger today than on the prior exam but CT numbers consistent with a simple cyst.    Gallbladder contracted with no calcified stones or CT findings of acute cholecystitis or biliary duct dilatation    Spleen is not enlarged    Adrenal glands are unremarkable appearance    Pancreas is mildly atrophied    Abdominal aorta tapers without aneurysmal dilatation    Stomach, bowel, mesentery; small hiatal hernia.  Diverticulosis without CT findings of acute diverticulitis.  Normal appearance of the appendix.    Kidneys, ureters, bladder;    Right kidney multiple, approximately at 9 stones with largest measuring 9 mm and smallest  punctate.  No hydronephrosis, ureteral dilatation, opaque ureteral stone or ureteral obstruction.    Left kidney; multiple stones approximately 7 stones largest, 8 mm and the smallest punctate.  No hydronephrosis.  No ureteral dilatation, opaque ureteral stone or ureteral obstruction.    There is symmetrical renal enhancement.  There is exophytic 12 mm cyst lower pole of the left kidney.    The urinary bladder is just mildly distended at time of the exam.  Even allowing for the incomplete degree of distention the wall appears diffusely thick suggesting changes of chronic bladder outlet obstruction.    The prostate gland is enlarged and measures 6.2 cm transversely.  There is thin linear calcification that projects in the posterior aspect of the bladder or adjacent anterior margin the prostate gland more likely within the prostate gland for example axial series 2 images 67 through 69 and coronal series 98731 image 36 and is not significantly changed compared to prior studies of 09/21/2019    Mild retroperitoneal fat stranding on the left the lower abdomen/upper pelvis appearing similar to the prior exam suggesting panniculitis and scarring..    Osseous structures show degenerative changes without obvious aggressive appearing osseous lesion.  There is mild spondylolisthesis L4-5 with L4 anteriorly positioned relative to L5 by 4-5 mm  Impression: Multiple nonobstructing bilateral renal stones.  No hydronephrosis, opaque ureteral stone or ureteral obstruction    Enlarged prostate gland.  Diffuse bladder wall thickening suggesting changes from chronic bladder outlet obstruction    Small left renal cyst    Additional findings as detailed above including hepatic cyst.  Small hiatal hernia.  Diverticulosis without CT findings of acute diverticulitis.  Retroperitoneal fat stranding not significantly changed compared to the prior exam suggesting panniculitis.    Electronically signed by: Lexus Nixon  MD  Date:    10/08/2024  Time:    15:28     Procedures   Lab Results   Component Value Date    CHOL 65 10/17/2024    CHOL 75 05/09/2024    CHOL 89 11/01/2023     Lab Results   Component Value Date    HDL 27 (L) 10/17/2024    HDL 22 (L) 05/09/2024    HDL 30 (L) 11/01/2023     Lab Results   Component Value Date    LDLCALC 22 10/17/2024    LDLCALC 20 05/09/2024    LDLCALC 36 11/01/2023     Lab Results   Component Value Date    TRIG 80 10/17/2024    TRIG 163 (H) 05/09/2024    TRIG 113 11/01/2023     Lab Results   Component Value Date    CHOLHDL 2.4 10/17/2024    CHOLHDL 3.4 05/09/2024    CHOLHDL 3.0 11/01/2023      Lab Results   Component Value Date    HGBA1C 5.4 10/22/2024      Lab Results   Component Value Date    TSH 1.230 01/26/2022    S3PKDRZ 3.3 (L) 01/02/2022    FREET4 0.94 01/26/2022      Assessment and Plan (including Health Maintenance)      Problem List  Smart Sets  Document Outside HM   :    Plan:         Health Maintenance Due   Topic Date Due    Shingles Vaccine (1 of 2) Never done    RSV Vaccine (Age 60+ and Pregnant patients) (1 - 1-dose 75+ series) Never done    Influenza Vaccine (1) 09/01/2024    COVID-19 Vaccine (5 - 2024-25 season) 09/01/2024       Problem List Items Addressed This Visit       Mixed hyperlipidemia    Current Assessment & Plan     History of MI in the past but no problems recently.  He has good control of his lipids recently.  Lab Results   Component Value Date    LDLCALC 22 10/17/2024    Patient's last LDL was 22.  In his cholesterol is less than 100 i.e. 93.  No changes in his medicines.         Primary hypertension    Current Assessment & Plan     Hypertension well controlled on current medicines.  Continue his carvedilol.  Continue his amlodipine.  Labs every 6 months.  CBC and CMP have already been drawn and reviewed today.  No abnormalities.         Atherosclerotic heart disease of native coronary artery with other forms of angina pectoris    Current Assessment & Plan      Patient is doing well and has seen his cardiologist within the last 6 months.  He denies any chest pain shortness in breath palpitations PND orthopnea.  No change in his carvedilol or his clopidogrel.  Continue his aspirin and amlodipine.  Follow-up in 3 months         Primary insomnia    Current Assessment & Plan     History of insomnia.  Patient takes doxepin in at [facility].  No change in medicines at this time.         Slow transit constipation - Primary    Current Assessment & Plan     Constipation which is controlled with the Colace and Dulcolax.  No change in his medication.  Follow-up every 6 months          Other Visit Diagnoses       Screening for diabetes mellitus        Relevant Orders    Hemoglobin A1C (Completed)    Hyperglycemia        Relevant Orders    Hemoglobin A1C (Completed)            Health Maintenance Topics with due status: Not Due       Topic Last Completion Date    TETANUS VACCINE 06/25/2021    Colonoscopy 07/07/2022    PROSTATE-SPECIFIC ANTIGEN 05/09/2024    Lipid Panel 10/17/2024    Aspirin/Antiplatelet Therapy 10/22/2024    Hemoglobin A1c (Prediabetes) 10/22/2024       Future Appointments   Date Time Provider Department Center   11/13/2024  2:40 PM Andria Elkins FNP [facility]   1/7/2025  9:20 AM Juan R Robles, NP [facility]   1/23/2025  1:40 PM Chevy Malhotra DO [facility]   8/27/2025  3:00 PM AWSARAHI NURSE, [facility]   9/2/2025  9:40 AM Juan R Robles, NP [facility]        Follow up in about 3 months (around 1/22/2025).     Signature:  Chevy Malhotra DO  [address] [place]  [address]    Date of encounter: 10/22/24

## 2024-10-24 NOTE — ASSESSMENT & PLAN NOTE
Hypertension well controlled on current medicines.  Continue his carvedilol.  Continue his amlodipine.  Labs every 6 months.  CBC and CMP have already been drawn and reviewed today.  No abnormalities.

## 2024-10-24 NOTE — ASSESSMENT & PLAN NOTE
History of MI in the past but no problems recently.  He has good control of his lipids recently.  Lab Results   Component Value Date    LDLCALC 22 10/17/2024    Patient's last LDL was 22.  In his cholesterol is less than 100 i.e. 93.  No changes in his medicines.

## 2024-10-24 NOTE — ASSESSMENT & PLAN NOTE
Constipation which is controlled with the Colace and Dulcolax.  No change in his medication.  Follow-up every 6 months

## 2024-10-24 NOTE — ASSESSMENT & PLAN NOTE
Patient is doing well and has seen his cardiologist within the last 6 months.  He denies any chest pain shortness in breath palpitations PND orthopnea.  No change in his carvedilol or his clopidogrel.  Continue his aspirin and amlodipine.  Follow-up in 3 months

## 2024-12-12 ENCOUNTER — TELEPHONE (OUTPATIENT)
Dept: GASTROENTEROLOGY | Facility: CLINIC | Age: 84
End: 2024-12-12
Payer: MEDICARE

## 2024-12-12 ENCOUNTER — OFFICE VISIT (OUTPATIENT)
Dept: GASTROENTEROLOGY | Facility: CLINIC | Age: 84
End: 2024-12-12
Payer: MEDICARE

## 2024-12-12 ENCOUNTER — HOSPITAL ENCOUNTER (OUTPATIENT)
Dept: RADIOLOGY | Facility: HOSPITAL | Age: 84
Discharge: HOME | End: 2024-12-12
Attending: NURSE PRACTITIONER
Payer: MEDICARE

## 2024-12-12 VITALS
BODY MASS INDEX: 27.77 KG/M2 | DIASTOLIC BLOOD PRESSURE: 68 MMHG | HEART RATE: 59 BPM | SYSTOLIC BLOOD PRESSURE: 140 MMHG | OXYGEN SATURATION: 100 % | HEIGHT: 66 IN | WEIGHT: 172.81 LBS

## 2024-12-12 DIAGNOSIS — K59.00 CONSTIPATION, UNSPECIFIED CONSTIPATION TYPE: Primary | ICD-10-CM

## 2024-12-12 DIAGNOSIS — K59.00 CONSTIPATION, UNSPECIFIED CONSTIPATION TYPE: ICD-10-CM

## 2024-12-12 PROCEDURE — 74018 RADEX ABDOMEN 1 VIEW: CPT | Mod: TC

## 2024-12-12 PROCEDURE — 99215 OFFICE O/P EST HI 40 MIN: CPT | Mod: PBBFAC,25 | Performed by: NURSE PRACTITIONER

## 2024-12-12 PROCEDURE — 99999 PR PBB SHADOW E&M-EST. PATIENT-LVL V: CPT | Mod: PBBFAC,,, | Performed by: NURSE PRACTITIONER

## 2024-12-12 RX ORDER — POLYETHYLENE GLYCOL 3350 17 G/17G
17 POWDER, FOR SOLUTION ORAL DAILY
Qty: 1530 G | Refills: 3 | Status: SHIPPED | OUTPATIENT
Start: 2024-12-12 | End: 2025-12-12

## 2024-12-12 NOTE — TELEPHONE ENCOUNTER
Results called to patient. Verbalized understanding.          ----- Message from NICOLÁS Ash sent at 12/12/2024 12:03 PM CST -----    No anemia.  Labs are acceptable.

## 2024-12-12 NOTE — PROGRESS NOTES
Elliott Peters is a 84 y.o. male here for Follow-up        PCP: Chevy Malhotra  Referring Provider: No referring provider defined for this encounter.     HPI:    Presents with report of constipation.  Patient reports that constipation has been worsening over the past few months.  States that he was previously taking MiraLax powder 1 capful daily.  Has not been doing this recently.  Reports that he is intermittently taking Dulcolax and milk of magnesia.  Reports that he sometimes goes several days without having a bowel movement.  Endorses abdominal bloating and decreased appetite due to constipation at times.  He denies any hematochezia or melena.  Reports that [employer] is not financially affordable.  No unintentional weight loss.  He did have a CT abdomen and pelvis with and without contrast on 10/08/2024, report reviewed, diverticulosis without diverticulitis.  No other acute abdominal abnormality. His last colonoscopy was 05/03/2019, 2 colon polyps removed from the transverse colon, 1 colon polyp removed from the ascending colon.  Recommendation was to repeat in 3 years.  We did discuss that due to patient's age that we would defer repeating colonoscopy unless absolutely necessary.  Patient agrees and does not want to repeat colonoscopy at this time.    Follow-up  Associated symptoms include a rash. Pertinent negatives include no abdominal pain, change in bowel habit, fatigue, fever, nausea or vomiting.         ROS:  Review of Systems   Constitutional:  Negative for activity change, fatigue, fever and unexpected weight change.   HENT:  Negative for trouble swallowing.    Gastrointestinal:  Positive for constipation. Negative for abdominal distention, abdominal pain, blood in stool, change in bowel habit, diarrhea, nausea, vomiting and reflux.   Musculoskeletal:  Negative for gait problem.   Integumentary:  Positive for rash. Negative for color change.   Psychiatric/Behavioral:  Negative for sleep  disturbance. The patient is not nervous/anxious.           PMHX:  has a past medical history of Acute pain of right knee (12/30/2021), Allergic reaction (05/17/2021), Anxiety, Arthritis, BPH (benign prostatic hyperplasia), Contact dermatitis (08/11/2021), Coronary artery disease, Enlarged lymph node (10/19/2022), Hematoma (10/19/2022), Hyperlipidemia, Hypertension, Kidney stone, MI (myocardial infarction), Primary insomnia, Sleep apnea, Staphylococcal infection of skin (05/17/2021), Upper respiratory tract infection (12/26/2023), and Urinary tract infection.    PSHX:  has a past surgical history that includes Coronary angioplasty with stent; Hernia repair; Joint replacement (Bilateral); and Prostatectomy (2012).    PFHX: family history includes Colon cancer in his mother; Cystic fibrosis in his daughter; Heart attack in his father; No Known Problems in his maternal grandfather, maternal grandmother, paternal grandfather, paternal grandmother, and son.    PSlHX:  reports that he quit smoking about 66 years ago. His smoking use included cigarettes. He has been exposed to tobacco smoke. He has never used smokeless tobacco. He reports that he does not currently use alcohol. He reports that he does not use drugs.        Review of patient's allergies indicates:   Allergen Reactions    Bronopol Dermatitis    Propylene glycol Dermatitis       Medication List with Changes/Refills   New Medications    POLYETHYLENE GLYCOL (GLYCOLAX) 17 GRAM/DOSE POWDER    Take 17 g by mouth once daily.   Current Medications    ALPRAZOLAM (XANAX) 0.5 MG TABLET    Take 1 tablet (0.5 mg total) by mouth 2 (two) times daily as needed for Anxiety.    AMLODIPINE (NORVASC) 5 MG TABLET    Take 0.5 tablets (2.5 mg total) by mouth once daily.    ASPIRIN (ECOTRIN) 81 MG EC TABLET    Take 81 mg by mouth once daily.    BISACODYL (DULCOLAX) 5 MG EC TABLET    Take 5 mg by mouth daily as needed for Constipation.    CARVEDILOL (COREG) 3.125 MG TABLET    Take 1  "tablet (3.125 mg total) by mouth 2 (two) times daily with meals.    CLOPIDOGREL (PLAVIX) 75 MG TABLET    Take 1 tablet (75 mg total) by mouth once daily.    DOCUSATE SODIUM (COLACE) 100 MG CAPSULE    Take 100 mg by mouth once daily.    DOXEPIN (SINEQUAN) 25 MG CAPSULE    Take 1 capsule (25 mg total) by mouth every evening.    DOXYCYCLINE (VIBRAMYCIN) 100 MG CAP    take one capsule twice a day x 10 days then reduce to one daily -- avoid direct sunlight, do not take this medication within two hours of milk, milk products or antacids    EPINEPHRINE (EPIPEN) 0.3 MG/0.3 ML ATIN        FINASTERIDE (PROSCAR) 5 MG TABLET    Take 1 tablet (5 mg total) by mouth once daily.    FLUTICASONE PROPIONATE (FLONASE) 50 MCG/ACTUATION NASAL SPRAY    1 spray (50 mcg total) by Each Nostril route once daily.    MELATONIN 10 MG TAB    Take 1 tablet (10 mg total) by mouth every evening.    MELOXICAM (MOBIC) 7.5 MG TABLET    Take 1 tablet (7.5 mg total) by mouth once daily.    NITROGLYCERIN (NITROSTAT) 0.4 MG SL TABLET    Place 1 tablet (0.4 mg total) under the tongue every 5 (five) minutes as needed for Chest pain.    PANTOPRAZOLE (PROTONIX) 40 MG TABLET    Take 1 tablet (40 mg total) by mouth once daily.    POLYETHYLENE GLYCOL (GLYCOLAX) 17 GRAM PWPK    Take 17 g by mouth once daily.    ROSUVASTATIN (CRESTOR) 40 MG TAB    Take 1 tablet (40 mg total) by mouth every evening.    TERAZOSIN (HYTRIN) 5 MG CAPSULE    Take 1 capsule (5 mg total) by mouth every evening.    TRIAMCINOLONE ACETONIDE 0.1% (KENALOG) 0.1 % CREAM    Apply topically 2 (two) times daily.    VALACYCLOVIR (VALTREX) 1000 MG TABLET    Take 1 tablet (1,000 mg total) by mouth 2 (two) times daily. for 7 days        Objective Findings:  Vital Signs:  BP (!) 140/68   Pulse (!) 59   Ht 5' 6" (1.676 m)   Wt 78.4 kg (172 lb 12.8 oz)   SpO2 100%   BMI 27.89 kg/m²  Body mass index is 27.89 kg/m².    Physical Exam:  Physical Exam  Vitals and nursing note reviewed.   Constitutional:  "      General: He is not in acute distress.     Appearance: Normal appearance.   HENT:      Mouth/Throat:      Mouth: Mucous membranes are moist.   Cardiovascular:      Rate and Rhythm: Bradycardia present.   Pulmonary:      Effort: Pulmonary effort is normal.      Breath sounds: No wheezing, rhonchi or rales.   Abdominal:      General: Bowel sounds are normal. There is no distension.      Palpations: Abdomen is soft. There is no mass.      Tenderness: There is no abdominal tenderness.   Skin:     General: Skin is warm and dry.      Coloration: Skin is not jaundiced or pale.   Neurological:      Mental Status: He is alert and oriented to person, place, and time.   Psychiatric:         Mood and Affect: Mood normal.          Labs:  Lab Results   Component Value Date    WBC 6.87 12/12/2024    HGB 14.1 12/12/2024    HCT 45.1 12/12/2024    MCV 88.4 12/12/2024    RDW 16.0 (H) 12/12/2024    PLT 95 (L) 12/12/2024    LYMPH 32.5 12/12/2024    LYMPH 2.23 12/12/2024    MONO 35.8 (H) 12/12/2024    EOS 0.06 12/12/2024    BASO 0.04 12/12/2024     Lab Results   Component Value Date     12/12/2024    K 3.8 12/12/2024     12/12/2024    CO2 26 12/12/2024    GLU 83 12/12/2024    BUN 17 12/12/2024    CREATININE 0.89 12/12/2024    CALCIUM 9.0 12/12/2024    PROT 7.3 12/12/2024    ALBUMIN 4.4 12/12/2024    BILITOT 0.9 12/12/2024    ALKPHOS 80 12/12/2024    AST 37 (H) 12/12/2024    ALT 16 12/12/2024         Imaging: No results found.      Assessment:  Elliott Peters is a 84 y.o. male here with:  1. Constipation, unspecified constipation type          Recommendations:  1.   KUB  2.  CBC, CMP  3.  MiraLax powder 17 g in 8 oz of liquid.  Increase to 2-3 times daily.  May also take MOM every 2 days.  4.   If patient fails MiraLax powder we will consider lactulose  5. Kiwi fruit 2 per day  6. Increase water intake to 64 ounces daily if no  contraindication    Portions of this note may have been created with voice recognition  "software.  Occasional wrong word or "sound a like substitutions may have occurred due to inherent limitations of voice recognition software.  Please read the note carefully and recognize using contexts, where substitutions have occurred.    Diagnosis, risks, benefits, and side effects of any medications and treatment plan were discussed with the patient.  All questions were answered to the satisfaction of the patient, and patient verbalized understanding and agreement to the treatment plan.    Follow up in about 6 weeks (around 1/23/2025).      Order summary:  Orders Placed This Encounter    X-Ray KUB    CBC Auto Differential    Comprehensive Metabolic Panel    polyethylene glycol (GLYCOLAX) 17 gram/dose powder       Thank you for allowing me to participate in the care of Elliott Peters.      NICOLÁS Douglas-JAVON          "

## 2024-12-12 NOTE — PATIENT INSTRUCTIONS
Miralax powder 1 capful 3 times daily in 8 ounces of liquid. Milk of Magnesia every 2 days  Kiwi 2 per day  64 ounces of water daily

## 2024-12-17 ENCOUNTER — TELEPHONE (OUTPATIENT)
Dept: GASTROENTEROLOGY | Facility: CLINIC | Age: 84
End: 2024-12-17
Payer: MEDICARE

## 2024-12-17 NOTE — TELEPHONE ENCOUNTER
Results and recommendations called to patient. Verbalized understanding.          ----- Message from NICOLÁS Ash sent at 12/16/2024  2:59 PM CST -----  Small amount of stool noted in the right colon.  Patient may continue MiraLax and milk of magnesia.  He does have multiple bilateral renal calculi.  No obstruction noted.  Bowel gas pattern is within normal limits.

## 2025-01-13 ENCOUNTER — TELEPHONE (OUTPATIENT)
Dept: FAMILY MEDICINE | Facility: CLINIC | Age: 85
End: 2025-01-13
Payer: MEDICARE

## 2025-01-13 DIAGNOSIS — E78.5 HYPERLIPIDEMIA, UNSPECIFIED HYPERLIPIDEMIA TYPE: ICD-10-CM

## 2025-01-13 DIAGNOSIS — I10 PRIMARY HYPERTENSION: Primary | ICD-10-CM

## 2025-01-13 DIAGNOSIS — N40.1 BENIGN PROSTATIC HYPERPLASIA WITH NOCTURIA: ICD-10-CM

## 2025-01-13 DIAGNOSIS — Z13.1 SCREENING FOR DIABETES MELLITUS: ICD-10-CM

## 2025-01-13 DIAGNOSIS — R35.1 BENIGN PROSTATIC HYPERPLASIA WITH NOCTURIA: ICD-10-CM

## 2025-01-13 NOTE — TELEPHONE ENCOUNTER
----- Message from Gina sent at 1/13/2025  8:31 AM CST -----  Mr. And Mrs. Peters wanted to make sure it was ok for them to come in for Labs   They both have appointments for next week

## 2025-01-13 NOTE — TELEPHONE ENCOUNTER
Discussed everything with Dr. Malhotra okay to come in for labs. Called and told pt, voiced understanding.

## 2025-01-13 NOTE — TELEPHONE ENCOUNTER
Called and spoke with pt about him and his wife Mrs. Peters. They want to come in early to have lab work done before their appointment next week (01/23/25). Explained that there are no orders pending for either of them to have. He stated that someone had to have dropped the ball because they were coming in for their 6 month follow up and they always have labs. Explained that while yes it is normal at times to have labs at a 6 month visit, they both were in in Oct 2024 and may not have to have labs at this time. That at their visit in Oct 2024 no future lab orders were placed for them to come in early and have labs. Explained that I would ask Dr. Malhotra if there were any orders either him or his wife would need to have done and call him back. Pt voiced understanding.

## 2025-01-27 ENCOUNTER — OFFICE VISIT (OUTPATIENT)
Dept: GASTROENTEROLOGY | Facility: CLINIC | Age: 85
End: 2025-01-27
Payer: MEDICARE

## 2025-01-27 VITALS
HEIGHT: 66 IN | DIASTOLIC BLOOD PRESSURE: 65 MMHG | HEART RATE: 65 BPM | WEIGHT: 174.81 LBS | OXYGEN SATURATION: 98 % | SYSTOLIC BLOOD PRESSURE: 135 MMHG | BODY MASS INDEX: 28.09 KG/M2

## 2025-01-27 DIAGNOSIS — K59.00 CONSTIPATION, UNSPECIFIED CONSTIPATION TYPE: Primary | ICD-10-CM

## 2025-01-27 PROCEDURE — 99213 OFFICE O/P EST LOW 20 MIN: CPT | Mod: S$PBB,,, | Performed by: NURSE PRACTITIONER

## 2025-01-27 PROCEDURE — 99215 OFFICE O/P EST HI 40 MIN: CPT | Mod: PBBFAC | Performed by: NURSE PRACTITIONER

## 2025-01-27 PROCEDURE — 99999 PR PBB SHADOW E&M-EST. PATIENT-LVL V: CPT | Mod: PBBFAC,,, | Performed by: NURSE PRACTITIONER

## 2025-01-27 NOTE — PROGRESS NOTES
Elliott Peters is a 84 y.o. male here for No chief complaint on file.        PCP: Chevy Malhotra  Referring Provider: No referring provider defined for this encounter.     HPI:  Presents for follow-up due to constipation.  Patient reports that constipation has improved.  States that he did increase MiraLax powder as directed and his bowels are moving without difficulty.  Abdominal discomfort has also improved.  States that he is feeling much better.  He did have a CT abdomen and pelvis on 10/08/2024, diverticulosis without diverticulitis.  His last colonoscopy was 05/03/2019, 3 adenomatous polyps were removed with recommendation to repeat colonoscopy in 3 years.  Patient does not wish to repeat colonoscopy at this time due to age.  He denies any hematochezia or melena.  No unintentional weight loss.  Appetite is good.          ROS:  Review of Systems   Constitutional:  Negative for activity change, appetite change, fatigue, fever and unexpected weight change.   HENT:  Negative for trouble swallowing.    Cardiovascular:  Negative for chest pain.   Gastrointestinal:  Positive for constipation. Negative for abdominal distention, abdominal pain, blood in stool, change in bowel habit, diarrhea, nausea, vomiting and reflux.   Musculoskeletal:  Negative for gait problem.   Integumentary:  Negative for color change.   Psychiatric/Behavioral:  Negative for sleep disturbance. The patient is not nervous/anxious.           PMHX:  has a past medical history of Acute pain of right knee (12/30/2021), Allergic reaction (05/17/2021), Anxiety, Arthritis, BPH (benign prostatic hyperplasia), Contact dermatitis (08/11/2021), Coronary artery disease, Enlarged lymph node (10/19/2022), Hematoma (10/19/2022), Hyperlipidemia, Hypertension, Kidney stone, MI (myocardial infarction), Primary insomnia, Sleep apnea, Staphylococcal infection of skin (05/17/2021), Upper respiratory tract infection (12/26/2023), and Urinary tract  infection.    PSHX:  has a past surgical history that includes Coronary angioplasty with stent; Hernia repair; Joint replacement (Bilateral); and Prostatectomy (2012).    PFHX: family history includes Colon cancer in his mother; Cystic fibrosis in his daughter; Heart attack in his father; No Known Problems in his maternal grandfather, maternal grandmother, paternal grandfather, paternal grandmother, and son.    PSlHX:  reports that he quit smoking about 67 years ago. His smoking use included cigarettes. He has been exposed to tobacco smoke. He has never used smokeless tobacco. He reports that he does not currently use alcohol. He reports that he does not use drugs.        Review of patient's allergies indicates:   Allergen Reactions    Bronopol Dermatitis    Propylene glycol Dermatitis       Medication List with Changes/Refills   Current Medications    ALPRAZOLAM (XANAX) 0.5 MG TABLET    Take 1 tablet (0.5 mg total) by mouth 2 (two) times daily as needed for Anxiety.    AMLODIPINE (NORVASC) 5 MG TABLET    Take 0.5 tablets (2.5 mg total) by mouth once daily.    ASPIRIN (ECOTRIN) 81 MG EC TABLET    Take 81 mg by mouth once daily.    BISACODYL (DULCOLAX) 5 MG EC TABLET    Take 5 mg by mouth daily as needed for Constipation.    CARVEDILOL (COREG) 3.125 MG TABLET    Take 1 tablet (3.125 mg total) by mouth 2 (two) times daily with meals.    CLOPIDOGREL (PLAVIX) 75 MG TABLET    Take 1 tablet (75 mg total) by mouth once daily.    DOCUSATE SODIUM (COLACE) 100 MG CAPSULE    Take 100 mg by mouth once daily.    DOXEPIN (SINEQUAN) 25 MG CAPSULE    Take 1 capsule (25 mg total) by mouth every evening.    DOXYCYCLINE (VIBRAMYCIN) 100 MG CAP    take one capsule twice a day x 10 days then reduce to one daily -- avoid direct sunlight, do not take this medication within two hours of milk, milk products or antacids    EPINEPHRINE (EPIPEN) 0.3 MG/0.3 ML ATIN        FINASTERIDE (PROSCAR) 5 MG TABLET    Take 1 tablet (5 mg total) by mouth  "once daily.    FLUTICASONE PROPIONATE (FLONASE) 50 MCG/ACTUATION NASAL SPRAY    1 spray (50 mcg total) by Each Nostril route once daily.    MELATONIN 10 MG TAB    Take 1 tablet (10 mg total) by mouth every evening.    MELOXICAM (MOBIC) 7.5 MG TABLET    Take 1 tablet (7.5 mg total) by mouth once daily.    NITROGLYCERIN (NITROSTAT) 0.4 MG SL TABLET    Place 1 tablet (0.4 mg total) under the tongue every 5 (five) minutes as needed for Chest pain.    PANTOPRAZOLE (PROTONIX) 40 MG TABLET    Take 1 tablet (40 mg total) by mouth once daily.    POLYETHYLENE GLYCOL (GLYCOLAX) 17 GRAM PWPK    Take 17 g by mouth once daily.    POLYETHYLENE GLYCOL (GLYCOLAX) 17 GRAM/DOSE POWDER    Take 17 g by mouth once daily.    ROSUVASTATIN (CRESTOR) 40 MG TAB    Take 1 tablet (40 mg total) by mouth every evening.    TERAZOSIN (HYTRIN) 5 MG CAPSULE    Take 1 capsule (5 mg total) by mouth every evening.    TRIAMCINOLONE ACETONIDE 0.1% (KENALOG) 0.1 % CREAM    Apply topically 2 (two) times daily.    VALACYCLOVIR (VALTREX) 1000 MG TABLET    Take 1 tablet (1,000 mg total) by mouth 2 (two) times daily. for 7 days        Objective Findings:  Vital Signs:  /65   Pulse 65   Ht 5' 6" (1.676 m)   Wt 79.3 kg (174 lb 12.8 oz)   SpO2 98%   BMI 28.21 kg/m²  Body mass index is 28.21 kg/m².    Physical Exam:  Physical Exam  Vitals and nursing note reviewed.   Constitutional:       General: He is not in acute distress.     Appearance: Normal appearance.   HENT:      Mouth/Throat:      Mouth: Mucous membranes are moist.   Cardiovascular:      Rate and Rhythm: Normal rate.   Pulmonary:      Effort: Pulmonary effort is normal.   Abdominal:      General: Bowel sounds are normal. There is no distension.      Palpations: Abdomen is soft. There is no mass.      Tenderness: There is no abdominal tenderness.   Skin:     General: Skin is warm and dry.      Coloration: Skin is not jaundiced or pale.   Neurological:      Mental Status: He is alert and " "oriented to person, place, and time.   Psychiatric:         Mood and Affect: Mood normal.          Labs:  Lab Results   Component Value Date    WBC 5.23 01/14/2025    HGB 14.6 01/14/2025    HCT 46.8 01/14/2025    MCV 90.7 01/14/2025    RDW 15.9 (H) 01/14/2025    PLT 88 (L) 01/14/2025    LYMPH 34.2 01/14/2025    LYMPH 1.79 01/14/2025    MONO 33.3 (H) 01/14/2025    EOS 0.06 01/14/2025    BASO 0.03 01/14/2025     Lab Results   Component Value Date     01/14/2025    K 4.4 01/14/2025     (H) 01/14/2025    CO2 25 01/14/2025    GLU 80 (L) 01/14/2025    BUN 11 01/14/2025    CREATININE 0.87 01/14/2025    CALCIUM 9.0 01/14/2025    PROT 6.8 01/14/2025    ALBUMIN 4.2 01/14/2025    BILITOT 0.9 01/14/2025    ALKPHOS 72 01/14/2025    AST 24 01/14/2025    ALT 17 01/14/2025         Imaging: No results found.      Assessment:  Elliott Peters is a 84 y.o. male here with:  1. Constipation, unspecified constipation type          Recommendations:  1. Continue MiraLax powder 17 g daily in 8 oz of liquid.  May increase to 2-3 times daily if needed.  2. At this time the patient does not wish to repeat colonoscopy due to age and risk associated  3. Patient may follow-up as needed for constipation or abdominal pain.  He has also been advised to notify our office if he has any hematochezia.    Portions of this note may have been created with voice recognition software.  Occasional wrong word or "sound a like substitutions may have occurred due to inherent limitations of voice recognition software.  Please read the note carefully and recognize using contexts, where substitutions have occurred.    Diagnosis, risks, benefits, and side effects of any medications and treatment plan were discussed with the patient.  All questions were answered to the satisfaction of the patient, and patient verbalized understanding and agreement to the treatment plan.        Follow up if symptoms worsen or fail to improve.      Order summary:   "     Thank you for allowing me to participate in the care of Elliott Peters.      JOANIE Douglas

## 2025-01-30 ENCOUNTER — OFFICE VISIT (OUTPATIENT)
Dept: FAMILY MEDICINE | Facility: CLINIC | Age: 85
End: 2025-01-30
Payer: MEDICARE

## 2025-01-30 VITALS
WEIGHT: 175.19 LBS | BODY MASS INDEX: 28.15 KG/M2 | SYSTOLIC BLOOD PRESSURE: 119 MMHG | DIASTOLIC BLOOD PRESSURE: 71 MMHG | OXYGEN SATURATION: 96 % | TEMPERATURE: 98 F | HEIGHT: 66 IN | HEART RATE: 61 BPM | RESPIRATION RATE: 18 BRPM

## 2025-01-30 DIAGNOSIS — I10 PRIMARY HYPERTENSION: Primary | ICD-10-CM

## 2025-01-30 DIAGNOSIS — F41.9 ANXIETY: ICD-10-CM

## 2025-01-30 DIAGNOSIS — G47.33 OSA ON CPAP: ICD-10-CM

## 2025-01-30 PROCEDURE — 99214 OFFICE O/P EST MOD 30 MIN: CPT | Mod: ,,, | Performed by: FAMILY MEDICINE

## 2025-01-30 RX ORDER — ALPRAZOLAM 0.5 MG/1
0.5 TABLET ORAL 2 TIMES DAILY PRN
Qty: 90 TABLET | Refills: 0 | Status: SHIPPED | OUTPATIENT
Start: 2025-01-30

## 2025-01-30 NOTE — PROGRESS NOTES
Chevy Malhotra DO   [address]  [place]  [address]      PATIENT NAME: Elliott Peters  : 1940  DATE: 25  MRN: [id number]      Billing Provider: Chevy Malhotra DO  Level of Service:   Patient PCP Information       Provider PCP Type    Chevy Malhotra DO General            Reason for Visit / Chief Complaint: Follow-up (Mr. Peters is an 85 y/o male presenting today for a 3 month follow up and medication refills. Had labs done in the last 2 weeks. ), Hypertension (Follow up on HTN, takes meds as prescribed. States checks BP sometimes but not daily.), Medication Refill (Needs refill on alprazolam today. ), and Health Maintenance (Has had flu and covid vaccines as well as shingles, will add history per MIIX)       Update PCP  Update Chief Complaint         History of Present Illness / Problem Focused Workflow     Elliott Peters presents to the clinic with Follow-up (Mr. Peters is an 85 y/o male presenting today for a 3 month follow up and medication refills. Had labs done in the last 2 weeks. ), Hypertension (Follow up on HTN, takes meds as prescribed. States checks BP sometimes but not daily.), Medication Refill (Needs refill on alprazolam today. ), and Health Maintenance (Has had flu and covid vaccines as well as shingles, will add history per MIIX)     Patient is in for follow-up and needs refills on his Xanax.  He had labs done 2 weeks ago with electrolytes normal except for chloride 108 and a glucose of 80 with a BUN 11 creatinine 0.87 lipid panel does show LDL of 34 with total of 75.  PSA level was 1.866.  His white count 14 6 hematocrit 46.8 but his platelet counts are down at 88,000. Hemoglobin A1c was 5.5%.  Patient has no complaints but has stopped several of his medicines which have been noted.  No chest pain shortness in breath palpitations PND orthopnea.        Review of Systems     Review of Systems   Constitutional:  Negative for activity change,  appetite change, chills, fatigue and fever.   HENT:  Negative for nasal congestion, ear discharge, ear pain, mouth dryness, mouth sores, postnasal drip, sinus pressure/congestion, sore throat and voice change.    Eyes:  Negative for pain, discharge, redness, itching and visual disturbance.   Respiratory:  Negative for apnea, cough, chest tightness, shortness of breath and wheezing.    Cardiovascular:  Negative for chest pain, palpitations and leg swelling.   Gastrointestinal:  Negative for abdominal distention, abdominal pain, anal bleeding, blood in stool, change in bowel habit, constipation, diarrhea, nausea, vomiting and reflux.   Endocrine: Negative for cold intolerance, heat intolerance, polydipsia, polyphagia and polyuria.   Genitourinary:  Negative for difficulty urinating, enuresis, erectile dysfunction, frequency, genital sores, hematuria and urgency.   Musculoskeletal:  Negative for arthralgias, back pain, gait problem, leg pain, myalgias and neck pain.   Integumentary:  Negative for rash, mole/lesion, breast mass and breast discharge.   Allergic/Immunologic: Negative for environmental allergies and food allergies.   Neurological:  Negative for dizziness, vertigo, tremors, seizures, syncope, facial asymmetry, speech difficulty, weakness, light-headedness, numbness, headaches, memory loss and coordination difficulties.   Hematological:  Negative for adenopathy. Does not bruise/bleed easily.   Psychiatric/Behavioral:  Positive for sleep disturbance. Negative for agitation, behavioral problems, confusion, decreased concentration, dysphoric mood, hallucinations, self-injury and suicidal ideas. The patient is nervous/anxious. The patient is not hyperactive.    Breast: Negative for mass      Medical / Social / Family History     Past Medical History:   Diagnosis Date    Acute pain of right knee 12/30/2021    Allergic reaction 05/17/2021    Anxiety     Arthritis     BPH (benign prostatic hyperplasia)     Contact  dermatitis 08/11/2021    Coronary artery disease     cardiac stents    Enlarged lymph node 10/19/2022    Hematoma 10/19/2022    Hyperlipidemia     Hypertension     Kidney stone     MI (myocardial infarction)     Primary insomnia     Sleep apnea     Staphylococcal infection of skin 05/17/2021    Upper respiratory tract infection 12/26/2023    Urinary tract infection        Past Surgical History:   Procedure Laterality Date    CORONARY ANGIOPLASTY WITH STENT PLACEMENT      HERNIA REPAIR      Inguinal hernia    JOINT REPLACEMENT Bilateral     difficult to wake after surgery  Knee replacements    PROSTATECTOMY  2012       Social History    reports that he quit smoking about 67 years ago. His smoking use included cigarettes. He has been exposed to tobacco smoke. He has never used smokeless tobacco. He reports that he does not currently use alcohol. He reports that he does not use drugs.    Family History  's family history includes Colon cancer in his mother; Cystic fibrosis in his daughter; Heart attack in his father; No Known Problems in his maternal grandfather, maternal grandmother, paternal grandfather, paternal grandmother, and son.    Medications and Allergies     Medications  Outpatient Medications Marked as Taking for the 1/30/25 encounter (Office Visit) with Chevy Malhotra, DO   Medication Sig Dispense Refill    amLODIPine (NORVASC) 5 MG tablet Take 0.5 tablets (2.5 mg total) by mouth once daily. 180 tablet 1    aspirin (ECOTRIN) 81 MG EC tablet Take 81 mg by mouth once daily.      carvediloL (COREG) 3.125 MG tablet Take 1 tablet (3.125 mg total) by mouth 2 (two) times daily with meals. 180 tablet 1    clopidogreL (PLAVIX) 75 mg tablet Take 1 tablet (75 mg total) by mouth once daily. 90 tablet 1    docusate sodium (COLACE) 100 MG capsule Take 100 mg by mouth once daily.      finasteride (PROSCAR) 5 mg tablet Take 1 tablet (5 mg total) by mouth once daily. 90 tablet 3    linaCLOtide (LINZESS) 145 mcg  Cap capsule Take 145 mcg by mouth before breakfast.      polyethylene glycol (GLYCOLAX) 17 gram/dose powder Take 17 g by mouth once daily. 1530 g 3    rosuvastatin (CRESTOR) 40 MG Tab Take 1 tablet (40 mg total) by mouth every evening. (Patient taking differently: Take 20 mg by mouth every evening.) 90 tablet 3    terazosin (HYTRIN) 5 MG capsule Take 1 capsule (5 mg total) by mouth every evening. 90 capsule 3    [DISCONTINUED] ALPRAZolam (XANAX) 0.5 MG tablet Take 1 tablet (0.5 mg total) by mouth 2 (two) times daily as needed for Anxiety. 90 tablet 0    [DISCONTINUED] polyethylene glycol (GLYCOLAX) 17 gram PwPk Take 17 g by mouth once daily.         Allergies  Review of patient's allergies indicates:   Allergen Reactions    Bronopol Dermatitis    Propylene glycol Dermatitis       Physical Examination     Vitals:    01/30/25 1410   BP: 119/71   Pulse: 61   Resp: 18   Temp: 98.3 °F (36.8 °C)     Physical Exam  Constitutional:       Appearance: Normal appearance. He is normal weight.   HENT:      Head: Normocephalic.      Nose: Nose normal.      Mouth/Throat:      Mouth: Mucous membranes are moist.      Pharynx: Oropharynx is clear. No oropharyngeal exudate or posterior oropharyngeal erythema.   Eyes:      General: No scleral icterus.        Right eye: No discharge.         Left eye: No discharge.      Conjunctiva/sclera: Conjunctivae normal.      Pupils: Pupils are equal, round, and reactive to light.   Neck:      Vascular: No carotid bruit.   Cardiovascular:      Rate and Rhythm: Normal rate and regular rhythm.      Pulses: Normal pulses.      Heart sounds: Murmur heard.   Pulmonary:      Effort: Pulmonary effort is normal.      Breath sounds: Normal breath sounds. No wheezing.   Abdominal:      General: Abdomen is flat. Bowel sounds are normal.      Tenderness: There is no abdominal tenderness.   Musculoskeletal:         General: Normal range of motion.      Cervical back: Normal range of motion and neck supple.       Right lower leg: No edema.      Left lower leg: No edema.   Skin:     General: Skin is warm.      Capillary Refill: Capillary refill takes less than 2 seconds.      Findings: No rash.   Neurological:      General: No focal deficit present.      Mental Status: He is alert and oriented to person, place, and time. Mental status is at baseline.      Cranial Nerves: No cranial nerve deficit.      Sensory: No sensory deficit.      Motor: No weakness.      Coordination: Coordination normal.      Gait: Gait normal.      Deep Tendon Reflexes: Reflexes normal.   Psychiatric:         Mood and Affect: Mood normal.         Behavior: Behavior normal.         Thought Content: Thought content normal.         Judgment: Judgment normal.               Lab Results   Component Value Date    WBC 5.23 01/14/2025    HGB 14.6 01/14/2025    HCT 46.8 01/14/2025    MCV 90.7 01/14/2025    PLT 88 (L) 01/14/2025          Sodium   Date Value Ref Range Status   01/14/2025 139 136 - 145 mmol/L Final     Potassium   Date Value Ref Range Status   01/14/2025 4.4 3.5 - 5.1 mmol/L Final     Chloride   Date Value Ref Range Status   01/14/2025 108 (H) 98 - 107 mmol/L Final     CO2   Date Value Ref Range Status   01/14/2025 25 23 - 31 mmol/L Final     Glucose   Date Value Ref Range Status   01/14/2025 80 (L) 82 - 115 mg/dL Final     BUN   Date Value Ref Range Status   01/14/2025 11 8 - 26 mg/dL Final     Creatinine   Date Value Ref Range Status   01/14/2025 0.87 0.72 - 1.25 mg/dL Final     Calcium   Date Value Ref Range Status   01/14/2025 9.0 8.8 - 10.0 mg/dL Final     Total Protein   Date Value Ref Range Status   01/14/2025 6.8 5.8 - 7.6 g/dL Final     Albumin   Date Value Ref Range Status   01/14/2025 4.2 3.4 - 4.8 g/dL Final     Bilirubin, Total   Date Value Ref Range Status   01/14/2025 0.9 <=1.5 mg/dL Final     Alk Phos   Date Value Ref Range Status   01/14/2025 72 40 - 150 U/L Final     AST   Date Value Ref Range Status   01/14/2025 24 5 - 34 U/L  Final     ALT   Date Value Ref Range Status   01/14/2025 17 <=55 U/L Final     Anion Gap   Date Value Ref Range Status   01/14/2025 10 7 - 16 mmol/L Final     eGFR   Date Value Ref Range Status   04/11/2022 86 >=60 mL/min/1.73m² Final      X-Ray KUB  Narrative: EXAMINATION:  XR ABDOMEN, 1 VIEW    CLINICAL HISTORY:  Constipation.    TECHNIQUE:  AP supine view of abdomen (2 images) obtained.    COMPARISON:  Abdominal radiograph 01/04/2022.  Impression: 1.  No acute abnormality identified:    *Bowel-gas pattern is within normal limits and no evidence of organomegaly, displacing abdominal mass or pneumatosis.  2.  Small amount of predominately right-sided colonic fecal material.    3.  Multiple chronic bilateral renal calculi.    4.  Several prostatic calculi.    5.  Degenerative change-lateral curvature of thoracolumbar spine.    Electronically signed by: Israel Felton  Date:    12/13/2024  Time:    09:18     Procedures   Lab Results   Component Value Date    CHOL 75 01/14/2025    CHOL 65 10/17/2024    CHOL 75 05/09/2024     Lab Results   Component Value Date    HDL 25 (L) 01/14/2025    HDL 27 (L) 10/17/2024    HDL 22 (L) 05/09/2024     Lab Results   Component Value Date    LDLCALC 34 01/14/2025    LDLCALC 22 10/17/2024    LDLCALC 20 05/09/2024     Lab Results   Component Value Date    TRIG 79 01/14/2025    TRIG 80 10/17/2024    TRIG 163 (H) 05/09/2024     Lab Results   Component Value Date    CHOLHDL 3.0 01/14/2025    CHOLHDL 2.4 10/17/2024    CHOLHDL 3.4 05/09/2024      Lab Results   Component Value Date    HGBA1C 5.5 01/14/2025      Lab Results   Component Value Date    TSH 1.230 01/26/2022    R0PCYLE 3.3 (L) 01/02/2022    FREET4 0.94 01/26/2022      Assessment and Plan (including Health Maintenance)      Problem List  Smart Sets  Document Outside HM   :    Plan:         Health Maintenance Due   Topic Date Due    RSV Vaccine (Age 60+ and Pregnant patients) (1 - 1-dose 75+ series) Never done       Problem List Items  Addressed This Visit       ROSI on CPAP    Current Assessment & Plan   History of obstructive sleep apnea and uses CPAP nightly.  He has had no problems.  Will need to follow-up in [place] as is no longer available at [facility].         RESOLVED: Primary hypertension - Primary    Current Assessment & Plan   Blood pressure controlled 2 with his amlodipine.  And carvedilol.  No change in medicines.  Follow-up every 3 months.         Anxiety    Current Assessment & Plan   Patient with a history of anxiety that is controlled with alprazolam.  No history of substance abuse.   pulled and no substance abuse.  Refilled his medicines for 3 months.         Relevant Medications    ALPRAZolam (XANAX) 0.5 MG tablet       Health Maintenance Topics with due status: Not Due       Topic Last Completion Date    TETANUS VACCINE 06/25/2021    Colonoscopy 07/07/2022    PROSTATE-SPECIFIC ANTIGEN 01/14/2025    Hemoglobin A1c (Prediabetes) 01/14/2025    Lipid Panel 01/14/2025    Aspirin/Antiplatelet Therapy 01/30/2025       Future Appointments   Date Time Provider Department Center   7/2/2025  1:40 PM Chevy Malhotra DO [facility]   8/27/2025  3:00 PM AWV NURSE, [facility]   9/2/2025  9:40 AM Juan R Robles, NP OB UROL [facility]        Follow up in about 3 months (around 4/30/2025).     Signature:  DO Vera Briseno[address] [place]  [address]    Date of encounter: 1/30/25

## 2025-03-06 PROBLEM — I10 PRIMARY HYPERTENSION: Status: RESOLVED | Noted: 2022-04-20 | Resolved: 2025-03-06

## 2025-03-06 NOTE — ASSESSMENT & PLAN NOTE
Patient with a history of anxiety that is controlled with alprazolam.  No history of substance abuse.   pulled and no substance abuse.  Refilled his medicines for 3 months.

## 2025-03-06 NOTE — ASSESSMENT & PLAN NOTE
Blood pressure controlled 2 with his amlodipine.  And carvedilol.  No change in medicines.  Follow-up every 3 months.   rolling walker

## 2025-03-06 NOTE — ASSESSMENT & PLAN NOTE
History of obstructive sleep apnea and uses CPAP nightly.  He has had no problems.  Will need to follow-up in [place] as is no longer available at [facility].

## 2025-04-09 DIAGNOSIS — N40.1 BENIGN PROSTATIC HYPERPLASIA WITH URINARY OBSTRUCTION: ICD-10-CM

## 2025-04-09 DIAGNOSIS — N13.8 BENIGN PROSTATIC HYPERPLASIA WITH URINARY OBSTRUCTION: ICD-10-CM

## 2025-04-15 RX ORDER — FINASTERIDE 5 MG/1
5 TABLET, FILM COATED ORAL
Qty: 90 TABLET | Refills: 3 | Status: SHIPPED | OUTPATIENT
Start: 2025-04-15

## 2025-06-19 DIAGNOSIS — E78.2 MIXED HYPERLIPIDEMIA: ICD-10-CM

## 2025-06-19 DIAGNOSIS — N40.1 BENIGN PROSTATIC HYPERPLASIA WITH NOCTURIA: Primary | ICD-10-CM

## 2025-06-19 DIAGNOSIS — I10 PRIMARY HYPERTENSION: ICD-10-CM

## 2025-06-19 DIAGNOSIS — Z13.1 SCREENING FOR DIABETES MELLITUS: ICD-10-CM

## 2025-06-19 DIAGNOSIS — R35.1 BENIGN PROSTATIC HYPERPLASIA WITH NOCTURIA: Primary | ICD-10-CM

## 2025-06-19 DIAGNOSIS — Z12.5 ENCOUNTER FOR SCREENING FOR MALIGNANT NEOPLASM OF PROSTATE: ICD-10-CM

## 2025-06-23 ENCOUNTER — RESULTS FOLLOW-UP (OUTPATIENT)
Dept: FAMILY MEDICINE | Facility: CLINIC | Age: 85
End: 2025-06-23

## 2025-06-23 ENCOUNTER — TELEPHONE (OUTPATIENT)
Dept: FAMILY MEDICINE | Facility: CLINIC | Age: 85
End: 2025-06-23
Payer: MEDICARE

## 2025-06-23 NOTE — TELEPHONE ENCOUNTER
----- Message from Chevy Malhotra DO sent at 6/23/2025  1:15 PM CDT -----  PSA was in the normal range.  The remainder of the labs were also normal with a cholesterol of 66 and an LDL of 28 which is excellent.  No changes in treatment.  ----- Message -----  From: Lab, Background User  Sent: 6/20/2025   4:07 PM CDT  To: Chevy Malhotra DO

## 2025-07-02 ENCOUNTER — OFFICE VISIT (OUTPATIENT)
Dept: FAMILY MEDICINE | Facility: CLINIC | Age: 85
End: 2025-07-02
Payer: MEDICARE

## 2025-07-02 VITALS
HEIGHT: 66 IN | WEIGHT: 170 LBS | TEMPERATURE: 99 F | OXYGEN SATURATION: 96 % | RESPIRATION RATE: 18 BRPM | HEART RATE: 60 BPM | BODY MASS INDEX: 27.32 KG/M2

## 2025-07-02 DIAGNOSIS — R01.1 HEART MURMUR: ICD-10-CM

## 2025-07-02 DIAGNOSIS — G47.33 OSA ON CPAP: ICD-10-CM

## 2025-07-02 DIAGNOSIS — I10 HYPERTENSION, UNSPECIFIED TYPE: ICD-10-CM

## 2025-07-02 DIAGNOSIS — E78.2 MIXED HYPERLIPIDEMIA: Primary | ICD-10-CM

## 2025-07-02 DIAGNOSIS — Z95.5 H/O HEART ARTERY STENT: ICD-10-CM

## 2025-07-02 PROCEDURE — 99213 OFFICE O/P EST LOW 20 MIN: CPT | Mod: ,,, | Performed by: FAMILY MEDICINE

## 2025-07-02 RX ORDER — CARVEDILOL 3.12 MG/1
3.12 TABLET ORAL 2 TIMES DAILY WITH MEALS
Qty: 180 TABLET | Refills: 1 | Status: SHIPPED | OUTPATIENT
Start: 2025-07-02

## 2025-07-02 RX ORDER — TAMSULOSIN HYDROCHLORIDE 0.4 MG/1
0.4 CAPSULE ORAL DAILY
COMMUNITY

## 2025-07-02 RX ORDER — CLOPIDOGREL BISULFATE 75 MG/1
75 TABLET ORAL DAILY
Qty: 90 TABLET | Refills: 1 | Status: SHIPPED | OUTPATIENT
Start: 2025-07-02

## 2025-07-02 NOTE — PROGRESS NOTES
Chevy Malhotra DO   [facility]  [address]  [place] [address]     PATIENT NAME: Elliott Peters  : 1940  DATE: 25  MRN: [id number]        Chief complaint   Chief Complaint   Patient presents with    Follow-up     Mr. Peters presents today for a 6 month follow up. Doing well. No concerns today.  Would like to discuss labs from last week.     Health Maintenance     RSV Vaccine (Age 60+ and Pregnant patients)(1 - 1-dose 75+ series) Never done  Colonoscopy due on 2025  declines       History of Present Illness    CHIEF COMPLAINT:  Patient presents for a follow-up visit to discuss medication changes and review recent lab results.    HPI:  Patient reports recently discontinuing Norvasc on the  and  of the previous month due to hypotension. He has a history of myocardial infarction approximately 7-8 years ago, treated with stent placement. His first stent was placed in 2017 during the heart attack, with additional stents placed about 4 years ago. He is evaluated by a cardiologist, Dr. Ojeda at [facility], every 6 months. He reports mild arthritis symptoms in his hips and back. He received iron infusions 2 years ago due to low iron levels as determined by his healthcare provider, Izzy.    Patient denies chest pain, shortness of breath, numbness, weakness, tingling, indigestion, heartburn, or history of stroke.      ROS:  General: -fever, -chills, -fatigue, -weight gain, -weight loss  Eyes: -vision changes, -redness, -discharge  ENT: -ear pain, -nasal congestion, -sore throat  Cardiovascular: -chest pain, -palpitations, -lower extremity edema, +orthostatic symptoms  Respiratory: -cough, -shortness of breath  Gastrointestinal: -abdominal pain, -nausea, -vomiting, -diarrhea, -constipation, -blood in stool  Genitourinary: -dysuria, -hematuria, -frequency  Musculoskeletal: -joint pain, -muscle pain, +limb pain, +back pain  Skin: -rash, -lesion  Neurological: -headache, -dizziness,  -numbness, -tingling  Psychiatric: -anxiety, -depression, -sleep difficulty             Current Medications[1]   Past Medical History:   Diagnosis Date    Acute pain of right knee 12/30/2021    Allergic reaction 05/17/2021    Anxiety     Arthritis     BPH (benign prostatic hyperplasia)     Contact dermatitis 08/11/2021    Coronary artery disease     cardiac stents    Enlarged lymph node 10/19/2022    Hematoma 10/19/2022    Hyperlipidemia     Hypertension     Kidney stone     MI (myocardial infarction)     Primary insomnia     Sleep apnea     Staphylococcal infection of skin 05/17/2021    Upper respiratory tract infection 12/26/2023    Urinary tract infection       Family History   Problem Relation Name Age of Onset    Colon cancer Mother      Heart attack Father      Cystic fibrosis Daughter      No Known Problems Son      No Known Problems Maternal Grandmother      No Known Problems Maternal Grandfather      No Known Problems Paternal Grandmother      No Known Problems Paternal Grandfather        Past Surgical History:   Procedure Laterality Date    CORONARY ANGIOPLASTY WITH STENT PLACEMENT      HERNIA REPAIR      Inguinal hernia    JOINT REPLACEMENT Bilateral     difficult to wake after surgery  Knee replacements    PROSTATECTOMY  2012        Physical Exam  Constitutional:       General: He is not in acute distress.     Appearance: Normal appearance. He is normal weight.   HENT:      Head: Normocephalic.      Nose: Nose normal.      Mouth/Throat:      Mouth: Mucous membranes are moist.      Pharynx: Oropharynx is clear. No oropharyngeal exudate or posterior oropharyngeal erythema.   Eyes:      General: No scleral icterus.        Right eye: No discharge.         Left eye: No discharge.      Conjunctiva/sclera: Conjunctivae normal.      Pupils: Pupils are equal, round, and reactive to light.   Neck:      Vascular: No carotid bruit.   Cardiovascular:      Rate and Rhythm: Normal rate and regular rhythm.      Pulses:  "Normal pulses.      Heart sounds: Murmur heard.      No gallop.   Pulmonary:      Effort: Pulmonary effort is normal.      Breath sounds: Normal breath sounds. No wheezing.   Abdominal:      General: Abdomen is flat. Bowel sounds are normal.      Tenderness: There is no abdominal tenderness.   Musculoskeletal:         General: Normal range of motion.      Cervical back: Normal range of motion and neck supple.      Right lower leg: No edema.      Left lower leg: No edema.   Skin:     General: Skin is warm.      Capillary Refill: Capillary refill takes less than 2 seconds.      Findings: No rash.   Neurological:      General: No focal deficit present.      Mental Status: He is alert and oriented to person, place, and time. Mental status is at baseline.      Cranial Nerves: No cranial nerve deficit.      Sensory: No sensory deficit.      Motor: No weakness.      Coordination: Coordination normal.      Gait: Gait normal.      Deep Tendon Reflexes: Reflexes normal.   Psychiatric:         Mood and Affect: Mood normal.         Behavior: Behavior normal.         Thought Content: Thought content normal.         Judgment: Judgment normal.        Objective   BP (!) (P) 112/59 (BP Location: Left arm, Patient Position: Sitting)   Pulse 60   Temp 98.5 °F (36.9 °C) (Oral)   Resp 18   Ht 5' 6" (1.676 m)   Wt 77.1 kg (170 lb)   SpO2 96%   BMI 27.44 kg/m²          LABS:   Recent Results (from the past 2 weeks)   CBC with Differential    Collection Time: 06/20/25  8:16 AM   Result Value Ref Range    WBC 5.39 4.50 - 11.00 K/uL    Hemoglobin 13.3 (L) 13.5 - 18.0 g/dL    Hematocrit 42.5 40.0 - 54.0 %    Platelet Count 147 (L) 150 - 400 K/uL      CMP  Sodium   Date Value Ref Range Status   06/20/2025 138 136 - 145 mmol/L Final     Potassium   Date Value Ref Range Status   06/20/2025 4.6 3.5 - 5.1 mmol/L Final     Chloride   Date Value Ref Range Status   06/20/2025 106 98 - 107 mmol/L Final     CO2   Date Value Ref Range Status " "  06/20/2025 26 23 - 31 mmol/L Final     Glucose   Date Value Ref Range Status   06/20/2025 83 82 - 115 mg/dL Final     BUN   Date Value Ref Range Status   06/20/2025 11 8 - 26 mg/dL Final     Creatinine   Date Value Ref Range Status   06/20/2025 0.85 0.72 - 1.25 mg/dL Final     Calcium   Date Value Ref Range Status   06/20/2025 9.5 8.8 - 10.0 mg/dL Final     Total Protein   Date Value Ref Range Status   06/20/2025 7.0 5.8 - 7.6 g/dL Final     Albumin   Date Value Ref Range Status   06/20/2025 3.9 3.4 - 4.8 g/dL Final     Bilirubin, Total   Date Value Ref Range Status   06/20/2025 0.6 <=1.5 mg/dL Final     Alk Phos   Date Value Ref Range Status   06/20/2025 76 40 - 150 U/L Final     AST   Date Value Ref Range Status   06/20/2025 19 11 - 45 U/L Final     ALT   Date Value Ref Range Status   06/20/2025 13 <=55 U/L Final     Anion Gap   Date Value Ref Range Status   06/20/2025 11 7 - 16 mmol/L Final     eGFR   Date Value Ref Range Status   06/20/2025 85 >=60 mL/min/1.73m2 Final     Comment:     Estimated GFR calculated using the CKD-EPI creatinine (2021) equation.      Lab Results   Component Value Date    HGBA1C 5.5 06/20/2025    HGBA1C 5.5 01/14/2025    HGBA1C 5.4 10/22/2024      Lab Results   Component Value Date    CHOL 66 06/20/2025    CHOL 75 01/14/2025    CHOL 65 10/17/2024      Lab Results   Component Value Date    HDL 21 (L) 06/20/2025    HDL 25 (L) 01/14/2025    HDL 27 (L) 10/17/2024     Lab Results   Component Value Date    LDLCALC 28 06/20/2025    LDLCALC 34 01/14/2025    LDLCALC 22 10/17/2024      Lab Results   Component Value Date    TRIG 83 06/20/2025    TRIG 79 01/14/2025    TRIG 80 10/17/2024     No results found for: "TOTALCHOLEST"  Lab Results   Component Value Date    NONHDLCHOL 45 06/20/2025    NONHDLCHOL 50 01/14/2025    NONHDLCHOL 38 10/17/2024     Lab Results   Component Value Date    CHOLHDL 3.1 06/20/2025    CHOLHDL 3.0 01/14/2025    CHOLHDL 2.4 10/17/2024        No images are attached to the " "encounter.     Assessment    Problem List Items Addressed This Visit       ROSI on CPAP    Patient has obstructive sleep apnea no changes in follow-up with sleep medicine.         Mixed hyperlipidemia - Primary    Lab Results   Component Value Date    CHOL 66 06/20/2025    CHOL 75 01/14/2025    CHOL 65 10/17/2024      Lab Results   Component Value Date    HDL 21 (L) 06/20/2025    HDL 25 (L) 01/14/2025    HDL 27 (L) 10/17/2024     Lab Results   Component Value Date    LDLCALC 28 06/20/2025    LDLCALC 34 01/14/2025    LDLCALC 22 10/17/2024      Lab Results   Component Value Date    TRIG 83 06/20/2025    TRIG 79 01/14/2025    TRIG 80 10/17/2024     No results found for: "TOTALCHOLEST"  Lab Results   Component Value Date    NONHDLCHOL 45 06/20/2025    NONHDLCHOL 50 01/14/2025    NONHDLCHOL 38 10/17/2024     Lab Results   Component Value Date    CHOLHDL 3.1 06/20/2025    CHOLHDL 3.0 01/14/2025    CHOLHDL 2.4 10/17/2024    Last LDL was 28.  Good control of his cholesterol.  No changes in his treatment.  Copy of these to his cardiologist         H/O heart artery stent    Stents placed without complications.  No angina.  Patient currently on aspirin.  No changes         Relevant Medications    clopidogreL (PLAVIX) 75 mg tablet    Heart murmur    History of heart murmur currently gets echos yearly.  No change in treatment.          Other Visit Diagnoses         Hypertension, unspecified type        Relevant Medications    carvediloL (COREG) 3.125 MG tablet            Assessment & Plan    IMPRESSION:  - Discontinued Norvasc due to low BP.  - Assessed cardiovascular risk factors, noting history of heart attack and stents.  - Reviewed recent lab results: total cholesterol 66, LDL 28.  - Evaluated iron status indirectly through CBC, determining patient is not anemic.  - Noted presence of heart murmur, emphasizing importance of regular ECHO monitoring.  - Current statin therapy with rosuvastatin 20 mg daily " appropriate.    HYPOTENSION:  - Measured the patient's blood pressure at 112/59 today, confirming hypotension.  - Patient discontinued Norvasc on the 5th and 19th due to low blood pressure readings.  - Discussed the effects of hypotension with the patient and explained the rationale for stopping Norvasc due to these consistently low readings.    OLD MYOCARDIAL INFARCTION:  - Noted that the patient had a myocardial infarction 7 years ago and has coronary stents.  - Educated the patient on the importance of keeping nitroglycerin up to date, replacing every 6 months.  - Instructed on proper use of nitroglycerin for chest pain: 1 dose every 5 minutes, and to call emergency services if a 3rd dose is needed.    CARDIAC MURMUR:  - Evaluated the patient's cardiac murmur, which is known to the patient's cardiologist and monitored with echocardiograms annually.  - Advised the patient to maintain regular cardiac murmur monitoring through these annual echocardiograms.    LIPOPROTEIN DEFICIENCY:  - Monitored the patient's cholesterol levels, with total cholesterol at 66 and LDL at 28.  - These values are within the desired range, and no further reduction is necessary.  - Advised the patient to continue current regimen as cholesterol levels are optimal.  - Explained the significance of maintaining these levels, particularly LDL.    ARTHRITIS:  - Noted that the patient reports mild arthritis symptoms in both hips and back.  - These symptoms are not severe and currently require no intervention.    CORONARY STENTS:  - Noted that the patient has coronary stents, with the most recent placement 4 years ago.  - Advised the patient to follow up with cardiologist Dr. Ojeda twice a year as recommended after stent placement.    FOLLOW-UP:  - Advised to follow up with cardiologist Dr. Ojeda as scheduled.  - Discussed potential for aligning future visits with cardiologist and primary care, alternating every 3 months.          This note was  generated with the assistance of ambient listening technology. Verbal consent was obtained by the patient and accompanying visitor(s) for the recording of patient appointment to facilitate this note. I attest to having reviewed and edited the generated note for accuracy, though some syntax or spelling errors may persist. Please contact the author of this note for any clarification.     Follow up in about 3 months (around 10/2/2025).          [1]   Current Outpatient Medications:     ALPRAZolam (XANAX) 0.5 MG tablet, Take 1 tablet (0.5 mg total) by mouth 2 (two) times daily as needed for Anxiety., Disp: 90 tablet, Rfl: 0    aspirin (ECOTRIN) 81 MG EC tablet, Take 81 mg by mouth once daily., Disp: , Rfl:     docusate sodium (COLACE) 100 MG capsule, Take 100 mg by mouth once daily., Disp: , Rfl:     finasteride (PROSCAR) 5 mg tablet, TAKE 1 TABLET ONE TIME DAILY, Disp: 90 tablet, Rfl: 3    rosuvastatin (CRESTOR) 40 MG Tab, Take 1 tablet (40 mg total) by mouth every evening. (Patient taking differently: Take 20 mg by mouth every evening.), Disp: 90 tablet, Rfl: 3    terazosin (HYTRIN) 5 MG capsule, Take 1 capsule (5 mg total) by mouth every evening., Disp: 90 capsule, Rfl: 3    carvediloL (COREG) 3.125 MG tablet, Take 1 tablet (3.125 mg total) by mouth 2 (two) times daily with meals., Disp: 180 tablet, Rfl: 1    clopidogreL (PLAVIX) 75 mg tablet, Take 1 tablet (75 mg total) by mouth once daily., Disp: 90 tablet, Rfl: 1    EPINEPHrine (EPIPEN) 0.3 mg/0.3 mL AtIn, , Disp: , Rfl:     linaCLOtide (LINZESS) 145 mcg Cap capsule, Take 145 mcg by mouth before breakfast. (Patient not taking: Reported on 7/2/2025), Disp: , Rfl:     nitroGLYCERIN (NITROSTAT) 0.4 MG SL tablet, Place 1 tablet (0.4 mg total) under the tongue every 5 (five) minutes as needed for Chest pain., Disp: 100 tablet, Rfl: 0    polyethylene glycol (GLYCOLAX) 17 gram/dose powder, Take 17 g by mouth once daily. (Patient not taking: Reported on 7/2/2025), Disp:  1530 g, Rfl: 3    tamsulosin (FLOMAX) 0.4 mg Cap, Take 0.4 mg by mouth once daily. (Patient not taking: Reported on 7/2/2025), Disp: , Rfl:

## 2025-07-02 NOTE — ASSESSMENT & PLAN NOTE
"Lab Results   Component Value Date    CHOL 66 06/20/2025    CHOL 75 01/14/2025    CHOL 65 10/17/2024      Lab Results   Component Value Date    HDL 21 (L) 06/20/2025    HDL 25 (L) 01/14/2025    HDL 27 (L) 10/17/2024     Lab Results   Component Value Date    LDLCALC 28 06/20/2025    LDLCALC 34 01/14/2025    LDLCALC 22 10/17/2024      Lab Results   Component Value Date    TRIG 83 06/20/2025    TRIG 79 01/14/2025    TRIG 80 10/17/2024     No results found for: "TOTALCHOLEST"  Lab Results   Component Value Date    NONHDLCHOL 45 06/20/2025    NONHDLCHOL 50 01/14/2025    NONHDLCHOL 38 10/17/2024     Lab Results   Component Value Date    CHOLHDL 3.1 06/20/2025    CHOLHDL 3.0 01/14/2025    CHOLHDL 2.4 10/17/2024    Last LDL was 28.  Good control of his cholesterol.  No changes in his treatment.  Copy of these to his cardiologist  "

## 2025-08-27 ENCOUNTER — OFFICE VISIT (OUTPATIENT)
Dept: FAMILY MEDICINE | Facility: CLINIC | Age: 85
End: 2025-08-27
Payer: MEDICARE

## 2025-08-27 VITALS
OXYGEN SATURATION: 99 % | TEMPERATURE: 98 F | SYSTOLIC BLOOD PRESSURE: 104 MMHG | WEIGHT: 174.06 LBS | DIASTOLIC BLOOD PRESSURE: 74 MMHG | HEART RATE: 66 BPM | RESPIRATION RATE: 20 BRPM | BODY MASS INDEX: 27.97 KG/M2 | HEIGHT: 66 IN

## 2025-08-27 DIAGNOSIS — D69.6 THROMBOCYTOPENIA: ICD-10-CM

## 2025-08-27 DIAGNOSIS — Z00.00 ENCOUNTER FOR SUBSEQUENT ANNUAL WELLNESS VISIT (AWV) IN MEDICARE PATIENT: Primary | ICD-10-CM

## 2025-08-27 DIAGNOSIS — E78.2 MIXED HYPERLIPIDEMIA: ICD-10-CM

## 2025-08-27 DIAGNOSIS — I10 PRIMARY HYPERTENSION: ICD-10-CM

## 2025-08-27 DIAGNOSIS — N40.1 BENIGN PROSTATIC HYPERPLASIA WITH NOCTURIA: ICD-10-CM

## 2025-08-27 DIAGNOSIS — R35.1 BENIGN PROSTATIC HYPERPLASIA WITH NOCTURIA: ICD-10-CM

## 2025-08-27 DIAGNOSIS — F41.9 ANXIETY: ICD-10-CM

## 2025-08-27 DIAGNOSIS — K59.04 CHRONIC IDIOPATHIC CONSTIPATION: ICD-10-CM

## 2025-08-27 DIAGNOSIS — I25.118 ATHEROSCLEROTIC HEART DISEASE OF NATIVE CORONARY ARTERY WITH OTHER FORMS OF ANGINA PECTORIS: ICD-10-CM

## 2025-08-27 DIAGNOSIS — Z79.899 HIGH RISK MEDICATION USE: ICD-10-CM

## 2025-08-27 PROCEDURE — G0439 PPPS, SUBSEQ VISIT: HCPCS | Mod: ,,, | Performed by: NURSE PRACTITIONER

## 2025-09-02 ENCOUNTER — OFFICE VISIT (OUTPATIENT)
Dept: UROLOGY | Facility: CLINIC | Age: 85
End: 2025-09-02
Payer: MEDICARE

## 2025-09-02 VITALS
HEART RATE: 69 BPM | OXYGEN SATURATION: 98 % | SYSTOLIC BLOOD PRESSURE: 123 MMHG | BODY MASS INDEX: 27.97 KG/M2 | WEIGHT: 174 LBS | HEIGHT: 66 IN | DIASTOLIC BLOOD PRESSURE: 55 MMHG

## 2025-09-02 DIAGNOSIS — N41.1 CHRONIC PROSTATITIS: Chronic | ICD-10-CM

## 2025-09-02 DIAGNOSIS — R35.1 NOCTURIA: ICD-10-CM

## 2025-09-02 DIAGNOSIS — N40.1 BENIGN PROSTATIC HYPERPLASIA WITH URINARY OBSTRUCTION: ICD-10-CM

## 2025-09-02 DIAGNOSIS — N13.8 BENIGN PROSTATIC HYPERPLASIA WITH URINARY OBSTRUCTION: ICD-10-CM

## 2025-09-02 DIAGNOSIS — N32.0 BLADDER NECK OBSTRUCTION: ICD-10-CM

## 2025-09-02 DIAGNOSIS — N20.0 KIDNEY STONE: Primary | Chronic | ICD-10-CM

## 2025-09-02 DIAGNOSIS — R35.0 URINARY FREQUENCY: ICD-10-CM

## 2025-09-02 PROBLEM — Z98.890 STATUS POST CARDIAC CATHETERIZATION: Status: ACTIVE | Noted: 2017-04-10

## 2025-09-02 PROBLEM — I25.2 OLD MI (MYOCARDIAL INFARCTION): Status: ACTIVE | Noted: 2018-03-05

## 2025-09-02 PROBLEM — Z80.0 FAMILY HISTORY OF MALIGNANT NEOPLASM OF DIGESTIVE ORGANS: Status: ACTIVE | Noted: 2025-09-02

## 2025-09-02 PROCEDURE — 99215 OFFICE O/P EST HI 40 MIN: CPT | Mod: PBBFAC | Performed by: NURSE PRACTITIONER

## 2025-09-02 PROCEDURE — 99999 PR PBB SHADOW E&M-EST. PATIENT-LVL V: CPT | Mod: PBBFAC,,, | Performed by: NURSE PRACTITIONER

## 2025-09-02 PROCEDURE — 99214 OFFICE O/P EST MOD 30 MIN: CPT | Mod: S$PBB,,, | Performed by: NURSE PRACTITIONER

## 2025-09-03 ENCOUNTER — TELEPHONE (OUTPATIENT)
Dept: FAMILY MEDICINE | Facility: CLINIC | Age: 85
End: 2025-09-03
Payer: MEDICARE